# Patient Record
Sex: FEMALE | Race: WHITE | Employment: OTHER | URBAN - METROPOLITAN AREA
[De-identification: names, ages, dates, MRNs, and addresses within clinical notes are randomized per-mention and may not be internally consistent; named-entity substitution may affect disease eponyms.]

---

## 2017-04-05 RX ORDER — MELOXICAM 7.5 MG/1
TABLET ORAL
Qty: 30 TABLET | Refills: 0 | Status: SHIPPED | OUTPATIENT
Start: 2017-04-05 | End: 2017-05-05 | Stop reason: SDUPTHER

## 2017-09-05 ENCOUNTER — OFFICE VISIT (OUTPATIENT)
Dept: FAMILY MEDICINE CLINIC | Age: 82
End: 2017-09-05

## 2017-09-05 VITALS
HEART RATE: 94 BPM | RESPIRATION RATE: 16 BRPM | BODY MASS INDEX: 19.14 KG/M2 | OXYGEN SATURATION: 95 % | WEIGHT: 101.4 LBS | TEMPERATURE: 98 F | HEIGHT: 61 IN | SYSTOLIC BLOOD PRESSURE: 116 MMHG | DIASTOLIC BLOOD PRESSURE: 72 MMHG

## 2017-09-05 DIAGNOSIS — J06.9 VIRAL URI: ICD-10-CM

## 2017-09-05 DIAGNOSIS — Z91.09 ENVIRONMENTAL ALLERGIES: Primary | ICD-10-CM

## 2017-09-05 PROCEDURE — 99213 OFFICE O/P EST LOW 20 MIN: CPT | Performed by: NURSE PRACTITIONER

## 2017-09-05 RX ORDER — AZITHROMYCIN 250 MG/1
TABLET, FILM COATED ORAL
Qty: 1 PACKET | Refills: 0 | Status: SHIPPED | OUTPATIENT
Start: 2017-09-05 | End: 2017-09-15

## 2017-09-05 RX ORDER — MONTELUKAST SODIUM 10 MG/1
10 TABLET ORAL DAILY
Qty: 30 TABLET | Refills: 3 | Status: SHIPPED | OUTPATIENT
Start: 2017-09-05 | End: 2017-11-13 | Stop reason: ALTCHOICE

## 2017-09-05 ASSESSMENT — ENCOUNTER SYMPTOMS
SHORTNESS OF BREATH: 1
COUGH: 1
RHINORRHEA: 1

## 2017-11-13 ENCOUNTER — OFFICE VISIT (OUTPATIENT)
Dept: FAMILY MEDICINE CLINIC | Age: 82
End: 2017-11-13

## 2017-11-13 VITALS
WEIGHT: 100 LBS | DIASTOLIC BLOOD PRESSURE: 74 MMHG | SYSTOLIC BLOOD PRESSURE: 126 MMHG | RESPIRATION RATE: 14 BRPM | BODY MASS INDEX: 19.63 KG/M2 | HEIGHT: 60 IN | HEART RATE: 82 BPM

## 2017-11-13 DIAGNOSIS — M25.511 ACUTE PAIN OF RIGHT SHOULDER: ICD-10-CM

## 2017-11-13 DIAGNOSIS — M19.019 SHOULDER ARTHRITIS: ICD-10-CM

## 2017-11-13 DIAGNOSIS — S20.212D CONTUSION OF RIB ON LEFT SIDE, SUBSEQUENT ENCOUNTER: ICD-10-CM

## 2017-11-13 DIAGNOSIS — R03.0 ELEVATED BP WITHOUT DIAGNOSIS OF HYPERTENSION: Primary | ICD-10-CM

## 2017-11-13 PROCEDURE — 99213 OFFICE O/P EST LOW 20 MIN: CPT | Performed by: FAMILY MEDICINE

## 2017-11-13 ASSESSMENT — PATIENT HEALTH QUESTIONNAIRE - PHQ9
SUM OF ALL RESPONSES TO PHQ9 QUESTIONS 1 & 2: 0
1. LITTLE INTEREST OR PLEASURE IN DOING THINGS: 0
SUM OF ALL RESPONSES TO PHQ QUESTIONS 1-9: 0
2. FEELING DOWN, DEPRESSED OR HOPELESS: 0

## 2017-11-13 NOTE — ADDENDUM NOTE
Encounter addended by: Ash Ugalde MA on: 11/13/2017 12:20 PM<BR>    Actions taken: Letter status changed

## 2017-11-13 NOTE — PROGRESS NOTES
Subjective:      Patient ID: Partha Trujillo is a 80 y.o. female. HPI  Went up 3-4 steps up front steps and fell back down steps - reached for rail, missed it and lost balance - no dizzyness  No dizzyness -missed step - hit back of head but no serious injury - hurts to raise arm - able to do it - pain in deltoid area/ upper humerus  Left rib pain - still painful  W/ movement  Happened 5 days ago  Chief Complaint   Patient presents with    Follow-Up from [address] [place] [address], ELEVATED BP BACK AND RIGHT SHOULDER ARE STILL VERY SORE, NO FRACTURE FOUND. BP Readings from Last 3 Encounters:   11/13/17 126/74   11/08/17 (!) 187/97   09/05/17 116/72     Pulse Readings from Last 3 Encounters:   11/13/17 82   11/08/17 99   09/05/17 94     Wt Readings from Last 3 Encounters:   11/13/17 100 lb (45.4 kg)   11/08/17 103 lb 9.9 oz (47 kg)   09/05/17 101 lb 6.4 oz (46 kg)   no sx of high bp - high in ER -  High tolerance for pain ? - still pretty uncomfortable  bp can go up if mad  Taking kid size tylenol only  - seems to help some    Review of Systems    Objective:   Physical Exam   Constitutional: She is oriented to person, place, and time. She appears well-developed and well-nourished. No distress. HENT:   Head: Normocephalic and atraumatic. Mouth/Throat: Oropharynx is clear and moist.   Scalp nontender w/o trauma   Eyes: Conjunctivae are normal. No scleral icterus. Cardiovascular: Normal rate, regular rhythm and normal heart sounds. No murmur heard. Pulmonary/Chest: Effort normal and breath sounds normal. No respiratory distress. She has no wheezes. She has no rales. She exhibits tenderness (tender left lateral low rib cage to spine on left side). Abdominal: Soft. Bowel sounds are normal. She exhibits no distension. There is no tenderness. Musculoskeletal: She exhibits no edema.    Right shoulder good rom - some ttp to flex shoulder  Neg impingement   Neurological: She is alert and oriented to

## 2018-02-05 ENCOUNTER — OFFICE VISIT (OUTPATIENT)
Dept: FAMILY MEDICINE CLINIC | Age: 83
End: 2018-02-05

## 2018-02-05 VITALS
BODY MASS INDEX: 19.83 KG/M2 | DIASTOLIC BLOOD PRESSURE: 82 MMHG | WEIGHT: 101 LBS | SYSTOLIC BLOOD PRESSURE: 158 MMHG | HEIGHT: 60 IN | HEART RATE: 94 BPM

## 2018-02-05 DIAGNOSIS — R42 ACUTE ONSET OF SEVERE VERTIGO: ICD-10-CM

## 2018-02-05 DIAGNOSIS — M19.90 ARTHRITIS: ICD-10-CM

## 2018-02-05 DIAGNOSIS — R03.0 ELEVATED BP WITHOUT DIAGNOSIS OF HYPERTENSION: Primary | ICD-10-CM

## 2018-02-05 PROCEDURE — 99214 OFFICE O/P EST MOD 30 MIN: CPT | Performed by: FAMILY MEDICINE

## 2018-02-05 RX ORDER — MECLIZINE HCL 12.5 MG/1
12.5 TABLET ORAL
Qty: 30 TABLET | Refills: 0 | Status: SHIPPED | OUTPATIENT
Start: 2018-02-05 | End: 2022-05-12 | Stop reason: SDUPTHER

## 2018-02-05 RX ORDER — MELOXICAM 7.5 MG/1
TABLET ORAL
Qty: 90 TABLET | Refills: 1 | Status: SHIPPED | OUTPATIENT
Start: 2018-02-05 | End: 2019-05-09

## 2018-02-05 NOTE — LETTER
[address] [place] [id number]  Phone: [phone]  Fax: [phone] German Sherman MD        February 5, 2018     Patient: Neo Carlton   YOB: 1931   Date of Visit: 2/5/2018       To Whom it May Concern:    Melinda Durham was seen in my clinic on 2/5/2018. She should be excused from work from 2/4/2018-2/11/2018. If you have any questions or concerns, please don't hesitate to call.     Sincerely,         Raman Arevalo MD

## 2018-02-09 ENCOUNTER — OFFICE VISIT (OUTPATIENT)
Dept: FAMILY MEDICINE CLINIC | Age: 83
End: 2018-02-09

## 2018-02-09 VITALS
WEIGHT: 101 LBS | BODY MASS INDEX: 19.83 KG/M2 | RESPIRATION RATE: 16 BRPM | SYSTOLIC BLOOD PRESSURE: 148 MMHG | DIASTOLIC BLOOD PRESSURE: 84 MMHG | HEART RATE: 90 BPM | OXYGEN SATURATION: 96 % | HEIGHT: 60 IN

## 2018-02-09 DIAGNOSIS — I49.9 IRREGULAR HEART RATE: ICD-10-CM

## 2018-02-09 DIAGNOSIS — R03.0 ELEVATED BP WITHOUT DIAGNOSIS OF HYPERTENSION: Primary | ICD-10-CM

## 2018-02-09 DIAGNOSIS — I35.0 MODERATE AORTIC STENOSIS: ICD-10-CM

## 2018-02-09 DIAGNOSIS — I50.32 CHRONIC CONGESTIVE HEART FAILURE WITH LEFT VENTRICULAR DIASTOLIC DYSFUNCTION (HCC): ICD-10-CM

## 2018-02-09 PROCEDURE — 99213 OFFICE O/P EST LOW 20 MIN: CPT | Performed by: FAMILY MEDICINE

## 2018-02-09 PROCEDURE — 93000 ELECTROCARDIOGRAM COMPLETE: CPT | Performed by: FAMILY MEDICINE

## 2018-02-09 RX ORDER — METOPROLOL SUCCINATE 25 MG/1
12.5-25 TABLET, EXTENDED RELEASE ORAL DAILY
Qty: 30 TABLET | Refills: 2 | Status: SHIPPED | OUTPATIENT
Start: 2018-02-09 | End: 2019-03-22 | Stop reason: ALTCHOICE

## 2018-02-09 NOTE — PROGRESS NOTES
Subjective:      Patient ID: Lynda Salazar is a 80 y.o. female. HPI  Chief Complaint   Patient presents with    Hypertension     LOW PULSE     BP Readings from Last 3 Encounters:   02/09/18 (!) 156/84   02/05/18 (!) 158/82   11/13/17 126/74     Pulse Readings from Last 3 Encounters:   02/09/18 90   02/05/18 94   11/13/17 82     Wt Readings from Last 3 Encounters:   02/09/18 101 lb (45.8 kg)   02/05/18 101 lb (45.8 kg)   11/13/17 100 lb (45.4 kg)     Feeling more tired  Hr 74 earlier  Got up this am to clean bathroom  bp had been ok on check 1-2 days ago but high this am - 158/110  Still slight swimmy in brain but not near as bad  Pulse 88 at home other day. Taking meclizine regularly - cut back to one yesterday - took mobic yesterday -helps oa pain in hands  Taking naps - ? More tired  No cp/palp. Review of Systems    Objective:   Physical Exam   Constitutional: She is oriented to person, place, and time. She appears well-developed and well-nourished. No distress. HENT:   Head: Normocephalic and atraumatic. Mouth/Throat: Oropharynx is clear and moist.   Eyes: Conjunctivae are normal. No scleral icterus. Cardiovascular: Normal rate, regular rhythm and normal heart sounds. No murmur heard. Pulmonary/Chest: Effort normal and breath sounds normal. No respiratory distress. She has no wheezes. She has no rales. Abdominal: Soft. Bowel sounds are normal. She exhibits no distension. There is no tenderness. Musculoskeletal: She exhibits no edema. Neurological: She is alert and oriented to person, place, and time. Skin: Skin is warm and dry. Psychiatric: She has a normal mood and affect. Assessment:      1. Elevated BP without diagnosis of hypertension     2. Irregular heart rate  EKG 12 Lead   3. Moderate aortic stenosis     4.  Chronic congestive heart failure with left ventricular diastolic dysfunction (HCC)             Plan:      Monitor bp - precautions d/ w pt including low salt/ caffeine moderation/ diet/ exercise w/ low fat / increased fruit / veggies d/w pt - consider low dose beta blocker for   Echo reviewed  ekg w/o concerning results  dizzyness improved -meclizine prn  mobic prn w/ food  toprol xl 25 -1/2 to 1 po daily w/ monitoring.

## 2018-05-07 ENCOUNTER — TELEPHONE (OUTPATIENT)
Dept: FAMILY MEDICINE CLINIC | Age: 83
End: 2018-05-07

## 2018-05-07 ENCOUNTER — OFFICE VISIT (OUTPATIENT)
Dept: FAMILY MEDICINE CLINIC | Age: 83
End: 2018-05-07

## 2018-05-07 VITALS
DIASTOLIC BLOOD PRESSURE: 82 MMHG | SYSTOLIC BLOOD PRESSURE: 150 MMHG | HEIGHT: 60 IN | OXYGEN SATURATION: 98 % | HEART RATE: 117 BPM

## 2018-05-07 DIAGNOSIS — I49.9 IRREGULAR HEART BEAT: Primary | ICD-10-CM

## 2018-05-07 DIAGNOSIS — R01.1 HEART MURMUR: ICD-10-CM

## 2018-05-07 DIAGNOSIS — I49.8 SINUS ARRHYTHMIA: ICD-10-CM

## 2018-05-07 PROCEDURE — 93000 ELECTROCARDIOGRAM COMPLETE: CPT | Performed by: FAMILY MEDICINE

## 2018-05-07 PROCEDURE — 99213 OFFICE O/P EST LOW 20 MIN: CPT | Performed by: FAMILY MEDICINE

## 2018-12-12 RX ORDER — MELOXICAM 7.5 MG/1
TABLET ORAL
Qty: 30 TABLET | Refills: 3 | Status: SHIPPED | OUTPATIENT
Start: 2018-12-12 | End: 2019-03-22 | Stop reason: ALTCHOICE

## 2019-03-22 ENCOUNTER — TELEPHONE (OUTPATIENT)
Dept: FAMILY MEDICINE CLINIC | Age: 84
End: 2019-03-22

## 2019-03-22 ENCOUNTER — HOSPITAL ENCOUNTER (OUTPATIENT)
Dept: GENERAL RADIOLOGY | Age: 84
Discharge: HOME | End: 2019-03-22
Payer: MEDICARE

## 2019-03-22 ENCOUNTER — HOSPITAL ENCOUNTER (OUTPATIENT)
Dept: NON INVASIVE DIAGNOSTICS | Age: 84
Discharge: HOME | End: 2019-03-22
Payer: MEDICARE

## 2019-03-22 ENCOUNTER — OFFICE VISIT (OUTPATIENT)
Dept: FAMILY MEDICINE CLINIC | Age: 84
End: 2019-03-22
Payer: MEDICARE

## 2019-03-22 VITALS
DIASTOLIC BLOOD PRESSURE: 74 MMHG | HEART RATE: 90 BPM | RESPIRATION RATE: 20 BRPM | BODY MASS INDEX: 19.73 KG/M2 | SYSTOLIC BLOOD PRESSURE: 130 MMHG | HEIGHT: 60 IN | TEMPERATURE: 97.9 F | OXYGEN SATURATION: 96 %

## 2019-03-22 DIAGNOSIS — I35.0 SEVERE AORTIC STENOSIS: Primary | ICD-10-CM

## 2019-03-22 DIAGNOSIS — R06.02 SOB (SHORTNESS OF BREATH): ICD-10-CM

## 2019-03-22 DIAGNOSIS — J06.9 VIRAL URI: ICD-10-CM

## 2019-03-22 DIAGNOSIS — R06.02 SOB (SHORTNESS OF BREATH): Primary | ICD-10-CM

## 2019-03-22 DIAGNOSIS — I35.0 AORTIC VALVE STENOSIS, ETIOLOGY OF CARDIAC VALVE DISEASE UNSPECIFIED: ICD-10-CM

## 2019-03-22 LAB
LV EF: 63 %
LVEF MODALITY: NORMAL

## 2019-03-22 PROCEDURE — 99213 OFFICE O/P EST LOW 20 MIN: CPT | Performed by: FAMILY MEDICINE

## 2019-03-22 PROCEDURE — 93306 TTE W/DOPPLER COMPLETE: CPT

## 2019-03-22 PROCEDURE — 71046 X-RAY EXAM CHEST 2 VIEWS: CPT

## 2019-03-22 RX ORDER — DOXYCYCLINE HYCLATE 100 MG
100 TABLET ORAL 2 TIMES DAILY
Qty: 14 TABLET | Refills: 0 | Status: SHIPPED | OUTPATIENT
Start: 2019-03-22 | End: 2019-03-29

## 2019-03-22 ASSESSMENT — PATIENT HEALTH QUESTIONNAIRE - PHQ9
SUM OF ALL RESPONSES TO PHQ9 QUESTIONS 1 & 2: 0
2. FEELING DOWN, DEPRESSED OR HOPELESS: 0
SUM OF ALL RESPONSES TO PHQ QUESTIONS 1-9: 0
1. LITTLE INTEREST OR PLEASURE IN DOING THINGS: 0
SUM OF ALL RESPONSES TO PHQ QUESTIONS 1-9: 0

## 2019-03-26 ENCOUNTER — OFFICE VISIT (OUTPATIENT)
Dept: CARDIOLOGY CLINIC | Age: 84
End: 2019-03-26
Payer: MEDICARE

## 2019-03-26 VITALS
HEIGHT: 60 IN | SYSTOLIC BLOOD PRESSURE: 160 MMHG | WEIGHT: 97 LBS | HEART RATE: 94 BPM | BODY MASS INDEX: 19.04 KG/M2 | DIASTOLIC BLOOD PRESSURE: 80 MMHG | OXYGEN SATURATION: 95 %

## 2019-03-26 DIAGNOSIS — I10 ESSENTIAL HYPERTENSION: ICD-10-CM

## 2019-03-26 DIAGNOSIS — I65.21 CAROTID STENOSIS, ASYMPTOMATIC, RIGHT: ICD-10-CM

## 2019-03-26 DIAGNOSIS — R06.09 DOE (DYSPNEA ON EXERTION): ICD-10-CM

## 2019-03-26 DIAGNOSIS — E78.2 MIXED HYPERLIPIDEMIA: ICD-10-CM

## 2019-03-26 DIAGNOSIS — I35.0 SEVERE AORTIC STENOSIS: Primary | ICD-10-CM

## 2019-03-26 PROCEDURE — 99205 OFFICE O/P NEW HI 60 MIN: CPT | Performed by: INTERNAL MEDICINE

## 2019-03-28 DIAGNOSIS — I35.0 SEVERE AORTIC STENOSIS: ICD-10-CM

## 2019-03-28 DIAGNOSIS — R06.09 DOE (DYSPNEA ON EXERTION): ICD-10-CM

## 2019-03-28 DIAGNOSIS — E78.2 MIXED HYPERLIPIDEMIA: ICD-10-CM

## 2019-03-28 DIAGNOSIS — I65.21 CAROTID STENOSIS, ASYMPTOMATIC, RIGHT: ICD-10-CM

## 2019-03-28 LAB
A/G RATIO: 1.5 (ref 1.1–2.2)
ALBUMIN SERPL-MCNC: 4 G/DL (ref 3.4–5)
ALP BLD-CCNC: 56 U/L (ref 40–129)
ALT SERPL-CCNC: <5 U/L (ref 10–40)
ANION GAP SERPL CALCULATED.3IONS-SCNC: 9 MMOL/L (ref 3–16)
AST SERPL-CCNC: 11 U/L (ref 15–37)
BILIRUB SERPL-MCNC: 0.3 MG/DL (ref 0–1)
BUN BLDV-MCNC: 21 MG/DL (ref 7–20)
CALCIUM SERPL-MCNC: 9.8 MG/DL (ref 8.3–10.6)
CHLORIDE BLD-SCNC: 105 MMOL/L (ref 99–110)
CHOLESTEROL, TOTAL: 197 MG/DL (ref 0–199)
CO2: 28 MMOL/L (ref 21–32)
CREAT SERPL-MCNC: 0.8 MG/DL (ref 0.6–1.2)
GFR AFRICAN AMERICAN: >60
GFR NON-AFRICAN AMERICAN: >60
GLOBULIN: 2.7 G/DL
GLUCOSE BLD-MCNC: 85 MG/DL (ref 70–99)
HCT VFR BLD CALC: 44 % (ref 36–48)
HDLC SERPL-MCNC: 59 MG/DL (ref 40–60)
HEMOGLOBIN: 14.5 G/DL (ref 12–16)
LDL CHOLESTEROL CALCULATED: 120 MG/DL
MCH RBC QN AUTO: 28.1 PG (ref 26–34)
MCHC RBC AUTO-ENTMCNC: 33.1 G/DL (ref 31–36)
MCV RBC AUTO: 84.9 FL (ref 80–100)
PDW BLD-RTO: 14.9 % (ref 12.4–15.4)
PLATELET # BLD: 220 K/UL (ref 135–450)
PMV BLD AUTO: 9 FL (ref 5–10.5)
POTASSIUM SERPL-SCNC: 4.7 MMOL/L (ref 3.5–5.1)
RBC # BLD: 5.18 M/UL (ref 4–5.2)
SODIUM BLD-SCNC: 142 MMOL/L (ref 136–145)
TOTAL PROTEIN: 6.7 G/DL (ref 6.4–8.2)
TRIGL SERPL-MCNC: 90 MG/DL (ref 0–150)
TSH REFLEX: 1.94 UIU/ML (ref 0.27–4.2)
VLDLC SERPL CALC-MCNC: 18 MG/DL
WBC # BLD: 5 K/UL (ref 4–11)

## 2019-04-03 PROBLEM — I10 ESSENTIAL HYPERTENSION: Status: ACTIVE | Noted: 2019-04-03

## 2019-04-03 PROBLEM — I65.21 CAROTID STENOSIS, ASYMPTOMATIC, RIGHT: Status: ACTIVE | Noted: 2019-04-03

## 2019-04-03 NOTE — PROGRESS NOTES
[address]       H+P // CONSULT // OUTPATIENT VISIT // Hunter Lemus     Referring Doctor Gumaro Driver MD   Encounter Type New     CHIEF COMPLAINT     VisitType [x] Acute [] Chronic     Sxs [] None [] CP [x] SOB [] Dizzy [] Palps [] Fatigue     Problems AS, HTN, Carotid Stenosis      HISTORY OF PRESENT ILLNESS     New referral from primary cardiologist for severe AS. Recent echo with EF 65% and AV MG 51mmHg consistent with severe AS. Reports increasing fatigue and sob with exertion. Works at [employer] and feels not able to keep up. Symptom Y N Frequency Duration Severity Modify Assoc Sx Other   CP [] [x]         SOB [x] [] occasion mins mild +stress -rest     Dizzy [] [x]         Syncope [] [x]         Palpitations [] [x]           COMPLIANCE     Category Meds Diet Salt Exercise Tobacco Alcohol Drugs   Compliant [x] [x] [x] [x] [x] [x] [x]   [x]Counseling given on all above above categories    HISTORY/ALLERGIES/ROS     MedHx:  has a past medical history of Aortic stenosis and Arthritis. SurgHx:  has no past surgical history on file. SocHx:  reports that she has never smoked. She has never used smokeless tobacco. She reports that she does not drink alcohol or use drugs. FamHx: family history includes Asthma in her daughter; Cancer in her brother; Diabetes in her mother; Heart Disease in her father and mother. Allergies: Patient has no known allergies.    ROS:  [x]Full ROS obtained and negative except as mentioned in HPI     OP CV MEDICATIONS     None    PHYSICAL EXAM     Vitals:    04/04/19 0812   BP: 138/84   Pulse:       Gen Alert, coop, no distress Heart  RRR, 4/6   Head NC, AT, no abnorm Abd  Soft, NT, +BS, no mass, no OM   Eyes PER, conj/corn clear Ext  Ext nl, AT, no C/C/E   Nose Nares nl, no drain, NT Pulse 2+ and symmetric   Throat Lips, mucosa, tongue nl Skin Col/text/turg nl, no vis rash/les   Neck S/S, TM, NT, no bruit/JVD Psych Nl mood and affect   Lung CTA-B, unlabored, no DTP Lymph   No cervical or axillary LA   Ch wall NT, no deform Neuro  Nl gross M/S exam     ASSESSMENT AND PLAN     ~AS   Date EF Detail   Sx   SOB, fatigue   Hx   No intervention   NYHA   []I         [x]II           []III          []IV   TTE 11/16  3/19 60%  65% Mod AS MG 28  Severe AS MG 51   EKG   Sinus arrhythmia   STS   8.2   Plan   TAVR workup - LHC, CTA, CTSX2   ~HTN  Today BP [x] Controlled [] Borderline [] Uncontrolled   Counseling [x] Diet/Salt [x] Exercise [x] Weight    Plan Continue current medications at doses detailed above  ~Carotid Stenosis   Date Results   CUS  ALEXIS 28-16%, LICA <75%   Intervention  Asymptomatic: Angio 60%, CT 80%  Symptomatic: Angio 50%, CT 70%   Plan  Observation   ~Followup  Interval: After testing  Tests/Labs: LHC, CTA, CTSX2    Scribe Attestation  IChristine, am scribing for and in the presence of Seven Sandy MD.   Signed, Christine Larson 04/03/19 2:47 PM   Provider Elvi Valenzuela is working as a scribe for and in the presence of me (Seven Sandy MD). Working as a scribe, Christine Larson may have prepopulated components of this note with my historical  intellectual property under my direct supervision. Any additions to this intellectual property were performed in my presence and at my direction.   Furthermore, the content and accuracy of this note have been reviewed by me Seven Sandy MD).  4/4/2019 8:00 AM

## 2019-04-04 ENCOUNTER — OFFICE VISIT (OUTPATIENT)
Dept: CARDIOLOGY CLINIC | Age: 84
End: 2019-04-04
Payer: MEDICARE

## 2019-04-04 VITALS
HEART RATE: 100 BPM | BODY MASS INDEX: 19.82 KG/M2 | HEIGHT: 59 IN | SYSTOLIC BLOOD PRESSURE: 138 MMHG | WEIGHT: 98.3 LBS | DIASTOLIC BLOOD PRESSURE: 84 MMHG

## 2019-04-04 DIAGNOSIS — I10 ESSENTIAL HYPERTENSION: ICD-10-CM

## 2019-04-04 DIAGNOSIS — I35.0 NONRHEUMATIC AORTIC VALVE STENOSIS: Primary | ICD-10-CM

## 2019-04-04 DIAGNOSIS — I65.21 CAROTID STENOSIS, ASYMPTOMATIC, RIGHT: ICD-10-CM

## 2019-04-04 PROCEDURE — 99214 OFFICE O/P EST MOD 30 MIN: CPT | Performed by: INTERNAL MEDICINE

## 2019-04-04 NOTE — LETTER
[address] [address] [place] [address]  Phone: [phone]  Fax: [phone]    Lynnette Barrow MD        April 4, 2019     Alek Uriostegui, [address] [address]    Patient: Aleena Roach  MR Number: [id number]  YOB: 1931  Date of Visit: 4/4/2019    Dear Dr. Alek Uriostegui:      Familia Villalobos       H+P // CONSULT // OUTPATIENT VISIT // Asif Diaz     Referring Doctor Alek Uriostegui MD   Encounter Type New     CHIEF COMPLAINT     VisitType [x] Acute [] Chronic     Sxs [] None [] CP [x] SOB [] Dizzy [] Palps [] Fatigue     Problems AS, HTN, Carotid Stenosis      HISTORY OF PRESENT ILLNESS     New referral from primary cardiologist for severe AS. Recent echo with EF 65% and AV MG 51mmHg consistent with severe AS. Reports increasing fatigue and sob with exertion. Works at [employer] and feels not able to keep up. Symptom Y N Frequency Duration Severity Modify Assoc Sx Other   CP [] [x]         SOB [x] [] occasion mins mild +stress -rest     Dizzy [] [x]         Syncope [] [x]         Palpitations [] [x]           COMPLIANCE     Category Meds Diet Salt Exercise Tobacco Alcohol Drugs   Compliant [x] [x] [x] [x] [x] [x] [x]   [x]Counseling given on all above above categories    HISTORY/ALLERGIES/ROS     MedHx:  has a past medical history of Aortic stenosis and Arthritis. SurgHx:  has no past surgical history on file. SocHx:  reports that she has never smoked. She has never used smokeless tobacco. She reports that she does not drink alcohol or use drugs. FamHx: family history includes Asthma in her daughter; Cancer in her brother; Diabetes in her mother; Heart Disease in her father and mother. Allergies: Patient has no known allergies.    ROS:  [x]Full ROS obtained and negative except as mentioned in HPI     OP CV MEDICATIONS     None    PHYSICAL EXAM     Vitals:    04/04/19 0812   BP: 138/84   Pulse: Gen Alert, coop, no distress Heart  RRR, 4/6   Head NC, AT, no abnorm Abd  Soft, NT, +BS, no mass, no OM   Eyes PER, conj/corn clear Ext  Ext nl, AT, no C/C/E   Nose Nares nl, no drain, NT Pulse 2+ and symmetric   Throat Lips, mucosa, tongue nl Skin Col/text/turg nl, no vis rash/les   Neck S/S, TM, NT, no bruit/JVD Psych Nl mood and affect   Lung CTA-B, unlabored, no DTP Lymph   No cervical or axillary LA   Ch wall NT, no deform Neuro  Nl gross M/S exam     ASSESSMENT AND PLAN     ~AS   Date EF Detail   Sx   SOB, fatigue   Hx   No intervention   NYHA   []I         [x]II           []III          []IV   TTE 11/16  3/19 60%  65% Mod AS MG 28  Severe AS MG 51   EKG   Sinus arrhythmia   STS   8.2   Plan   TAVR workup - LHC, CTA, CTSX2   ~HTN  Today BP [x] Controlled [] Borderline [] Uncontrolled   Counseling [x] Diet/Salt [x] Exercise [x] Weight    Plan Continue current medications at doses detailed above  ~Carotid Stenosis   Date Results   CUS  ALEXIS 40-79%, LICA <14%   Intervention  Asymptomatic: Angio 60%, CT 80%  Symptomatic: Angio 50%, CT 70%   Plan  Observation   ~Followup  Interval: After testing  Tests/Labs: LHC, CTA, CTSX2    Scribe Attestation  Noel HERNANDEZ, am scribing for and in the presence of Gerald Lozano MD.   SignedNoel 04/03/19 2:47 PM   Provider Patrick Ricci is working as a scribe for and in the presence of me (Gerald Lozano MD). Working as a scribe, Noel Anderson may have prepopulated components of this note with my historical  intellectual property under my direct supervision. Any additions to this intellectual property were performed in my presence and at my direction. Furthermore, the content and accuracy of this note have been reviewed by me Gerald Lozano MD).  4/4/2019 8:00 AM      If you have questions, please do not hesitate to call me. I look forward to following [place] along with you.     Sincerely,        Stoney Salter MD

## 2019-04-08 ENCOUNTER — TELEPHONE (OUTPATIENT)
Dept: CARDIOLOGY CLINIC | Age: 84
End: 2019-04-08

## 2019-04-08 DIAGNOSIS — I35.0 SEVERE AORTIC STENOSIS: Primary | ICD-10-CM

## 2019-04-08 DIAGNOSIS — I35.0 SEVERE AORTIC STENOSIS: ICD-10-CM

## 2019-04-08 LAB
ANION GAP SERPL CALCULATED.3IONS-SCNC: 11 MMOL/L (ref 3–16)
BUN BLDV-MCNC: 14 MG/DL (ref 7–20)
CALCIUM SERPL-MCNC: 9.3 MG/DL (ref 8.3–10.6)
CHLORIDE BLD-SCNC: 102 MMOL/L (ref 99–110)
CO2: 27 MMOL/L (ref 21–32)
CREAT SERPL-MCNC: 0.8 MG/DL (ref 0.6–1.2)
GFR AFRICAN AMERICAN: >60
GFR NON-AFRICAN AMERICAN: >60
GLUCOSE BLD-MCNC: 94 MG/DL (ref 70–99)
HCT VFR BLD CALC: 42.6 % (ref 36–48)
HEMOGLOBIN: 14 G/DL (ref 12–16)
INR BLD: 0.84 (ref 0.86–1.14)
MCH RBC QN AUTO: 27.8 PG (ref 26–34)
MCHC RBC AUTO-ENTMCNC: 32.8 G/DL (ref 31–36)
MCV RBC AUTO: 84.7 FL (ref 80–100)
PDW BLD-RTO: 15.1 % (ref 12.4–15.4)
PLATELET # BLD: 219 K/UL (ref 135–450)
PMV BLD AUTO: 9.6 FL (ref 5–10.5)
POTASSIUM SERPL-SCNC: 4.4 MMOL/L (ref 3.5–5.1)
PROTHROMBIN TIME: 9.6 SEC (ref 9.8–13)
RBC # BLD: 5.03 M/UL (ref 4–5.2)
SODIUM BLD-SCNC: 140 MMOL/L (ref 136–145)
WBC # BLD: 4.8 K/UL (ref 4–11)

## 2019-04-08 NOTE — TELEPHONE ENCOUNTER
Scheduled left heart cath 4/17/19 at 9 arrive at 0730. Published on [employer] and e-mail to [place]. The morning of your procedure you will park in the hospital parking lot and report directly to the cath lab to check in.     Pre-Procedure Instructions   1. You will need to fast for at least 8 hours prior to procedure. No caffeine the morning of.   2. All other medications can be taken in the morning with sips of water. 3. You will need to take 325 mg aspirin the morning of. If you are currently taking 81 mg please take 4 tablets that morning. 4. Do not use any lotions, creams or perfume the morning of procedure. 5. Pre-procedure lab work will need to be completed 5-7 days prior to procedure. 6. Please have a responsible adult to drive you home after procedure. Depending on procedure you may require an overnight stay. 7. Cath lab will provide you with all post procedure instructions. If you have any questions regarding the procedure itself or medications, please call [phone] and ask to speak with a nurse.

## 2019-04-08 NOTE — TELEPHONE ENCOUNTER
Called [facility] spoke with granddaughter CTA Friday 4/19/19 @ 11:30 patient arrival test @ 12:00 pm in [place].     NPO 4 hrs prior/bring med list

## 2019-04-16 NOTE — PRE-PROCEDURE INSTRUCTIONS
Called patient about procedure in cath lab, instructed to arrive at  0730 for procedure at 0900. Patient should be NPO after midnight, but can take morning medication with sips of water. Instructed to have a responsible adult be with patient to take them home and stay with them afterwards. Patient asked to bring current list of medications. All questions and concerns addressed.

## 2019-04-17 ENCOUNTER — HOSPITAL ENCOUNTER (OUTPATIENT)
Dept: CARDIAC CATH/INVASIVE PROCEDURES | Age: 84
Setting detail: OUTPATIENT SURGERY
Discharge: HOME | End: 2019-04-17
Payer: MEDICARE

## 2019-04-17 VITALS — BODY MASS INDEX: 19.78 KG/M2 | WEIGHT: 98.11 LBS | HEIGHT: 59 IN

## 2019-04-17 LAB
EKG ATRIAL RATE: 78 BPM
EKG DIAGNOSIS: NORMAL
EKG P AXIS: -15 DEGREES
EKG P-R INTERVAL: 152 MS
EKG Q-T INTERVAL: 386 MS
EKG QRS DURATION: 78 MS
EKG QTC CALCULATION (BAZETT): 440 MS
EKG R AXIS: 38 DEGREES
EKG T AXIS: 77 DEGREES
EKG VENTRICULAR RATE: 78 BPM
POC ACT LR: 263 SEC

## 2019-04-17 PROCEDURE — 93571 IV DOP VEL&/PRESS C FLO 1ST: CPT

## 2019-04-17 PROCEDURE — 6360000002 HC RX W HCPCS

## 2019-04-17 PROCEDURE — 93454 CORONARY ARTERY ANGIO S&I: CPT | Performed by: INTERNAL MEDICINE

## 2019-04-17 PROCEDURE — C1887 CATHETER, GUIDING: HCPCS

## 2019-04-17 PROCEDURE — 6360000004 HC RX CONTRAST MEDICATION: Performed by: INTERNAL MEDICINE

## 2019-04-17 PROCEDURE — 2720000010 HC SURG SUPPLY STERILE

## 2019-04-17 PROCEDURE — 93454 CORONARY ARTERY ANGIO S&I: CPT

## 2019-04-17 PROCEDURE — 99152 MOD SED SAME PHYS/QHP 5/>YRS: CPT

## 2019-04-17 PROCEDURE — 2709999900 HC NON-CHARGEABLE SUPPLY

## 2019-04-17 PROCEDURE — 99153 MOD SED SAME PHYS/QHP EA: CPT

## 2019-04-17 PROCEDURE — 2580000003 HC RX 258

## 2019-04-17 PROCEDURE — 99152 MOD SED SAME PHYS/QHP 5/>YRS: CPT | Performed by: INTERNAL MEDICINE

## 2019-04-17 PROCEDURE — 2500000003 HC RX 250 WO HCPCS

## 2019-04-17 PROCEDURE — 93571 IV DOP VEL&/PRESS C FLO 1ST: CPT | Performed by: INTERNAL MEDICINE

## 2019-04-17 PROCEDURE — 93005 ELECTROCARDIOGRAM TRACING: CPT | Performed by: INTERNAL MEDICINE

## 2019-04-17 PROCEDURE — C1894 INTRO/SHEATH, NON-LASER: HCPCS

## 2019-04-17 PROCEDURE — 85347 COAGULATION TIME ACTIVATED: CPT

## 2019-04-17 PROCEDURE — C1769 GUIDE WIRE: HCPCS

## 2019-04-17 RX ORDER — SODIUM CHLORIDE 9 MG/ML
INJECTION, SOLUTION INTRAVENOUS CONTINUOUS
Status: DISCONTINUED | OUTPATIENT
Start: 2019-04-17 | End: 2019-04-18 | Stop reason: HOSPADM

## 2019-04-17 RX ORDER — SODIUM CHLORIDE 0.9 % (FLUSH) 0.9 %
10 SYRINGE (ML) INJECTION EVERY 12 HOURS SCHEDULED
Status: DISCONTINUED | OUTPATIENT
Start: 2019-04-17 | End: 2019-04-17 | Stop reason: ALTCHOICE

## 2019-04-17 RX ORDER — SODIUM CHLORIDE 0.9 % (FLUSH) 0.9 %
10 SYRINGE (ML) INJECTION PRN
Status: DISCONTINUED | OUTPATIENT
Start: 2019-04-17 | End: 2019-04-18 | Stop reason: HOSPADM

## 2019-04-17 RX ORDER — ACETAMINOPHEN 325 MG/1
650 TABLET ORAL EVERY 4 HOURS PRN
Status: DISCONTINUED | OUTPATIENT
Start: 2019-04-17 | End: 2019-04-18 | Stop reason: HOSPADM

## 2019-04-17 RX ORDER — SODIUM CHLORIDE 0.9 % (FLUSH) 0.9 %
10 SYRINGE (ML) INJECTION PRN
Status: DISCONTINUED | OUTPATIENT
Start: 2019-04-17 | End: 2019-04-17 | Stop reason: ALTCHOICE

## 2019-04-17 RX ORDER — SODIUM CHLORIDE 0.9 % (FLUSH) 0.9 %
10 SYRINGE (ML) INJECTION EVERY 12 HOURS SCHEDULED
Status: DISCONTINUED | OUTPATIENT
Start: 2019-04-17 | End: 2019-04-18 | Stop reason: HOSPADM

## 2019-04-17 RX ORDER — ASPIRIN 325 MG
325 TABLET ORAL ONCE
Status: DISCONTINUED | OUTPATIENT
Start: 2019-04-17 | End: 2019-04-17 | Stop reason: ALTCHOICE

## 2019-04-17 RX ORDER — ONDANSETRON 2 MG/ML
4 INJECTION INTRAMUSCULAR; INTRAVENOUS EVERY 6 HOURS PRN
Status: DISCONTINUED | OUTPATIENT
Start: 2019-04-17 | End: 2019-04-18 | Stop reason: HOSPADM

## 2019-04-17 RX ADMIN — IOPAMIDOL 90 ML: 755 INJECTION, SOLUTION INTRAVENOUS at 09:47

## 2019-04-17 NOTE — PRE SEDATION
Brief Pre-Op Note/Sedation Assessment      Jordon Rojo  4/14/1931  Room/bed info not found  [id number]  8:58 AM    Planned Procedure: Cardiac Catheterization Procedure    Post Procedure Plan: Return to same level of care    Consent: I have discussed with the patient and/or the patient representative the indication, alternatives, and the possible risks and/or complications of the planned procedure and the anesthesia methods. The patient and/or patient representative appear to understand and agree to proceed. Chief Complaint: Dyspnea on Exertion      Indications for Cath Procedure:  Valvular Disease - Aortic Stenosis; Stenosis Severity: Severe  Anginal Classification within 2 weeks:  CCS III - Symptoms with everyday living activities, i.e., moderate limitation  NYHA Heart Failure Class within 2 weeks: No symptoms    Anti- Anginal Meds within 2 weeks:   No: Contraindicated  none    Stress or Imaging Studies Performed:  None    Vital Signs:  Ht 4' 11\" (1.499 m)   Wt 98 lb 1.7 oz (44.5 kg)   LMP  (Exact Date)   BMI 19.81 kg/m²     Allergies:  No Known Allergies    Past Medical History:  Past Medical History:   Diagnosis Date    Aortic stenosis     Arthritis          Surgical History:  History reviewed. No pertinent surgical history.       Medications:  Current Outpatient Medications   Medication Sig Dispense Refill    aspirin 81 MG tablet Take 81 mg by mouth daily      meloxicam (MOBIC) 7.5 MG tablet TAKE ONE TABLET BY MOUTH ONCE DAILY 90 tablet 1     Current Facility-Administered Medications   Medication Dose Route Frequency Provider Last Rate Last Dose    0.9 % sodium chloride infusion   Intravenous Continuous Olivia Fagan MD        sodium chloride flush 0.9 % injection 10 mL  10 mL Intravenous 2 times per day Olivia Fagan MD        sodium chloride flush 0.9 % injection 10 mL  10 mL Intravenous PRN Olivia Fagan MD        aspirin tablet 325 mg  325 mg Oral Once Olivia Fagan MD

## 2019-04-17 NOTE — H&P
[address]  Cardiology Consult    Wally Carr  4/14/1931 April 17, 2019      CC: Shortness of breath       Subjective:     History of Present Illness:    Wally Carr is a 80 y.o. patient with a PMH significant for aortic valve stenosis     She is here for [facility] to define coronary anatomy     Has progressive dyspnea     Progressive dyspnea mild chest discomfort    Quality. Progressive dyspnea for several weeks  Severity. Moderately severe  Present with exertion relieved by rest      Past Medical History:   has a past medical history of Aortic stenosis and Arthritis. Surgical History:   has no past surgical history on file. Social History:   reports that she has never smoked. She has never used smokeless tobacco. She reports that she does not drink alcohol or use drugs. Family History:  family history includes Asthma in her daughter; Cancer in her brother; Diabetes in her mother; Heart Disease in her father and mother. Home Medications:  Were reviewed and are listed in nursing record and/or below  Prior to Admission medications    Medication Sig Start Date End Date Taking? Authorizing Provider   aspirin 81 MG tablet Take 81 mg by mouth daily   Yes Historical Provider, MD   meloxicam (MOBIC) 7.5 MG tablet TAKE ONE TABLET BY MOUTH ONCE DAILY 2/5/18  Yes Kinsey Cat MD        CURRENT Medications:    0.9 % sodium chloride infusion Continuous   sodium chloride flush 0.9 % injection 10 mL 2 times per day   sodium chloride flush 0.9 % injection 10 mL PRN   aspirin tablet 325 mg Once       Allergies:  Patient has no known allergies. Review of Systems: SEE HPI   · Constitutional: no unanticipated weight loss. There's been no change in energy level, sleep pattern, or activity level. No fevers, chills. · Eyes: No visual changes or diplopia. No scleral icterus. · ENT: No Headaches, hearing loss or vertigo. No mouth sores or sore throat.   · Cardiovascular: No Chest pain, tightness or discomfort.  No Shortness of breath. No Dyspnea on exertion, Orthopnea, Paroxysmal nocturnal dyspnea or breathlessness at rest.   No Palpitations.  No Syncope ('blackouts', 'faints', 'collapse') or dizziness. · Respiratory: No cough or wheezing, no sputum production. No hematemesis. · Gastrointestinal: No abdominal pain, appetite loss, blood in stools. No change in bowel or bladder habits. · Genitourinary: No dysuria, trouble voiding, or hematuria. · Musculoskeletal:  No gait disturbance, no joint complaints. · Integumentary: No rash or pruritis. · Neurological: No headache, diplopia, change in muscle strength, numbness or tingling. · Psychiatric: No anxiety or depression. · Endocrine: No temperature intolerance. No excessive thirst, fluid intake, or urination. No tremor. · Hematologic/Lymphatic: No abnormal bruising or bleeding, blood clots or swollen lymph nodes. · Allergic/Immunologic: No nasal congestion or hives. Objective:     PHYSICAL EXAM:      There were no vitals filed for this visit. Weight: 98 lb 1.7 oz (44.5 kg)       General Appearance:  Alert, cooperative, no distress, appears stated age. Head:  Normocephalic, without obvious abnormality, atraumatic. Eyes:  Pupils equal and round. No scleral icterus. Mouth: Moist mucosa, no pharyngeal erythema. Nose: Nares normal. No drainage or sinus tenderness. Neck: Supple, symmetrical, trachea midline. No adenopathy. No tenderness/mass/nodules. No carotid bruit or elevated JVD. Lungs:   Respiratory Effort: Normal   Auscultation: Clear to auscultation bilaterally, respirations unlabored. No wheeze, rales   Chest Wall:  No tenderness or deformity. Cardiovascular:    Pulses  Palpation: normal   Ascultation: Regular rate, S1/ S2 normal. No murmur, rub, or gallop. 2+ radial and pedal pulses, symmetric  Carotid  Femoral   Abdomen and Gastrointestinal:   Soft, non-tender, bowel sounds active.   Liver and Spleen  Masses Musculoskeletal: No muscle wasting  Back  Gait   Extremities: Extremities normal, atraumatic. No cyanosis or edema. No cyanosis clubbing       Skin: Inspection and palpation performed, no rashes or lesions. Pysch: Normal mood and affect. Alert and oriented to time place person   Neurologic: Normal gross motor and sensory exam.       Labs     All labs have been reviewed    Lab Results   Component Value Date    WBC 4.8 04/08/2019    RBC 5.03 04/08/2019    HGB 14.0 04/08/2019    HCT 42.6 04/08/2019    MCV 84.7 04/08/2019    RDW 15.1 04/08/2019     04/08/2019     Lab Results   Component Value Date     04/08/2019    K 4.4 04/08/2019     04/08/2019    CO2 27 04/08/2019    BUN 14 04/08/2019    CREATININE 0.8 04/08/2019    GFRAA >60 04/08/2019    AGRATIO 1.5 03/28/2019    LABGLOM >60 04/08/2019    GLUCOSE 94 04/08/2019    PROT 6.7 03/28/2019    CALCIUM 9.3 04/08/2019    BILITOT 0.3 03/28/2019    ALKPHOS 56 03/28/2019    AST 11 03/28/2019    ALT <5 03/28/2019     No results found for: PTINR  No results found for: LABA1C  No results found for: CKTOTAL, CKMB, CKMBINDEX, TROPONINI    Cardiac, Vascular and Imaging Data all Personally Reviewed in Detail by Myself        Echocardiogram: Left ventricular cavity size is normal. There is mild concentric left   ventricular hypertrophy. Overall left ventricular systolic function appears   normal with an ejection fraction of 60-65%. No regional wall motion   abnormalities are noted. Normal diastolic function.   The aortic valve is thickened/calcified. Severe aortic stenosis with a peak   velocity of 4.4m/s and a mean pressure gradient of 51mmHg.   Mild tricuspid regurgitation.   Estimated pulmonary artery systolic pressure is at 31 mmHg assuming a right   atrial pressure of 3 mmHg.   Compare to prior echo on (11/17/16).        Assessment and Plan     -Severe Aortic valve stenosis   [facility] today to define coronary anatomy     Hx oF CAD per CTA    Carotid stenosis

## 2019-04-17 NOTE — PROCEDURES
[address] [facility]                            CARDIAC CATHETERIZATION    PATIENT NAME: Bhargavi Calloway                   :        1931  MED REC NO:   [id number]                          ROOM:  ACCOUNT NO:   [de-identified]                           ADMIT DATE: 2019  PROVIDER:     Amina Adams MD    DATE OF PROCEDURE:  2019    PROCEDURE PERFORMED:  1. Left heart catheterization. 2.  Fractional flow reserve of left anterior descending artery. INDICATION FOR PROCEDURE:  Severe aortic valve stenosis. Informed consent obtained. PROCEDURE IN DETAIL:  The patient was brought to the cardiac cath  laboratory. The right groin was prepped and draped in sterile fashion. We gave lidocaine. We accessed the right common femoral artery using micropuncture access  needle and inserted a 4 x 5 slender sheath. Dilators and wires were  removed. Sheath was flushed. JL4 diagnostic catheter was advanced and  used to select and engage the left main coronary artery ostium. We  performed angiography of the left system. JL was removed. Evangelina Fling was  advanced and used to select and engage the right coronary artery ostium. We performed angiography of the right system. Evangelina Fling was removed. XB3.5  guide catheter was advanced over the J-wire, this was a 5-Panamanian. It  was used to select and engage the left main coronary artery ostium. We  performed angiography. We advanced a Runthrough coronary guidewire in  the LAD. We performed fractional flow reserve using ACIST system. The  fractional flow reserve of proximal left anterior descending artery was  0.84. After that, catheters, wires, sheaths were removed. Manual pressure was  applied to achieve hemostasis. No complications. Estimated blood loss  less than 20 mL. ANGIOGRAPHY FINDINGS:  1. Left main is normal with CLARISSA 3 flow. No stenosis.   2.  Left anterior descending artery in the proximal portion has 60%  stenosis. Fractional flow reserve is 0.84. Patent diagonal branches. 3.  Left circumflex artery is patent without significant stenosis. CLARISSA  3 flow. 4.  Right coronary artery is coming from right coronary cusp. It is  right dominant. Proximal portion has 50% stenosis. Patent posterior  descending artery. SUMMARY:  1. Left anterior descending artery proximal portion 60% stenosis. The  fractional flow reserve was 0.84.  2.  Right coronary artery 50%. RECOMMENDATION:  1. Continue postop post cath care. 2.  Site care. 3.  Risk factor modification. 4.  Continue TAVR workup. 5.  Follow up with Dr. Nia Escobedo and Dr. Nikki Stark.         Corie Banegas MD    D: 04/17/2019 9:37:17       T: 04/17/2019 12:19:08     [id number]  Job#: [id number]     Doc#: [id number]    CC:

## 2019-04-19 ENCOUNTER — HOSPITAL ENCOUNTER (OUTPATIENT)
Dept: CT IMAGING | Age: 84
Discharge: HOME | End: 2019-04-19
Payer: MEDICARE

## 2019-04-19 DIAGNOSIS — I35.0 NONRHEUMATIC AORTIC VALVE STENOSIS: ICD-10-CM

## 2019-04-19 DIAGNOSIS — I65.21 CAROTID STENOSIS, ASYMPTOMATIC, RIGHT: ICD-10-CM

## 2019-04-19 PROCEDURE — 70498 CT ANGIOGRAPHY NECK: CPT

## 2019-04-19 PROCEDURE — 71275 CT ANGIOGRAPHY CHEST: CPT

## 2019-04-19 PROCEDURE — 6360000004 HC RX CONTRAST MEDICATION: Performed by: INTERNAL MEDICINE

## 2019-04-19 PROCEDURE — 74174 CTA ABD&PLVS W/CONTRAST: CPT

## 2019-04-19 RX ADMIN — IOPAMIDOL 150 ML: 755 INJECTION, SOLUTION INTRAVENOUS at 12:06

## 2019-04-23 NOTE — TELEPHONE ENCOUNTER
LAST APPT 3/22/19 WITH DR Antonio BLUNT  NO FOLLOW UP SCHEDULED    Sodium 140  136 - 145 mmol/L Final 04/08/2019 11:17 AM [facility]   Potassium 4.4  3.5 - 5.1 mmol/L Final 04/08/2019 11:17 AM [facility]   Chloride 102  99 - 110 mmol/L Final 04/08/2019 11:17 AM [facility]   CO2 27  21 - 32 mmol/L Final 04/08/2019 11:17 AM [facility]   Anion Gap 11  3 - 16 Final 04/08/2019 11:17 AM [facility]   Glucose 94  70 - 99 mg/dL Final 04/08/2019 11:17 AM [facility]   BUN 14  7 - 20 mg/dL Final 04/08/2019 11:17 AM [facility]   CREATININE 0.8  0.6 - 1.2 mg/dL Final 04/08/2019 11:17 AM [facility]   GFR Non-African American >60  >60 Final 04/08/2019 11:17 AM [facility]   >60 mL/min/1.73m2 EGFR, calc. for ages 25 and older using the   MDRD formula (not corrected for weight), is valid for stable   renal function. GFR  >60  >60 Final 04/08/2019 11:17 AM [facility]   Chronic Kidney Disease: less than 60 ml/min/1.73 sq. m.         Kidney Failure: less than 15 ml/min/1.73 sq.m. Results valid for patients 18 years and older.     Calcium 9.3  8.3 - 10.6 mg/dL Final 04/08/2019 11:17 AM [facility]   Testing Performed By

## 2019-04-24 ENCOUNTER — OFFICE VISIT (OUTPATIENT)
Dept: CARDIOTHORACIC SURGERY | Age: 84
End: 2019-04-24
Payer: MEDICARE

## 2019-04-24 VITALS
OXYGEN SATURATION: 98 % | BODY MASS INDEX: 19.76 KG/M2 | SYSTOLIC BLOOD PRESSURE: 166 MMHG | DIASTOLIC BLOOD PRESSURE: 80 MMHG | HEART RATE: 98 BPM | TEMPERATURE: 97.4 F | HEIGHT: 59 IN | WEIGHT: 98 LBS

## 2019-04-24 DIAGNOSIS — I35.0 NONRHEUMATIC AORTIC VALVE STENOSIS: Primary | ICD-10-CM

## 2019-04-24 PROCEDURE — 99205 OFFICE O/P NEW HI 60 MIN: CPT | Performed by: THORACIC SURGERY (CARDIOTHORACIC VASCULAR SURGERY)

## 2019-04-24 RX ORDER — MELOXICAM 7.5 MG/1
TABLET ORAL
Qty: 30 TABLET | Refills: 3 | Status: ON HOLD | OUTPATIENT
Start: 2019-04-24 | End: 2019-05-22 | Stop reason: HOSPADM

## 2019-04-24 ASSESSMENT — ENCOUNTER SYMPTOMS
BACK PAIN: 0
ANAL BLEEDING: 0
NAUSEA: 0
SORE THROAT: 0
SINUS PAIN: 0
COUGH: 0
EYE REDNESS: 0
CHEST TIGHTNESS: 0
EYE PAIN: 0
SHORTNESS OF BREATH: 1
ABDOMINAL PAIN: 0

## 2019-04-24 NOTE — PROGRESS NOTES
Subjective:      Patient ID: Fer Lovett is a 80 y.o. female. Chief Complaint   Patient presents with   Sofie Mcclure Patient     Mrs. Enrico Baker is being seen today for her 1st TAVR consult. HPI   Ms. Enrico Baker is an 80year old woman with symptomatic severe aortic stenosis who presents for evaluation for TAVR. She has been having symptoms of shortness of breath with exertion and subtle difficulties with memory which is what prompted her to see her physician. Reports she has really only noticed the symptoms in the last 4 weeks or so. She had a known history of aortic stenosis, she saw her family physician Dr. Augustina Uribe who repeated and echo which demonstrated severe aortic stenosis with preserved LV function. She denies any other symptoms including chest pain, syncope, lower extremity edema. She is active, she works at [employer]. .    Her medical history is significant for hypertension, arthritis, carotid artery disease, and aortic stenosis. She has subsequently had a left heart cath which demonstrated 60 % LAD stenosis with negative FFR and a 50% stenosis in the right. There was no intervention performed. Review of Systems   Constitutional: Positive for activity change, fatigue and unexpected weight change (Weight loss). HENT: Positive for dental problem (Upper and lower dentures) and hearing loss (+Right ear- Hearing aid). Negative for sinus pain and sore throat. Eyes: Negative for pain, redness and visual disturbance. Wears reading glasses   Respiratory: Positive for shortness of breath. Negative for cough and chest tightness. Cardiovascular: Negative for chest pain, palpitations and leg swelling. Gastrointestinal: Negative for abdominal pain, anal bleeding and nausea. Endocrine: Negative. Genitourinary: Negative for difficulty urinating, dysuria, frequency and hematuria. Musculoskeletal: Positive for arthralgias (Fingers). Negative for back pain and neck pain.    Skin: Negative for pallor, rash and wound. Allergic/Immunologic: Positive for environmental allergies. Negative for food allergies. Neurological: Positive for dizziness and light-headedness. Negative for seizures and syncope. Hematological: Does not bruise/bleed easily. Psychiatric/Behavioral: Positive for confusion. Past Medical History:   Diagnosis Date    Aortic stenosis     Arthritis      Past Surgical History:   Procedure Laterality Date    CARDIAC CATHETERIZATION  04/17/2018    HYSTERECTOMY, TOTAL ABDOMINAL      VARICOSE VEIN SURGERY Bilateral 1980's     No Known Allergies  Current Outpatient Medications   Medication Sig Dispense Refill    meloxicam (MOBIC) 7.5 MG tablet TAKE ONE TABLET BY MOUTH ONCE DAILY 90 tablet 1     No current facility-administered medications for this visit. Social History     Socioeconomic History    Marital status:       Spouse name: Not on file    Number of children: Not on file    Years of education: Not on file    Highest education level: Not on file   Occupational History    Not on file   Social Needs    Financial resource strain: Not on file    Food insecurity:     Worry: Not on file     Inability: Not on file    Transportation needs:     Medical: Not on file     Non-medical: Not on file   Tobacco Use    Smoking status: Never Smoker    Smokeless tobacco: Never Used   Substance and Sexual Activity    Alcohol use: No    Drug use: No    Sexual activity: Not on file   Lifestyle    Physical activity:     Days per week: Not on file     Minutes per session: Not on file    Stress: Not on file   Relationships    Social connections:     Talks on phone: Not on file     Gets together: Not on file     Attends Quaker service: Not on file     Active member of club or organization: Not on file     Attends meetings of clubs or organizations: Not on file     Relationship status: Not on file    Intimate partner violence:     Fear of current or ex partner: Not on file     Emotionally abused: Not on file     Physically abused: Not on file     Forced sexual activity: Not on file   Other Topics Concern    Not on file   Social History Narrative    Not on file     Family History   Problem Relation Age of Onset    Diabetes Mother     Heart Disease Mother     Heart Disease Father     Cancer Brother     Asthma Daughter    Thiago Benton Sister         Breast, Bone    Heart Disease Sister         Valve     Heart Disease Nephew         Valve    Heart Disease Brother        Objective:   Physical Exam  Vitals:    04/24/19 0919 04/24/19 0926   BP: (!) 164/74 (!) 166/80   Site: Left Upper Arm Right Upper Arm   Position: Sitting Sitting   Pulse: 98    Temp: 97.4 °F (36.3 °C)    TempSrc: Oral    SpO2: 98%    Weight: 98 lb (44.5 kg)    Height: 4' 11\" (1.499 m)        General : elderly woman, appears well  HEENT: bilateral carotid bruit-right louder than left, no JVD, no stridor, uses hearing aides  CV: normal rate, regular rhythm, systolic murmur  Resp: normal effort, clear, good air movement bilaterally  Abd: soft, NT, ND, no bruit  Ext: no edema, warm, normal radial and pedal pulses  Neuro: oriented, no focal deficits, normal strength  Assessment:       Ms. Geovanni Ovalles is an 80year old woman with symptomatic severe aortic stenosis who presents for evaluation for TAVR. STS risk calculator for isolated AVR estimates her risk as follows:    Risk of Mortality: 4.844%  Morbidity or Mortality: 14.000%   Renal Failure: 1.313%   Permanent Stroke: 3.273%   Prolonged Ventilation: 8.316%   DSW Infection: 0.039%   Reoperation: 4.457%   Short Length of Stay: 25.205%   Long Length of Stay: 5.605%    She has minimal comorbid conditions but her age and frailty make her a good candidate for TAVR. Plan:      Proceed with TAVR work up. Agree she is an appropriate candidate for CABG. LHC and CTAs complete. She will still need to see Dr. Judith Roberson for second surgical opinion.     Amy Domingo, MD    I saw and evaluated the patient. I agree with the findings/plan of care in the fellow's note.       Debby Potter MD  4/24/2019  1:31 PM

## 2019-04-29 ENCOUNTER — TELEPHONE (OUTPATIENT)
Dept: CARDIOLOGY CLINIC | Age: 84
End: 2019-04-29

## 2019-04-29 NOTE — TELEPHONE ENCOUNTER
Spoke to pts Christelle brower, to let her know plan for upcoming TAVR (pending last CVTS opinion). Scheduled on 5/16/19 at 12:45 to see Dr. Talita Goddard with PAT to follow. Planning for TAVR on 5/21/19. Verbalized understanding.  Brandi ESPITIA

## 2019-05-09 ENCOUNTER — OFFICE VISIT (OUTPATIENT)
Dept: CARDIOTHORACIC SURGERY | Age: 84
End: 2019-05-09
Payer: MEDICARE

## 2019-05-09 VITALS
HEART RATE: 74 BPM | HEIGHT: 59 IN | SYSTOLIC BLOOD PRESSURE: 140 MMHG | TEMPERATURE: 97.3 F | OXYGEN SATURATION: 94 % | BODY MASS INDEX: 19.96 KG/M2 | DIASTOLIC BLOOD PRESSURE: 80 MMHG | WEIGHT: 99 LBS

## 2019-05-09 DIAGNOSIS — I35.0 NONRHEUMATIC AORTIC VALVE STENOSIS: Primary | ICD-10-CM

## 2019-05-09 PROCEDURE — 99212 OFFICE O/P EST SF 10 MIN: CPT | Performed by: THORACIC SURGERY (CARDIOTHORACIC VASCULAR SURGERY)

## 2019-05-09 SDOH — HEALTH STABILITY: MENTAL HEALTH: HOW OFTEN DO YOU HAVE A DRINK CONTAINING ALCOHOL?: NEVER

## 2019-05-09 NOTE — PROGRESS NOTES
Subjective:      Patient ID: Alvin Hurtado is a 80 y.o. female. Chief Complaint   Patient presents with   Dania Flores Patient     Mrs. Jia Wei is being seen today for her 1st TAVR consult. HPI   Ms. Jia Wei is an 80year old woman with symptomatic severe aortic stenosis who presents for evaluation for TAVR. She has been having symptoms of shortness of breath with exertion and subtle difficulties with memory which is what prompted her to see her physician. Reports she has really only noticed the symptoms in the last 4 weeks or so. She had a known history of aortic stenosis, she saw her family physician Dr. Tia Bee who repeated and echo which demonstrated severe aortic stenosis with preserved LV function. She denies any other symptoms including chest pain, syncope, lower extremity edema. She is active, she works at [employer]. .    Her medical history is significant for hypertension, arthritis, carotid artery disease, and aortic stenosis. She has subsequently had a left heart cath which demonstrated 60 % LAD stenosis with negative FFR and a 50% stenosis in the right. There was no intervention performed. Review of Systems   Constitutional: Positive for activity change, fatigue and unexpected weight change (Weight loss). HENT: Positive for dental problem (Upper and lower dentures) and hearing loss (+Right ear- Hearing aid). Negative for sinus pain and sore throat. Eyes: Negative for pain, redness and visual disturbance. Wears reading glasses   Respiratory: Positive for shortness of breath. Negative for cough and chest tightness. Cardiovascular: Negative for chest pain, palpitations and leg swelling. Gastrointestinal: Negative for abdominal pain, anal bleeding and nausea. Endocrine: Negative. Genitourinary: Negative for difficulty urinating, dysuria, frequency and hematuria. Musculoskeletal: Positive for arthralgias (Fingers). Negative for back pain and neck pain.    Skin: Negative for pallor, rash and wound. Allergic/Immunologic: Positive for environmental allergies. Negative for food allergies. Neurological: Positive for dizziness and light-headedness. Negative for seizures and syncope. Hematological: Does not bruise/bleed easily. Psychiatric/Behavioral: Positive for confusion. Past Medical History:   Diagnosis Date    Aortic stenosis     Arthritis      Past Surgical History:   Procedure Laterality Date    CARDIAC CATHETERIZATION  04/17/2018    HYSTERECTOMY, TOTAL ABDOMINAL      VARICOSE VEIN SURGERY Bilateral 1980's     No Known Allergies  Current Outpatient Medications   Medication Sig Dispense Refill    meloxicam (MOBIC) 7.5 MG tablet TAKE ONE TABLET BY MOUTH ONCE DAILY 90 tablet 1     No current facility-administered medications for this visit. Social History     Socioeconomic History    Marital status:       Spouse name: Not on file    Number of children: Not on file    Years of education: Not on file    Highest education level: Not on file   Occupational History    Not on file   Social Needs    Financial resource strain: Not on file    Food insecurity:     Worry: Not on file     Inability: Not on file    Transportation needs:     Medical: Not on file     Non-medical: Not on file   Tobacco Use    Smoking status: Never Smoker    Smokeless tobacco: Never Used   Substance and Sexual Activity    Alcohol use: No    Drug use: No    Sexual activity: Not on file   Lifestyle    Physical activity:     Days per week: Not on file     Minutes per session: Not on file    Stress: Not on file   Relationships    Social connections:     Talks on phone: Not on file     Gets together: Not on file     Attends Mormonism service: Not on file     Active member of club or organization: Not on file     Attends meetings of clubs or organizations: Not on file     Relationship status: Not on file    Intimate partner violence:     Fear of current or ex partner: MD    I saw and evaluated the patient. I agree with the findings/plan of care in the fellow's note. Martin Nur MD  4/24/2019  1:31 PM           Agree with above and with Davion Bishop's progress note from today. No c/o. Very pleasant lady. CTAB RRR with 3/6 harsh systolic murmur. Severe symptomatic AS with moderate risk for SAVR. Best option TAVR. Proceed with work-up.     Electronically signed by Sarah Waters MD on 5/9/2019 at 10:34 AM

## 2019-05-09 NOTE — PROGRESS NOTES
Has had no need for MD's for last 80+ years  Works full time for [employer] a week off, when coming back to work did not have her normal energy, felt she was getting confused. Was feeling SOB, cold and very tired, left work and went to [facility]. Dr. 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 very tired, SOB with walking and talking  Eyes:  No glaucoma, cataracts. Wears reading glasses  ENMT:  No nosebleeds, deviated septum. [place], now cannot hear our of L ear, has full dentures  Cardiac:  SOB with waking and talking, fatigue  Vascular:  No claudication, no varicosities. GI:  No PUD,  Occasional heartburn. :  No kidney stones,   Musculoskeletal:  + arthritis in hands  Respiratory:  + SOB,no  Emphysema, no  asthma. Integumentary:  No dermatitis, no  itching,  No rash. Neurological:  No stroke, TIAs,no  seizures. Psychiatric:  No depression, no anxiety. Endocrine: No diabetes,no  thyroid issues. Hematologic:  No bleeding, + easy bruising. Immunologic:  No known cancer,  No steroid therapies.

## 2019-05-13 ENCOUNTER — OFFICE VISIT (OUTPATIENT)
Dept: FAMILY MEDICINE CLINIC | Age: 84
End: 2019-05-13
Payer: MEDICARE

## 2019-05-13 VITALS
SYSTOLIC BLOOD PRESSURE: 104 MMHG | WEIGHT: 99.4 LBS | DIASTOLIC BLOOD PRESSURE: 76 MMHG | BODY MASS INDEX: 20.04 KG/M2 | HEART RATE: 92 BPM | TEMPERATURE: 97.6 F | HEIGHT: 59 IN | OXYGEN SATURATION: 98 %

## 2019-05-13 DIAGNOSIS — J45.41 MODERATE PERSISTENT ASTHMATIC BRONCHITIS WITH ACUTE EXACERBATION: Primary | ICD-10-CM

## 2019-05-13 PROCEDURE — 94640 AIRWAY INHALATION TREATMENT: CPT | Performed by: NURSE PRACTITIONER

## 2019-05-13 PROCEDURE — 99214 OFFICE O/P EST MOD 30 MIN: CPT | Performed by: NURSE PRACTITIONER

## 2019-05-13 RX ORDER — ALBUTEROL SULFATE 2.5 MG/3ML
2.5 SOLUTION RESPIRATORY (INHALATION) ONCE
Status: COMPLETED | OUTPATIENT
Start: 2019-05-13 | End: 2019-05-13

## 2019-05-13 RX ORDER — AZITHROMYCIN 250 MG/1
TABLET, FILM COATED ORAL
Qty: 6 TABLET | Refills: 0 | Status: ON HOLD | OUTPATIENT
Start: 2019-05-13 | End: 2019-05-22 | Stop reason: HOSPADM

## 2019-05-13 RX ORDER — ALBUTEROL SULFATE 90 UG/1
2 AEROSOL, METERED RESPIRATORY (INHALATION) EVERY 4 HOURS PRN
Qty: 1 INHALER | Refills: 0 | Status: SHIPPED
Start: 2019-05-13 | End: 2019-07-16

## 2019-05-13 RX ADMIN — ALBUTEROL SULFATE 2.5 MG: 2.5 SOLUTION RESPIRATORY (INHALATION) at 10:19

## 2019-05-13 NOTE — LETTER
[address] [facility] [address]  Phone: [phone]  Fax: [phone] Chiki Sherman MD      May 13, 2019     Patient: Ramon Cordova   YOB: 1931   Date of Visit: 5/13/2019       To Whom It May Concern: It is my medical opinion that Vinod Uriostegui is scheduled for TAVR  surgery on 5/21/19 at [facility]. A return to work date will be determined one week after surgery. If you have any questions or concerns, please don't hesitate to call.     Sincerely,        MD PING Levine/magen

## 2019-05-13 NOTE — PROGRESS NOTES
Administrations This Visit     albuterol (PROVENTIL) nebulizer solution 2.5 mg     Admin Date  05/13/2019  10:19 Action  Given Dose  2.5 mg Route  Nebulization Site   Administered By  Rambo Diaz MA    Ordering Provider:  CAROLYN Christensen CNP    NDC:  3460-4695-61    Lot#:  [id number]    :  [id number] [facility].     Patient Supplied?:  No    Comments:  SITE: NEBULIZERNDC# 2441-1545-71SLV# [id number] DATE: 11/2020VERIFIED BY: SPRING

## 2019-05-13 NOTE — PROGRESS NOTES
Lexie Toy  80 y.o. female    4/14/1931      CC: Cough     Chief Complaint   Patient presents with    Cough     PT CO SLIGHT COUGH AND CHEST CONGESTION SINCE THURS/FRI LAST WEEK. BELIEVES IT IS ALLERGIES. RUNNY NOSE, SPITTING UP PHLEM, PND. NO SORE THROAT. HPI     Started Thursday after sitting outside when grass was being cut. Has history of sinuses all year long but has bad allergies this time of the year. Started with sinus drainage and then chest congestion and then cannot sleep   Then her heart started flipping and blamed it on her anxiety. Said the stress does it. Drainage is clear and goes down to the chest and then coughs it up and feels a little better. Is a little wheezy. Has shortness of breath anyway due to her heart. Worse with the allergy flare. O2 at home 90 yesterday and then up this am.    HR up some to 115. O2 was 97-98 this am.    Raspy cough. No fever. Laid in bed Saturday. Has preop later this week. Will be having aortic valve replacement. No Known Allergies    Physical  Examination    Physical Exam   Constitutional: She is oriented to person, place, and time. She appears well-developed and well-nourished. She is cooperative. No distress. HENT:   Head: Normocephalic. Right Ear: External ear and ear canal normal. No drainage, swelling or tenderness. Tympanic membrane is not injected, not erythematous and not bulging. No middle ear effusion. Left Ear: External ear and ear canal normal. No drainage, swelling or tenderness. Tympanic membrane is not injected, not erythematous and not bulging. No middle ear effusion. Nose: Mucosal edema present. No rhinorrhea, sinus tenderness or nasal deformity. No epistaxis. Right sinus exhibits no maxillary sinus tenderness and no frontal sinus tenderness. Left sinus exhibits no maxillary sinus tenderness and no frontal sinus tenderness. Mouth/Throat: Uvula is midline.  Mucous membranes are not pale, not dry and not cyanotic. No oral lesions. Normal dentition. Posterior oropharyngeal erythema present. No oropharyngeal exudate or posterior oropharyngeal edema. Dull TMs bilaterally. Eyes: Pupils are equal, round, and reactive to light. Right eye exhibits no discharge and no exudate. Left eye exhibits no discharge and no exudate. Right conjunctiva is not injected. Right conjunctiva has no hemorrhage. Left conjunctiva is not injected. Left conjunctiva has no hemorrhage. Cardiovascular: Normal rate, regular rhythm, S1 normal and S2 normal. Exam reveals no gallop and no friction rub. Murmur heard. Pulmonary/Chest: Effort normal. No respiratory distress. She has no decreased breath sounds. She has no wheezes. She has no rhonchi. She has rales (bases bilaterally with rhonchi and almost wheeze noted. ). Abdominal: Normal appearance. There is no rigidity. Lymphadenopathy:        Head (right side): No submental, no submandibular, no tonsillar, no preauricular, no posterior auricular and no occipital adenopathy present. Head (left side): No submental, no submandibular, no tonsillar, no preauricular, no posterior auricular and no occipital adenopathy present. She has no cervical adenopathy. Neurological: She is alert and oriented to person, place, and time. She has normal strength. No sensory deficit. Coordination normal.   Skin: Skin is warm and dry. No rash noted. She is not diaphoretic. No erythema. Nursing note and vitals reviewed. Vitals:    05/13/19 0901   BP: 104/76   Site: Left Upper Arm   Position: Sitting   Cuff Size: Medium Adult   Pulse: 92   Temp: 97.6 °F (36.4 °C)   TempSrc: Oral   SpO2: 98%   Weight: 99 lb 6.4 oz (45.1 kg)   Height: 4' 11\" (1.499 m)     Body mass index is 20.08 kg/m².      Wt Readings from Last 3 Encounters:   05/13/19 99 lb 6.4 oz (45.1 kg)   05/09/19 99 lb (44.9 kg)   04/24/19 98 lb (44.5 kg)     BP Readings from Last 3 Encounters:   05/13/19 104/76   05/09/19 (!) 140/80   04/24/19 (!) 166/80        No results found for this visit on 05/13/19. Assessment     Diagnosis Orders   1. Moderate persistent asthmatic bronchitis with acute exacerbation  azithromycin (ZITHROMAX) 250 MG tablet    albuterol sulfate HFA (VENTOLIN HFA) 108 (90 Base) MCG/ACT inhaler         Plan    Albuterol nebs. Rales and rhonchi  In bases have completely cleared with nebs. Breathing easier and less short of breath. Albuterol MDI or home nebs. zpak  Hold on steroids due to upcoming aortic valve surgery. Return if symptoms worsen or fail to improve. There are no Patient Instructions on file for this visit.

## 2019-05-15 NOTE — PROGRESS NOTES
[address]     H+P // CONSULT // OUTPATIENT VISIT // [place]     Referring Doctor Roosevelt Mera MD   Encounter Type Followup     CHIEF COMPLAINT     Visit Type Chronic   Symptoms None   Problems AS, HTN, Carotid stenosis      HISTORY OF PRESENT ILLNESS      GEN - Doing well. SOB continues and worsening. Using inhaler.  AS - worsening sob. Plan for TAVR next week.  HTN - Ambulatory BP readings in good range   CAROTID - Results below noted. No TIA or CVA sx.     MED - Compliant with CV meds listed below without notable side effects     COMPLIANCE     Category Meds Diet Salt Exercise Tobacco Alcohol Drugs   Compliant [x] [x] [x] [x] [x] [x] [x]   [x]Counseling given on all above above categories    HISTORY/ALLERGIES/ROS     MedHx:  has a past medical history of Aortic stenosis and Arthritis. SurgHx:  has a past surgical history that includes Varicose vein surgery (Bilateral, 1980's); Cardiac catheterization (04/17/2018); and Hysterectomy, total abdominal.   SocHx:  reports that she has never smoked. She has never used smokeless tobacco. She reports that she does not drink alcohol or use drugs. FamHx: family history includes Asthma in her daughter; Cancer in her brother and sister; Diabetes in her mother; Heart Disease in her brother, father, mother, nephew, and sister. Allergies: Patient has no known allergies.    ROS:  [x]Full ROS obtained and negative except as mentioned in HPI     OP CV MEDICATIONS     None    PHYSICAL EXAM     Vitals:    05/16/19 1223   BP: 130/80   Pulse: 96      Gen Alert, coop, no distress Heart  Rrr, 4/6   Head NC, AT, no abnorm Abd  Soft, NT, +BS, no mass, no OM   Eyes PER, conj/corn clear Ext  Ext nl, AT, no C/C/E   Nose Nares nl, no drain, NT Pulse 2+ and symmetric   Throat Lips, mucosa, tongue nl Skin Col/text/turg nl, no vis rash/les   Neck S/S, TM, NT, no bruit/JVD Psych Nl mood and affect   Lung CTA-B, unlabored, no DTP Lymph   No cervical or axillary LA   Ch wall NT, no deform Neuro  Nl gross M/S exam     ASSESSMENT AND PLAN     ~AS   Date EF Detail   Sx   SOB, fatigue   Hx   No intervention   NYHA   []I         [x]II           []III          []IV   TTE 11/16  3/19 60%  65% Mod AS MG 28  Severe AS MG 51   EKG   Sinus arrhythmia   STS   8.2   Plan   TAVR next week - R 23mm ES3   ~HTN  Today BP Controlled   Counseling Counseled on diet/salt, exercise and ideal body weight   Plan Continue current meds at doses above   ~Carotid Stenosis   Date Results   CUS  ALEXIS 55-09%, LICA <63%   Intervention  Asymptomatic: Angio 60%, CT 80%  Symptomatic: Angio 50%, CT 70%   Plan  Observation   ~Followup  Interval: After TAVR  Tests/Labs: PAT

## 2019-05-16 ENCOUNTER — OFFICE VISIT (OUTPATIENT)
Dept: CARDIOLOGY CLINIC | Age: 84
End: 2019-05-16
Payer: MEDICARE

## 2019-05-16 ENCOUNTER — HOSPITAL ENCOUNTER (OUTPATIENT)
Dept: PREADMISSION TESTING | Age: 84
Discharge: HOME | End: 2019-05-20
Payer: MEDICARE

## 2019-05-16 ENCOUNTER — HOSPITAL ENCOUNTER (OUTPATIENT)
Dept: GENERAL RADIOLOGY | Age: 84
Discharge: HOME | End: 2019-05-16
Payer: MEDICARE

## 2019-05-16 VITALS
DIASTOLIC BLOOD PRESSURE: 80 MMHG | HEIGHT: 59 IN | SYSTOLIC BLOOD PRESSURE: 130 MMHG | BODY MASS INDEX: 19.56 KG/M2 | WEIGHT: 97 LBS | HEART RATE: 96 BPM

## 2019-05-16 VITALS
BODY MASS INDEX: 19.56 KG/M2 | TEMPERATURE: 97.2 F | OXYGEN SATURATION: 95 % | RESPIRATION RATE: 16 BRPM | WEIGHT: 97 LBS | DIASTOLIC BLOOD PRESSURE: 90 MMHG | HEART RATE: 75 BPM | SYSTOLIC BLOOD PRESSURE: 174 MMHG | HEIGHT: 59 IN

## 2019-05-16 DIAGNOSIS — I65.23 BILATERAL CAROTID ARTERY STENOSIS: ICD-10-CM

## 2019-05-16 DIAGNOSIS — I10 ESSENTIAL HYPERTENSION: ICD-10-CM

## 2019-05-16 DIAGNOSIS — Z01.811 PRE-OP CHEST EXAM: ICD-10-CM

## 2019-05-16 DIAGNOSIS — I35.0 NONRHEUMATIC AORTIC VALVE STENOSIS: Primary | ICD-10-CM

## 2019-05-16 LAB
ABO/RH: NORMAL
ALBUMIN SERPL-MCNC: 4.4 G/DL (ref 3.4–5)
ALP BLD-CCNC: 52 U/L (ref 40–129)
ALT SERPL-CCNC: 6 U/L (ref 10–40)
ANION GAP SERPL CALCULATED.3IONS-SCNC: 10 MMOL/L (ref 3–16)
ANTIBODY SCREEN: NORMAL
AST SERPL-CCNC: 13 U/L (ref 15–37)
BASOPHILS ABSOLUTE: 0.1 K/UL (ref 0–0.2)
BASOPHILS RELATIVE PERCENT: 1.3 %
BILIRUB SERPL-MCNC: 0.3 MG/DL (ref 0–1)
BILIRUBIN DIRECT: <0.2 MG/DL (ref 0–0.3)
BILIRUBIN URINE: NEGATIVE
BILIRUBIN, INDIRECT: ABNORMAL MG/DL (ref 0–1)
BLOOD, URINE: NEGATIVE
BUN BLDV-MCNC: 11 MG/DL (ref 7–20)
CALCIUM SERPL-MCNC: 9.8 MG/DL (ref 8.3–10.6)
CHLORIDE BLD-SCNC: 103 MMOL/L (ref 99–110)
CLARITY: CLEAR
CO2: 27 MMOL/L (ref 21–32)
COLOR: YELLOW
CREAT SERPL-MCNC: 0.8 MG/DL (ref 0.6–1.2)
EOSINOPHILS ABSOLUTE: 0.2 K/UL (ref 0–0.6)
EOSINOPHILS RELATIVE PERCENT: 5.3 %
EPITHELIAL CELLS, UA: 1 /HPF (ref 0–5)
GFR AFRICAN AMERICAN: >60
GFR NON-AFRICAN AMERICAN: >60
GLUCOSE BLD-MCNC: 78 MG/DL (ref 70–99)
GLUCOSE URINE: NEGATIVE MG/DL
HCT VFR BLD CALC: 44.6 % (ref 36–48)
HEMOGLOBIN: 14.5 G/DL (ref 12–16)
HYALINE CASTS: 0 /LPF (ref 0–8)
INR BLD: 0.93 (ref 0.86–1.14)
KETONES, URINE: NEGATIVE MG/DL
LEUKOCYTE ESTERASE, URINE: ABNORMAL
LYMPHOCYTES ABSOLUTE: 1.1 K/UL (ref 1–5.1)
LYMPHOCYTES RELATIVE PERCENT: 27.1 %
MAGNESIUM: 2.5 MG/DL (ref 1.8–2.4)
MCH RBC QN AUTO: 27.7 PG (ref 26–34)
MCHC RBC AUTO-ENTMCNC: 32.6 G/DL (ref 31–36)
MCV RBC AUTO: 84.9 FL (ref 80–100)
MICROSCOPIC EXAMINATION: YES
MONOCYTES ABSOLUTE: 0.4 K/UL (ref 0–1.3)
MONOCYTES RELATIVE PERCENT: 9.1 %
NEUTROPHILS ABSOLUTE: 2.4 K/UL (ref 1.7–7.7)
NEUTROPHILS RELATIVE PERCENT: 57.2 %
NITRITE, URINE: NEGATIVE
PDW BLD-RTO: 14.7 % (ref 12.4–15.4)
PH UA: 7 (ref 5–8)
PLATELET # BLD: 177 K/UL (ref 135–450)
PMV BLD AUTO: 9 FL (ref 5–10.5)
POTASSIUM SERPL-SCNC: 4.3 MMOL/L (ref 3.5–5.1)
PROTEIN UA: NEGATIVE MG/DL
PROTHROMBIN TIME: 10.6 SEC (ref 9.8–13)
RBC # BLD: 5.25 M/UL (ref 4–5.2)
RBC UA: NORMAL /HPF (ref 0–2)
SODIUM BLD-SCNC: 140 MMOL/L (ref 136–145)
SPECIFIC GRAVITY UA: 1.02 (ref 1–1.03)
TOTAL PROTEIN: 7.4 G/DL (ref 6.4–8.2)
URINE REFLEX TO CULTURE: YES
URINE TYPE: ABNORMAL
UROBILINOGEN, URINE: 0.2 E.U./DL
WBC # BLD: 4.2 K/UL (ref 4–11)
WBC UA: 2 /HPF (ref 0–5)

## 2019-05-16 PROCEDURE — 80048 BASIC METABOLIC PNL TOTAL CA: CPT

## 2019-05-16 PROCEDURE — 99214 OFFICE O/P EST MOD 30 MIN: CPT | Performed by: INTERNAL MEDICINE

## 2019-05-16 PROCEDURE — 86850 RBC ANTIBODY SCREEN: CPT

## 2019-05-16 PROCEDURE — 80076 HEPATIC FUNCTION PANEL: CPT

## 2019-05-16 PROCEDURE — 83735 ASSAY OF MAGNESIUM: CPT

## 2019-05-16 PROCEDURE — 81001 URINALYSIS AUTO W/SCOPE: CPT

## 2019-05-16 PROCEDURE — 86901 BLOOD TYPING SEROLOGIC RH(D): CPT

## 2019-05-16 PROCEDURE — 87086 URINE CULTURE/COLONY COUNT: CPT

## 2019-05-16 PROCEDURE — 71046 X-RAY EXAM CHEST 2 VIEWS: CPT

## 2019-05-16 PROCEDURE — 86900 BLOOD TYPING SEROLOGIC ABO: CPT

## 2019-05-16 PROCEDURE — 85610 PROTHROMBIN TIME: CPT

## 2019-05-16 PROCEDURE — 85025 COMPLETE CBC W/AUTO DIFF WBC: CPT

## 2019-05-16 PROCEDURE — 36415 COLL VENOUS BLD VENIPUNCTURE: CPT

## 2019-05-16 NOTE — PROGRESS NOTES
Name: Shanna Gilbert and time of surgery:_5/21/19 0700__ Arrival Time:_0500_____________     1. Do not eat or drink anything after 12 midnight prior to surgery. This includes no water, chewing gum or mints. 2. Take the following pills with a small sip of water on the morning of surgery:__Albuterol__________     3. Please stop taking Ibuprofen, Advil, Naproxen, Vitamin E and other Anti-inflammatory products should be stopped for 5 days before surgery or as directed by your physician. 4. Stopping Plavix, Coumadin,Eliquis, Lovenox,Effient,Pradaxa,Xarelto, Fragmin or other blood thinners on _not taking________________. 5. Shower the night before with Hibiclens. 6.  If you take a long acting insulin in the evening only  take half of your usual  dose the night  before your procedure     7. Do not smoke and do not drink alcoholic beverages 24 hours prior to surgery, including NA beer. 8.  You may brush your teeth and gargle the morning of surgery. DO NOT SWALLOW WATER     9. If you have dentures, they will be removed before going to the OR; we will provide you a container. If you wear contact lenses please do not wear them the day of surgery. If you wear glasses, they will be removed; please bring a case for them. 10. Please wear simple, loose fitting clothing to the hospital.  Mindi Scales not bring valuables (money, credit cards, checkbooks, etc.) Do not wear any makeup (including no eye makeup) or nail polish on your fingers or toes. 11. DO NOT wear any jewelry or piercings on day of surgery. All body piercing jewelry must be removed.                15. Notify your Surgeon if you develop any illness between now and surgery  time, cough, cold, fever, sore throat, nausea, vomiting, etc.  Please notify your surgeon if you experience dizziness, shortness of breath or blurred vision between now & the time of your surgery               13. During flu season no children under the age of 15 are permitted in the hospital for the safety of all patients. 15. For your convenience [facility] has a pharmacy on site to fill your prescriptions. If you have any questions between now and surgery, please call [phone]. All above information reviewed with patient in person or by phone. Patient verbalizes understanding. All questions and concerns addressed.                                                                                                                                                                                                                             CVPRE OP INSTRUCTIONS

## 2019-05-16 NOTE — PRE-PROCEDURE INSTRUCTIONS
1.  EAA Introduced self to pt and family. 2.  EAA Informed about what to expect the day of surgery. A)  Preop nurse will bring them to pre-op holdingEAA. B)  PCA will bathe & shave them. EAA       C)  Anesthesiologist will put IV lines in while in preop. sedation for comfort,             during lining, doze off. EAA       D)  Nursing staff will be in to visit. EAA       E)  Operating room will be very cold. Warming blanket under and on themEAA        F)   In the operating room there will be several people connecting them to               monitors. EAA        3. EAA Will be taken to the CVU for recovery. 4.   EAA Invite them to ask questions.

## 2019-05-16 NOTE — PROGRESS NOTES
Patient here for PAT. Labs sent as ordered. EKG and chest xray on chart. PFT's on chart. Pre op instructions given with full understanding voiced. All questions answered. Released to home.

## 2019-05-18 LAB
ORGANISM: ABNORMAL
URINE CULTURE, ROUTINE: ABNORMAL
URINE CULTURE, ROUTINE: ABNORMAL

## 2019-05-21 ENCOUNTER — HOSPITAL ENCOUNTER (INPATIENT)
Age: 84
LOS: 1 days | Discharge: HOME | DRG: 267 | End: 2019-05-22
Attending: INTERNAL MEDICINE | Admitting: INTERNAL MEDICINE
Payer: MEDICARE

## 2019-05-21 ENCOUNTER — HOSPITAL ENCOUNTER (OUTPATIENT)
Dept: CARDIAC CATH/INVASIVE PROCEDURES | Age: 84
Discharge: HOME | DRG: 267 | End: 2019-05-21
Payer: MEDICARE

## 2019-05-21 ENCOUNTER — ANESTHESIA EVENT (OUTPATIENT)
Dept: OPERATING ROOM | Age: 84
DRG: 267 | End: 2019-05-21
Payer: MEDICARE

## 2019-05-21 ENCOUNTER — ANESTHESIA (OUTPATIENT)
Dept: OPERATING ROOM | Age: 84
DRG: 267 | End: 2019-05-21
Payer: MEDICARE

## 2019-05-21 VITALS — OXYGEN SATURATION: 100 %

## 2019-05-21 LAB
ABO/RH: NORMAL
ACTIVATED CLOTTING TIME: 123 SEC (ref 99–130)
ACTIVATED CLOTTING TIME: 171 SEC (ref 99–130)
ACTIVATED CLOTTING TIME: 97 SEC (ref 99–130)
ANTIBODY SCREEN: NORMAL
BASE EXCESS ARTERIAL: -2 (ref -3–3)
BASE EXCESS ARTERIAL: 0 (ref -3–3)
BASE EXCESS ARTERIAL: 1 (ref -3–3)
BLOOD BANK DISPENSE STATUS: NORMAL
BLOOD BANK PRODUCT CODE: NORMAL
BPU ID: NORMAL
CALCIUM IONIZED: 1.26 MMOL/L (ref 1.12–1.32)
CALCIUM IONIZED: 1.28 MMOL/L (ref 1.12–1.32)
CALCIUM IONIZED: 1.29 MMOL/L (ref 1.12–1.32)
DESCRIPTION BLOOD BANK: NORMAL
EKG ATRIAL RATE: 63 BPM
EKG DIAGNOSIS: NORMAL
EKG P AXIS: 58 DEGREES
EKG P-R INTERVAL: 168 MS
EKG Q-T INTERVAL: 436 MS
EKG QRS DURATION: 88 MS
EKG QTC CALCULATION (BAZETT): 446 MS
EKG R AXIS: -14 DEGREES
EKG T AXIS: 101 DEGREES
EKG VENTRICULAR RATE: 63 BPM
GLUCOSE BLD-MCNC: 135 MG/DL (ref 70–99)
GLUCOSE BLD-MCNC: 153 MG/DL (ref 70–99)
GLUCOSE BLD-MCNC: 162 MG/DL (ref 70–99)
HCO3 ARTERIAL: 24.3 MMOL/L (ref 21–29)
HCO3 ARTERIAL: 26 MMOL/L (ref 21–29)
HCO3 ARTERIAL: 27.9 MMOL/L (ref 21–29)
HEMOGLOBIN: 10.6 GM/DL (ref 12–16)
HEMOGLOBIN: 11.8 GM/DL (ref 12–16)
HEMOGLOBIN: 12.6 GM/DL (ref 12–16)
LACTATE: 0.41 MMOL/L (ref 0.4–2)
LACTATE: 1.28 MMOL/L (ref 0.4–2)
LACTATE: 1.38 MMOL/L (ref 0.4–2)
O2 SAT, ARTERIAL: 100 % (ref 93–100)
O2 SAT, ARTERIAL: 100 % (ref 93–100)
O2 SAT, ARTERIAL: 99 % (ref 93–100)
PCO2 ARTERIAL: 45.7 MM HG (ref 35–45)
PCO2 ARTERIAL: 47.8 MM HG (ref 35–45)
PCO2 ARTERIAL: 56.9 MM HG (ref 35–45)
PERFORMED ON: ABNORMAL
PH ARTERIAL: 7.3 (ref 7.35–7.45)
PH ARTERIAL: 7.33 (ref 7.35–7.45)
PH ARTERIAL: 7.34 (ref 7.35–7.45)
PO2 ARTERIAL: 140.7 MM HG (ref 75–108)
PO2 ARTERIAL: 351.4 MM HG (ref 75–108)
PO2 ARTERIAL: 412.6 MM HG (ref 75–108)
POC HEMATOCRIT: 31 % (ref 36–48)
POC HEMATOCRIT: 35 % (ref 36–48)
POC HEMATOCRIT: 37 % (ref 36–48)
POC POTASSIUM: 3.7 MMOL/L (ref 3.5–5.1)
POC SAMPLE TYPE: ABNORMAL
POC SODIUM: 141 MMOL/L (ref 136–145)
POC SODIUM: 141 MMOL/L (ref 136–145)
POC SODIUM: 142 MMOL/L (ref 136–145)
PRO-BNP: 348 PG/ML (ref 0–449)
TCO2 ARTERIAL: 26 MMOL/L
TCO2 ARTERIAL: 27 MMOL/L
TCO2 ARTERIAL: 30 MMOL/L
TROPONIN: <0.01 NG/ML

## 2019-05-21 PROCEDURE — 36415 COLL VENOUS BLD VENIPUNCTURE: CPT

## 2019-05-21 PROCEDURE — 6370000000 HC RX 637 (ALT 250 FOR IP): Performed by: INTERNAL MEDICINE

## 2019-05-21 PROCEDURE — 2709999900 HC NON-CHARGEABLE SUPPLY

## 2019-05-21 PROCEDURE — 2780000006 HC MISC HEART VALVE

## 2019-05-21 PROCEDURE — 82330 ASSAY OF CALCIUM: CPT

## 2019-05-21 PROCEDURE — 33361 REPLACE AORTIC VALVE PERQ: CPT | Performed by: INTERNAL MEDICINE

## 2019-05-21 PROCEDURE — 2580000003 HC RX 258: Performed by: INTERNAL MEDICINE

## 2019-05-21 PROCEDURE — 2580000003 HC RX 258

## 2019-05-21 PROCEDURE — 6360000002 HC RX W HCPCS

## 2019-05-21 PROCEDURE — 2709999900 HC NON-CHARGEABLE SUPPLY: Performed by: THORACIC SURGERY (CARDIOTHORACIC VASCULAR SURGERY)

## 2019-05-21 PROCEDURE — 6360000004 HC RX CONTRAST MEDICATION

## 2019-05-21 PROCEDURE — 6370000000 HC RX 637 (ALT 250 FOR IP): Performed by: THORACIC SURGERY (CARDIOTHORACIC VASCULAR SURGERY)

## 2019-05-21 PROCEDURE — 6360000002 HC RX W HCPCS: Performed by: INTERNAL MEDICINE

## 2019-05-21 PROCEDURE — 02RF38Z REPLACEMENT OF AORTIC VALVE WITH ZOOPLASTIC TISSUE, PERCUTANEOUS APPROACH: ICD-10-PCS | Performed by: THORACIC SURGERY (CARDIOTHORACIC VASCULAR SURGERY)

## 2019-05-21 PROCEDURE — 2500000003 HC RX 250 WO HCPCS: Performed by: THORACIC SURGERY (CARDIOTHORACIC VASCULAR SURGERY)

## 2019-05-21 PROCEDURE — 83880 ASSAY OF NATRIURETIC PEPTIDE: CPT

## 2019-05-21 PROCEDURE — 86901 BLOOD TYPING SEROLOGIC RH(D): CPT

## 2019-05-21 PROCEDURE — 2500000003 HC RX 250 WO HCPCS

## 2019-05-21 PROCEDURE — 83605 ASSAY OF LACTIC ACID: CPT

## 2019-05-21 PROCEDURE — 86900 BLOOD TYPING SEROLOGIC ABO: CPT

## 2019-05-21 PROCEDURE — 82947 ASSAY GLUCOSE BLOOD QUANT: CPT

## 2019-05-21 PROCEDURE — 93010 ELECTROCARDIOGRAM REPORT: CPT | Performed by: INTERNAL MEDICINE

## 2019-05-21 PROCEDURE — 6360000002 HC RX W HCPCS: Performed by: THORACIC SURGERY (CARDIOTHORACIC VASCULAR SURGERY)

## 2019-05-21 PROCEDURE — 84484 ASSAY OF TROPONIN QUANT: CPT

## 2019-05-21 PROCEDURE — C1894 INTRO/SHEATH, NON-LASER: HCPCS

## 2019-05-21 PROCEDURE — 3700000000 HC ANESTHESIA ATTENDED CARE: Performed by: THORACIC SURGERY (CARDIOTHORACIC VASCULAR SURGERY)

## 2019-05-21 PROCEDURE — 84132 ASSAY OF SERUM POTASSIUM: CPT

## 2019-05-21 PROCEDURE — 6360000004 HC RX CONTRAST MEDICATION: Performed by: INTERNAL MEDICINE

## 2019-05-21 PROCEDURE — 85347 COAGULATION TIME ACTIVATED: CPT

## 2019-05-21 PROCEDURE — 82803 BLOOD GASES ANY COMBINATION: CPT

## 2019-05-21 PROCEDURE — 94150 VITAL CAPACITY TEST: CPT

## 2019-05-21 PROCEDURE — 86923 COMPATIBILITY TEST ELECTRIC: CPT

## 2019-05-21 PROCEDURE — C1725 CATH, TRANSLUMIN NON-LASER: HCPCS

## 2019-05-21 PROCEDURE — 3700000001 HC ADD 15 MINUTES (ANESTHESIA): Performed by: THORACIC SURGERY (CARDIOTHORACIC VASCULAR SURGERY)

## 2019-05-21 PROCEDURE — 84295 ASSAY OF SERUM SODIUM: CPT

## 2019-05-21 PROCEDURE — 86850 RBC ANTIBODY SCREEN: CPT

## 2019-05-21 PROCEDURE — 2720000010 HC SURG SUPPLY STERILE

## 2019-05-21 PROCEDURE — C1769 GUIDE WIRE: HCPCS

## 2019-05-21 PROCEDURE — 33361 REPLACE AORTIC VALVE PERQ: CPT | Performed by: THORACIC SURGERY (CARDIOTHORACIC VASCULAR SURGERY)

## 2019-05-21 PROCEDURE — 85014 HEMATOCRIT: CPT

## 2019-05-21 PROCEDURE — 6360000002 HC RX W HCPCS: Performed by: ANESTHESIOLOGY

## 2019-05-21 PROCEDURE — 93005 ELECTROCARDIOGRAM TRACING: CPT | Performed by: INTERNAL MEDICINE

## 2019-05-21 PROCEDURE — 94760 N-INVAS EAR/PLS OXIMETRY 1: CPT

## 2019-05-21 PROCEDURE — 2580000003 HC RX 258: Performed by: THORACIC SURGERY (CARDIOTHORACIC VASCULAR SURGERY)

## 2019-05-21 PROCEDURE — 2140000000 HC CCU INTERMEDIATE R&B

## 2019-05-21 PROCEDURE — 6370000000 HC RX 637 (ALT 250 FOR IP)

## 2019-05-21 RX ORDER — NITROGLYCERIN 20 MG/100ML
5 INJECTION INTRAVENOUS CONTINUOUS
Status: DISCONTINUED | OUTPATIENT
Start: 2019-05-21 | End: 2019-05-22 | Stop reason: HOSPADM

## 2019-05-21 RX ORDER — AMIODARONE HYDROCHLORIDE 200 MG/1
400 TABLET ORAL DAILY
Status: DISCONTINUED | OUTPATIENT
Start: 2019-05-21 | End: 2019-05-22 | Stop reason: HOSPADM

## 2019-05-21 RX ORDER — SODIUM CHLORIDE 0.9 % (FLUSH) 0.9 %
10 SYRINGE (ML) INJECTION EVERY 12 HOURS SCHEDULED
Status: DISCONTINUED | OUTPATIENT
Start: 2019-05-21 | End: 2019-05-22 | Stop reason: HOSPADM

## 2019-05-21 RX ORDER — SODIUM CHLORIDE, SODIUM LACTATE, POTASSIUM CHLORIDE, CALCIUM CHLORIDE 600; 310; 30; 20 MG/100ML; MG/100ML; MG/100ML; MG/100ML
INJECTION, SOLUTION INTRAVENOUS ONCE
Status: DISCONTINUED | OUTPATIENT
Start: 2019-05-21 | End: 2019-05-21

## 2019-05-21 RX ORDER — CEFAZOLIN SODIUM 2 G/100ML
2 INJECTION, SOLUTION INTRAVENOUS ONCE
Status: COMPLETED | OUTPATIENT
Start: 2019-05-21 | End: 2019-05-21

## 2019-05-21 RX ORDER — MIDAZOLAM HYDROCHLORIDE 1 MG/ML
INJECTION INTRAMUSCULAR; INTRAVENOUS PRN
Status: DISCONTINUED | OUTPATIENT
Start: 2019-05-21 | End: 2019-05-21 | Stop reason: SDUPTHER

## 2019-05-21 RX ORDER — SODIUM CHLORIDE 0.9 % (FLUSH) 0.9 %
10 SYRINGE (ML) INJECTION PRN
Status: DISCONTINUED | OUTPATIENT
Start: 2019-05-21 | End: 2019-05-22 | Stop reason: HOSPADM

## 2019-05-21 RX ORDER — ONDANSETRON 2 MG/ML
4 INJECTION INTRAMUSCULAR; INTRAVENOUS EVERY 6 HOURS PRN
Status: DISCONTINUED | OUTPATIENT
Start: 2019-05-21 | End: 2019-05-22 | Stop reason: HOSPADM

## 2019-05-21 RX ORDER — HEPARIN SODIUM 1000 [USP'U]/ML
INJECTION, SOLUTION INTRAVENOUS; SUBCUTANEOUS PRN
Status: DISCONTINUED | OUTPATIENT
Start: 2019-05-21 | End: 2019-05-21 | Stop reason: SDUPTHER

## 2019-05-21 RX ORDER — 0.9 % SODIUM CHLORIDE 0.9 %
500 INTRAVENOUS SOLUTION INTRAVENOUS PRN
Status: DISCONTINUED | OUTPATIENT
Start: 2019-05-21 | End: 2019-05-22 | Stop reason: HOSPADM

## 2019-05-21 RX ORDER — HYDRALAZINE HYDROCHLORIDE 20 MG/ML
10 INJECTION INTRAMUSCULAR; INTRAVENOUS EVERY 6 HOURS PRN
Status: DISCONTINUED | OUTPATIENT
Start: 2019-05-21 | End: 2019-05-22 | Stop reason: HOSPADM

## 2019-05-21 RX ORDER — FENTANYL CITRATE 50 UG/ML
INJECTION, SOLUTION INTRAMUSCULAR; INTRAVENOUS PRN
Status: DISCONTINUED | OUTPATIENT
Start: 2019-05-21 | End: 2019-05-21 | Stop reason: SDUPTHER

## 2019-05-21 RX ORDER — PROTAMINE SULFATE 10 MG/ML
INJECTION, SOLUTION INTRAVENOUS PRN
Status: DISCONTINUED | OUTPATIENT
Start: 2019-05-21 | End: 2019-05-21 | Stop reason: SDUPTHER

## 2019-05-21 RX ORDER — AMIODARONE HYDROCHLORIDE 50 MG/ML
INJECTION, SOLUTION INTRAVENOUS PRN
Status: DISCONTINUED | OUTPATIENT
Start: 2019-05-21 | End: 2019-05-21 | Stop reason: SDUPTHER

## 2019-05-21 RX ORDER — ACETAMINOPHEN 325 MG/1
650 TABLET ORAL EVERY 4 HOURS PRN
Status: DISCONTINUED | OUTPATIENT
Start: 2019-05-21 | End: 2019-05-22 | Stop reason: HOSPADM

## 2019-05-21 RX ORDER — ATROPINE SULFATE 0.4 MG/ML
0.5 AMPUL (ML) INJECTION
Status: DISPENSED | OUTPATIENT
Start: 2019-05-21 | End: 2019-05-21

## 2019-05-21 RX ORDER — PROPOFOL 10 MG/ML
INJECTION, EMULSION INTRAVENOUS CONTINUOUS PRN
Status: DISCONTINUED | OUTPATIENT
Start: 2019-05-21 | End: 2019-05-21 | Stop reason: SDUPTHER

## 2019-05-21 RX ORDER — CEFAZOLIN SODIUM 2 G/100ML
2 INJECTION, SOLUTION INTRAVENOUS ONCE
Status: DISCONTINUED | OUTPATIENT
Start: 2019-05-21 | End: 2019-05-21

## 2019-05-21 RX ORDER — CLOPIDOGREL BISULFATE 75 MG/1
75 TABLET ORAL DAILY
Status: DISCONTINUED | OUTPATIENT
Start: 2019-05-21 | End: 2019-05-22 | Stop reason: HOSPADM

## 2019-05-21 RX ORDER — ASPIRIN 81 MG/1
81 TABLET ORAL DAILY
Status: DISCONTINUED | OUTPATIENT
Start: 2019-05-21 | End: 2019-05-22 | Stop reason: HOSPADM

## 2019-05-21 RX ORDER — ALBUTEROL SULFATE 90 UG/1
2 AEROSOL, METERED RESPIRATORY (INHALATION) EVERY 4 HOURS PRN
Status: DISCONTINUED | OUTPATIENT
Start: 2019-05-21 | End: 2019-05-22 | Stop reason: HOSPADM

## 2019-05-21 RX ORDER — SODIUM CHLORIDE, SODIUM LACTATE, POTASSIUM CHLORIDE, CALCIUM CHLORIDE 600; 310; 30; 20 MG/100ML; MG/100ML; MG/100ML; MG/100ML
INJECTION, SOLUTION INTRAVENOUS ONCE
Status: COMPLETED | OUTPATIENT
Start: 2019-05-21 | End: 2019-05-21

## 2019-05-21 RX ADMIN — CEFAZOLIN SODIUM 2 G: 2 INJECTION, SOLUTION INTRAVENOUS at 07:14

## 2019-05-21 RX ADMIN — Medication 10 ML: at 20:47

## 2019-05-21 RX ADMIN — PROPOFOL 5 MCG/KG/MIN: 10 INJECTION, EMULSION INTRAVENOUS at 07:16

## 2019-05-21 RX ADMIN — PROTAMINE SULFATE 50 MG: 10 INJECTION, SOLUTION INTRAVENOUS at 08:21

## 2019-05-21 RX ADMIN — MIDAZOLAM HYDROCHLORIDE 0.5 MG: 1 INJECTION, SOLUTION INTRAMUSCULAR; INTRAVENOUS at 07:16

## 2019-05-21 RX ADMIN — AMIODARONE HYDROCHLORIDE 400 MG: 200 TABLET ORAL at 14:16

## 2019-05-21 RX ADMIN — FENTANYL CITRATE 25 MCG: 50 INJECTION, SOLUTION INTRAMUSCULAR; INTRAVENOUS at 07:39

## 2019-05-21 RX ADMIN — FENTANYL CITRATE 25 MCG: 50 INJECTION, SOLUTION INTRAMUSCULAR; INTRAVENOUS at 07:16

## 2019-05-21 RX ADMIN — Medication 10 ML: at 14:17

## 2019-05-21 RX ADMIN — AMIODARONE HYDROCHLORIDE 150 MG: 50 INJECTION, SOLUTION INTRAVENOUS at 08:13

## 2019-05-21 RX ADMIN — MIDAZOLAM HYDROCHLORIDE 0.5 MG: 1 INJECTION, SOLUTION INTRAMUSCULAR; INTRAVENOUS at 07:39

## 2019-05-21 RX ADMIN — FENTANYL CITRATE 25 MCG: 50 INJECTION, SOLUTION INTRAMUSCULAR; INTRAVENOUS at 08:09

## 2019-05-21 RX ADMIN — PHENYLEPHRINE HYDROCHLORIDE 100 MCG/MIN: 10 INJECTION INTRAVENOUS at 07:14

## 2019-05-21 RX ADMIN — ONDANSETRON 4 MG: 2 INJECTION INTRAMUSCULAR; INTRAVENOUS at 17:25

## 2019-05-21 RX ADMIN — MIDAZOLAM HYDROCHLORIDE 0.5 MG: 1 INJECTION, SOLUTION INTRAMUSCULAR; INTRAVENOUS at 08:09

## 2019-05-21 RX ADMIN — CLOPIDOGREL 75 MG: 75 TABLET, FILM COATED ORAL at 14:16

## 2019-05-21 RX ADMIN — ASPIRIN 81 MG: 81 TABLET, COATED ORAL at 14:16

## 2019-05-21 RX ADMIN — SODIUM CHLORIDE, POTASSIUM CHLORIDE, SODIUM LACTATE AND CALCIUM CHLORIDE: 600; 310; 30; 20 INJECTION, SOLUTION INTRAVENOUS at 07:14

## 2019-05-21 RX ADMIN — IOPAMIDOL 71 ML: 755 INJECTION, SOLUTION INTRAVENOUS at 08:40

## 2019-05-21 RX ADMIN — HEPARIN SODIUM 4000 UNITS: 1000 INJECTION INTRAVENOUS; SUBCUTANEOUS at 07:57

## 2019-05-21 ASSESSMENT — PULMONARY FUNCTION TESTS
PIF_VALUE: 1

## 2019-05-21 ASSESSMENT — PAIN SCALES - GENERAL
PAINLEVEL_OUTOF10: 0

## 2019-05-21 NOTE — PROGRESS NOTES
Order received for arterial line removal.  Right arterial line discontinued. Manual pressure held for 10 minutes and tegaderm on site. No hematoma, no oozing noted. Patient tolerated well.

## 2019-05-21 NOTE — PROGRESS NOTES
Patient admitted to CVU from hybrid cath lab post acute recovery phase and attached to monitors. Report received from cath lab staff and groin sites assessed by both staff at bedside. Right and left groin sites venous sheaths dry and intact. amiodarone infusing via Left groin sheath. Site soft to touch, no hematoma or oozing noted and bilateral pulses per doppler. Right Sheaths removed in cath lab and perclosed. Left arterial sheath removed in cath lab at 0900. Bilateral groins sites soft to touch, no oozing and hematoma noted. (See cath lab \"Zims\" documentation in chart.) Hemodymanics stable and will continue to monitor per MD order. Assessment completed as charted. Family let back to see patient. Visiting hours reviewed and all questions answered. Tele , DAVID.

## 2019-05-21 NOTE — ANESTHESIA PRE PROCEDURE
PROT 7.4 05/16/2019    CALCIUM 9.8 05/16/2019    BILITOT 0.3 05/16/2019    ALKPHOS 52 05/16/2019    AST 13 05/16/2019    ALT 6 05/16/2019       POC Tests: No results for input(s): POCGLU, POCNA, POCK, POCCL, POCBUN, POCHEMO, POCHCT in the last 72 hours. Coags:   Lab Results   Component Value Date    PROTIME 10.6 05/16/2019    INR 0.93 05/16/2019       HCG (If Applicable): No results found for: PREGTESTUR, PREGSERUM, HCG, HCGQUANT     ABGs: No results found for: PHART, PO2ART, YYR1PEQ, FTC2GEZ, BEART, V4ZEZGAV     Type & Screen (If Applicable):  No results found for: LABABO, [address]    Anesthesia Evaluation  Patient summary reviewed and Nursing notes reviewed no history of anesthetic complications:   Airway: Mallampati: II  TM distance: >3 FB   Neck ROM: full  Mouth opening: > = 3 FB Dental:          Pulmonary:normal exam    (+) asthma:                            Cardiovascular:  Exercise tolerance: poor (<4 METS),   (+) hypertension:, valvular problems/murmurs: AS, CAD:,       ECG reviewed      Echocardiogram reviewed  Stress test reviewed  Cleared by cardiology              Neuro/Psych:               GI/Hepatic/Renal:             Endo/Other:                     Abdominal:           Vascular:                                        Anesthesia Plan      MAC     ASA 4       Induction: intravenous. arterial line  MIPS: Postoperative opioids intended and Prophylactic antiemetics administered. Anesthetic plan and risks discussed with patient. Use of blood products discussed with patient whom consented to blood products.                    Hayley Weathers MD   5/21/2019

## 2019-05-21 NOTE — OP NOTE
performed    PostProcedure Wires removed and delivery catheter withdrawn. Sheaths pulled in OR unless needed for fluid maintenance, hemodynamic monitoring or pacing. If preclosed, femoral artery sutures tied and manual pressure applied until hemostasis. Transitioned to CVU without ventilator support and hemodynamically stable.        Complications   None     Blood Loss   <100cc     Facility Inpatient Coding      ü ICD-10 Diagnosis Code Code   X Nonrheumatic aortic valve stenosis I35.0     ü ICD-10 Diagnosis Code Code   X Replacement of aortic valve with zooplastic tissue, perc [id number]    Replacement of aortic valve with zooplastic tissue, transapical [id number]     Major Comorbidities/Complications   ü [facility] Code Evidence    Severe protein malnutrition E43     Encephalopathy, unspecified G92.28     Metabolic encephalopathy U88.07     Other encephalopathy G93.49     Posterior reversible encephalopathy syndrome I67.83     Toxic encephalopathy G92     Ventricular fibrillation E65.95     Acute systolic CHF A71.17     Acute on chronic systolic CHF E21.84     Acue diastolic CHF O15.95     Acute on chronic diastolic CHF K60.29     Acute combined systolic and diastolic CHF V71.98     Acute on chronic combined systolic and diastolic CHF I60.63     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, 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     CVA due to unsp occl or sten of carotid arteries, unsp I63.239     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

## 2019-05-21 NOTE — PROGRESS NOTES
Patient instructed on removal procedure. Venous  sheath site without hematoma or oozing. Venous sheath removed per policy without difficulty. Integrity of sheath inspected upon removal and no abnormalities noted. Manual pressure held X 10 minutes. Dry, sterile tegaderm applied. Patient tolerated well. Vital signs, groin checks, and pedal pulses will be completed per protocol (every 15 minutes X 4, every 30 minutes X 2, and every hour X 2 per protocol). Sheath removed by  June Ernst.

## 2019-05-21 NOTE — OP NOTE
artery. This was  secured with the skin with a 2-0 silk suture. The valve device was then  placed through the sheath and the valve was positioned across the aortic  wall. Once all present were in agreement, the valve was appropriately  located. The valve was deployed. It seated perfectly. The patient go  into atrial fibrillation after deployment of the valve. She was  cardioverted once and returned to sinus rhythm for a minute or 2 and  then went back in the atrial fibrillation with a rate of around 100 to  110 beats per minute. Amiodarone was given, and as of the end of the  procedure, she was still in atrial fibrillation with a controlled rate. With the valve being deployed, an echocardiogram was utilized to assess  its function. There was zero paravalvular leakage. The valve leads  function normally. The mean gradient across the valve was somewhere  between 2 to 3 mmHg. The guidewire of the ventricle was captured and removed. The introducer  sheath was removed under angiographic guidance. The Perclose devices in  the right common femoral artery then snug down. A completion angiogram  was performed and there was a small leak at the perforation site with  holding pressure and administration of protamine to reverse the  previously given Heparin, good hemostasis was able to be achieved. The  patient was then prepared for transverse to the ICU for ongoing care.         Blake Lackey MD    D: 05/21/2019 9:23:25       T: 05/21/2019 9:48:49     [id number]  Job#: [id number]     Doc#: [id number]    CC:  Rohit Crowley MD

## 2019-05-21 NOTE — PROGRESS NOTES
05/21/19 1205   Incentive Spirometry Tx   Treatment Tolerance Well   Incentive Spirometry Goal (mL) 420 mL   Incentive Spirometry Achieved (mL) 450 mL

## 2019-05-21 NOTE — PROGRESS NOTES
Patient instructed on removal procedure. Venous  sheath site without hematoma or oozing. Venous sheath removed per policy without difficulty. Integrity of sheath inspected upon removal and no abnormalities noted. Manual pressure held X 10 minutes. Dry, sterile tegaderm applied Patient tolerated well. Vital signs, groin checks, and pedal pulses will be completed per protocol (every 15 minutes X 4, every 30 minutes X 2, and every hour X 2 per protocol).    Sheath removed by  Lora Banuelos.

## 2019-05-21 NOTE — PROGRESS NOTES
Pt vomited small after coughing per family. zofran given up to chair, right groin with small amount of blood noted, site soft to touch, no hematoma or bruising noted. Left groin unremarkable. Will monitor.

## 2019-05-21 NOTE — H&P
[address]  Inpatient Consultation / H+P      REASON FOR CONSULT/CHIEF COMPLAINT/HPI     TYPE Interventional Cardiology / Structural Heart Disease   RFC/CC AS   HPI Miguel Mckeon is a 80 y. o.here for TAVR for severe AS. HISTORY/ALLERGIES/ROS       MedHx:  has a past medical history of Aortic stenosis and Arthritis. SurgHx:  has a past surgical history that includes Varicose vein surgery (Bilateral, 1980's); Cardiac catheterization (04/17/2018); and Hysterectomy, total abdominal.   SocHx:  reports that she has never smoked. She has never used smokeless tobacco. She reports that she does not drink alcohol or use drugs. FamHx: No evidence for sudden cardiac death or premature CAD. Allergies: Patient has no known allergies. ROS:   [x]Full ROS obtained and negative except as mentioned in HPI    MEDICATIONS      Prior to Admission medications    Medication Sig Start Date End Date Taking?  Authorizing Provider   albuterol sulfate HFA (VENTOLIN HFA) 108 (90 Base) MCG/ACT inhaler Inhale 2 puffs into the lungs every 4 hours as needed for Wheezing or Shortness of Breath 5/13/19  Yes Tracie , APRN - CNP   azithromycin (ZITHROMAX) 250 MG tablet 500mg on day 1 followed by 250mg on days 2 - 5 5/13/19   Tracie , APRN - CNP   meloxicam (MOBIC) 7.5 MG tablet TAKE 1 TABLET BY MOUTH ONCE DAILY 4/24/19   Fabiola Mcneill APRN - [place]       PHYSICAL EXAM    HR 90  Vitals:    05/21/19 0524   BP: (!) 161/89   Pulse:    Resp:    Temp:    SpO2:     Weight: 97 lb (44 kg)(Bed scale reading 64.2 kg - grossly off from stated weight)     Gen Alert, coop, no distress Heart  RRR, 4/6   Head NC, AT, no abnorm Abd  Soft, NT, +BS, no mass, no OM   Eyes PERRLA, conj/corn clear Ext  Ext nl, AT, no C/C/E   Nose Nares nl, no drain, NT Pulse 2+ and symmetric   Throat Lips, mucosa, tongue nl Skin Color/text/turg nl, no rash/lesions   Neck S/S, TM, NT, no bruit/JVD Psych Nl mood and affect   Lung CTA-B, unlabored, no DTP Lymph   No cervical or axillary LA   Ch wall NT, no deform Neuro  Nl gross M/S exam     LABS     CBC:   Lab Results   Component Value Date    WBC 4.2 05/16/2019    RBC 5.25 05/16/2019    HGB 10.6 05/21/2019    HCT 44.6 05/16/2019    MCV 84.9 05/16/2019    RDW 14.7 05/16/2019     05/16/2019     CMP:  Lab Results   Component Value Date     05/16/2019    K 4.3 05/16/2019     05/16/2019    CO2 27 05/16/2019    BUN 11 05/16/2019    CREATININE 0.8 05/16/2019    GFRAA >60 05/16/2019    AGRATIO 1.5 03/28/2019    LABGLOM >60 05/16/2019    GLUCOSE 78 05/16/2019    PROT 7.4 05/16/2019    CALCIUM 9.8 05/16/2019    BILITOT 0.3 05/16/2019    ALKPHOS 52 05/16/2019    AST 13 05/16/2019    ALT 6 05/16/2019     PT/INR:  No results found for: PTINR  Lab Results   Component Value Date    TROPONINI <0.01 05/21/2019     ASSESSMENT AND PLAN      ~AS    Date EF Detail   Sx     SOB, fatigue   Hx     No intervention   NYHA     []I         [x]II           []III          []IV   TTE 11/16  3/19 60%  65% Mod AS MG 28  Severe AS MG 51   EKG     Sinus arrhythmia   STS     8.2   Plan     TAVR today   ~HTN  Today BP Controlled   Counseling Counseled on diet/salt, exercise and ideal body weight   Plan Continue current meds at doses above   ~Carotid Stenosis    Date Results   CUS   ALEXIS 69-86%, LICA <25%   Intervention   Asymptomatic: Angio 60%, CT 80%  Symptomatic: Angio 50%, CT 70%   Plan   Observation

## 2019-05-21 NOTE — PROGRESS NOTES
Pt maintaining NSR. Discussed with Dr. Anam Miles. Will give Amiodarone 400mg PO now and DC Amiodarone gtt. Du Murphy, RN, aware of plan. OK to DC venous sheath once gtt off. Pt states that she is feeling well with the exception of back discomfort from laying. Right groin site clear. No complaints of groin pain. States breathing well. No chest pain.  Brandi ESPITIA

## 2019-05-21 NOTE — PROGRESS NOTES
Pt verified information regarding surgery, name, birth date, surgeon, procedure and allergies: NKDA. Patient transferred to [address] [place] [address] for surgery. Appropriate antibiotics ordered: Ancef 2 grams. Beta blocker: Pt not taking pre-op. DVT prophyaxis: SCD and ROMAN stockings in place. Lab work within normal limits, 2 units of RBC's on call to OR, vital signs stable, Mepilex sacral border applied and 2% chlorhexidine gluconate skin prep given. Patient and family educated about surgery and pain management. Arterial line placed in Left Radial by Dr. Juan Carlos Richey. To surgery per bed at 0654.

## 2019-05-22 VITALS
DIASTOLIC BLOOD PRESSURE: 90 MMHG | SYSTOLIC BLOOD PRESSURE: 145 MMHG | BODY MASS INDEX: 21.33 KG/M2 | HEART RATE: 81 BPM | WEIGHT: 105.82 LBS | RESPIRATION RATE: 18 BRPM | HEIGHT: 59 IN | TEMPERATURE: 97.2 F | OXYGEN SATURATION: 96 %

## 2019-05-22 LAB
ANION GAP SERPL CALCULATED.3IONS-SCNC: 12 MMOL/L (ref 3–16)
BUN BLDV-MCNC: 8 MG/DL (ref 7–20)
CALCIUM SERPL-MCNC: 9.6 MG/DL (ref 8.3–10.6)
CHLORIDE BLD-SCNC: 102 MMOL/L (ref 99–110)
CO2: 24 MMOL/L (ref 21–32)
CREAT SERPL-MCNC: 0.7 MG/DL (ref 0.6–1.2)
EKG ATRIAL RATE: 82 BPM
EKG DIAGNOSIS: NORMAL
EKG P AXIS: 23 DEGREES
EKG P-R INTERVAL: 164 MS
EKG Q-T INTERVAL: 364 MS
EKG QRS DURATION: 88 MS
EKG QTC CALCULATION (BAZETT): 425 MS
EKG R AXIS: 50 DEGREES
EKG T AXIS: 93 DEGREES
EKG VENTRICULAR RATE: 82 BPM
GFR AFRICAN AMERICAN: >60
GFR NON-AFRICAN AMERICAN: >60
GLUCOSE BLD-MCNC: 107 MG/DL (ref 70–99)
HCT VFR BLD CALC: 39.3 % (ref 36–48)
HEMOGLOBIN: 13.1 G/DL (ref 12–16)
LEFT VENTRICULAR EJECTION FRACTION MODE: NORMAL
LV EF: 65 %
LV EF: 65 %
LVEF MODALITY: NORMAL
MCH RBC QN AUTO: 27.8 PG (ref 26–34)
MCHC RBC AUTO-ENTMCNC: 33.4 G/DL (ref 31–36)
MCV RBC AUTO: 83.3 FL (ref 80–100)
PDW BLD-RTO: 14.4 % (ref 12.4–15.4)
PLATELET # BLD: 141 K/UL (ref 135–450)
PMV BLD AUTO: 8.5 FL (ref 5–10.5)
POTASSIUM REFLEX MAGNESIUM: 3.9 MMOL/L (ref 3.5–5.1)
RBC # BLD: 4.72 M/UL (ref 4–5.2)
SODIUM BLD-SCNC: 138 MMOL/L (ref 136–145)
WBC # BLD: 6.8 K/UL (ref 4–11)

## 2019-05-22 PROCEDURE — 85027 COMPLETE CBC AUTOMATED: CPT

## 2019-05-22 PROCEDURE — 3600000017 HC SURGERY HYBRID ADDL 15MIN

## 2019-05-22 PROCEDURE — 99024 POSTOP FOLLOW-UP VISIT: CPT | Performed by: INTERNAL MEDICINE

## 2019-05-22 PROCEDURE — 93005 ELECTROCARDIOGRAM TRACING: CPT | Performed by: INTERNAL MEDICINE

## 2019-05-22 PROCEDURE — 80048 BASIC METABOLIC PNL TOTAL CA: CPT

## 2019-05-22 PROCEDURE — 93306 TTE W/DOPPLER COMPLETE: CPT

## 2019-05-22 PROCEDURE — 94761 N-INVAS EAR/PLS OXIMETRY MLT: CPT

## 2019-05-22 PROCEDURE — 6370000000 HC RX 637 (ALT 250 FOR IP): Performed by: INTERNAL MEDICINE

## 2019-05-22 PROCEDURE — 3600000007 HC SURGERY HYBRID BASE

## 2019-05-22 PROCEDURE — 93010 ELECTROCARDIOGRAM REPORT: CPT | Performed by: INTERNAL MEDICINE

## 2019-05-22 PROCEDURE — 2580000003 HC RX 258: Performed by: INTERNAL MEDICINE

## 2019-05-22 RX ORDER — CLOPIDOGREL BISULFATE 75 MG/1
75 TABLET ORAL DAILY
Qty: 30 TABLET | Refills: 5 | Status: SHIPPED | OUTPATIENT
Start: 2019-05-22 | End: 2019-10-10 | Stop reason: ALTCHOICE

## 2019-05-22 RX ORDER — ASPIRIN 81 MG/1
81 TABLET ORAL DAILY
Qty: 30 TABLET | Refills: 3 | Status: SHIPPED | OUTPATIENT
Start: 2019-05-22 | End: 2019-09-21 | Stop reason: SDUPTHER

## 2019-05-22 RX ORDER — AMIODARONE HYDROCHLORIDE 400 MG/1
400 TABLET ORAL DAILY
Qty: 30 TABLET | Refills: 1 | Status: SHIPPED | OUTPATIENT
Start: 2019-05-22 | End: 2019-05-22

## 2019-05-22 RX ORDER — AMIODARONE HYDROCHLORIDE 200 MG/1
200 TABLET ORAL DAILY
Qty: 30 TABLET | Refills: 0 | Status: SHIPPED | OUTPATIENT
Start: 2019-05-22 | End: 2019-05-30 | Stop reason: DRUGHIGH

## 2019-05-22 RX ADMIN — AMIODARONE HYDROCHLORIDE 400 MG: 200 TABLET ORAL at 08:52

## 2019-05-22 RX ADMIN — ASPIRIN 81 MG: 81 TABLET, COATED ORAL at 08:52

## 2019-05-22 RX ADMIN — Medication 10 ML: at 08:53

## 2019-05-22 RX ADMIN — CLOPIDOGREL 75 MG: 75 TABLET, FILM COATED ORAL at 08:52

## 2019-05-22 ASSESSMENT — PAIN SCALES - GENERAL
PAINLEVEL_OUTOF10: 0
PAINLEVEL_OUTOF10: 0

## 2019-05-22 NOTE — PROGRESS NOTES
CLINICAL PHARMACY NOTE: MEDS TO [address] Select Patient?: No  Total # of Prescriptions Filled: 3   The following medications were delivered to the patient:  · Clopidogrel  · Aspirin low  · Amiodarone  Total # of Interventions Completed: 0  Time Spent (min): 60    Additional Documentation:  Meds delivered to patient bedside.  Signed by patient granddaughter

## 2019-05-22 NOTE — DISCHARGE SUMMARY
[address]   DISCHARGE SUMMARY    Patient ID:  Ramon Cordova [id number]  7 y.o.  4/14/1931    Admit date:   5/21/2019  Discharge Date:  5/22/19  Admit MD:   Sissy Jimenes MD   Admit Dx:   Nonrheumatic aortic valve stenosis [I35.0]  D/C Dx:     Patient Active Problem List   Diagnosis    Aortic calcification ([address]    Hypercholesteremia    Nonrheumatic aortic valve stenosis    Right carotid artery occlusion    Arthritis    Essential hypertension    Carotid stenosis, asymptomatic, right      D/C Condition:  good  Hospital Course:  Admitted for TAVR for severe AS. No apparent complications. Access site(s) stable. Patient denies cp, sob. Consults:   IP CONSULT TO CARDIAC REHAB  Subjective:  Patient was seen and examined. Notes, labs, and recent testing reviewed. No acute issues overnight and patient without concern prior to discharge.    Exam:   Gen Alert, coop, no distress Heart  RRR, no MRG, nl apical impulse   Head NC, AT, no abnorm Abd  Soft, NT, +BS, no mass, no OM   Eyes PERRLA, conj/corn clear Ext  Ext nl, AT, no C/C/E   Nose Nares nl, no drain, NT Pulse 2+ and symmetric   Throat Lips, mucosa, tongue nl Skin Color/text/turg nl, no rash/lesions   Neck S/S, TM, NT, no bruit/JVD Psych Nl mood and affect   Lung CTA-B, unlabored, no DTP Lymph   No cervical or axillary LA   Ch wall NT, no deform Neuro  Nl gross M/S exam     Diagnostic Studies and Labs:  Reviewed, please see Epic for specific details  Disposition:     home  Discharge Medications:    Barb Prasad   Home Medication Instructions [facility]:[id number]    Printed on:05/22/19 0653   Medication Information                      albuterol sulfate HFA (VENTOLIN HFA) 108 (90 Base) MCG/ACT inhaler  Inhale 2 puffs into the lungs every 4 hours as needed for Wheezing or Shortness of Breath             amiodarone (PACERONE) 400 MG tablet  Take 1 tablet by mouth daily             aspirin 81 MG EC tablet  Take 1 tablet by mouth daily             clopidogrel (PLAVIX) 75 MG tablet  Take 1 tablet by mouth daily                 Valve AC/AP Regimen:  Antiplatelet  [x]ASA 90QK qd  [x]Plavix 75mg qd  []Effient 10mg  qd  []Brilinta 90bid   Anticoagulant  []Coumadin  []Xarelto  []Eliquis    Core Measure Medications  []BB    []ACEI/ARB   []Statin     Summary:  ~Patient is stable from CV standpoint  ~Tobacco, diet, salt, activity restrictions discussed in detail  [facility] not indicated for AS or for TAVR procedure. Resume only with other indications.      Followup:  ~[facility] TAVR Clinic:  1 weeks    Signed:  Gretchen Abreu MD, 5/22/2019, 6:53 AM  Time spent on discharge of patient: >31 minutes

## 2019-05-22 NOTE — CONSULTS
RN discussed CR flyer with pt and pt's family member; CR flyer provided as well as contact information for [facility] CR as it is in closer proximity to pt's home; all questions answered at this time

## 2019-05-22 NOTE — PROGRESS NOTES
Resting in bed, ambulated to chair with SBA. Assessment completed. Right groin site soft to touch, no hematoma with small amount of old drainage. Left groin site soft to touch with small bruise   , no hematoma or oozing noted.  Denies pain will monitor

## 2019-05-22 NOTE — PROGRESS NOTES
Transcatheter Aortic Valve Replacement (TAVR)  Procedure and Hospitalization Summary      Dear Odelia Schwab, MD, Argentina Romero MD and Giuliana Herrera MD,  Thank you for referring your patient to [facility] for treatment of severe aortic stenosis. A Transcatheter Aortic Valve Replacement (TAVR) was performed on your patient. For your reference, I have included a summary of the procedure and hospitalization.     Procedural Details  Date Make Model Size Access PreTAVR MG PostTAVR [facility]   5/21/19 [place] S3 23mm Transfemoral 51mmHg 8mmHg     Hospitalization Details  Admit Date Discharge Date Complications   7/86/85 4/17/69 None     Typical Followup  Date Followup Studies/Labs   1 day Valve Coordinator phone call None   1 week Office visit with TAVR Team PRN   4 weeks Office visit with TAVR Team Echocardiogram   3 months Office visit with primary cardiologist None   Yearly Office visit with primary cardiologist Echocardiogram     Questions/Concerns  Name Title Phone   [address] Nurse Coordinator [phone]   Carmela Tate MD Interventional Cardiology - TAVR [phone]   Nishant Orozco MD Interventional Cardiology - TAVR [phone]     Transcatheter AVR Versus Surgical AVR

## 2019-05-22 NOTE — PROGRESS NOTES
Discharge instructions and post trav instructions reviewed with patient and family member. Patient and family verbalized understanding. All home medications have been reviewed, questions answered and patient voiced understanding. All medication side effects reviewed and patient and family verbalized understanding. Patient given prescriptions, discharge instructions, and appointment times. Patient discharged to home with family via private car. Taken to lobby via wheelchair. Follow up appointment(s) reviewed with patient and all attempts made to schedule within 7 days of discharge. [facility]  Depression Screen    1. During the past month, have you often been bothered by feeling down, depressed, or hopeless?   no    2. During the past month, have you often been bothered by little interest or pleasure in       doing things?    no

## 2019-05-28 NOTE — PROGRESS NOTES
[address]     H+P // CONSULT // OUTPATIENT VISIT // Daljit Faust     Referring Doctor Romain Roberts MD   Encounter Type Followup     CHIEF COMPLAINT     Visit Type Chronic   Symptoms None   Problems AS, HTN, Carotid stenosis, AFIB      HISTORY OF PRESENT ILLNESS      GEN - Doing great since TAVR. No new concerns. Groins intact without symptoms.  AS - s/p TAVR. No concerns. EKG with NSR, nl QRS.  HTN - Ambulatory BP readings in good range. No HA or dizziness.  CAROTID - Results below noted. No TIA or CVA sx.  AFIB - brief post TAVR. Resolved and on amio.  MED - Compliant with CV meds listed below without notable side effects     COMPLIANCE     Category Meds Diet Salt Exercise Tobacco Alcohol Drugs   Compliant [x] [x] [x] [x] [x] [x] [x]   [x]Counseling given on all above above categories  HISTORY/ALLERGIES/ROS     MedHx:  has a past medical history of Aortic stenosis and Arthritis. SurgHx:  has a past surgical history that includes Varicose vein surgery (Bilateral, 1980's); Cardiac catheterization (04/17/2018); Hysterectomy, total abdominal; and Aortic valve replacement (N/A, 5/21/2019). SocHx:  reports that she has never smoked. She has never used smokeless tobacco. She reports that she does not drink alcohol or use drugs. FamHx: family history includes Asthma in her daughter; Cancer in her brother and sister; Diabetes in her mother; Heart Disease in her brother, father, mother, nephew, and sister. Allergies: Patient has no known allergies.    ROS:  [x]Full ROS obtained and negative except as mentioned in HPI     OP CV MEDICATIONS     Current Outpatient Medications   Medication Sig Dispense Refill    aspirin 81 MG EC tablet Take 1 tablet by mouth daily 30 tablet 3    clopidogrel (PLAVIX) 75 MG tablet Take 1 tablet by mouth daily 30 tablet 5    amiodarone (CORDARONE) 200 MG tablet Take 1 tablet by mouth daily 30 tablet 0    albuterol sulfate HFA (VENTOLIN HFA) 108 (90 Base) MCG/ACT inhaler Inhale 2 puffs into the lungs every 4 hours as needed for Wheezing or Shortness of Breath 1 Inhaler 0     No current facility-administered medications for this visit.       PHYSICAL EXAM     Vitals:    05/30/19 1155   BP: 130/80   Pulse: 78      Gen Alert, coop, no distress Heart  Rrr, no mrg   Head NC, AT, no abnorm Abd  Soft, NT, +BS, no mass, no OM   Eyes PER, conj/corn clear Ext  Ext nl, AT, no C/C/E   Nose Nares nl, no drain, NT Pulse 2+ and symmetric   Throat Lips, mucosa, tongue nl Skin Col/text/turg nl, no vis rash/les   Neck S/S, TM, NT, no bruit/JVD Psych Nl mood and affect   Lung CTA-B, unlabored, no DTP Lymph   No cervical or axillary LA   Ch wall NT, no deform Neuro  Nl gross M/S exam     ASSESSMENT AND PLAN     ~AS   Date EF Detail   Sx   SOB, fatigue   Hx 5/19  TAVR with ES3 23mm   NYHA   [x]I         []II           []III          []IV   TTE 11/16  3/19  5/19 60%  65%  65% Mod AS MG 28  Severe AS MG 51  TAVR MG 8, no AI   EKG   Sinus arrhythmia   Plan   Continued observation  Echo at 1 month and yearly thereafter   ~HTN  Today BP Controlled   Counseling Counseled on diet/salt, exercise and ideal body weight   Plan Continue current meds at doses above   ~Carotid Stenosis   Date Results   CUS  ALEXIS 79-22%, LICA <94%   Intervention  Asymptomatic: Angio 60%, CT 80%  Symptomatic: Angio 50%, CT 70%   Plan  Observation   ~AFIB  Brief post TAVR, resolved with amio  Plan Stop amio at 1 month, reduce to 100mg daily today   No known recurrence, EKG today NSR.  ~Followup  Interval: 1 month  Tests/Labs: Echo at 1 month

## 2019-05-30 ENCOUNTER — OFFICE VISIT (OUTPATIENT)
Dept: CARDIOLOGY CLINIC | Age: 84
End: 2019-05-30
Payer: MEDICARE

## 2019-05-30 VITALS
HEIGHT: 59 IN | DIASTOLIC BLOOD PRESSURE: 80 MMHG | WEIGHT: 98.1 LBS | BODY MASS INDEX: 19.78 KG/M2 | SYSTOLIC BLOOD PRESSURE: 130 MMHG | HEART RATE: 78 BPM

## 2019-05-30 DIAGNOSIS — I65.21 CAROTID STENOSIS, ASYMPTOMATIC, RIGHT: ICD-10-CM

## 2019-05-30 DIAGNOSIS — I48.0 PAF (PAROXYSMAL ATRIAL FIBRILLATION) (HCC): Primary | ICD-10-CM

## 2019-05-30 DIAGNOSIS — I35.0 NONRHEUMATIC AORTIC VALVE STENOSIS: ICD-10-CM

## 2019-05-30 DIAGNOSIS — I10 ESSENTIAL HYPERTENSION: ICD-10-CM

## 2019-05-30 PROCEDURE — 99214 OFFICE O/P EST MOD 30 MIN: CPT | Performed by: INTERNAL MEDICINE

## 2019-05-30 PROCEDURE — 93000 ELECTROCARDIOGRAM COMPLETE: CPT | Performed by: INTERNAL MEDICINE

## 2019-05-30 RX ORDER — AMIODARONE HYDROCHLORIDE 200 MG/1
100 TABLET ORAL DAILY
Qty: 30 TABLET | Refills: 0 | Status: SHIPPED
Start: 2019-05-30 | End: 2019-07-16

## 2019-05-30 NOTE — LETTER
[address] [address] [address] [address]  Phone: [phone]  Fax: [phone]    Lisa Hein MD        May 31, 2019     Mikal Hidalgo, [address] [place] [id number]    Patient: Dori Garibay  MR Number: [id number]  YOB: 1931  Date of Visit: 5/30/2019    Dear Dr. Mikal Hidalgo:      [address]     H+P // CONSULT // OUTPATIENT VISIT // Madi Lawson     Referring Doctor Mikal Hidalgo MD   Encounter Type Followup     CHIEF COMPLAINT     Visit Type Chronic   Symptoms None   Problems AS, HTN, Carotid stenosis, AFIB      HISTORY OF PRESENT ILLNESS     ? GEN - Doing great since TAVR. No new concerns. Groins intact without symptoms. ? AS - s/p TAVR. No concerns. EKG with NSR, nl QRS. ? HTN - Ambulatory BP readings in good range. No HA or dizziness. ? CAROTID - Results below noted. No TIA or CVA sx.    ? AFIB - brief post TAVR. Resolved and on amio. ? MED - Compliant with CV meds listed below without notable side effects     COMPLIANCE     Category Meds Diet Salt Exercise Tobacco Alcohol Drugs   Compliant [x] [x] [x] [x] [x] [x] [x]   [x]Counseling given on all above above categories  HISTORY/ALLERGIES/ROS     MedHx:  has a past medical history of Aortic stenosis and Arthritis. SurgHx:  has a past surgical history that includes Varicose vein surgery (Bilateral, 1980's); Cardiac catheterization (04/17/2018); Hysterectomy, total abdominal; and Aortic valve replacement (N/A, 5/21/2019). SocHx:  reports that she has never smoked. She has never used smokeless tobacco. She reports that she does not drink alcohol or use drugs. FamHx: family history includes Asthma in her daughter; Cancer in her brother and sister; Diabetes in her mother; Heart Disease in her brother, father, mother, nephew, and sister. Allergies: Patient has no known allergies. ROS:  [x]Full ROS obtained and negative except as mentioned in HPI     OP CV MEDICATIONS     Current Outpatient Medications   Medication Sig Dispense Refill    aspirin 81 MG EC tablet Take 1 tablet by mouth daily 30 tablet 3    clopidogrel (PLAVIX) 75 MG tablet Take 1 tablet by mouth daily 30 tablet 5    amiodarone (CORDARONE) 200 MG tablet Take 1 tablet by mouth daily 30 tablet 0    albuterol sulfate HFA (VENTOLIN HFA) 108 (90 Base) MCG/ACT inhaler Inhale 2 puffs into the lungs every 4 hours as needed for Wheezing or Shortness of Breath 1 Inhaler 0     No current facility-administered medications for this visit. PHYSICAL EXAM     Vitals:    05/30/19 1155   BP: 130/80   Pulse: 78      Gen Alert, coop, no distress Heart  Rrr, no mrg   Head NC, AT, no abnorm Abd  Soft, NT, +BS, no mass, no OM   Eyes PER, conj/corn clear Ext  Ext nl, AT, no C/C/E   Nose Nares nl, no drain, NT Pulse 2+ and symmetric   Throat Lips, mucosa, tongue nl Skin Col/text/turg nl, no vis rash/les   Neck S/S, TM, NT, no bruit/JVD Psych Nl mood and affect   Lung CTA-B, unlabored, no DTP Lymph   No cervical or axillary LA   Ch wall NT, no deform Neuro  Nl gross M/S exam     ASSESSMENT AND PLAN     ~AS   Date EF Detail   Sx   SOB, fatigue   Hx 5/19  TAVR with ES3 23mm   NYHA   [x]I         []II           []III          []IV   TTE 11/16  3/19  5/19 60%  65%  65% Mod AS MG 28  Severe AS MG 51  TAVR MG 8, no AI   EKG   Sinus arrhythmia   Plan   Continued observation  Echo at 1 month and yearly thereafter   ~HTN  Today BP Controlled   Counseling Counseled on diet/salt, exercise and ideal body weight   Plan Continue current meds at doses above   ~Carotid Stenosis   Date Results   CUS  ALEXIS 89-69%, LICA <83%   Intervention  Asymptomatic: Angio 60%, CT 80%  Symptomatic: Angio 50%, CT 70%   Plan  Observation   ~AFIB  Brief post TAVR, resolved with amio  Plan Stop amio at 1 month, reduce to 100mg daily today   No known recurrence, EKG today NSR.

## 2019-05-30 NOTE — PATIENT INSTRUCTIONS
Decrease your Amiodarone to 100mg a day. You can cut the current 200mg tablets in half. Stop the medicine in 2 weeks.

## 2019-06-06 ENCOUNTER — TELEPHONE (OUTPATIENT)
Dept: CARDIOLOGY CLINIC | Age: 84
End: 2019-06-06

## 2019-06-06 NOTE — TELEPHONE ENCOUNTER
Irma Oakley is requesting Clarification on current restrictions.  Please fax to [phone] or call [phone]

## 2019-07-16 ENCOUNTER — OFFICE VISIT (OUTPATIENT)
Dept: FAMILY MEDICINE CLINIC | Age: 84
End: 2019-07-16
Payer: MEDICARE

## 2019-07-16 VITALS
WEIGHT: 99 LBS | BODY MASS INDEX: 19.96 KG/M2 | OXYGEN SATURATION: 97 % | HEART RATE: 88 BPM | SYSTOLIC BLOOD PRESSURE: 122 MMHG | HEIGHT: 59 IN | DIASTOLIC BLOOD PRESSURE: 62 MMHG

## 2019-07-16 DIAGNOSIS — M79.89 LEG SWELLING: ICD-10-CM

## 2019-07-16 DIAGNOSIS — L03.115 CELLULITIS OF RIGHT LEG: Primary | ICD-10-CM

## 2019-07-16 PROCEDURE — 99213 OFFICE O/P EST LOW 20 MIN: CPT | Performed by: FAMILY MEDICINE

## 2019-07-16 RX ORDER — DOXYCYCLINE HYCLATE 100 MG
100 TABLET ORAL 2 TIMES DAILY
Qty: 20 TABLET | Refills: 0 | Status: SHIPPED | OUTPATIENT
Start: 2019-07-16 | End: 2019-07-26

## 2019-07-16 NOTE — PROGRESS NOTES
Subjective:      Patient ID: Criss Kwong is a 80 y.o. female. HPI  Some sob but a lot more swelling marlen on right leg - bruising a lot  Cut shin on car door 1 week ago - now red/ tender -not healing   Swelling better now  On triple antibiotic oint  Chief Complaint   Patient presents with    Edema     RIGHT LEG SWELLING X 3 DAYS WITH REDNESS. HAS BEEN BACK TO WORK X 3-4 WKS. Using alcohol / peroxide for shin  On plavix/ asa - stopped amiodarone  Past Surgical History:   Procedure Laterality Date    AORTIC VALVE REPLACEMENT N/A 5/21/2019    TRANSCATHETER AORTIC VALVE REPLACEMENT FEMORAL APPROACH performed by Julian Jacobs MD at [address]  04/17/2018    HYSTERECTOMY, TOTAL ABDOMINAL      VARICOSE VEIN SURGERY Bilateral 1980's   elevation helped swelling overnight  Active at work/ home -not sitting around  Echo was normal 65% ef in may - getting another echo soon  BP Readings from Last 3 Encounters:   07/16/19 122/62   05/30/19 130/80   05/22/19 (!) 145/90     Pulse Readings from Last 3 Encounters:   07/16/19 88   05/30/19 78   05/22/19 81     Wt Readings from Last 3 Encounters:   07/16/19 99 lb (44.9 kg)   05/30/19 98 lb 1.6 oz (44.5 kg)   05/22/19 105 lb 13.1 oz (48 kg)   more off balance lately  Some sob at times  Hearing seems worse    Review of Systems    Objective:   Physical Exam   Constitutional: She is oriented to person, place, and time. She appears well-developed and well-nourished. No distress. Eyes: No scleral icterus. Cardiovascular: Normal rate and regular rhythm. Murmur heard. Pulmonary/Chest: Effort normal.   Musculoskeletal: She exhibits edema (swelling - trace -right > left). Neurological: She is alert and oriented to person, place, and time. Skin: Skin is warm and dry. Small oval abrasion right shin -superficial -   Psychiatric: She has a normal mood and affect. Assessment:       Diagnosis Orders   1. Cellulitis of right leg     2.  Leg swelling             Plan:      Td or Tdap if covered  Elevation/ compression/ low salt  F/u cardiology soon - echo pending  Doxy 100 bid for 10 days w/ probiotic/ yogurt  - se including sun sensitivity d/ wpt  Wound care d/w pt  F/u cardiology        Nehemias Meza MD

## 2019-07-18 ENCOUNTER — OFFICE VISIT (OUTPATIENT)
Dept: CARDIOLOGY CLINIC | Age: 84
End: 2019-07-18
Payer: MEDICARE

## 2019-07-18 ENCOUNTER — HOSPITAL ENCOUNTER (OUTPATIENT)
Dept: NON INVASIVE DIAGNOSTICS | Age: 84
Discharge: HOME | End: 2019-07-18
Payer: MEDICARE

## 2019-07-18 VITALS
OXYGEN SATURATION: 95 % | DIASTOLIC BLOOD PRESSURE: 92 MMHG | HEART RATE: 81 BPM | HEIGHT: 60 IN | BODY MASS INDEX: 19.04 KG/M2 | SYSTOLIC BLOOD PRESSURE: 176 MMHG | WEIGHT: 97 LBS

## 2019-07-18 DIAGNOSIS — I35.0 NONRHEUMATIC AORTIC VALVE STENOSIS: Primary | ICD-10-CM

## 2019-07-18 DIAGNOSIS — I48.91 ATRIAL FIBRILLATION, UNSPECIFIED TYPE (HCC): ICD-10-CM

## 2019-07-18 DIAGNOSIS — I10 ESSENTIAL HYPERTENSION: ICD-10-CM

## 2019-07-18 DIAGNOSIS — I35.0 NONRHEUMATIC AORTIC VALVE STENOSIS: ICD-10-CM

## 2019-07-18 LAB
LV EF: 65 %
LVEF MODALITY: NORMAL

## 2019-07-18 PROCEDURE — 93306 TTE W/DOPPLER COMPLETE: CPT

## 2019-07-18 PROCEDURE — 99214 OFFICE O/P EST MOD 30 MIN: CPT | Performed by: INTERNAL MEDICINE

## 2019-09-23 RX ORDER — HYDROGEN PEROXIDE 2.65 ML/100ML
LIQUID ORAL; TOPICAL
Qty: 90 TABLET | Refills: 2 | Status: ON HOLD | OUTPATIENT
Start: 2019-09-23 | End: 2021-10-02 | Stop reason: HOSPADM

## 2019-10-02 PROBLEM — I48.0 PAF (PAROXYSMAL ATRIAL FIBRILLATION) (HCC): Status: ACTIVE | Noted: 2019-10-02

## 2019-10-02 PROBLEM — Z95.2 S/P TAVR (TRANSCATHETER AORTIC VALVE REPLACEMENT): Status: ACTIVE | Noted: 2019-10-02

## 2019-10-10 ENCOUNTER — OFFICE VISIT (OUTPATIENT)
Dept: CARDIOLOGY CLINIC | Age: 84
End: 2019-10-10
Payer: MEDICARE

## 2019-10-10 VITALS
HEIGHT: 60 IN | HEART RATE: 78 BPM | DIASTOLIC BLOOD PRESSURE: 80 MMHG | OXYGEN SATURATION: 96 % | SYSTOLIC BLOOD PRESSURE: 160 MMHG | WEIGHT: 94 LBS | BODY MASS INDEX: 18.46 KG/M2

## 2019-10-10 DIAGNOSIS — I48.0 PAF (PAROXYSMAL ATRIAL FIBRILLATION) (HCC): ICD-10-CM

## 2019-10-10 DIAGNOSIS — Z95.2 S/P TAVR (TRANSCATHETER AORTIC VALVE REPLACEMENT): ICD-10-CM

## 2019-10-10 DIAGNOSIS — I35.0 NONRHEUMATIC AORTIC VALVE STENOSIS: Primary | ICD-10-CM

## 2019-10-10 DIAGNOSIS — I10 ESSENTIAL HYPERTENSION: ICD-10-CM

## 2019-10-10 PROCEDURE — 99214 OFFICE O/P EST MOD 30 MIN: CPT | Performed by: INTERNAL MEDICINE

## 2019-10-10 RX ORDER — LOSARTAN POTASSIUM 25 MG/1
25 TABLET ORAL DAILY
Qty: 30 TABLET | Refills: 11 | Status: SHIPPED | OUTPATIENT
Start: 2019-10-10 | End: 2020-06-11

## 2019-11-25 ENCOUNTER — TELEPHONE (OUTPATIENT)
Dept: FAMILY MEDICINE CLINIC | Age: 84
End: 2019-11-25

## 2019-11-25 ENCOUNTER — HOSPITAL ENCOUNTER (OUTPATIENT)
Age: 84
Discharge: HOME | End: 2019-11-25
Payer: MEDICARE

## 2019-11-25 ENCOUNTER — OFFICE VISIT (OUTPATIENT)
Dept: FAMILY MEDICINE CLINIC | Age: 84
End: 2019-11-25
Payer: MEDICARE

## 2019-11-25 ENCOUNTER — HOSPITAL ENCOUNTER (OUTPATIENT)
Dept: GENERAL RADIOLOGY | Age: 84
Discharge: HOME | End: 2019-11-25
Payer: MEDICARE

## 2019-11-25 VITALS
SYSTOLIC BLOOD PRESSURE: 118 MMHG | DIASTOLIC BLOOD PRESSURE: 68 MMHG | TEMPERATURE: 98 F | HEART RATE: 88 BPM | WEIGHT: 89.2 LBS | HEIGHT: 60 IN | BODY MASS INDEX: 17.51 KG/M2 | OXYGEN SATURATION: 98 %

## 2019-11-25 DIAGNOSIS — F32.1 CURRENT MODERATE EPISODE OF MAJOR DEPRESSIVE DISORDER WITHOUT PRIOR EPISODE (HCC): ICD-10-CM

## 2019-11-25 DIAGNOSIS — R05.3 PERSISTENT COUGH: Primary | ICD-10-CM

## 2019-11-25 DIAGNOSIS — J31.0 CHRONIC RHINITIS: ICD-10-CM

## 2019-11-25 DIAGNOSIS — J06.9 VIRAL URI: Primary | ICD-10-CM

## 2019-11-25 DIAGNOSIS — R05.3 PERSISTENT COUGH: ICD-10-CM

## 2019-11-25 PROCEDURE — 99213 OFFICE O/P EST LOW 20 MIN: CPT | Performed by: FAMILY MEDICINE

## 2019-11-25 PROCEDURE — 71046 X-RAY EXAM CHEST 2 VIEWS: CPT

## 2019-11-25 RX ORDER — ESCITALOPRAM OXALATE 10 MG/1
10 TABLET ORAL DAILY
Qty: 30 TABLET | Refills: 3 | Status: SHIPPED | OUTPATIENT
Start: 2019-11-25 | End: 2020-12-23

## 2019-12-27 ENCOUNTER — TELEPHONE (OUTPATIENT)
Dept: CARDIOLOGY CLINIC | Age: 84
End: 2019-12-27

## 2019-12-27 DIAGNOSIS — Z95.2 S/P TAVR (TRANSCATHETER AORTIC VALVE REPLACEMENT): Primary | ICD-10-CM

## 2020-01-10 RX ORDER — MELOXICAM 7.5 MG/1
TABLET ORAL
Qty: 30 TABLET | Refills: 2 | Status: SHIPPED | OUTPATIENT
Start: 2020-01-10 | End: 2020-07-08

## 2020-01-22 ENCOUNTER — TELEPHONE (OUTPATIENT)
Dept: FAMILY MEDICINE CLINIC | Age: 85
End: 2020-01-22

## 2020-01-22 RX ORDER — PREDNISONE 10 MG/1
10 TABLET ORAL SEE ADMIN INSTRUCTIONS
Qty: 16 TABLET | Refills: 0 | Status: SHIPPED | OUTPATIENT
Start: 2020-01-22 | End: 2020-06-11 | Stop reason: ALTCHOICE

## 2020-03-18 ENCOUNTER — OFFICE VISIT (OUTPATIENT)
Dept: FAMILY MEDICINE CLINIC | Age: 85
End: 2020-03-18
Payer: MEDICARE

## 2020-03-18 VITALS
HEIGHT: 60 IN | HEART RATE: 86 BPM | OXYGEN SATURATION: 99 % | WEIGHT: 99 LBS | SYSTOLIC BLOOD PRESSURE: 120 MMHG | BODY MASS INDEX: 19.44 KG/M2 | RESPIRATION RATE: 18 BRPM | DIASTOLIC BLOOD PRESSURE: 64 MMHG

## 2020-03-18 PROCEDURE — 99212 OFFICE O/P EST SF 10 MIN: CPT | Performed by: NURSE PRACTITIONER

## 2020-03-18 ASSESSMENT — ENCOUNTER SYMPTOMS: COUGH: 1

## 2020-03-18 ASSESSMENT — PATIENT HEALTH QUESTIONNAIRE - PHQ9
1. LITTLE INTEREST OR PLEASURE IN DOING THINGS: 1
SUM OF ALL RESPONSES TO PHQ QUESTIONS 1-9: 2
SUM OF ALL RESPONSES TO PHQ9 QUESTIONS 1 & 2: 2
2. FEELING DOWN, DEPRESSED OR HOPELESS: 1
SUM OF ALL RESPONSES TO PHQ QUESTIONS 1-9: 2

## 2020-03-18 NOTE — PATIENT INSTRUCTIONS
Patient Education        Allergies: Care Instructions  Your Care Instructions    Allergies occur when your body's defense system (immune system) overreacts to certain substances. The immune system treats a harmless substance as if it were a harmful germ or virus. Many things can cause this overreaction, including pollens, medicine, food, dust, animal dander, and mold. Allergies can be mild or severe. Mild allergies can be managed with home treatment. But medicine may be needed to prevent problems. Managing your allergies is an important part of staying healthy. Your doctor may suggest that you have allergy testing to help find out what is causing your allergies. When you know what things trigger your symptoms, you can avoid them. This can prevent allergy symptoms and other health problems. For severe allergies that cause reactions that affect your whole body (anaphylactic reactions), your doctor may prescribe a shot of epinephrine to carry with you in case you have a severe reaction. Learn how to give yourself the shot and keep it with you at all times. Make sure it is not . Follow-up care is a key part of your treatment and safety. Be sure to make and go to all appointments, and call your doctor if you are having problems. It's also a good idea to know your test results and keep a list of the medicines you take. How can you care for yourself at home? · If you have been told by your doctor that dust or dust mites are causing your allergy, decrease the dust around your bed:  ? Wash sheets, pillowcases, and other bedding in hot water every week. ? Use dust-proof covers for pillows, duvets, and mattresses. Avoid plastic covers because they tear easily and do not \"breathe. \" Wash as instructed on the label. ? Do not use any blankets and pillows that you do not need. ? Use blankets that you can wash in your washing machine. ?  Consider removing drapes and carpets, which attract and hold dust, from your bedroom. · If you are allergic to house dust and mites, do not use home humidifiers. Your doctor can suggest ways you can control dust and mites. · Look for signs of cockroaches. Cockroaches cause allergic reactions. Use cockroach baits to get rid of them. Then, clean your home well. Cockroaches like areas where grocery bags, newspapers, empty bottles, or cardboard boxes are stored. Do not keep these inside your home, and keep trash and food containers sealed. Seal off any spots where cockroaches might enter your home. · If you are allergic to mold, get rid of furniture, rugs, and drapes that smell musty. Check for mold in the bathroom. · If you are allergic to outdoor pollen or mold spores, use air-conditioning. Change or clean all filters every month. Keep windows closed. · If you are allergic to pollen, stay inside when pollen counts are high. Use a vacuum  with a HEPA filter or a double-thickness filter at least two times each week. · Stay inside when air pollution is bad. Avoid paint fumes, perfumes, and other strong odors. · Avoid conditions that make your allergies worse. Stay away from smoke. Do not smoke or let anyone else smoke in your house. Do not use fireplaces or wood-burning stoves. · If you are allergic to your pets, change the air filter in your furnace every month. Use high-efficiency filters. · If you are allergic to pet dander, keep pets outside or out of your bedroom. Old carpet and cloth furniture can hold a lot of animal dander. You may need to replace them. When should you call for help? Give an epinephrine shot if:    · You think you are having a severe allergic reaction.     · You have symptoms in more than one body area, such as mild nausea and an itchy mouth.    After giving an epinephrine shot call  911, even if you feel better.   Call 911 if:    · You have symptoms of a severe allergic reaction.  These may include:  ? Sudden raised, red areas (hives) all over your instructions on the label. Managing your allergies is an important part of staying healthy. Your doctor may suggest that you have tests to help find the cause of your allergies. When you know what things trigger your symptoms, you can avoid them. This can prevent allergy symptoms and other health problems. In some cases, immunotherapy might help. For this treatment, you get shots or use pills that have a small amount of certain allergens in them. Your body \"gets used to\" the allergen, so you react less to it over time. This kind of treatment may help prevent or reduce some allergy symptoms. Follow-up care is a key part of your treatment and safety. Be sure to make and go to all appointments, and call your doctor if you are having problems. It's also a good idea to know your test results and keep a list of the medicines you take. How can you care for yourself at home? · Be safe with medicines. Take your medicines exactly as prescribed. Call your doctor if you think you are having a problem with your medicine. · During your allergy season, keep windows closed. If you need to use air-conditioning, change or clean all filters every month. Take a shower and change your clothes after you have been outside. · Stay inside when pollen counts are high. Vacuum once or twice a week. Use a vacuum  with a HEPA filter or a double-thickness filter. When should you call for help? Give an epinephrine shot if:    · You think you are having a severe allergic reaction.    After giving an epinephrine shot, call  911, even if you feel better.   Call 911 if:    · You have symptoms of a severe allergic reaction. These may include:  ? Sudden raised, red areas (hives) all over your body. ? Swelling of the throat, mouth, lips, or tongue. ? Trouble breathing. ? Passing out (losing consciousness).  Or you may feel very lightheaded or suddenly feel weak, confused, or restless.     · You have been given an epinephrine shot, even

## 2020-03-18 NOTE — PROGRESS NOTES
SUBJECTIVE:    Patient ID: August Rodriguez is a 80 y.o. y.o. female. HPI  Chief Complaint   Patient presents with    Forms     PT IS NEEDS STD PAPERWORK FILLED OUT    Other     PT HAS THESE SPOTS ON BOTH OF HER EARS BEEN THERE FOR MONTH     Pt in for a dry  cough for the last few days - sneezing as well when she is around - she had a valve replacement this year- works at [employer] and she is concerned about dorys the [employer] Virus    She has a sore in her right ear Dr Simon Hough looked at them awhile- she is using H202 last week and her antibacterial ointment   Tender to touch   Review of Systems   Respiratory: Positive for cough. All other systems reviewed and are negative. OBJECTIVE:    Vitals:    03/18/20 1505   BP: 120/64   Pulse: 86   Resp: 18   SpO2: 99%     Physical Exam  Constitutional:       General: She is awake. Appearance: Normal appearance. She is well-developed, well-groomed and normal weight. She is not ill-appearing, toxic-appearing or diaphoretic. Pulmonary:      Effort: Pulmonary effort is normal.      Breath sounds: Normal breath sounds and air entry. Neurological:      Mental Status: She is alert. Psychiatric:         Attention and Perception: Attention and perception normal.         Mood and Affect: Mood and affect normal.         Speech: Speech normal.         Behavior: Behavior normal. Behavior is cooperative. Thought Content: Thought content normal.         Cognition and Memory: Cognition and memory normal.         Judgment: Judgment normal.       Chandan Whalen was seen today for forms and other. Diagnoses and all orders for this visit:    Cough  Continue allergy medication  Return to work in two weeks - extend leave based on COVID      -     mupirocin (BACTROBAN) 2 % ointment; Apply topically 3 times daily.

## 2020-04-10 ENCOUNTER — TELEPHONE (OUTPATIENT)
Dept: CARDIOLOGY CLINIC | Age: 85
End: 2020-04-10

## 2020-04-14 NOTE — TELEPHONE ENCOUNTER
XIOMARA for pts granddaughter [facility] to call to [facility] DCE appt to 06/11/2020 12:45pm per [place].

## 2020-06-10 NOTE — PATIENT INSTRUCTIONS
Increase Losartan to 50mg a day. You can double up on your current 25mg tablets until you get your new prescription. Monitor your blood pressure at home and let Kelvin Bain know if you have any consistently high readings.  Kelvin Bain [phone]

## 2020-06-11 ENCOUNTER — OFFICE VISIT (OUTPATIENT)
Dept: CARDIOLOGY CLINIC | Age: 85
End: 2020-06-11
Payer: MEDICARE

## 2020-06-11 VITALS
WEIGHT: 101 LBS | HEIGHT: 61 IN | SYSTOLIC BLOOD PRESSURE: 164 MMHG | DIASTOLIC BLOOD PRESSURE: 98 MMHG | BODY MASS INDEX: 19.07 KG/M2 | RESPIRATION RATE: 20 BRPM | HEART RATE: 88 BPM

## 2020-06-11 PROCEDURE — 99214 OFFICE O/P EST MOD 30 MIN: CPT | Performed by: INTERNAL MEDICINE

## 2020-06-11 RX ORDER — LOSARTAN POTASSIUM 50 MG/1
50 TABLET ORAL DAILY
Qty: 90 TABLET | Refills: 3 | Status: SHIPPED | OUTPATIENT
Start: 2020-06-11 | End: 2021-07-10 | Stop reason: SDUPTHER

## 2020-06-11 NOTE — LETTER
AM (Patient not taking: Reported on 3/18/2020) 16 tablet 0    meloxicam (MOBIC) 7.5 MG tablet TAKE 1 TABLET BY MOUTH ONCE DAILY 30 tablet 2    Meloxicam (MOBIC PO) Take by mouth as needed      escitalopram (LEXAPRO) 10 MG tablet Take 1 tablet by mouth daily 30 tablet 3    losartan (COZAAR) 25 MG tablet Take 1 tablet by mouth daily 30 tablet 11    EQ ASPIRIN ADULT LOW DOSE 81 MG EC tablet TAKE  TABLET BY MOUTH ONCE DAILY 90 tablet 2     No current facility-administered medications for this visit.       PHYSICAL EXAM     Vitals:    06/11/20 1301   BP: (!) 164/98   Pulse:    Resp:       Gen Alert, coop, no distress Heart  Rrr, no mrg   Head NC, AT, no abnorm Abd  Soft, NT, +BS, no mass, no OM   Eyes PER, conj/corn clear Ext  Ext nl, AT, no C/C/E   Nose Nares nl, no drain, NT Pulse 2+ and symmetric   Throat Lips, mucosa, tongue nl Skin Col/text/turg nl, no vis rash/les   Neck S/S, TM, NT, no bruit/JVD Psych Nl mood and affect   Lung CTA-B, unlabored, no DTP Lymph   No cervical or axillary LA   Ch wall NT, no deform Neuro  Nl gross M/S exam     ASSESSMENT AND PLAN     *AS    Date EF Detail   Sx   SOB, fatigue   Hx 5/19  TAVR with ES3 23mm   NYHA   [x]I         []II           []III          []IV   TTE 11/16  3/19  5/19  7/19 60%  65%  65%  65% Mod AS MG 28  Severe AS MG 51  TAVR MG 8, no AI  TAVR MG 9   EKG   Sinus arrhythmia   Plan   Continued observation  Echo yearly   *HTN  Status elevated  Plan Counseled on diet/salt/exercise/weight, increase losartan to 50  *AFIB   Brief post TAVR, resolved with amio  Plan Continued observation  *COMPLIANCE  Status Compliant  Plan Discussed importance of compliance with meds/diet/salt/exercise; avoid tob/alc/drugs; patient verbalized understanding  *FOLLOWUP  6 months    [address] Nehal Bond, am scribing for and in the presence of Macario Donis MD.   Signed, Nehal Moulton 06/10/20 3:00 PM   Provider Attestation Yoli Helms is working as a scribe for and in the presence of genet Simmons MD). Working as a scribe, Yoli Helms may have prepopulated components of this note with my historical  intellectual property under my direct supervision. Any additions to this intellectual property were performed in my presence and at my direction. Furthermore, the content and accuracy of this note have been reviewed by me Maria Eugenia Simmons MD).  6/11/2020 1:12 PM      If you have questions, please do not hesitate to call me. I look forward to following Greg Bowie along with you.     Sincerely,        Barney Vasquez MD

## 2020-07-08 RX ORDER — MELOXICAM 7.5 MG/1
TABLET ORAL
Qty: 30 TABLET | Refills: 2 | Status: SHIPPED | OUTPATIENT
Start: 2020-07-08 | End: 2020-12-23

## 2020-09-02 NOTE — TELEPHONE ENCOUNTER
Called Gloria appt to 06/11/2020 12:45pm with DCE per Lily Please contact the patient to schedule an appt with Annalise.

## 2020-12-17 NOTE — PROGRESS NOTES
2 or more) AS, HTN, AFIB   Chronic illness with: Exac, progr or SA of Tx  (57435/82719 - 1 or more)    Undiagnosed new problem with: uncertain prognosis  (50391/86451 - 1 or more)    Acute illness with systemic Sx  (89268/04840 - 1 or more)    Acute, complicated injury  (80301/90455 - 1 or more)    54351 1 or more chronic illness with exacerbation, progression or SA of treatment    Time  30-39 minutes spent preparing to see patient including reviewing patient history/prior tests/prior consults, performing a medical exam, counseling and educating patient/family/caregiver, ordering medications/tests/procedures, referring and communicating with PCPs and other pertinent consultants, documenting information in the EMR, independently interpreting results and communicating to family and coordination of patient care.

## 2020-12-23 ENCOUNTER — OFFICE VISIT (OUTPATIENT)
Dept: CARDIOLOGY CLINIC | Age: 85
End: 2020-12-23
Payer: MEDICARE

## 2020-12-23 VITALS
HEIGHT: 60 IN | HEART RATE: 118 BPM | WEIGHT: 105 LBS | SYSTOLIC BLOOD PRESSURE: 138 MMHG | DIASTOLIC BLOOD PRESSURE: 86 MMHG | BODY MASS INDEX: 20.62 KG/M2 | OXYGEN SATURATION: 98 %

## 2020-12-23 PROCEDURE — 99214 OFFICE O/P EST MOD 30 MIN: CPT | Performed by: INTERNAL MEDICINE

## 2020-12-23 NOTE — LETTER
 losartan (COZAAR) 50 MG tablet Take 1 tablet by mouth daily 90 tablet 3    escitalopram (LEXAPRO) 10 MG tablet Take 1 tablet by mouth daily (Patient not taking: Reported on 6/11/2020) 30 tablet 3    EQ ASPIRIN ADULT LOW DOSE 81 MG EC tablet TAKE  TABLET BY MOUTH ONCE DAILY 90 tablet 2     No current facility-administered medications for this visit.       PHYSICAL EXAM     Vitals:    12/23/20 1026   BP: 138/86   Pulse: 90   SpO2: 98%      Gen Alert, coop, no distress Heart  Rrr, no mrg   Head NC, AT, no abnorm Abd  Soft, NT, +BS, no mass, no OM   Eyes PER, conj/corn clear Ext  Ext nl, AT, no C/C/E   Nose Nares nl, no drain, NT Pulse 2+ and symmetric   Throat Lips, mucosa, tongue nl Skin Col/text/turg nl, no vis rash/les   Neck S/S, TM, NT, no bruit/JVD Psych Nl mood and affect   Lung CTA-B, unlabored, no DTP Lymph   No cervical or axillary LA   Ch wall NT, no deform Neuro  Nl gross M/S exam     ASSESSMENT AND PLAN     *AS    Date EF Detail   Sx   SOB, fatigue   Hx 5/19  TAVR with ES3 23mm   NYHA   [x]I         []II           []III          []IV   TTE 11/16  3/19  5/19  7/19  7/20 60%  65%  65%  65%  65% Mod AS MG 28  Severe AS MG 51  TAVR MG 8, no AI  TAVR MG 9  TAVR well seated MG 9, no AI   EKG   Sinus arrhythmia   Plan   Continued observation  Echo yearly   *HTN  Status Controlled, vitals and available ambulatory monitoring logs reviewed personally  Plan Counseled on diet/salt/exercise/weight, continue meds at doses above  *AFIB   Brief post TAVR, resolved with amio  Plan Continued observation  *COMPLIANCE  Status Compliant  Plan Discussed importance of compliance with meds/diet/salt/exercise; avoid tob/alc/drugs; patient verbalized understanding  *FOLLOWUP  6 months    [address] [place], Nicole Lord, am scribing for and in the presence of Chloe Simmons MD.   SignedNicole 12/17/20 3:51 PM   Provider Attestation

## 2021-02-25 ENCOUNTER — VIRTUAL VISIT (OUTPATIENT)
Dept: FAMILY MEDICINE CLINIC | Age: 86
End: 2021-02-25
Payer: MEDICARE

## 2021-02-25 DIAGNOSIS — I10 ESSENTIAL HYPERTENSION: ICD-10-CM

## 2021-02-25 DIAGNOSIS — G47.62 NOCTURNAL LEG CRAMPS: Primary | ICD-10-CM

## 2021-02-25 LAB
A/G RATIO: 1.6 (ref 1.1–2.2)
ALBUMIN SERPL-MCNC: 4.2 G/DL (ref 3.4–5)
ALP BLD-CCNC: 64 U/L (ref 40–129)
ALT SERPL-CCNC: 6 U/L (ref 10–40)
ANION GAP SERPL CALCULATED.3IONS-SCNC: 14 MMOL/L (ref 3–16)
AST SERPL-CCNC: 14 U/L (ref 15–37)
BILIRUB SERPL-MCNC: <0.2 MG/DL (ref 0–1)
BUN BLDV-MCNC: 17 MG/DL (ref 7–20)
CALCIUM SERPL-MCNC: 10.1 MG/DL (ref 8.3–10.6)
CHLORIDE BLD-SCNC: 104 MMOL/L (ref 99–110)
CO2: 23 MMOL/L (ref 21–32)
CREAT SERPL-MCNC: 1.1 MG/DL (ref 0.6–1.2)
GFR AFRICAN AMERICAN: 56
GFR NON-AFRICAN AMERICAN: 47
GLOBULIN: 2.7 G/DL
GLUCOSE BLD-MCNC: 119 MG/DL (ref 70–99)
MAGNESIUM: 2.4 MG/DL (ref 1.8–2.4)
POTASSIUM SERPL-SCNC: 4.1 MMOL/L (ref 3.5–5.1)
SODIUM BLD-SCNC: 141 MMOL/L (ref 136–145)
TOTAL PROTEIN: 6.9 G/DL (ref 6.4–8.2)

## 2021-02-25 PROCEDURE — 99213 OFFICE O/P EST LOW 20 MIN: CPT | Performed by: FAMILY MEDICINE

## 2021-02-25 PROCEDURE — 36415 COLL VENOUS BLD VENIPUNCTURE: CPT | Performed by: FAMILY MEDICINE

## 2021-02-25 RX ORDER — MELOXICAM 7.5 MG/1
7.5 TABLET ORAL DAILY
COMMUNITY
End: 2021-04-15 | Stop reason: SDUPTHER

## 2021-02-25 SDOH — ECONOMIC STABILITY: FOOD INSECURITY: WITHIN THE PAST 12 MONTHS, YOU WORRIED THAT YOUR FOOD WOULD RUN OUT BEFORE YOU GOT MONEY TO BUY MORE.: NEVER TRUE

## 2021-02-25 SDOH — ECONOMIC STABILITY: TRANSPORTATION INSECURITY
IN THE PAST 12 MONTHS, HAS LACK OF TRANSPORTATION KEPT YOU FROM MEETINGS, WORK, OR FROM GETTING THINGS NEEDED FOR DAILY LIVING?: NO

## 2021-02-25 SDOH — ECONOMIC STABILITY: INCOME INSECURITY: HOW HARD IS IT FOR YOU TO PAY FOR THE VERY BASICS LIKE FOOD, HOUSING, MEDICAL CARE, AND HEATING?: NOT HARD AT ALL

## 2021-02-25 ASSESSMENT — PATIENT HEALTH QUESTIONNAIRE - PHQ9
SUM OF ALL RESPONSES TO PHQ QUESTIONS 1-9: 2
SUM OF ALL RESPONSES TO PHQ9 QUESTIONS 1 & 2: 2
1. LITTLE INTEREST OR PLEASURE IN DOING THINGS: 1
2. FEELING DOWN, DEPRESSED OR HOPELESS: 1

## 2021-02-25 NOTE — PROGRESS NOTES
2021    TELEHEALTH EVALUATION -- Audio/Visual (During VWKNV-05 public health emergency)    HPI:    Dilan Prince (:  1931) has requested an audio/video evaluation for the following concern(s):    Chief Complaint   Patient presents with    Leg Pain     PT C/O BILATERAL LOWER LEG PAIN FOR THE PAST 2-3 DAYS WITH SOME DISCOLORATION. THIS IS KEEPING HER AWAKE AT NIGHT. BP Readings from Last 3 Encounters:   20 138/86   20 (!) 164/98   20 120/64   getting leg cramps in calves - ankles stay tight -no swelling/ no redness  Mostly at night. Taking mvi daily - and getting bananas more which helped - hard to walk when cramps  Left worse than right. On asa - gets dark patches from that - but no other changes. Uses antibiotic oint on dark areas  Pushing oranges / vitamins - somewhat picky eater. Not drinking as much? Using otc allergy pill  On mobic 7.5mg daily  bp 144/86 this am.  Higher from stress - elevated bp  Not taking covid vaccine yet - not sure what to do  Son had bad reaction - not working right now  [employer] cardio periodically  Weight stable to up slightly - 102# this am - usually stays 100-105#. Review of Systems    Prior to Visit Medications    Medication Sig Taking?  Authorizing Provider   losartan (COZAAR) 50 MG tablet Take 1 tablet by mouth daily  Sinan Douglas MD   EQ ASPIRIN ADULT LOW DOSE 81 MG EC tablet TAKE  TABLET BY MOUTH ONCE DAILY  Sinan Douglas MD       Social History     Tobacco Use    Smoking status: Never Smoker    Smokeless tobacco: Never Used   Substance Use Topics    Alcohol use: Never     Frequency: Never    Drug use: No        PHYSICAL EXAMINATION:  [ INSTRUCTIONS:  \"[x]\" Indicates a positive item  \"[]\" Indicates a negative item  -- DELETE ALL ITEMS NOT EXAMINED]  Vital Signs: (As obtained by patient/caregiver or practitioner observation)    Blood pressure-  Heart rate-    Respiratory rate-    Temperature-  Pulse oximetry- was limited: Vitals/Constitutional/EENT/Resp/CV/GI//MS/Neuro/Skin/Heme-Lymph-Imm. Pursuant to the emergency declaration under the [address] [place] [address] and the [employer] Act, this Virtual Visit was conducted with patient's (and/or legal guardian's) consent, to reduce the patient's risk of exposure to COVID-19 and provide necessary medical care. The patient (and/or legal guardian) has also been advised to contact this office for worsening conditions or problems, and seek emergency medical treatment and/or call 911 if deemed necessary. Patient identification was verified at the start of the visit: Yes    Total time spent on this encounter: 21-30 minutes    Services were provided through a video synchronous discussion virtually to substitute for in-person clinic visit. Patient and provider were located at their individual homes. --Rupa Jones MD on 2/25/2021 at 11:18 AM    An electronic signature was used to authenticate this note.

## 2021-04-14 ENCOUNTER — PATIENT MESSAGE (OUTPATIENT)
Dept: FAMILY MEDICINE CLINIC | Age: 86
End: 2021-04-14

## 2021-04-14 DIAGNOSIS — N28.9 RENAL INSUFFICIENCY: Primary | ICD-10-CM

## 2021-04-15 RX ORDER — MELOXICAM 7.5 MG/1
7.5 TABLET ORAL DAILY
Qty: 90 TABLET | Refills: 0 | Status: SHIPPED | OUTPATIENT
Start: 2021-04-15 | End: 2021-07-10 | Stop reason: SDUPTHER

## 2021-04-15 NOTE — TELEPHONE ENCOUNTER
It may be because her kidney function was down a little on last lab test. Will refill, but patient should hydrate more with water through day and recheck bmp again in next 1-2 months prior to further refills.

## 2021-04-15 NOTE — TELEPHONE ENCOUNTER
I sent a [place] [employer] message to her/ her granddaughter advising her to stay well hydrated and recheck labs in 1-2 months

## 2021-05-05 ENCOUNTER — OFFICE VISIT (OUTPATIENT)
Dept: FAMILY MEDICINE CLINIC | Age: 86
End: 2021-05-05
Payer: MEDICARE

## 2021-05-05 VITALS
HEIGHT: 60 IN | BODY MASS INDEX: 20.42 KG/M2 | OXYGEN SATURATION: 99 % | SYSTOLIC BLOOD PRESSURE: 134 MMHG | HEART RATE: 64 BPM | WEIGHT: 104 LBS | DIASTOLIC BLOOD PRESSURE: 80 MMHG

## 2021-05-05 DIAGNOSIS — N28.9 RENAL INSUFFICIENCY: ICD-10-CM

## 2021-05-05 DIAGNOSIS — I10 ESSENTIAL HYPERTENSION: ICD-10-CM

## 2021-05-05 DIAGNOSIS — M19.90 ARTHRITIS: ICD-10-CM

## 2021-05-05 DIAGNOSIS — H91.93 BILATERAL HEARING LOSS, UNSPECIFIED HEARING LOSS TYPE: Primary | ICD-10-CM

## 2021-05-05 PROCEDURE — 36415 COLL VENOUS BLD VENIPUNCTURE: CPT | Performed by: FAMILY MEDICINE

## 2021-05-05 PROCEDURE — 99213 OFFICE O/P EST LOW 20 MIN: CPT | Performed by: FAMILY MEDICINE

## 2021-05-05 NOTE — PROGRESS NOTES
[facility]  Clinic Note    Date: 5/5/2021                                               Subjective:     Chief Complaint   Patient presents with    Otalgia     EARS FEEL CLOGGED      HPI  HEARING BAD  TURNED 80 AND RETIRED FROM WORKING. HEARING A LOT WORSE BILAT  PRESSURE IN EARS - TALKING LOUD TO COMPENSATE  SOUNDS DIFFERENT TO PATIENT. No nasal congestion - breathing good. Pt father w/ hearing loss early in life  No other concerns  bp stable on cozaar  Sees cardio q6m s/ p avr  On mobic daily - working well/ tolerated well. Mainly for pain in hands.          Patient Active Problem List    Diagnosis Date Noted    S/P TAVR (transcatheter aortic valve replacement) 10/02/2019    PAF (paroxysmal atrial fibrillation) ([address] 10/02/2019    Essential hypertension 04/03/2019    Carotid stenosis, asymptomatic, right 04/03/2019    Arthritis 02/05/2018    Hypercholesteremia 11/23/2016    Nonrheumatic aortic valve stenosis 11/23/2016    Right carotid artery occlusion 11/23/2016    Aortic calcification ([facility] 11/09/2016     Past Medical History:   Diagnosis Date    Aortic stenosis     Arthritis         Past Surgical History:   Procedure Laterality Date    AORTIC VALVE REPLACEMENT N/A 5/21/2019    TRANSCATHETER AORTIC VALVE REPLACEMENT FEMORAL APPROACH performed by Margaret Kirby MD at [address]  04/17/2018    HYSTERECTOMY, TOTAL ABDOMINAL      VARICOSE VEIN SURGERY Bilateral 1980's     Virtual Visit on 02/25/2021   Component Date Value Ref Range Status    Magnesium 02/25/2021 2.40  1.80 - 2.40 mg/dL Final    Sodium 02/25/2021 141  136 - 145 mmol/L Final    Potassium 02/25/2021 4.1  3.5 - 5.1 mmol/L Final    Chloride 02/25/2021 104  99 - 110 mmol/L Final    CO2 02/25/2021 23  21 - 32 mmol/L Final    Anion Gap 02/25/2021 14  3 - 16 Final    Glucose 02/25/2021 119* 70 - 99 mg/dL Final    BUN 02/25/2021 17  7 - 20 mg/dL Final    CREATININE 02/25/2021 1.1  0.6 - 1.2 Pulse Readings from Last 3 Encounters:   05/05/21 64   12/23/20 118   06/11/20 88       Wt Readings from Last 3 Encounters:   05/05/21 104 lb (47.2 kg)   12/23/20 105 lb (47.6 kg)   06/11/20 101 lb (45.8 kg)       Physical Exam  Constitutional:       General: She is not in acute distress. Appearance: Normal appearance. She is well-developed. HENT:      Head: Normocephalic and atraumatic. Right Ear: Tympanic membrane and ear canal normal.      Left Ear: Tympanic membrane and ear canal normal.      Mouth/Throat:      Mouth: Mucous membranes are moist.      Pharynx: Oropharynx is clear. No oropharyngeal exudate or posterior oropharyngeal erythema. Eyes:      General: No scleral icterus. Conjunctiva/sclera: Conjunctivae normal.   Neck:      Thyroid: No thyromegaly. Cardiovascular:      Rate and Rhythm: Normal rate and regular rhythm. Heart sounds: Normal heart sounds. No murmur. Pulmonary:      Effort: Pulmonary effort is normal. No respiratory distress. Breath sounds: Normal breath sounds. No wheezing or rales. Abdominal:      General: Bowel sounds are normal. There is no distension. Palpations: Abdomen is soft. Tenderness: There is no abdominal tenderness. Lymphadenopathy:      Cervical: No cervical adenopathy. Skin:     General: Skin is warm and dry. Neurological:      Mental Status: She is alert and oriented to person, place, and time. Assessment/Plan:   There are no diagnoses linked to this encounter. Diagnosis Orders   1. Bilateral hearing loss, unspecified hearing loss type     2. Essential hypertension     3. Arthritis     declines vaccines including covid  bp stable on cozaar  Cont mobic w/ gi effects - can use prn  On asa 81/d  No obvious wax impaction/ fluid/ pressure in ears  Refer to audiology    No follow-ups on file.     Fahad Rogers DO  5/5/2021  11:20 AM

## 2021-05-06 LAB
A/G RATIO: 1.8 (ref 1.1–2.2)
ALBUMIN SERPL-MCNC: 4.4 G/DL (ref 3.4–5)
ALP BLD-CCNC: 60 U/L (ref 40–129)
ALT SERPL-CCNC: 10 U/L (ref 10–40)
ANION GAP SERPL CALCULATED.3IONS-SCNC: 11 MMOL/L (ref 3–16)
AST SERPL-CCNC: 18 U/L (ref 15–37)
BILIRUB SERPL-MCNC: 0.3 MG/DL (ref 0–1)
BUN BLDV-MCNC: 20 MG/DL (ref 7–20)
CALCIUM SERPL-MCNC: 9.7 MG/DL (ref 8.3–10.6)
CHLORIDE BLD-SCNC: 104 MMOL/L (ref 99–110)
CO2: 27 MMOL/L (ref 21–32)
CREAT SERPL-MCNC: 0.9 MG/DL (ref 0.6–1.2)
GFR AFRICAN AMERICAN: >60
GFR NON-AFRICAN AMERICAN: 59
GLOBULIN: 2.4 G/DL
GLUCOSE BLD-MCNC: 82 MG/DL (ref 70–99)
POTASSIUM SERPL-SCNC: 4.6 MMOL/L (ref 3.5–5.1)
SODIUM BLD-SCNC: 142 MMOL/L (ref 136–145)
TOTAL PROTEIN: 6.8 G/DL (ref 6.4–8.2)

## 2021-05-24 NOTE — PROGRESS NOTES
Aneudy Brown   4/14/1931, 80 y.o. female   [id number]       Referring Provider: Justin Zimmer MD  Referral Type: In an order in [address] [place] [address]    Reason for Visit: Evaluation of suspected change in hearing, tinnitus, or balance. ADULT AUDIOLOGIC EVALUATION      Aneudy Brown is a 80 y.o. female seen today, 5/25/2021 , for an initial audiologic evaluation. Patient was seen by Denzel Ca MD following today's evaluation. AUDIOLOGIC AND OTHER PERTINENT MEDICAL HISTORY:      Aneudy Brown noted tinnitus. Patient reports a gradual decrease in hearing, bilaterally. She also notes bilateral tinnitus that is intermittent in nature. Aneudy Brown denied otalgia, aural fullness, otorrhea, dizziness, imbalance, history of falls, history of significant noise exposure, history of head trauma, history of ear surgery and family history of hearing loss. Date: 5/25/2021     IMPRESSIONS:      AU: Moderate sloping to Profound SNHL, Good WRS, Type As tymps    Hearing loss consistent with Sensorineural Hearing Loss. Hearing loss significant enough to create hearing difficulty in most listening situations. Discussed hearing loss, tinnitus, and hearing aids with patient. Patient to follow medical recommendations per Denzel Ca MD .    ASSESSMENT AND FINDINGS:     Otoscopy revealed: Clear ear canals bilaterally    RIGHT EAR:  Hearing Sensitivity: Moderate to Profound Sensorineural hearing loss  Speech Recognition Threshold: 65 dB HL  Word Recognition: Good (80-89%), based on NU-6  25-word list at 95m dBHL using recorded speech stimuli. Tympanometry: Normal peak pressure with low compliance, Type As tympanogram, consistent with reduced tympanic membrane mobility.   Acoustic Reflexes: Ipsilateral: Did not test. Contralateral: Did not test.    LEFT EAR:  Hearing Sensitivity: Moderate to Profound Sensorineural hearing loss  Speech Recognition Threshold: 60 dB HL  Word Recognition: Good (80-89%), based on NU-6 25-word list at 90m dBHL using recorded speech stimuli. Tympanometry: Normal peak pressure with low compliance, Type As tympanogram, consistent with reduced tympanic membrane mobility. Acoustic Reflexes: Ipsilateral: Did not test. Contralateral: Did not test.    Reliability: Good  Transducer: Inserts    See scanned audiogram dated 5/25/2021  for results. PATIENT EDUCATION:       The following items were discussed with the patient:    - Good Communication Strategies   - Hearing Loss and Hearing Aids   - Tinnitus Management Strategies    Educational information was shared in the After Visit Summary. RECOMMENDATIONS:                                                                                                                                                                                                                                                            The following items are recommended based on patient report and results from today's appointment:   - Continue medical follow-up with Palma Costello MD.   - Retest hearing as medically indicated and/or sooner if a change in hearing is noted. - If desired, schedule a Hearing Aid Evaluation (HAE) appointment to discuss hearing aid options. - Maintain a sound enriched environment to assist in the management of tinnitus symptoms.          Ruby Rosario  Audiologist    Chart CC'd to: Mary Yan MD      Degree of   Hearing Sensitivity dB Range   Within Normal Limits (WNL) 0 - 20   Mild 20 - 40   Moderate 40 - 55   Moderately-Severe 55 - 70   Severe 70 - 90   Profound 90 +

## 2021-05-25 ENCOUNTER — PROCEDURE VISIT (OUTPATIENT)
Dept: AUDIOLOGY | Age: 86
End: 2021-05-25

## 2021-05-25 ENCOUNTER — OFFICE VISIT (OUTPATIENT)
Dept: ENT CLINIC | Age: 86
End: 2021-05-25
Payer: MEDICARE

## 2021-05-25 VITALS
HEIGHT: 60 IN | DIASTOLIC BLOOD PRESSURE: 81 MMHG | WEIGHT: 105 LBS | SYSTOLIC BLOOD PRESSURE: 154 MMHG | HEART RATE: 80 BPM | BODY MASS INDEX: 20.62 KG/M2 | TEMPERATURE: 98 F

## 2021-05-25 DIAGNOSIS — J31.0 CHRONIC RHINITIS: ICD-10-CM

## 2021-05-25 DIAGNOSIS — H93.13 TINNITUS OF BOTH EARS: ICD-10-CM

## 2021-05-25 DIAGNOSIS — H90.3 SENSORINEURAL HEARING LOSS (SNHL) OF BOTH EARS: Primary | ICD-10-CM

## 2021-05-25 PROCEDURE — 99203 OFFICE O/P NEW LOW 30 MIN: CPT | Performed by: STUDENT IN AN ORGANIZED HEALTH CARE EDUCATION/TRAINING PROGRAM

## 2021-05-25 RX ORDER — IPRATROPIUM BROMIDE 42 UG/1
2 SPRAY, METERED NASAL 4 TIMES DAILY
Qty: 1 BOTTLE | Refills: 3 | Status: SHIPPED | OUTPATIENT
Start: 2021-05-25 | End: 2021-07-10 | Stop reason: SDUPTHER

## 2021-05-25 NOTE — PROGRESS NOTES
Micky Redman 112 (:  1931) is a 80 y.o. female, here for evaluation of the following chief complaint(s):  Hearing Problem      ASSESSMENT/PLAN:  1. Sensorineural hearing loss (SNHL) of both ears  2. Chronic rhinitis  3. Tinnitus of both ears      This is a very pleasant 80 y.o. female here today for evaluation of the the above-noted complaints. I had a lengthy discussion with the patient regarding her audiogram and the very significant degree of sensorineural hearing loss that she has. This would certainly impact her ability to function in every day activities and be exacerbated in situations with increased background noise such as family functions or public gatherings. She would be an excellent candidate for hearing aids and I discussed with the patient how this should help with her tinnitus and the relationship between hearing loss and tinnitus. She is cleared for hearing aids should she wish to pursue them. Regarding the patient's chronic rhinitis, I suspect that she has some underlying vasomotor rhinitis. I would like to trial her on Atrovent to see if this can improve her nasal drainage. As she shared with me, she is not very good at taking pills and medications every day and would prefer not to do so if at all possible so I think this which she can use as needed would be preferable to something like a nasal steroid spray. She can follow-up with me in the future regarding this on an as-needed basis. SUBJECTIVE/OBJECTIVE:  AIMEE    Wiliam Neves is here today for evaluation of issues related to hearing loss and her nose. I independently reviewed her audiogram with her and it shows evidence of moderately severe sloping to severe sensorineural hearing loss with type a S tympanograms. She denies any prior otologic history including any prior surgeries or ear infections or other symptoms.   She states that her hearing has been decreasing over the years. She is very cheerful throughout the interview. She also notes that she gets issues related to nasal drainage. This will happen all the time. She occasionally will get trouble breathing to the nose but her chief complaint is clear drainage that comes out the front and back of her nose. She states her nose is drippy. REVIEW OF SYSTEMS  The following systems were reviewed and revealed the following in addition to any already discussed in the HPI:    PHYSICAL EXAM    GENERAL: No acute distress, alert and oriented, no hoarseness, strong voice  EYES: EOMI, Anti-icteric  HENT:   Head: Normocephalic and atraumatic. Face:  Symmetric, facial nerve intact, no sinus tenderness  Right Ear: Normal external ear, normal external auditory canal, intact tympanic membrane with normal mobility and aerated middle ear  Left Ear: Normal external ear, normal external auditory canal, intact tympanic membrane with normal mobility and aerated middle ear  Mouth/Oral Cavity:  normal lips, Uvula is midline, no mucosal lesions, no trismus, without dentition, normal salivary quality/flow  Oropharynx/Larynx:  normal oropharynx, unable to visualize tonsils; patient did not tolerate mirror exam due to excessive gag reflex  Nose:Normal external nasal appearance. Anterior rhinoscopy shows  a deviated septum preventing view posteriorly. turbinates. Normal mucosa   NECK: Normal range of motion, no thyromegaly, trachea is midline, no lymphadenopathy, no neck masses, no crepitus  CHEST: Normal respiratory effort, no retractions, breathing comfortably  SKIN: No rashes, normal appearing skin, no evidence of skin lesions/tumors  Neuro:  cranial nerve II-XII intact; normal gait  Cardio:  no edema        PROCEDURE      This note was generated completely or in part utilizing Dragon dictation speech recognition software.   Occasionally, words are mistranscribed and despite editing, the text may contain inaccuracies due to incorrect word recognition. If further clarification is needed please contact the office at [phone]. An electronic signature was used to authenticate this note.     --Katie Alarcon MD

## 2021-05-25 NOTE — PATIENT INSTRUCTIONS
Good Communication Strategies    Communication can be challenging for anyone, but can be especially difficult for those with some degree of hearing loss. While we may not be able to control every factor that may lead to difficulty with communication, there are Good Communication Strategies that we can all use in our day-to-day lives. Communication takes both parties working together for it to be successful. Tips as a Listener:   1. Control your environment. It is important to limit the amount of background noise in the room when possible. You should also consider having a good light source in the room to best see the other person. 2. Ask for clarification. Instead of saying \"What?\", you can use parts of what you heard to make a new question. For example, if you heard the word \"Thursday\" but not the rest of the week, you may ask \"What was that about Thursday? \" or \"What did you want to do Thursday? \". This shows the person talking that you are listening and will help them better explain what they are saying. 3. Be an advocate for yourself. If you are hearing but not understanding, tell the other person \"I can hear you, but I need you to slow down when you speak. \"  Or if someone is facing the other direction, say \"I cannot hear you when you are not looking at me when we talk. \"       Tips as a Talker:   - Sit or stand 3 to 6 feet away to maximize audibility         -- It is unrealistic to believe someone else will fully hear your message if you are speaking from across the room or in a different room in the house   - Stay at eye level to help with visual cues   - Make sure you have the persons attention before speaking   - Use facial expressions and gestures to accentuate your message   - Raise your voice slightly (do not scream)   - Speak slowly and distinctly   - Use short, simple sentences   - Rephrase your words if the person is having a hard time understanding you    - To avoid distortion, dont speak a \"Hearing Aid Evaluation\" with an audiologist to discuss your lifestyle, features of hearing aid technology, and styles of hearing aids available. It is recommended that you contact your insurance company to determine if you have a hearing aid benefit, as this may dictate who you can see for these services. · Have hearing tests as your doctor suggests. They can show whether your hearing has changed. Your hearing aid may need to be adjusted. · Use other assistive devices as needed. These may include:  ? Telephone amplifiers and hearing aids that can connect to a television, stereo, radio, or microphone. ? Devices that use lights or vibrations. These alert you to the doorbell, a ringing telephone, or a baby monitor. ? Television closed-captioning. This shows the words at the bottom of the screen. Most new TVs can do this. ? TTY (text telephone). This lets you type messages back and forth on the telephone instead of talking or listening. These devices are also called TDD. When messages are typed on the keyboard, they are sent over the phone line to a receiving TTY. The message is shown on a monitor. · Use pagers, fax machines, text, and email if it is hard for you to communicate by telephone. · Try to learn a listening technique called speech-reading. It is not lip-reading. You pay attention to people's gestures, expressions, posture, and tone of voice. These clues can help you understand what a person is saying. Face the person you are talking to, and have him or her face you. Make sure the lighting is good. You need to see the other person's face clearly. · Think about counseling if you need help to adjust to your hearing loss. When should you call for help? Watch closely for changes in your health, and be sure to contact your doctor if:    · You think your hearing is getting worse. · You have new symptoms, such as dizziness or nausea.            Tinnitus: Overview and Management Strategies          Many people have some ringing sounds in their ears once in a while. You may hear a roar, a hiss, a tinkle, or a buzz. The sound usually lasts only a few minutes. If it goes on all the time, you may have tinnitus. Tinnitus is usually caused by long-term exposure to loud noise. This damages the nerves in the inner ear. It can occur with all types of hearing loss. It may be a symptom of almost any ear problem. Tinnitus may be caused by a buildup of earwax. Or, it may be caused by ear infections or certain medicines (especially antibiotics or large amounts of aspirin). You can also hear noises in your ears because of an injury to the ears, drinking too much alcohol or caffeine, or a medical condition. Other conditions may also contribute to tinnitus, including: head and neck trauma, temporomandibular joint disorder (TMJ), sinus pressure and barometric trauma, traumatic brain injury, metabolic disorders, autoimmune disorders, stress, and high blood pressure. You may need tests to evaluate your hearing and to find causes of long-lasting tinnitus. Your doctor may suggest one or more treatments to help you cope with the tinnitus. You can also do things at home to help reduce symptoms. Causes of Tinnitus  We do not know the exact cause of tinnitus. One thing we do know is that you are not imagining it. If you have tinnitus, chances are the cause will remain a mystery. Conditions that might cause tinnitus include the following:    Hearing loss    Ménière's disease    Loud noise exposure    Migraines    Head injury    Drugs or medicines that are toxic to hearing    Anemia    High blood pressure    Stress    A lot of wax in the ear    Certain types of tumors    Having a lot of caffeine    Smoking cigarettes  You may find that your tinnitus is worse at night. This happens because it is quiet and you are not distracted.  Feeling tired and stressed may make your tinnitus worse.    Follow-up care is a key part of your treatment and safety. Be sure to make and go to all appointments, and call your doctor if you are having problems. It's also a good idea to know your test results and keep a list of the medicines you take. How can you care for yourself at home? · Limit or cut out alcohol, caffeine, and sodium. They can make your symptoms worse. · Do not smoke or use other tobacco products. Nicotine reduces blood flow to the ear and makes tinnitus worse. If you need help quitting, talk to your doctor about stop-smoking programs and medicines. These can increase your chances of quitting for good. · Talk to your doctor about whether to stop taking aspirin and similar products such as ibuprofen or naproxen. · Get exercise often. It can help improve blood flow to the ear. Hearing Aids and Other Devices  A hearing aid may help your tinnitus if you have a hearing loss. An audiologist can help you find and use the best hearing aid for you. Tinnitus maskers look like hearing aids. They make a sound that masks, or covers up, the tinnitus. The masking sound distracts you from the ringing in your ears. You may be able to use a masker and a hearing aid at the same time. Sound machines can be useful at night or during quiet times. There are machines you can buy at the store. Or, you can find apps on your phone that make sounds, like the ocean or rainfall. Fish tanks, fans, quiet music, and indoor waterfalls can help, as well. Ways to manage/cope with tinnitus  Some tinnitus may last a long time. To manage your tinnitus, try to:  · Avoid noises that you think caused your tinnitus. If you can't avoid loud noises, wear earplugs or earmuffs. · Ignore the sound by paying attention to other things. Keeping your brain busy with other tasks or background noise can help your brain not focus on the tinnitus. · Try to not give the tinnitus an emotional reaction.   Do your best to ignore the sound and not let it bother you. Relax using biofeedback, meditation, or yoga. Feeling stressed and being tired can make tinnitus worse. · Play music or white noise to help you sleep. Background noise may cover up the noise that you hear in your ears. You can buy a tabletop machine or a device that sits under your pillow to play soothing sounds, like ocean waves. · Smart phones have free apps, such as Whist, Relax Melodies, ReSound Relief, and White Noise Lite. These apps have different types of sounds/noise, some of which you can blend together to find sounds that are most soothing to you. · Hearing aid technology, especially when there is some hearing loss, may help reduce tinnitus symptoms by giving your brain better access to the sounds it is missing. There are some hearing aids with built-in noise generator programs, which may help when amplification alone is not enough. Additional resources may be found through the [employer]inn[employer] at www.angel.org    When should you call for help? Call 911 anytime you think you may need emergency care. For example, call if:    · You have symptoms of a stroke. These may include:  ? Sudden numbness, tingling, weakness, or loss of movement in your face, arm, or leg, especially on only one side of your body. ? Sudden vision changes. ? Sudden trouble speaking. ? Sudden confusion or trouble understanding simple statements. ? Sudden problems with walking or balance. ? A sudden, severe headache that is different from past headaches. Call your doctor now or seek immediate medical care if:    · You develop other symptoms. These may include hearing loss (or worse hearing loss), balance problems, dizziness, nausea, or vomiting. Watch closely for changes in your health, and be sure to contact your doctor if:    · Your tinnitus moves from both ears to one ear. · Your hearing loss gets worse within 1 day after an ear injury.      · Your tinnitus or

## 2021-05-25 NOTE — Clinical Note
Dr. Caryle Doyne,    Thank you for your referral for audiometric testing on this patient. Please see the scanned audiogram (under \"Media\" tab) and encounter note for details. Today's testing revealed:  AU: Moderate sloping to Profound SNHL, Good WRS, Type As tymps    Hearing loss consistent with Sensorineural Hearing Loss. If you have any questions, or if there is anything else you need, please let me know.       Coreen Cheadle, AuD  Audiologist  ---  [facility] - Audiology

## 2021-06-23 NOTE — PROGRESS NOTES
[facility]    H+P // CONSULT // OUTPATIENT VISIT // Jasvir Brown     Referring Doctor Emelyn Delgadillo MD   Encounter Type Followup     CHIEF COMPLAINT     Visit Type Chronic   Symptoms None   Problems AS s/p TAVR, HTN, AFIB     HISTORY OF PRESENT ILLNESS     GEN - Doing well. No new concerns  AS - Denies cp, sob, dizziness, syncope, palpitations. AFIB - single episode post tavr, no known recurrence  HTN - Ambulatory BP readings in range. No HA or dizziness. MED - Compliant with CV meds listed below without notable side effects. HISTORY/ALLERGIES/ROS     MedHx:   has a past medical history of Aortic stenosis and Arthritis. SurgHx:  has a past surgical history that includes Varicose vein surgery (Bilateral, 1980's); Cardiac catheterization (04/17/2018); Hysterectomy, total abdominal; and Aortic valve replacement (N/A, 5/21/2019). SocHx:   reports that she has never smoked. She has never used smokeless tobacco. She reports that she does not drink alcohol and does not use drugs. FamHx:  family history includes Asthma in her daughter; Cancer in her brother and sister; Diabetes in her mother; Heart Disease in her brother, father, mother, nephew, and sister. Allergies: Patient has no known allergies. ROS:  [x]Full ROS obtained and negative except as mentioned in HPI     MEDICATIONS      Current Outpatient Medications   Medication Sig Dispense Refill    ipratropium (ATROVENT) 0.06 % nasal spray 2 sprays by Each Nostril route 4 times daily 1 Bottle 3    meloxicam (MOBIC) 7.5 MG tablet Take 1 tablet by mouth daily 90 tablet 0    losartan (COZAAR) 50 MG tablet Take 1 tablet by mouth daily 90 tablet 3    EQ ASPIRIN ADULT LOW DOSE 81 MG EC tablet TAKE  TABLET BY MOUTH ONCE DAILY 90 tablet 2     No current facility-administered medications for this visit.      Reviewed with patient and will remain unchanged except as mentioned in A/P  PHYSICAL EXAM     Vitals:    07/01/21 1052   BP: 138/88 Pulse: 85   SpO2: 98%      Gen Alert, coop, no distress Heart  Rrr, no mrg   Head NC, AT, no abnorm Abd  Soft, NT, +BS, no mass, no OM   Eyes PER, conj/corn clear Ext  Ext nl, AT, no C/C/E   Nose Nares nl, no drain, NT Pulse 2+ and symmetric   Throat Lips, mucosa, tongue nl Skin Col/text/turg nl, no vis rash/les   Neck S/S, TM, NT, no bruit/JVD Psych Nl mood and affect   Lung CTA-B, unlabored, no DTP Lymph   No cervical or axillary LA   Ch wall NT, no deform Neuro  Nl gross M/S exam     ASSESSMENT AND PLAN     *AS    Date EF Detail   Sx   SOB, fatigue   Hx 5/19  TAVR with ES3 23mm   NYHA   I   TTE 11/16  3/19  5/19  7/19  7/20  7/21 60%  65%  65%  65%  65%  65% Mod AS MG 28  Severe AS MG 51  TAVR MG 8, no AI  TAVR MG 9  TAVR MG 9, no AI  TAVR MG 7, no AI   EKG   Sinus arrhythmia   Plan   Continued observation  Echo yearly   *HTN  Status Controlled, vitals and available ambulatory monitoring logs reviewed personally  Plan Counseled on diet/salt/exercise/weight, continue meds at doses above  *AFIB   Brief post TAVR, resolved with amio  Plan Continued observation  *COMPLIANCE  Status Compliant  Plan Discussed importance of compliance with meds/diet/salt/exercise; avoid tob/alc/drugs; patient verbalized understanding  *FOLLOWUP  12 months with echo    [address] am scribing for and in the presence of Bisi Acosta MD.   Jing Lerner 06/23/21 3:03 PM   Provider [address] is working as a scribe for and in the presence of me (Bisi Acosta MD). Working as a scribe, Jing Cheung may have prepopulated components of this note with my historical  intellectual property under my direct supervision. Any additions to this intellectual property were performed in my presence and at my direction.   Furthermore, the content and accuracy of this note have been reviewed by me Bisi Acosta MD).  7/1/2021 11:27 AM    CODING     Category Diagnosis   Stable chronic illness  (27598/75893 - 2 or more) AS, HTN, AFIB   Chronic illness with: Exac, progr or SA of Tx  (89136/49883 - 1 or more)    Undiagnosed new problem with: uncertain prognosis  (68427/45082 - 1 or more)    Acute illness with systemic Sx  (09121/00948 - 1 or more)    Acute, complicated injury  (61228/71935 - 1 or more)    18861 1 or more chronic illness with exacerbation, progression or SA of treatment    Time  30-39 minutes spent preparing to see patient including reviewing patient history/prior tests/prior consults, performing a medical exam, counseling and educating patient/family/caregiver, ordering medications/tests/procedures, referring and communicating with PCPs and other pertinent consultants, documenting information in the EMR, independently interpreting results and communicating to family and coordination of patient care.

## 2021-06-29 ENCOUNTER — OFFICE VISIT (OUTPATIENT)
Dept: FAMILY MEDICINE CLINIC | Age: 86
End: 2021-06-29
Payer: MEDICARE

## 2021-06-29 VITALS
OXYGEN SATURATION: 98 % | DIASTOLIC BLOOD PRESSURE: 72 MMHG | WEIGHT: 103 LBS | SYSTOLIC BLOOD PRESSURE: 154 MMHG | BODY MASS INDEX: 20.22 KG/M2 | HEIGHT: 60 IN | HEART RATE: 74 BPM

## 2021-06-29 DIAGNOSIS — N90.89 HEMATOMA OF LABIA MAJORA: Primary | ICD-10-CM

## 2021-06-29 DIAGNOSIS — R23.3 ABNORMAL BRUISING: ICD-10-CM

## 2021-06-29 PROCEDURE — 99213 OFFICE O/P EST LOW 20 MIN: CPT | Performed by: NURSE PRACTITIONER

## 2021-06-29 ASSESSMENT — ENCOUNTER SYMPTOMS
CHEST TIGHTNESS: 0
CONSTIPATION: 0
ABDOMINAL DISTENTION: 0
SHORTNESS OF BREATH: 0
SINUS PAIN: 0
COLOR CHANGE: 1
NAUSEA: 0
ABDOMINAL PAIN: 0
SINUS PRESSURE: 0
BACK PAIN: 0
COUGH: 0
DIARRHEA: 0
EYE DISCHARGE: 0

## 2021-06-29 NOTE — PROGRESS NOTES
Date of Service:  2021    Ashley Hamilton (:  1931) is a 80 y.o. female, here for evaluation of the following medical concerns:    Chief Complaint   Patient presents with    Bleeding/Bruising     pt is having a bruising on her left labial        HPI    Bruising  Pt is having bruising on right labia. Onset- yesterday after the shower. Location- right labia. Alleviating factors- none. Aggravating factors- none. Treatment- none. Denies any trauma, no sexual abuse concerns, no fall, did not bump it. Noticed it was swollen and bruised in the shower. Review of Systems   Constitutional: Negative for activity change, appetite change, fatigue, fever and unexpected weight change. HENT: Negative for congestion, ear pain, sinus pressure and sinus pain. Eyes: Negative for discharge and visual disturbance. Respiratory: Negative for cough, chest tightness and shortness of breath. Cardiovascular: Negative for chest pain, palpitations and leg swelling. Gastrointestinal: Negative for abdominal distention, abdominal pain, constipation, diarrhea and nausea. Endocrine: Negative for cold intolerance, heat intolerance, polydipsia, polyphagia and polyuria. Genitourinary: Negative for decreased urine volume, difficulty urinating, dysuria, flank pain, frequency and urgency. Musculoskeletal: Negative for arthralgias, back pain, gait problem, joint swelling, myalgias and neck pain. Skin: Positive for color change (right labia). Negative for rash and wound. Allergic/Immunologic: Negative for food allergies and immunocompromised state. Neurological: Negative for dizziness, tremors, speech difficulty, weakness, light-headedness, numbness and headaches. Hematological: Negative for adenopathy. Does not bruise/bleed easily. Psychiatric/Behavioral: Negative for confusion, decreased concentration, self-injury, sleep disturbance and suicidal ideas. The patient is not nervous/anxious.         Prior to Pulses:           Carotid pulses are 2+ on the right side and 2+ on the left side. Radial pulses are 2+ on the right side and 2+ on the left side. Posterior tibial pulses are 2+ on the right side and 2+ on the left side. Heart sounds: Normal heart sounds, S1 normal and S2 normal. No murmur heard. Pulmonary:      Effort: Pulmonary effort is normal.      Breath sounds: Normal breath sounds. Abdominal:      General: Bowel sounds are normal. There is no abdominal bruit. Palpations: Abdomen is soft. Tenderness: There is no abdominal tenderness. Genitourinary:     Pubic Area: No rash or pubic lice. Labia:         Right: Injury present. No rash, tenderness or lesion. Comments:  Bruising and hematoma in right groin/pubic area with bruising the worst near to posterior of labia  Musculoskeletal:         General: Normal range of motion. Cervical back: Full passive range of motion without pain, normal range of motion and neck supple. Right lower leg: No edema. Left lower leg: No edema. Lymphadenopathy:      Head:      Right side of head: No submental, submandibular, tonsillar, preauricular, posterior auricular or occipital adenopathy. Left side of head: No submental, submandibular, tonsillar, preauricular, posterior auricular or occipital adenopathy. Cervical: No cervical adenopathy. Right cervical: No superficial, deep or posterior cervical adenopathy. Left cervical: No superficial, deep or posterior cervical adenopathy. Upper Body:      Right upper body: No supraclavicular adenopathy. Left upper body: No supraclavicular adenopathy. Skin:     General: Skin is warm and dry. Capillary Refill: Capillary refill takes less than 2 seconds. Neurological:      General: No focal deficit present. Mental Status: She is alert and oriented to person, place, and time. Mental status is at baseline.       Sensory: Sensation is intact. Motor: Motor function is intact. Coordination: Coordination is intact. Gait: Gait is intact. Psychiatric:         Attention and Perception: Attention and perception normal.         Mood and Affect: Mood and affect normal.         Speech: Speech normal.         Behavior: Behavior normal. Behavior is cooperative. Thought Content: Thought content normal.         Cognition and Memory: Cognition and memory normal.         Judgment: Judgment normal.         ASSESSMENT/PLAN:  1. Hematoma of labia majora  2. Abnormal bruising   Hematoma noted in right pubic area with bruising down the right labia    Pt on daily ASA. No known trauma   Elevate as much as possible as gravity will make it worse   Cool compresses to help with any discomfort   Could likely get worse or more bruised before it gets better   Massage/apply pressure periodically   Follows up with cardiology later this week   Follow up for HTN and AWV this summer   BP elevated today but has been \"running around\" the past couple of hours         Care Gaps Addressed  COVID vaccine recommended  TDAP vaccine recommended- call insurance to discuss coverage  Call insurance company to discuss coverage for shingles vaccine (Shingrix) 2 dose series   PNA vaccine recommended  AWV recommended in 2021      I have reviewed patient's pertinent medical history, relevant laboratory and imaging studies, and past/future health maintenance. Discussed with the patient the importance of adhering to their current medication regimen as directed. Advised the patient that they should continue to work on eating a healthy balanced diet and staying active by exercising within their personal limits. Orders as listed above. Patient was advised to keep future appointments with their respective specialty care team(s). Patient had the opportunity to ask questions, all of which were answered to the best of my ability and with patient satisfaction.  Patient understands and is agreeable with the care plan following today's visit. Patient is to schedule an appointment for any new or worsening symptoms. Go to ER for significant shortness of breath, chest pain, or uncontrolled pain or fever. I discussed with patient the risk and benefits of any medications that were prescribed today. I verified that the patient understands their medications, labs, and/or procedures. The patient is doing well with current medication regimen and does not have any barriers to adherence. The patient's self-management abilities are good. Return in about 1 month (around 7/29/2021) for Annual Wellness Visit and fasting labs. An electronic signature was used to authenticate this note.     --CAROLYN Dunlap - CNP on 6/29/2021 at 12:47 PM

## 2021-07-01 ENCOUNTER — HOSPITAL ENCOUNTER (OUTPATIENT)
Dept: NON INVASIVE DIAGNOSTICS | Age: 86
Discharge: HOME | End: 2021-07-01
Payer: MEDICARE

## 2021-07-01 ENCOUNTER — OFFICE VISIT (OUTPATIENT)
Dept: CARDIOLOGY CLINIC | Age: 86
End: 2021-07-01
Payer: MEDICARE

## 2021-07-01 VITALS
HEART RATE: 85 BPM | BODY MASS INDEX: 20.22 KG/M2 | WEIGHT: 103 LBS | DIASTOLIC BLOOD PRESSURE: 88 MMHG | SYSTOLIC BLOOD PRESSURE: 138 MMHG | OXYGEN SATURATION: 98 % | HEIGHT: 60 IN

## 2021-07-01 DIAGNOSIS — I48.0 PAF (PAROXYSMAL ATRIAL FIBRILLATION) (HCC): ICD-10-CM

## 2021-07-01 DIAGNOSIS — I10 ESSENTIAL HYPERTENSION: ICD-10-CM

## 2021-07-01 DIAGNOSIS — Z95.2 S/P TAVR (TRANSCATHETER AORTIC VALVE REPLACEMENT): ICD-10-CM

## 2021-07-01 DIAGNOSIS — I35.0 NONRHEUMATIC AORTIC VALVE STENOSIS: Primary | ICD-10-CM

## 2021-07-01 DIAGNOSIS — I35.0 NONRHEUMATIC AORTIC VALVE STENOSIS: ICD-10-CM

## 2021-07-01 LAB
LV EF: 65 %
LVEF MODALITY: NORMAL

## 2021-07-01 PROCEDURE — 93306 TTE W/DOPPLER COMPLETE: CPT

## 2021-07-01 PROCEDURE — 99214 OFFICE O/P EST MOD 30 MIN: CPT | Performed by: INTERNAL MEDICINE

## 2021-07-12 RX ORDER — MELOXICAM 7.5 MG/1
7.5 TABLET ORAL DAILY
Qty: 90 TABLET | Refills: 0 | Status: SHIPPED | OUTPATIENT
Start: 2021-07-12 | End: 2021-07-12 | Stop reason: SDUPTHER

## 2021-07-12 RX ORDER — IPRATROPIUM BROMIDE 42 UG/1
2 SPRAY, METERED NASAL 4 TIMES DAILY
Qty: 1 BOTTLE | Refills: 3 | Status: SHIPPED | OUTPATIENT
Start: 2021-07-12 | End: 2021-07-12 | Stop reason: SDUPTHER

## 2021-07-12 RX ORDER — MELOXICAM 7.5 MG/1
7.5 TABLET ORAL DAILY
Qty: 90 TABLET | Refills: 1 | Status: ON HOLD
Start: 2021-07-12 | End: 2021-10-02 | Stop reason: HOSPADM

## 2021-07-12 RX ORDER — LOSARTAN POTASSIUM 50 MG/1
50 TABLET ORAL DAILY
Qty: 90 TABLET | Refills: 3 | Status: SHIPPED | OUTPATIENT
Start: 2021-07-12 | End: 2021-07-22 | Stop reason: SDUPTHER

## 2021-07-12 RX ORDER — IPRATROPIUM BROMIDE 42 UG/1
2 SPRAY, METERED NASAL 4 TIMES DAILY
Qty: 1 BOTTLE | Refills: 3 | Status: SHIPPED | OUTPATIENT
Start: 2021-07-12 | End: 2022-02-04 | Stop reason: ALTCHOICE

## 2021-07-23 RX ORDER — LOSARTAN POTASSIUM 50 MG/1
50 TABLET ORAL DAILY
Qty: 90 TABLET | Refills: 3 | Status: ON HOLD
Start: 2021-07-23 | End: 2021-10-02 | Stop reason: HOSPADM

## 2021-08-13 ENCOUNTER — OFFICE VISIT (OUTPATIENT)
Dept: FAMILY MEDICINE CLINIC | Age: 86
End: 2021-08-13
Payer: MEDICARE

## 2021-08-13 VITALS
HEIGHT: 60 IN | BODY MASS INDEX: 20.03 KG/M2 | DIASTOLIC BLOOD PRESSURE: 74 MMHG | WEIGHT: 102 LBS | HEART RATE: 74 BPM | RESPIRATION RATE: 14 BRPM | SYSTOLIC BLOOD PRESSURE: 128 MMHG

## 2021-08-13 DIAGNOSIS — R07.89 RIGHT-SIDED CHEST WALL PAIN: ICD-10-CM

## 2021-08-13 DIAGNOSIS — G47.62 NOCTURNAL LEG CRAMPS: ICD-10-CM
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
## 2021-08-13 PROCEDURE — 99214 OFFICE O/P EST MOD 30 MIN: CPT | Performed by: FAMILY MEDICINE

## 2021-08-13 NOTE — PROGRESS NOTES
[facility] Medicine  Clinic Note    Date: 8/13/2021                                               Subjective:     Chief Complaint   Patient presents with    Fall     PT HAD A FALL OUT OF HER BED AT 0200 DAUGHTER BANDAGED HER UP REFUSED ER CRAMP IN LEG AND LEG WENT OUT DID NOT HIT HER HEAD      HPI  LEG CRAMP STOOD UP OUT OF BED AND FELL ON RIGHT SIDE ON TOP OF BEDSIDE TABLE - CUT RIGHT UPPER ARM, THUMBS / RIGHT KNEE  ? LOC - DAUGHTER STATES PT RESPONSIVE NOT CONFUSED  A LOT OF BLEEDING 2/2 ASA  STOPPED ASA  PAIN FROM UNDER BREAST DOWN TO ABD ADRIENNE ON RIGHT       Patient Active Problem List    Diagnosis Date Noted    S/P TAVR (transcatheter aortic valve replacement) 10/02/2019    PAF (paroxysmal atrial fibrillation) ([address] 10/02/2019    Essential hypertension 04/03/2019    Carotid stenosis, asymptomatic, right 04/03/2019    Arthritis 02/05/2018    Hypercholesteremia 11/23/2016    Nonrheumatic aortic valve stenosis 11/23/2016    Right carotid artery occlusion 11/23/2016    Aortic calcification ([address] 11/09/2016     Past Medical History:   Diagnosis Date    Aortic stenosis     Arthritis      Past Surgical History:   Procedure Laterality Date    AORTIC VALVE REPLACEMENT N/A 5/21/2019    TRANSCATHETER AORTIC VALVE REPLACEMENT FEMORAL APPROACH performed by Durga Velasquez MD at [address]  04/17/2018    HYSTERECTOMY, TOTAL ABDOMINAL      VARICOSE VEIN SURGERY Bilateral [facility] Outpatient Visit on 07/01/2021   Component Date Value Ref Range Status    Left Ventricular Ejection Fraction 07/01/2021 65   Final-Edited    LVEF MODALITY 07/01/2021 ECHO   Final-Edited     Family History   Problem Relation Age of Onset    Diabetes Mother     Heart Disease Mother     Heart Disease Father     Cancer Brother     Asthma Daughter     Cancer Sister         Breast, Bone    Heart Disease Sister         Valve     Heart Disease Nephew         Valve    Heart Disease Brother Current Outpatient Medications   Medication Sig Dispense Refill    losartan (COZAAR) 50 MG tablet Take 1 tablet by mouth daily 90 tablet 3    ipratropium (ATROVENT) 0.06 % nasal spray 2 sprays by Each Nostril route 4 times daily 1 Bottle 3    meloxicam (MOBIC) 7.5 MG tablet Take 1 tablet by mouth daily 90 tablet 1    EQ ASPIRIN ADULT LOW DOSE 81 MG EC tablet TAKE  TABLET BY MOUTH ONCE DAILY 90 tablet 2     No current facility-administered medications for this visit. No Known Allergies    Review of Systems    Objective:  /74 (Site: Left Upper Arm, Position: Sitting, Cuff Size: Medium Adult)   Pulse 74   Resp 14   Ht 5' (1.524 m)   Wt 102 lb (46.3 kg)   LMP  (Exact Date)   Breastfeeding No   BMI 19.92 kg/m²     BP Readings from Last 3 Encounters:   08/13/21 128/74   07/01/21 138/88   06/29/21 (!) 154/72       Pulse Readings from Last 3 Encounters:   08/13/21 74   07/01/21 85   06/29/21 74       Wt Readings from Last 3 Encounters:   08/13/21 102 lb (46.3 kg)   07/01/21 103 lb (46.7 kg)   06/29/21 103 lb (46.7 kg)       Physical Exam  Constitutional:       General: She is not in acute distress. Appearance: She is well-developed. HENT:      Head: Normocephalic and atraumatic. Mouth/Throat:      Pharynx: No oropharyngeal exudate. Eyes:      General: No scleral icterus. Conjunctiva/sclera: Conjunctivae normal.   Neck:      Thyroid: No thyromegaly. Cardiovascular:      Rate and Rhythm: Normal rate and regular rhythm. Heart sounds: Normal heart sounds. No murmur heard. Pulmonary:      Effort: Pulmonary effort is normal. No respiratory distress. Breath sounds: Normal breath sounds. No wheezing or rales. Abdominal:      General: Bowel sounds are normal. There is no distension. Palpations: Abdomen is soft. Tenderness: There is no abdominal tenderness.    Musculoskeletal:      Comments: TENDER ANTERIOR LOWER COSTAL MARGIN   Lymphadenopathy:      Cervical: No cervical adenopathy. Skin:     General: Skin is warm and dry. Comments: LARGE SKIN TEAR W/ BRUISING RIGHT LATERAL UPPER ARM AND RIGHT KNEE W/ SMALLER NONBLEEDING LESIONS ON THUMBS   Neurological:      Mental Status: She is alert and oriented to person, place, and time. Assessment/Plan:      Diagnosis Orders   1. Fall, initial encounter     2. Nocturnal leg cramps     3.  Right-sided chest wall pain  XR RIBS RIGHT INCLUDE CHEST (MIN 3 VIEWS)   HOLD ASA FOR A WEEK  WOUND CARE DW/ PT - DRESSING DONE  MG/ B COMPLEX AT NIGHT FOR CRAMPS W/ HYDRATION  CONSIDER XRAY BUT WILL HOLD FOR NOW UNLESS WORSENING SXS AS NO SPECIFIC TX IF RIB FX  SAFETY PRECAUTIONS ADDRESSED  F/U PRN SXS OF INFECTION  TYLENOL AT NIGHT/ PRN  Lena Porras MD, MD  8/13/2021  10:47 AM

## 2021-09-07 DIAGNOSIS — J01.90 ACUTE SINUSITIS, RECURRENCE NOT SPECIFIED, UNSPECIFIED LOCATION: Primary | ICD-10-CM

## 2021-09-07 RX ORDER — AZITHROMYCIN 250 MG/1
250 TABLET, FILM COATED ORAL SEE ADMIN INSTRUCTIONS
Qty: 6 TABLET | Refills: 0 | Status: SHIPPED | OUTPATIENT
Start: 2021-09-07 | End: 2021-09-12

## 2021-09-13 ENCOUNTER — HOSPITAL ENCOUNTER (INPATIENT)
Age: 86
LOS: 19 days | Discharge: HOME | DRG: 177 | End: 2021-10-02
Attending: EMERGENCY MEDICINE | Admitting: INTERNAL MEDICINE
Payer: MEDICARE

## 2021-09-13 ENCOUNTER — APPOINTMENT (OUTPATIENT)
Dept: GENERAL RADIOLOGY | Age: 86
DRG: 177 | End: 2021-09-13
Payer: MEDICARE

## 2021-09-13 DIAGNOSIS — R09.02 HYPOXIA: Primary | ICD-10-CM

## 2021-09-13 DIAGNOSIS — J18.9 PNEUMONIA DUE TO INFECTIOUS ORGANISM, UNSPECIFIED LATERALITY, UNSPECIFIED PART OF LUNG: ICD-10-CM

## 2021-09-13 LAB
A/G RATIO: 0.9 (ref 1.1–2.2)
ALBUMIN SERPL-MCNC: 3.3 G/DL (ref 3.4–5)
ALP BLD-CCNC: 101 U/L (ref 40–129)
ALT SERPL-CCNC: 14 U/L (ref 10–40)
ANION GAP SERPL CALCULATED.3IONS-SCNC: 19 MMOL/L (ref 3–16)
AST SERPL-CCNC: 37 U/L (ref 15–37)
BASE EXCESS VENOUS: -3.9 MMOL/L
BASOPHILS ABSOLUTE: 0 K/UL (ref 0–0.2)
BASOPHILS RELATIVE PERCENT: 0.3 %
BILIRUB SERPL-MCNC: 0.7 MG/DL (ref 0–1)
BUN BLDV-MCNC: 33 MG/DL (ref 7–20)
C-REACTIVE PROTEIN: 100.9 MG/L (ref 0–5.1)
CALCIUM SERPL-MCNC: 9.2 MG/DL (ref 8.3–10.6)
CARBOXYHEMOGLOBIN: 0.9 %
CHLORIDE BLD-SCNC: 96 MMOL/L (ref 99–110)
CO2: 19 MMOL/L (ref 21–32)
CREAT SERPL-MCNC: 1.1 MG/DL (ref 0.6–1.2)
D DIMER: 446 NG/ML DDU (ref 0–229)
EKG ATRIAL RATE: 90 BPM
EKG DIAGNOSIS: NORMAL
EKG P AXIS: -23 DEGREES
EKG P-R INTERVAL: 144 MS
EKG Q-T INTERVAL: 358 MS
EKG QRS DURATION: 80 MS
EKG QTC CALCULATION (BAZETT): 437 MS
EKG R AXIS: 1 DEGREES
EKG T AXIS: 16 DEGREES
EKG VENTRICULAR RATE: 90 BPM
EOSINOPHILS ABSOLUTE: 0 K/UL (ref 0–0.6)
EOSINOPHILS RELATIVE PERCENT: 0 %
FERRITIN: 780.8 NG/ML (ref 15–150)
GFR AFRICAN AMERICAN: 56
GFR NON-AFRICAN AMERICAN: 47
GLOBULIN: 3.6 G/DL
GLUCOSE BLD-MCNC: 98 MG/DL (ref 70–99)
HCO3 VENOUS: 21 MMOL/L (ref 23–29)
HCT VFR BLD CALC: 39.9 % (ref 36–48)
HEMOGLOBIN: 13.3 G/DL (ref 12–16)
LACTIC ACID: 1.2 MMOL/L (ref 0.4–2)
LYMPHOCYTES ABSOLUTE: 0.5 K/UL (ref 1–5.1)
LYMPHOCYTES RELATIVE PERCENT: 11.4 %
MCH RBC QN AUTO: 27.7 PG (ref 26–34)
MCHC RBC AUTO-ENTMCNC: 33.4 G/DL (ref 31–36)
MCV RBC AUTO: 83 FL (ref 80–100)
METHEMOGLOBIN VENOUS: 0.3 %
MONOCYTES ABSOLUTE: 0.4 K/UL (ref 0–1.3)
MONOCYTES RELATIVE PERCENT: 10.8 %
NEUTROPHILS ABSOLUTE: 3.1 K/UL (ref 1.7–7.7)
NEUTROPHILS RELATIVE PERCENT: 77.5 %
O2 SAT, VEN: 26 %
O2 THERAPY: ABNORMAL
PCO2, VEN: 37.9 MMHG (ref 40–50)
PDW BLD-RTO: 14.7 % (ref 12.4–15.4)
PH VENOUS: 7.36 (ref 7.35–7.45)
PLATELET # BLD: 194 K/UL (ref 135–450)
PLATELET SLIDE REVIEW: ADEQUATE
PMV BLD AUTO: 10.1 FL (ref 5–10.5)
PO2, VEN: <30 MMHG
POTASSIUM REFLEX MAGNESIUM: 4.7 MMOL/L (ref 3.5–5.1)
PRO-BNP: 1634 PG/ML (ref 0–449)
PROCALCITONIN: 0.39 NG/ML (ref 0–0.15)
RBC # BLD: 4.81 M/UL (ref 4–5.2)
SLIDE REVIEW: ABNORMAL
SODIUM BLD-SCNC: 134 MMOL/L (ref 136–145)
TCO2 CALC VENOUS: 22 MMOL/L
TOTAL PROTEIN: 6.9 G/DL (ref 6.4–8.2)
TROPONIN: <0.01 NG/ML
WBC # BLD: 4.1 K/UL (ref 4–11)

## 2021-09-13 PROCEDURE — 36415 COLL VENOUS BLD VENIPUNCTURE: CPT

## 2021-09-13 PROCEDURE — 6360000002 HC RX W HCPCS: Performed by: INTERNAL MEDICINE

## 2021-09-13 PROCEDURE — 96365 THER/PROPH/DIAG IV INF INIT: CPT

## 2021-09-13 PROCEDURE — 6370000000 HC RX 637 (ALT 250 FOR IP): Performed by: INTERNAL MEDICINE

## 2021-09-13 PROCEDURE — 99285 EMERGENCY DEPT VISIT HI MDM: CPT

## 2021-09-13 PROCEDURE — 84484 ASSAY OF TROPONIN QUANT: CPT

## 2021-09-13 PROCEDURE — 82803 BLOOD GASES ANY COMBINATION: CPT

## 2021-09-13 PROCEDURE — 87040 BLOOD CULTURE FOR BACTERIA: CPT

## 2021-09-13 PROCEDURE — 94761 N-INVAS EAR/PLS OXIMETRY MLT: CPT

## 2021-09-13 PROCEDURE — 80053 COMPREHEN METABOLIC PANEL: CPT

## 2021-09-13 PROCEDURE — 86140 C-REACTIVE PROTEIN: CPT

## 2021-09-13 PROCEDURE — 6360000002 HC RX W HCPCS: Performed by: EMERGENCY MEDICINE

## 2021-09-13 PROCEDURE — 71045 X-RAY EXAM CHEST 1 VIEW: CPT

## 2021-09-13 PROCEDURE — 6370000000 HC RX 637 (ALT 250 FOR IP): Performed by: EMERGENCY MEDICINE

## 2021-09-13 PROCEDURE — 93010 ELECTROCARDIOGRAM REPORT: CPT | Performed by: INTERNAL MEDICINE

## 2021-09-13 PROCEDURE — 83880 ASSAY OF NATRIURETIC PEPTIDE: CPT

## 2021-09-13 PROCEDURE — 84145 PROCALCITONIN (PCT): CPT

## 2021-09-13 PROCEDURE — 82728 ASSAY OF FERRITIN: CPT

## 2021-09-13 PROCEDURE — 94640 AIRWAY INHALATION TREATMENT: CPT

## 2021-09-13 PROCEDURE — 83605 ASSAY OF LACTIC ACID: CPT

## 2021-09-13 PROCEDURE — 85025 COMPLETE CBC W/AUTO DIFF WBC: CPT

## 2021-09-13 PROCEDURE — U0005 INFEC AGEN DETEC AMPLI PROBE: HCPCS

## 2021-09-13 PROCEDURE — U0003 INFECTIOUS AGENT DETECTION BY NUCLEIC ACID (DNA OR RNA); SEVERE ACUTE RESPIRATORY SYNDROME CORONAVIRUS 2 (SARS-COV-2) (CORONAVIRUS DISEASE [COVID-19]), AMPLIFIED PROBE TECHNIQUE, MAKING USE OF HIGH THROUGHPUT TECHNOLOGIES AS DESCRIBED BY CMS-2020-01-R: HCPCS

## 2021-09-13 PROCEDURE — 96368 THER/DIAG CONCURRENT INF: CPT

## 2021-09-13 PROCEDURE — 93005 ELECTROCARDIOGRAM TRACING: CPT | Performed by: EMERGENCY MEDICINE

## 2021-09-13 PROCEDURE — 85379 FIBRIN DEGRADATION QUANT: CPT

## 2021-09-13 PROCEDURE — 2580000003 HC RX 258: Performed by: INTERNAL MEDICINE

## 2021-09-13 PROCEDURE — 1200000000 HC SEMI PRIVATE

## 2021-09-13 PROCEDURE — 2700000000 HC OXYGEN THERAPY PER DAY

## 2021-09-13 PROCEDURE — 2580000003 HC RX 258: Performed by: EMERGENCY MEDICINE

## 2021-09-13 RX ORDER — ALBUTEROL SULFATE 90 UG/1
2 AEROSOL, METERED RESPIRATORY (INHALATION)
Status: DISCONTINUED | OUTPATIENT
Start: 2021-09-13 | End: 2021-09-22

## 2021-09-13 RX ORDER — ACETAMINOPHEN 650 MG/1
650 SUPPOSITORY RECTAL EVERY 6 HOURS PRN
Status: DISCONTINUED | OUTPATIENT
Start: 2021-09-13 | End: 2021-10-02 | Stop reason: HOSPADM

## 2021-09-13 RX ORDER — SODIUM CHLORIDE 0.9 % (FLUSH) 0.9 %
10 SYRINGE (ML) INJECTION EVERY 12 HOURS SCHEDULED
Status: DISCONTINUED | OUTPATIENT
Start: 2021-09-13 | End: 2021-10-02 | Stop reason: HOSPADM

## 2021-09-13 RX ORDER — ACETAMINOPHEN 325 MG/1
650 TABLET ORAL EVERY 6 HOURS PRN
Status: DISCONTINUED | OUTPATIENT
Start: 2021-09-13 | End: 2021-10-02 | Stop reason: HOSPADM

## 2021-09-13 RX ORDER — HEPARIN SODIUM 5000 [USP'U]/ML
5000 INJECTION, SOLUTION INTRAVENOUS; SUBCUTANEOUS EVERY 8 HOURS SCHEDULED
Status: DISCONTINUED | OUTPATIENT
Start: 2021-09-13 | End: 2021-09-14

## 2021-09-13 RX ORDER — MAGNESIUM SULFATE IN WATER 40 MG/ML
2000 INJECTION, SOLUTION INTRAVENOUS PRN
Status: DISCONTINUED | OUTPATIENT
Start: 2021-09-13 | End: 2021-09-13

## 2021-09-13 RX ORDER — 0.9 % SODIUM CHLORIDE 0.9 %
30 INTRAVENOUS SOLUTION INTRAVENOUS ONCE
Status: COMPLETED | OUTPATIENT
Start: 2021-09-13 | End: 2021-09-13

## 2021-09-13 RX ORDER — ASPIRIN 81 MG/1
81 TABLET ORAL DAILY
Status: DISCONTINUED | OUTPATIENT
Start: 2021-09-14 | End: 2021-10-02 | Stop reason: HOSPADM

## 2021-09-13 RX ORDER — LOSARTAN POTASSIUM 25 MG/1
50 TABLET ORAL DAILY
Status: DISCONTINUED | OUTPATIENT
Start: 2021-09-14 | End: 2021-09-13 | Stop reason: SDUPTHER

## 2021-09-13 RX ORDER — LOSARTAN POTASSIUM 25 MG/1
50 TABLET ORAL DAILY
Status: DISCONTINUED | OUTPATIENT
Start: 2021-09-13 | End: 2021-09-21

## 2021-09-13 RX ORDER — ONDANSETRON 2 MG/ML
4 INJECTION INTRAMUSCULAR; INTRAVENOUS EVERY 6 HOURS PRN
Status: DISCONTINUED | OUTPATIENT
Start: 2021-09-13 | End: 2021-10-02 | Stop reason: HOSPADM

## 2021-09-13 RX ORDER — ALBUTEROL SULFATE 90 UG/1
2 AEROSOL, METERED RESPIRATORY (INHALATION) 4 TIMES DAILY
Status: DISCONTINUED | OUTPATIENT
Start: 2021-09-13 | End: 2021-09-13

## 2021-09-13 RX ORDER — POTASSIUM CHLORIDE 7.45 MG/ML
10 INJECTION INTRAVENOUS PRN
Status: DISCONTINUED | OUTPATIENT
Start: 2021-09-13 | End: 2021-09-13

## 2021-09-13 RX ORDER — ONDANSETRON 4 MG/1
4 TABLET, ORALLY DISINTEGRATING ORAL EVERY 8 HOURS PRN
Status: DISCONTINUED | OUTPATIENT
Start: 2021-09-13 | End: 2021-10-02 | Stop reason: HOSPADM

## 2021-09-13 RX ORDER — IPRATROPIUM BROMIDE 42 UG/1
2 SPRAY, METERED NASAL 4 TIMES DAILY
Status: DISCONTINUED | OUTPATIENT
Start: 2021-09-13 | End: 2021-10-02 | Stop reason: HOSPADM

## 2021-09-13 RX ORDER — SODIUM CHLORIDE 0.9 % (FLUSH) 0.9 %
10 SYRINGE (ML) INJECTION PRN
Status: DISCONTINUED | OUTPATIENT
Start: 2021-09-13 | End: 2021-10-02 | Stop reason: HOSPADM

## 2021-09-13 RX ORDER — ALBUTEROL SULFATE 90 UG/1
2 AEROSOL, METERED RESPIRATORY (INHALATION) 2 TIMES DAILY
Status: DISCONTINUED | OUTPATIENT
Start: 2021-09-14 | End: 2021-10-02 | Stop reason: HOSPADM

## 2021-09-13 RX ORDER — SODIUM CHLORIDE 9 MG/ML
25 INJECTION, SOLUTION INTRAVENOUS PRN
Status: DISCONTINUED | OUTPATIENT
Start: 2021-09-13 | End: 2021-10-02 | Stop reason: HOSPADM

## 2021-09-13 RX ADMIN — Medication 2 PUFF: at 21:45

## 2021-09-13 RX ADMIN — CEFTRIAXONE 1000 MG: 1 INJECTION, POWDER, FOR SOLUTION INTRAMUSCULAR; INTRAVENOUS at 15:06

## 2021-09-13 RX ADMIN — SODIUM CHLORIDE 1380 ML: 9 INJECTION, SOLUTION INTRAVENOUS at 15:05

## 2021-09-13 RX ADMIN — HEPARIN SODIUM 5000 UNITS: 5000 INJECTION INTRAVENOUS; SUBCUTANEOUS at 22:08

## 2021-09-13 RX ADMIN — SODIUM CHLORIDE, PRESERVATIVE FREE 10 ML: 5 INJECTION INTRAVENOUS at 22:08

## 2021-09-13 RX ADMIN — AZITHROMYCIN MONOHYDRATE 500 MG: 500 INJECTION, POWDER, LYOPHILIZED, FOR SOLUTION INTRAVENOUS at 14:50

## 2021-09-13 RX ADMIN — LOSARTAN POTASSIUM 50 MG: 25 TABLET, FILM COATED ORAL at 19:55

## 2021-09-13 ASSESSMENT — PAIN SCALES - GENERAL: PAINLEVEL_OUTOF10: 0

## 2021-09-13 NOTE — ED NOTES
Patient's HR noted to be 130s-150s, AFIB.  notified, see new orders.      Issa Vela RN  09/13/21 [id number]

## 2021-09-13 NOTE — PROGRESS NOTES
Medication Reconciliation    List of medications patient is currently taking is complete. Source of information: 1. Conversation with patient                                       2. EPIC records      Allergies  Patient has no known allergies. Notes regarding home medications:   1. Patient received all morning medications prior to arrival to the emergency department today.     Halima Young RPH, PharmD, BCPS  9/13/2021 2:42 PM

## 2021-09-13 NOTE — ED NOTES
Bed: D-39  Expected date:   Expected time:   Means of arrival:   Comments:  makenzie Bronson RN  09/13/21 [id number]

## 2021-09-13 NOTE — ED PROVIDER NOTES
Triage Chief Complaint:   Concern For COVID-19 (Patient hypoxic via EMS, hypoxic on arrival with room air trial to 87%. Family member also with similar symptoms, shortness of breath, hypoxic. Granddaughter that called EMS reports concern for covid.) and Shortness of Breath    Ak Chin:  Aamnda Valle is a 80 y.o. female past medical history including but not limited to CAD,AS s/p AVR who presents complaint of shortness of breath for the past \"few days\". The patient states that she was negative for Covid 3 days ago but she is here with her daughter who is also suffering the same symptoms. Does not use home oxygen but is requiring 3 L to meet saturations over 92% and was 87% on RA. No abdominal pain no nausea no vomiting reported    ROS:  At least 12 systems reviewed and otherwise acutely negative except as in the [address] Past Medical History:   Diagnosis Date    Aortic stenosis     Arthritis      Past Surgical History:   Procedure Laterality Date    AORTIC VALVE REPLACEMENT N/A 5/21/2019    TRANSCATHETER AORTIC VALVE REPLACEMENT FEMORAL APPROACH performed by Mele Sorenson MD at [address]  04/17/2018    HYSTERECTOMY, TOTAL ABDOMINAL      VARICOSE VEIN SURGERY Bilateral 1980's     Family History   Problem Relation Age of Onset    Diabetes Mother     Heart Disease Mother     Heart Disease Father     Cancer Brother     Asthma Daughter     Cancer Sister         Breast, Bone    Heart Disease Sister         Valve     Heart Disease Nephew         Valve    Heart Disease Brother      Social History     Socioeconomic History    Marital status:       Spouse name: Not on file    Number of children: Not on file    Years of education: Not on file    Highest education level: Not on file   Occupational History    Not on file   Tobacco Use    Smoking status: Never Smoker    Smokeless tobacco: Never Used   Vaping Use    Vaping Use: Never used   Substance and Sexual Activity  Alcohol use: Never    Drug use: No    Sexual activity: Not on file   Other Topics Concern    Not on file   Social History Narrative    Not on file     Social Determinants of Health     Financial Resource Strain: Low Risk     Difficulty of Paying Living Expenses: Not hard at all   Food Insecurity: No Food Insecurity    Worried About Running Out of Food in the Last Year: Never true    Omari of Food in the Last Year: Never true   Transportation Needs: No Transportation Needs    Lack of Transportation (Medical): No    Lack of Transportation (Non-Medical):  No   Physical Activity:     Days of Exercise per Week:     Minutes of Exercise per Session:    Stress:     Feeling of Stress :    Social Connections:     Frequency of Communication with Friends and Family:     Frequency of Social Gatherings with Friends and Family:     Attends Hindu Services:     Active Member of Clubs or Organizations:     Attends Club or Organization Meetings:     Marital Status:    Intimate Partner Violence:     Fear of Current or Ex-Partner:     Emotionally Abused:     Physically Abused:     Sexually Abused:      Current Facility-Administered Medications   Medication Dose Route Frequency Provider Last Rate Last Admin    [START ON 9/14/2021] aspirin EC tablet 81 mg  81 mg Oral Daily Yasmin Degroot MD        ipratropium (ATROVENT) 0.06 % nasal spray 2 spray  2 spray Each Nostril 4x Daily Yasmin Degroot MD        [START ON 9/14/2021] losartan (COZAAR) tablet 50 mg  50 mg Oral Daily Yasmin Degroot MD        losartan (COZAAR) tablet 50 mg  50 mg Oral Daily Leif Miranda MD         Current Outpatient Medications   Medication Sig Dispense Refill    losartan (COZAAR) 50 MG tablet Take 1 tablet by mouth daily 90 tablet 3    ipratropium (ATROVENT) 0.06 % nasal spray 2 sprays by Each Nostril route 4 times daily 1 Bottle 3    meloxicam (MOBIC) 7.5 MG tablet Take 1 tablet by mouth daily 90 tablet 1    EQ ASPIRIN ADULT LOW DOSE 81 MG EC tablet TAKE  TABLET BY MOUTH ONCE DAILY 90 tablet 2     No Known Allergies    [unfilled]    Nursing Notes Reviewed    Physical Exam:  Vitals:    09/13/21 1930   BP: (!) 117/59   Pulse: 114   Resp: (!) 34   Temp:    SpO2: 92%       GENERAL APPEARANCE: Awake and alert. Cooperative. No acute distress. HEAD: Normocephalic. Atraumatic. EYES: EOM's grossly intact. Sclera anicteric. ENT: Mucous membranes are moist. Tolerates saliva. No trismus. NECK: Supple. No meningismus. Trachea midline. HEART: RRR. Radial pulses 2+. LUNGS: Respirations unlabored. Very coarse breath sounds bilaterally  ABDOMEN: Soft. Non-tender. No guarding or rebound. EXTREMITIES: No acute deformities. SKIN: Warm and dry. NEUROLOGICAL: No gross facial drooping. Moves all 4 extremities spontaneously. PSYCHIATRIC: Normal mood. I have reviewed and interpreted all of the currently available lab results from this visit (if applicable):       Radiographs (if obtained):  [] The following radiograph was interpreted by myself in the absence of a radiologist:  [x] Radiologist's Report Reviewed:     XR CHEST PORTABLE (Final result)  Result time 09/13/21 13:25:01  Final result by Jenny Pantoja MD (09/13/21 13:25:01)                Impression:    Findings most consistent with multifocal pneumonia. Narrative:    EXAMINATION:   ONE XRAY VIEW OF THE CHEST     9/13/2021 1:18 pm     COMPARISON:   None.      HISTORY:   ORDERING SYSTEM PROVIDED HISTORY: Shortness of breath   TECHNOLOGIST PROVIDED HISTORY:   Reason for exam:->Shortness of breath   Reason for Exam: Concern For COVID-19; Shortness of Breath   Acuity: Acute   Type of Exam: Initial     FINDINGS:   There is patchy bibasilar, left greater than right, interstitial type   airspace disease.  The mediastinum cardiac silhouette are unremarkable.                       EKG (if obtained): (All EKG's are interpreted by myself in the absence of a cardiologist)  Initial EKG on my interpretation shows normal sinus rhythm with rate of 90 bpm with no ST segment elevation    MDM:  Differential diagnosis: Pneumonia, Covid, ACS, sepsis    The patient's labs are generally reassuring however her x-ray shows multifocal pneumonia and she is hypoxic. Covid testing sent out. I will empirically cover with antibiotics to make sure there is not a bacterial component at play. Patient will be admitted    Of note prior to transport upstairs, the patient converted into atrial fibrillation, which she has a history of pAF. Home Cozaar ordered. Old records reviewed. Labs and imaging reviewed and results discussed with patient. .        Patient was given scripts for the following medications. I counseled patient how to take these medications. New Prescriptions    No medications on file         CRITICAL CARE TIME   Total Critical Care time was 30 minutes, excluding separately reportable procedures. There was a high probability of clinically significant/life threatening deterioration in the patient's condition which required my urgent intervention.       Clinical Impression:  Hypoxia, multifocal pneumonia, a-fib  (Please note that portions of this note may have been completed with a voice recognition program. Efforts were made to edit the dictations but occasionally words are mis-transcribed.)    MD Helen Hernandez MD  09/13/21 [facility] [address] Lacy Thakkar MD  09/13/21 1952

## 2021-09-13 NOTE — H&P
Hospital Medicine History & Physical      PCP: Rebeca Vizcarra MD    Date of Admission: 9/13/2021    Chief Complaint:  SOB    History Of Present Illness:  Patient is a 70-year-old female with past medical history of CAD, aortic stenosis status post AVR who presented to hospital for shortness of breath. Patient is a poor historian, reportedly patient was brought to the hospital for shortness of breath. Patient was found hypoxic in upper 80s on room air, was transitioned to nasal cannula. No reports of fevers chills chest pain nausea vomiting or diarrhea. Past Medical History:          Diagnosis Date    Aortic stenosis     Arthritis        Past Surgical History:          Procedure Laterality Date    AORTIC VALVE REPLACEMENT N/A 5/21/2019    TRANSCATHETER AORTIC VALVE REPLACEMENT FEMORAL APPROACH performed by Nely Vazquez MD at [address]  04/17/2018    HYSTERECTOMY, TOTAL ABDOMINAL      VARICOSE VEIN SURGERY Bilateral 1980's       Medications Prior to Admission:      Prior to Admission medications    Medication Sig Start Date End Date Taking? Authorizing Provider   losartan (COZAAR) 50 MG tablet Take 1 tablet by mouth daily 7/23/21  Yes Tequila Douglas MD   ipratropium (ATROVENT) 0.06 % nasal spray 2 sprays by Each Nostril route 4 times daily 7/12/21  Yes Yehuda Coburn MD   meloxicam (MOBIC) 7.5 MG tablet Take 1 tablet by mouth daily 7/12/21  Yes Rebeca Vizcarra MD   EQ ASPIRIN ADULT LOW DOSE 81 MG EC tablet TAKE  TABLET BY MOUTH ONCE DAILY 9/23/19  Yes Tequila Douglas MD       Allergies:  Patient has no known allergies. Social History:      TOBACCO:   reports that she has never smoked. She has never used smokeless tobacco.  ETOH:   reports no history of alcohol use.       Family History:       Reviewed in detail and non contributory          Problem Relation Age of Onset    Diabetes Mother     Heart Disease Mother     Heart Disease Father     Cancer Brother  Asthma Daughter     Cancer Sister         Breast, Bone    Heart Disease Sister         Valve     Heart Disease Nephew         Valve    Heart Disease Brother        REVIEW OF SYSTEMS:   Pertinent positives as noted in the HPI. All other systems reviewed and negative. PHYSICAL EXAM PERFORMED:    /61   Pulse 106   Temp 98.4 °F (36.9 °C) (Oral)   Resp 26   Ht 5' (1.524 m)   Wt 101 lb 6.4 oz (46 kg)   SpO2 95%   BMI 19.80 kg/m²     General appearance:  seems weak and frail, on 3 L nasal cannula  HEENT:  Normal cephalic, atraumatic without obvious deformity. Conjunctivae/corneas clear. Neck: Supple, with full range of motion. No cervical lymphadenopathy  Respiratory:  Normal respiratory effort. Clear to auscultation, bilaterally without Rales/Wheezes/Rhonchi. Cardiovascular:  Regular rate and rhythm with normal S1/S2 without murmurs, rubs or gallops. Abdomen: Soft, non-tender, non-distended, normal bowel sounds. Musculoskeletal:  No edema noted bilaterally. No tenderness on palpation   Skin: no rash visible  Neurologic:  Neurologically intact without any focal sensory/motor deficits. grossly non-focal.  Psychiatric:  Alert and oriented, normal mood  Peripheral Pulses: +2 palpable, equal bilaterally       Labs:     Recent Labs     09/13/21  1303   WBC 4.1   HGB 13.3   HCT 39.9        Recent Labs     09/13/21  1303   *   K 4.7   CL 96*   CO2 19*   BUN 33*   CREATININE 1.1   CALCIUM 9.2     Recent Labs     09/13/21  1303   AST 37   ALT 14   BILITOT 0.7   ALKPHOS 101     No results for input(s): INR in the last 72 hours.   Recent Labs     09/13/21  1303   TROPONINI <0.01       Urinalysis:      Lab Results   Component Value Date    NITRU Negative 05/16/2019    WBCUA 2 05/16/2019    RBCUA 0-2 05/16/2019    BLOODU Negative 05/16/2019    SPECGRAV 1.016 05/16/2019    GLUCOSEU Negative 05/16/2019       Radiology:       XR CHEST PORTABLE   Final Result   Findings most consistent with multifocal pneumonia. Active Hospital Problems    Diagnosis Date Noted    Hypoxia [R09.02] 09/13/2021       Patient is a 70-year-old female with past medical history of CAD, aortic stenosis status post AVR who presented to hospital for shortness of breath. Patient is a poor historian, reportedly patient was brought to the hospital for shortness of breath. Patient was found hypoxic in upper 80s on room air, was transitioned to nasal cannula. No reports of fevers chills chest pain nausea vomiting or diarrhea. Assessment  Acute hypoxic respiratory failure satting less than 90% on room air  Multifocal pneumonia, rule out COVID-19  Tachypnea tachycardia, hypoxia-rule out PE  CAD  Aortic stenosis status post AVR    Plan  Continue oxygen supplementation, check COVID-19 PCR, check procalcitonin, started on empirical antibiotics with azithromycin, Rocephin, check blood cultures, proBNP  Will order CTA chest to rule out PE  DVT prophylaxis-Lovenox  Diet: No diet orders on file  Code Status: Prior    PT/OT Eval Status: ordered    Dispo - pending clinical improvement     Lxey Limon MD    The note was completed using EMR and Dragon dictation system. Every effort was made to ensure accuracy; however, inadvertent computerized transcription errors may be present. Thank you Chitra Tillman MD for the opportunity to be involved in this patient's care. If you have any questions or concerns please feel free to contact me at [phone].     Lexy Limon MD

## 2021-09-14 ENCOUNTER — APPOINTMENT (OUTPATIENT)
Dept: CT IMAGING | Age: 86
DRG: 177 | End: 2021-09-14
Payer: MEDICARE

## 2021-09-14 LAB
ANION GAP SERPL CALCULATED.3IONS-SCNC: 15 MMOL/L (ref 3–16)
BUN BLDV-MCNC: 20 MG/DL (ref 7–20)
CALCIUM SERPL-MCNC: 9 MG/DL (ref 8.3–10.6)
CHLORIDE BLD-SCNC: 103 MMOL/L (ref 99–110)
CO2: 18 MMOL/L (ref 21–32)
CREAT SERPL-MCNC: 0.9 MG/DL (ref 0.6–1.2)
GFR AFRICAN AMERICAN: >60
GFR NON-AFRICAN AMERICAN: 59
GLUCOSE BLD-MCNC: 88 MG/DL (ref 70–99)
HCT VFR BLD CALC: 37.3 % (ref 36–48)
HEMOGLOBIN: 12.4 G/DL (ref 12–16)
MCH RBC QN AUTO: 27.6 PG (ref 26–34)
MCHC RBC AUTO-ENTMCNC: 33.2 G/DL (ref 31–36)
MCV RBC AUTO: 83.2 FL (ref 80–100)
PDW BLD-RTO: 14.5 % (ref 12.4–15.4)
PLATELET # BLD: 185 K/UL (ref 135–450)
PLATELET SLIDE REVIEW: ADEQUATE
PMV BLD AUTO: 10.1 FL (ref 5–10.5)
POTASSIUM REFLEX MAGNESIUM: 4.3 MMOL/L (ref 3.5–5.1)
RBC # BLD: 4.48 M/UL (ref 4–5.2)
SARS-COV-2: DETECTED
SLIDE REVIEW: NORMAL
SODIUM BLD-SCNC: 136 MMOL/L (ref 136–145)
WBC # BLD: 4 K/UL (ref 4–11)

## 2021-09-14 PROCEDURE — 2700000000 HC OXYGEN THERAPY PER DAY

## 2021-09-14 PROCEDURE — 97530 THERAPEUTIC ACTIVITIES: CPT

## 2021-09-14 PROCEDURE — 94761 N-INVAS EAR/PLS OXIMETRY MLT: CPT

## 2021-09-14 PROCEDURE — 6360000002 HC RX W HCPCS: Performed by: INTERNAL MEDICINE

## 2021-09-14 PROCEDURE — 6370000000 HC RX 637 (ALT 250 FOR IP): Performed by: EMERGENCY MEDICINE

## 2021-09-14 PROCEDURE — 94640 AIRWAY INHALATION TREATMENT: CPT

## 2021-09-14 PROCEDURE — 6370000000 HC RX 637 (ALT 250 FOR IP): Performed by: INTERNAL MEDICINE

## 2021-09-14 PROCEDURE — 2500000003 HC RX 250 WO HCPCS: Performed by: INTERNAL MEDICINE

## 2021-09-14 PROCEDURE — 80048 BASIC METABOLIC PNL TOTAL CA: CPT

## 2021-09-14 PROCEDURE — 97535 SELF CARE MNGMENT TRAINING: CPT

## 2021-09-14 PROCEDURE — 1200000000 HC SEMI PRIVATE

## 2021-09-14 PROCEDURE — 97162 PT EVAL MOD COMPLEX 30 MIN: CPT

## 2021-09-14 PROCEDURE — 36415 COLL VENOUS BLD VENIPUNCTURE: CPT

## 2021-09-14 PROCEDURE — 97166 OT EVAL MOD COMPLEX 45 MIN: CPT

## 2021-09-14 PROCEDURE — 71260 CT THORAX DX C+: CPT

## 2021-09-14 PROCEDURE — 85027 COMPLETE CBC AUTOMATED: CPT

## 2021-09-14 PROCEDURE — 2580000003 HC RX 258: Performed by: INTERNAL MEDICINE

## 2021-09-14 PROCEDURE — 6360000004 HC RX CONTRAST MEDICATION: Performed by: INTERNAL MEDICINE

## 2021-09-14 RX ORDER — DEXAMETHASONE 6 MG/1
6 TABLET ORAL DAILY
Status: DISCONTINUED | OUTPATIENT
Start: 2021-09-14 | End: 2021-09-15

## 2021-09-14 RX ORDER — 0.9 % SODIUM CHLORIDE 0.9 %
30 INTRAVENOUS SOLUTION INTRAVENOUS PRN
Status: DISCONTINUED | OUTPATIENT
Start: 2021-09-14 | End: 2021-10-02 | Stop reason: HOSPADM

## 2021-09-14 RX ADMIN — AZITHROMYCIN MONOHYDRATE 250 MG: 500 INJECTION, POWDER, LYOPHILIZED, FOR SOLUTION INTRAVENOUS at 10:50

## 2021-09-14 RX ADMIN — SODIUM CHLORIDE 30 ML: 9 INJECTION, SOLUTION INTRAVENOUS at 16:44

## 2021-09-14 RX ADMIN — ACETAMINOPHEN 650 MG: 325 TABLET ORAL at 01:42

## 2021-09-14 RX ADMIN — IPRATROPIUM BROMIDE 2 SPRAY: 42 SPRAY NASAL at 16:41

## 2021-09-14 RX ADMIN — ASPIRIN 81 MG: 81 TABLET, COATED ORAL at 08:19

## 2021-09-14 RX ADMIN — LOSARTAN POTASSIUM 50 MG: 25 TABLET, FILM COATED ORAL at 08:19

## 2021-09-14 RX ADMIN — IPRATROPIUM BROMIDE 2 SPRAY: 42 SPRAY NASAL at 12:35

## 2021-09-14 RX ADMIN — Medication 2 PUFF: at 20:36

## 2021-09-14 RX ADMIN — REMDESIVIR 200 MG: 100 INJECTION, POWDER, LYOPHILIZED, FOR SOLUTION INTRAVENOUS at 16:45

## 2021-09-14 RX ADMIN — Medication 2 PUFF: at 11:28

## 2021-09-14 RX ADMIN — SODIUM CHLORIDE, PRESERVATIVE FREE 10 ML: 5 INJECTION INTRAVENOUS at 08:19

## 2021-09-14 RX ADMIN — BARICITINIB 1 MG: 1 TABLET, FILM COATED ORAL at 16:41

## 2021-09-14 RX ADMIN — IOPAMIDOL 75 ML: 755 INJECTION, SOLUTION INTRAVENOUS at 09:20

## 2021-09-14 RX ADMIN — SODIUM CHLORIDE 25 ML: 9 INJECTION, SOLUTION INTRAVENOUS at 08:28

## 2021-09-14 RX ADMIN — HEPARIN SODIUM 5000 UNITS: 5000 INJECTION INTRAVENOUS; SUBCUTANEOUS at 06:32

## 2021-09-14 RX ADMIN — SODIUM CHLORIDE 25 ML: 9 INJECTION, SOLUTION INTRAVENOUS at 10:49

## 2021-09-14 RX ADMIN — DEXAMETHASONE 6 MG: 6 TABLET ORAL at 09:45

## 2021-09-14 RX ADMIN — IPRATROPIUM BROMIDE 2 SPRAY: 42 SPRAY NASAL at 21:55

## 2021-09-14 RX ADMIN — IPRATROPIUM BROMIDE 2 SPRAY: 42 SPRAY NASAL at 09:45

## 2021-09-14 RX ADMIN — SODIUM CHLORIDE, PRESERVATIVE FREE 10 ML: 5 INJECTION INTRAVENOUS at 21:55

## 2021-09-14 RX ADMIN — ENOXAPARIN SODIUM 30 MG: 30 INJECTION SUBCUTANEOUS at 21:55

## 2021-09-14 RX ADMIN — ENOXAPARIN SODIUM 30 MG: 30 INJECTION SUBCUTANEOUS at 12:35

## 2021-09-14 RX ADMIN — CEFTRIAXONE 1000 MG: 1 INJECTION, POWDER, FOR SOLUTION INTRAMUSCULAR; INTRAVENOUS at 08:29

## 2021-09-14 ASSESSMENT — PAIN SCALES - GENERAL
PAINLEVEL_OUTOF10: 0

## 2021-09-14 NOTE — PROGRESS NOTES
Spoke to daughter Laurita Ling. Reviewed medication with daughter. Confirmed. Did a take home Covid test and was negative last week. Has been congested for two weeks. Patient normally up and independent. No assistance. Patient has become extremely weak in the past week from not being able to get up and move around. Patient normally takes care of her daughter at home.

## 2021-09-14 NOTE — PROGRESS NOTES
Admitted patient to room 4261 from the Emergency Room with shortness of breathe, weakness and fatigue. VS recorded (see flowsheet). Patient's breathing regular and labored, denies pain. Oriented to room, call light, TV, phone, patient rights and responsibilities. Bed in lowest position and locked, exit alarm in place. Non-slip socks on. ID bracelet on and correct per pt verbally reporting name and date of birth. Call light within reach. Needed items within reach.

## 2021-09-14 NOTE — PLAN OF CARE
Problem: Falls - Risk of:  Goal: Will remain free from falls  Description: Will remain free from falls  Outcome: Ongoing  Goal: Absence of physical injury  Description: Absence of physical injury  Outcome: Ongoing     Problem: Skin Integrity:  Goal: Will show no infection signs and symptoms  Description: Will show no infection signs and symptoms  Outcome: Ongoing  Goal: Absence of new skin breakdown  Description: Absence of new skin breakdown  Outcome: Ongoing     Problem: Safety:  Goal: Free from accidental physical injury  Description: Free from accidental physical injury  Outcome: Ongoing  Goal: Free from intentional harm  Description: Free from intentional harm  Outcome: Ongoing     Problem: Daily Care:  Goal: Daily care needs are met  Description: Daily care needs are met  Outcome: Ongoing

## 2021-09-14 NOTE — CARE COORDINATION
Advance Care Planning     Advance Care Planning Activator (Inpatient)  Conversation Note      Date of ACP Conversation: 9/14/2021     Conversation Conducted with: Patient with Gen [address]: Named in Advance Directive or Healthcare Power of  (name) [place]    ACP Activator: Yesenia Pena 45 Decision Maker:     Current Designated Health Care Decision Maker:     Primary Decision Maker: [facility] - [phone]    Secondary Decision Maker: Sophia Garo - [phone]    Today we documented Decision Maker(s). The patient will provide ACP documents. Care Preferences    Ventilation: \"If you were in your present state of health and suddenly became very ill and were unable to breathe on your own, what would your preference be about the use of a ventilator (breathing machine) if it were available to you? \"      Would the patient desire the use of ventilator (breathing machine)?: yes    \"If your health worsens and it becomes clear that your chance of recovery is unlikely, what would your preference be about the use of a ventilator (breathing machine) if it were available to you? \"     Would the patient desire the use of ventilator (breathing machine)?: No      Resuscitation  \"CPR works best to restart the heart when there is a sudden event, like a heart attack, in someone who is otherwise healthy. Unfortunately, CPR does not typically restart the heart for people who have serious health conditions or who are very sick. \"    \"In the event your heart stopped as a result of an underlying serious health condition, would you want attempts to be made to restart your heart (answer \"yes\" for attempt to resuscitate) or would you prefer a natural death (answer \"no\" for do not attempt to resuscitate)? \" yes       [] Yes   [] No   Educated Patient / Lawrnce Mater regarding differences between Advance Directives and portable DNR orders.     Length of ACP Conversation in minutes:  5 min    Conversation Outcomes:  [x] ACP discussion completed  [] Existing advance directive reviewed with patient; no changes to patient's previously recorded wishes  [] New Advance Directive completed  [] Portable Do Not Rescitate prepared for Provider review and signature  [] POLST/POST/MOLST/MOST prepared for Provider review and signature      Follow-up plan:    [] Schedule follow-up conversation to continue planning  [] Referred individual to Provider for additional questions/concerns   [] Advised patient/agent/surrogate to review completed ACP document and update if needed with changes in condition, patient preferences or care setting    [] This note routed to one or more involved healthcare providers        Dionne Dewitt RN, BSN,   [phone]  Electronically signed by Dionne Dewitt RN on 9/14/2021 at 9:54 AM

## 2021-09-14 NOTE — PROGRESS NOTES
Spoke to Rakan, Daughter of patient. Other family members will try and call as Adis Luna (granddaughter) to get information about patient. However, family does not want to have cousins or other family try and start trouble. They have been causing a lot of issue for patient at home. Might try to call and act as granddaughter. Informed by Rakan that granddaughters name is [facility], but goes by Adis Luna. However, for this situation, granddaughter will only use her biological name, not her nickname. If an individual calls for information and uses the name Adis Luna, it is not her granddaughter calling. If any questions, can call Rakan, other granddaughter.  Phone number under emergency contact list.

## 2021-09-14 NOTE — RT PROTOCOL NOTE
RT Inhaler-Nebulizer Bronchodilator Protocol Note    There is a bronchodilator order in the chart from a provider indicating to follow the RT Bronchodilator Protocol and there is an Initiate RT Bronchodilator Protocol order as well (see protocol at bottom of note). The findings from the last RT Protocol Assessment were as follows:  Smoking: Non smoker  Surgical Status: No surgery  Xray: Multiple lobe infiltrates/atelectasis/pleural effusion/edema  Respiratory Pattern: Dyspnea with exertion  Mental Status: Confused but follows commands  Breath Sounds: Diminished and/or crackles  Cough: Weak, productive  Activity Level: Mostly sedentary, minimal walking  Oxygen Requirement: 29% or 3LNC - 5LNC/40%  Indication for Bronchodilator Therapy: Decreased or absent breath sounds  Bronchodilator Assessment Score: 8    Aerosolized bronchodilator medication orders have been revised according to the RT Bronchodilator Protocol. RT Inhaler-Nebulizer Bronchodilator Protocol:    Respiratory Therapist to perform RT Therapy Protocol Assessment then follow the protocol. No Indications - adjust the frequency to every 6 hours PRN wheezing or bronchospasm, if no treatments needed after 48 hours then discontinue using Per Protocol order mode. If indication present, adjust the RT bronchodilator orders based on the Bronchodilator Assessment Score as follows:    0-6 - enter or revise RT bronchodilator order to Albuterol Inhaler order with frequency of every 2 hours PRN for wheezing or increased work of breathing using Per Protocol order mode. If Albuterol Inhaler not tolerated or not effective, then discontinue the Albuterol Inhaler order and enter Albuterol Nebulizer order with same frequency and PRN reasons. Repeat RT Therapy Protocol Assessment as needed.     7-10 - discontinue any other Inpatient aerosolized bronchodilator medication orders and enter or revise two Albuterol Inhaler orders, one with BID frequency and one with frequency of every 2 hours PRN wheezing or increased work of breathing using Per Protocol order mode. Repeat RT Therapy Protocol Assessment with second treatment then BID and as needed. If Albuterol Inhaler not tolerated or not effective, then discontinue the Albuterol Inhaler orders and enter two Albuterol Nebulizer orders with same frequencies and PRN reasons. 11-13 - discontinue any other Inpatient aerosolized bronchodilator medication orders and enter DuoNeb Nebulizer orders QID frequency and an Albuterol Nebulizer order every 2 hours PRN wheezing or increased work of breathing using Per Protocol order mode. Repeat RT Therapy Protocol Assessment with second treatment then QID and as needed. Greater than 13 - discontinue any other Inpatient bronchodilator aerosolized medication orders and enter DuoNeb Nebulizer order every 4 hours frequency and Albuterol Nebulizer every 2 hours PRN wheezing or increased work of breathing using Per Protocol order mode. Repeat RT Therapy Protocol Assessment with second treatment then every 4 hours and as needed. RT to enter RT Home Evaluation for COPD & MDI Assessment order using Per Protocol order mode.     Electronically signed by Sylvia Nunes RCP on 9/13/2021 at 9:45 PM

## 2021-09-14 NOTE — PROGRESS NOTES
Physical Therapy    Facility/Department: [address] MED SURG  Initial Assessment    NAME: Danielle Bucio  : 1931  MRN: [id number]    Date of Service: 2021    Discharge Recommendations:  Patient would benefit from continued therapy after discharge, 3-5 sessions per week     Danielle Courser scored a 16/24 on the AM-PAC short mobility form. Current research shows that an AM-PAC score of 17 or less is typically not associated with a discharge to the patient's home setting. Based on the patient's AM-PAC score and their current functional mobility deficits, it is recommended that the patient have 3-5 sessions per week of Physical Therapy at d/c to increase the patient's independence. Please see assessment section for further patient specific details. If patient discharges prior to next session this note will serve as a discharge summary. Please see below for the latest assessment towards goals. Assessment   Body structures, Functions, Activity limitations: Decreased functional mobility ; Decreased ADL status; Decreased balance;Decreased strength;Decreased endurance  Assessment: Pt is a 80 y.o. female who presented to the ED on 21 with SOB. Pt found to be COVID+. Prior to admission, pt living with dtr and granddtr in two level home with 4 CHRISTOS - pt independent with all ADLs and ambulation without device - helps take care of dtr with help of granddtr. Pt currently functioning below baseline - significant limitations to respiratory system - required titration from 4L to 8L. Recommend continued skilled therapy 3-5x/week to maximize independence and safety with functional mobility.   Treatment Diagnosis: impaired activity tolerance  Prognosis: Fair  Decision Making: Medium Complexity  History: see below  Exam: see below  Clinical Presentation: evolving  PT Education: Goals;PT Role;Plan of Care;General Safety;Transfer Training;Gait Training;Functional Mobility Training  REQUIRES PT FOLLOW UP: Yes  Activity Tolerance  Activity Tolerance: Patient limited by fatigue;Patient limited by endurance;Treatment limited secondary to medical complications (free text)  Activity Tolerance: limited by respiratory status       Patient Diagnosis(es): The primary encounter diagnosis was Hypoxia. A diagnosis of Pneumonia due to infectious organism, unspecified laterality, unspecified part of lung was also pertinent to this visit. has a past medical history of Aortic stenosis and Arthritis. has a past surgical history that includes Varicose vein surgery (Bilateral, 1980's); Cardiac catheterization (04/17/2018); Hysterectomy, total abdominal; and Aortic valve replacement (N/A, 5/21/2019). Restrictions  Restrictions/Precautions  Restrictions/Precautions: Contact Precautions, Isolation, Fall Risk  Position Activity Restriction  Other position/activity restrictions: COVID+; droplet precautions; 4L to 8L     Vision/Hearing  Hearing: Exceptions to [facility]  Hearing Exceptions: Hard of hearing/hearing concerns       Subjective  General  Chart Reviewed: Yes  Patient assessed for rehabilitation services?: Yes  Additional Pertinent Hx: Pt is a 80 y.o. female who presented to the ED on 9/13/21 with SOB. Pt found to be COVID+. Response To Previous Treatment: Not applicable  Family / Caregiver Present: No  Referring Practitioner: Brian Blakely MD  Referral Date : 09/13/21  Diagnosis: COVID+  Follows Commands: Within Functional Limits  Subjective  Subjective: Pt is agreeable to PT - very [place].   Pain Screening  Patient Currently in Pain: Denies          Orientation  Orientation  Overall Orientation Status: Within Functional Limits     Social/Functional History  Social/Functional History  Lives With: Family (dtr and granddtr (dtr requires assist with all ADLs))  Type of Home: House  Home Layout: Two level  Home Access: Stairs to enter with rails  Entrance Stairs - Number of Steps: 4STE  ADL Assistance: Independent  Homemaking Assistance: Independent  Ambulation Assistance: Independent  Transfer Assistance: Independent  Additional Comments: Pt and granddtr take care of the dtr. Cognition   Cognition  Overall Cognitive Status: Exceptions  Following Commands: Follows one step commands with increased time  Attention Span: Appears intact  Memory: Decreased short term memory    Objective  Strength RLE  Strength RLE: Exception  Comment: mild generalized weakness  Strength LLE  Strength LLE: Exception  Comment: mild generalized weakness  Motor Control  Gross Motor?: WFL     Bed mobility  Supine to Sit: Moderate assistance  Sit to Supine: Unable to assess (in recliner at end of session)  Scooting: Stand by assistance  Comment: O2 sats 88% on 4L while lying supine in bed - titrated to 6L with increase in O2 sats to 95% while sitting EOB. Transfers  Sit to Stand: Minimal Assistance  Stand to sit: Minimal Assistance  Ambulation  Ambulation?: Yes  Ambulation 1  Surface: level tile  Device: Rolling Walker  Other Apparatus: O2 (6L)  Assistance: Minimal assistance  Gait Deviations: Slow Mirian;Decreased step length;Decreased step height  Distance: 10' bed to toilet then 10' toilet to stretcher  Comments: O2 sats drop to 84% with ambulation, unable to recover into 90s while lying in stretcher, required titration from 6L to 8L (no 7L shane on portable O2 tank). Stairs/Curb  Stairs?: No     Balance  Posture: Fair  Sitting - Static: Good  Sitting - Dynamic: Good  Standing - Static: Fair (@RW)  Standing - Dynamic: Fair;- (@RW)        Plan   Plan  Times per week: 3-5x/week  Current Treatment Recommendations: Strengthening, Functional Mobility Training, Transfer Training, Balance Training, Endurance Training, Patient/Caregiver Education & Training, Safety Education & Training, Gait Training, Neuromuscular Re-education, Equipment Evaluation, Education, & procurement  Safety Devices  Type of devices:  All fall risk precautions in place, Call light within reach, Gait belt, Patient at risk for falls, Nurse notified (on stretcher to CT)    AM-PAC Score  AM-PAC Inpatient Mobility Raw Score : 16 (09/14/21 0930)  AM-PAC Inpatient T-Scale Score : 40.78 (09/14/21 0930)  Mobility Inpatient CMS 0-100% Score: 54.16 (09/14/21 0930)  Mobility Inpatient CMS G-Code Modifier : CK (09/14/21 0930)          Goals  Short term goals  Time Frame for Short term goals: by acute discharge  Short term goal 1: bed mobility with SBA  Short term goal 2: sit<>stand with SBA  Short term goal 3: ambulate >30' with LRAD and SBA  Patient Goals   Patient goals : none stated       Therapy Time   Individual Concurrent Group Co-treatment   Time In 0835         Time Out 0930         Minutes 55         Timed Code Treatment Minutes: 40 Minutes       Senait Penaloza, PT

## 2021-09-14 NOTE — PROGRESS NOTES
Pharmacy has discontinued the MOM,  Potassium and the Magnesium Protocol per hospital policy    CrCl = 24    Physician needs to order each dose as needed

## 2021-09-14 NOTE — PROGRESS NOTES
Hospitalist Progress Note      PCP: Andres Villa MD    Date of Admission: 9/13/2021    Chief Complaint: Shortness of breath    Hospital Course:  Patient is a 24-year-old female with past medical history of CAD, aortic stenosis status post AVR who presented to hospital for shortness of breath. Patient is a poor historian, reportedly patient was brought to the hospital for shortness of breath. Patient was found hypoxic in upper 80s on room air, was transitioned to nasal cannula. Subjective: Patient seen and examined. Still requiring supplemental oxygen. Patient's daughter is currently in the hospital as well. Continue steroids. Medications:  Reviewed    Infusion Medications    sodium chloride 25 mL (09/14/21 0828)     Scheduled Medications    sodium chloride flush  10 mL IntraVENous 2 times per day    heparin (porcine)  5,000 Units SubCUTAneous 3 times per day    aspirin  81 mg Oral Daily    ipratropium  2 spray Each Nostril 4x Daily    cefTRIAXone (ROCEPHIN) IV  1,000 mg IntraVENous Q24H    azithromycin  250 mg IntraVENous Q24H    losartan  50 mg Oral Daily    ipratropium  2 puff Inhalation BID    And    albuterol sulfate HFA  2 puff Inhalation BID     PRN Meds: sodium chloride flush, sodium chloride, ondansetron **OR** ondansetron, acetaminophen **OR** acetaminophen, albuterol sulfate HFA      Intake/Output Summary (Last 24 hours) at 9/14/2021 0834  Last data filed at 9/13/2021 2208  Gross per 24 hour   Intake 10 ml   Output --   Net 10 ml       Physical Exam Performed:    BP (!) 144/65   Pulse 106   Temp 98.3 °F (36.8 °C) (Axillary)   Resp 20   Ht 5' (1.524 m)   Wt 97 lb 10.6 oz (44.3 kg)   SpO2 91%   BMI 19.07 kg/m²     General appearance: No apparent distress, appears stated age and cooperative, frail  HEENT: Pupils equal, round, and reactive to light. Conjunctivae/corneas clear. Neck: Supple, with full range of motion. No jugular venous distention.  Trachea midline. Respiratory:  Normal respiratory effort. Diminished breath sounds bilateral  Cardiovascular: Regular rate and rhythm with normal S1/S2   Abdomen: Soft, non-tender, non-distended with normal bowel sounds. Musculoskeletal: No clubbing, cyanosis or edema bilaterally. Full range of motion without deformity. Skin: Skin color, texture, turgor normal.  No rashes or lesions. Neurologic:  Neurovascularly intact without any focal sensory/motor deficits. Cranial nerves: II-XII intact, grossly non-focal.  Psychiatric: Alert and oriented, thought content appropriate, normal insight  Capillary Refill: Brisk,< 3 seconds   Peripheral Pulses: +2 palpable, equal bilaterally       Labs:   Recent Labs     09/13/21  1303   WBC 4.1   HGB 13.3   HCT 39.9        Recent Labs     09/13/21  1303   *   K 4.7   CL 96*   CO2 19*   BUN 33*   CREATININE 1.1   CALCIUM 9.2     Recent Labs     09/13/21  1303   AST 37   ALT 14   BILITOT 0.7   ALKPHOS 101     No results for input(s): INR in the last 72 hours. Recent Labs     09/13/21  1303   TROPONINI <0.01       Urinalysis:      Lab Results   Component Value Date    NITRU Negative 05/16/2019    WBCUA 2 05/16/2019    RBCUA 0-2 05/16/2019    BLOODU Negative 05/16/2019    SPECGRAV 1.016 05/16/2019    GLUCOSEU Negative 05/16/2019       Radiology:  XR CHEST PORTABLE   Final Result   Findings most consistent with multifocal pneumonia. CTA CHEST W CONTRAST    (Results Pending)           Assessment/Plan:    COVID-19 pneumonia  Decadron 6 mg daily for 10 days  Start patient on Remdesivir and baricitinib September 14  Lovenox twice daily  Incentive spirometry  Encourage proning    Acute respiratory failure with hypoxia  Secondary to COVID-19 pneumonia  Titrate oxygen to keep saturations above 90%    Aortic stenosis status post AVR  Continue to monitor  Monitor fluid status    CAD  Continue aspirin, ACE inhibitor    DVT Prophylaxis: Lovenox  Diet: ADULT DIET;  Regular  Code

## 2021-09-14 NOTE — PROGRESS NOTES
Occupational Therapy   Occupational Therapy Initial Assessment  Date: 2021   Patient Name: Dixie Gardiner  MRN: [id number]     : 1931    Date of Service: 2021    Discharge Recommendations:  Continue to assess pending progress, Patient would benefit from continued therapy after discharge       Assessment   Performance deficits / Impairments: Decreased functional mobility ; Decreased ADL status; Decreased strength;Decreased cognition;Decreased endurance;Decreased balance;Decreased high-level IADLs  Assessment: Pt is a 80 y.o. female admitted with Acute hypoxic respiratory failure, Multifocal pneumonia, COVID + . At baseline pt lives with daughter (h/o CVA and pt is daughter's caregiver) and granddaughter, independent ADLs, IADLs, and fxl mobility. Pt currently functioning below baseline d/t the above deficits, today requiring min/mod A bed mobility, Min A fxl transfers/mobility, max A toileting and LB dressing, and anticipate pt would require overall mod A for ADLs. SpO2 on 6 L O2 84-87% with activity. Pt demonstrates need for ongoing OT services to maximize pt's safety and independence. Will cont to assess pending progress for d/c recommendations. Prognosis: Good  Decision Making: Medium Complexity  OT Education: OT Role;Plan of Care;ADL Adaptive Strategies;Transfer Training  Barriers to Learning: hearing  REQUIRES OT FOLLOW UP: Yes  Activity Tolerance  Activity Tolerance: Patient limited by fatigue  Activity Tolerance: SpO2 on 4 L O2 88% at rest, increased to 6 L O2 and sats 95%, decreased to 84-87% with activity. Increased to 8 L O2 to maintain O2 sats >90% at end of session. Safety Devices  Safety Devices in place: Not Applicable (pt leaving room with transport at end of session)           Patient Diagnosis(es): The primary encounter diagnosis was Hypoxia. A diagnosis of Pneumonia due to infectious organism, unspecified laterality, unspecified part of lung was also pertinent to this visit. has a past medical history of Aortic stenosis and Arthritis. has a past surgical history that includes Varicose vein surgery (Bilateral, 1980's); Cardiac catheterization (04/17/2018); Hysterectomy, total abdominal; and Aortic valve replacement (N/A, 5/21/2019). Restrictions  Restrictions/Precautions  Restrictions/Precautions: Contact Precautions, Isolation, Fall Risk  Position Activity Restriction  Other position/activity restrictions: COVID+; droplet precautions; 4L to 8L    Subjective   General  Chart Reviewed: Yes  Patient assessed for rehabilitation services?: Yes  Additional Pertinent Hx: Per H&P: \"Patient is a 58-year-old female with past medical history of CAD, aortic stenosis status post AVR who presented to hospital for shortness of breath. Patient is a poor historian, reportedly patient was brought to the hospital for shortness of breath. Patient was found hypoxic in upper 80s on room air, was transitioned to nasal cannula. No reports of fevers chills chest pain nausea vomiting or diarrhea. \" Pt found to be COVID+  Family / Caregiver Present: No  Referring Practitioner: Brigette Toledo MD  Diagnosis: Acute hypoxic respiratory failure, Multifocal pneumonia, COVID +  Subjective  Subjective: Pt met b/s for OT eval/tx. Pt in bed on arrival, agreeable to participate in therapy. Pt denies pain, but c/o weakness. Pt very pleasant and cooperative. Social/Functional History  Social/Functional History  Lives With: Family (dtr and granddtr (dtr requires assist with all ADLs))  Type of Home: House  Home Layout: Two level  Home Access: Stairs to enter with rails  Entrance Stairs - Number of Steps: 4STE  ADL Assistance: Independent  Homemaking Assistance: Independent  Ambulation Assistance: Independent  Transfer Assistance: Independent  Additional Comments: Pt and granddtr take care of the dtr.        Objective      Orientation  Overall Orientation Status: Within Functional Limits  Orientation Level: Oriented to person;Oriented to situation;Oriented to time;Oriented to place     Balance  Sitting Balance: Contact guard assistance (seated EOB with SBA/CGA)  Standing Balance: Contact guard assistance  Functional Mobility  Functional - Mobility Device: Rolling Walker  Activity: To/from bathroom  Assist Level: Minimal assistance  Functional Mobility Comments: Pt completed fxl mobility to/from BR ~10 ft, ~15 ft with RW and CGA/min A, no device and Min A/HHA. SpO2 on 6 L O2 84-87%    ADL  LE Dressing:  (assist to don socks, anticipate mod A for complete LB dressing)  Toileting: Maximum assistance (to void urine, assist to manage depends down/up, pericare in standing with CGA)  Additional Comments: Anticipate pt is independent feeding, mod A bathing and dressing based on balance, endurance, ROM. Bed mobility  Supine to Sit: Moderate assistance  Sit to Supine: Unable to assess (in recliner at end of session)  Scooting: Stand by assistance  Comment: O2 sats 88% on 4L while lying supine in bed - titrated to 6L with increase in O2 sats to 95% while sitting EOB. Transfers  Sit to stand: Minimal assistance  Stand to sit: Minimal assistance   Toilet Transfers  Toilet - Technique: Ambulating  Equipment Used: Grab bars  Toilet Transfer: Minimal assistance    Cognition  Overall Cognitive Status: Exceptions  Following Commands:  Follows one step commands with increased time  Attention Span: Appears intact  Memory: Decreased short term memory        Plan   Plan  Times per week: 3-5  Current Treatment Recommendations: Functional Mobility Training, Balance Training, Strengthening, Endurance Training, Safety Education & Training, Self-Care / ADL, Equipment Evaluation, Education, & procurement      AM-PAC Score        [facility] Inpatient Daily Activity Raw Score: 16 (09/14/21 0931)  AM-PAC Inpatient ADL T-Scale Score : 35.96 (09/14/21 0931)  ADL Inpatient CMS 0-100% Score: 53.32 (09/14/21 0931)  ADL Inpatient CMS G-Code Modifier : CK (09/14/21 0931)    Goals  Short term goals  Time Frame for Short term goals: Prior to d/c:  Short term goal 1: Pt will bathe with SBA. Short term goal 2: Pt will dress with SBA. Short term goal 3: Pt will toilet with SBA. Short term goal 4: Pt will complete fxl mobility and fxl transfers to/from ADL surfaces with SBA. Short term goal 5: Pt will tolerate standing >5 minutes for functional task with SBA while maintaining O2 sats >90%  Short term goal 6: Pt will tolerate 10-15 minutes of therex to improve strength/endurance for ADLs. Long term goals  Time Frame for Long term goals : STGs=LTGs  Patient Goals   Patient goals : to return home. Therapy Time   Individual Concurrent Group Co-treatment   Time In 9216         Time Out 0930         Minutes 55         Timed Code Treatment Minutes: 40 Minutes      This note to serve as OT d/c summary if pt is d/c-ed prior to next therapy session.       Laz Shipley, OTR/L 1156

## 2021-09-14 NOTE — PLAN OF CARE
Problem: Falls - Risk of:  Goal: Will remain free from falls  Description: Will remain free from falls  9/14/2021 1106 by Domenico Ruffin RN  Outcome: Ongoing  9/14/2021 0006 by Katt Centeno RN  Outcome: Ongoing  Goal: Absence of physical injury  Description: Absence of physical injury  9/14/2021 1106 by Domenico Ruffin RN  Outcome: Ongoing  9/14/2021 0006 by Katt Centeno RN  Outcome: Ongoing     Problem: Skin Integrity:  Goal: Will show no infection signs and symptoms  Description: Will show no infection signs and symptoms  9/14/2021 1106 by Domenico Ruffin RN  Outcome: Ongoing  9/14/2021 0006 by Katt Centeno RN  Outcome: Ongoing  Goal: Absence of new skin breakdown  Description: Absence of new skin breakdown  9/14/2021 1106 by Domenico Ruffin RN  Outcome: Ongoing  9/14/2021 0006 by Katt Centeno RN  Outcome: Ongoing     Problem: Safety:  Goal: Free from accidental physical injury  Description: Free from accidental physical injury  9/14/2021 1106 by Domenico Ruffin RN  Outcome: Ongoing  9/14/2021 0006 by Katt Centeno RN  Outcome: Ongoing  Goal: Free from intentional harm  Description: Free from intentional harm  9/14/2021 1106 by Domenico Ruffin RN  Outcome: Ongoing  9/14/2021 0006 by Katt Centeno RN  Outcome: Ongoing     Problem: Daily Care:  Goal: Daily care needs are met  Description: Daily care needs are met  9/14/2021 1106 by Domenico Ruffin RN  Outcome: Ongoing  9/14/2021 0006 by Katt Centeno RN  Outcome: Ongoing     Problem: Airway Clearance - Ineffective  Goal: Achieve or maintain patent airway  Outcome: Ongoing     Problem: Gas Exchange - Impaired  Goal: Absence of hypoxia  Outcome: Ongoing  Goal: Promote optimal lung function  Outcome: Ongoing     Problem: Breathing Pattern - Ineffective  Goal: Ability to achieve and maintain a regular respiratory rate  Outcome: Ongoing     Problem:  Body Temperature -  Risk of, Imbalanced  Goal: Ability to maintain a body temperature within defined limits  Outcome: Ongoing  Goal: Will regain or maintain usual level of consciousness  Outcome: Ongoing  Goal: Complications related to the disease process, condition or treatment will be avoided or minimized  Outcome: Ongoing     Problem: Isolation Precautions - Risk of Spread of Infection  Goal: Prevent transmission of infection  Outcome: Ongoing     Problem: Nutrition Deficits  Goal: Optimize nutritional status  Outcome: Ongoing     Problem: Risk for Fluid Volume Deficit  Goal: Maintain normal heart rhythm  Outcome: Ongoing  Goal: Maintain absence of muscle cramping  Outcome: Ongoing  Goal: Maintain normal serum potassium, sodium, calcium, phosphorus, and pH  Outcome: Ongoing     Problem: Loneliness or Risk for Loneliness  Goal: Demonstrate positive use of time alone when socialization is not possible  Outcome: Ongoing     Problem: Fatigue  Goal: Verbalize increase energy and improved vitality  Outcome: Ongoing     Problem: Patient Education: Go to Patient Education Activity  Goal: Patient/Family Education  Outcome: Ongoing

## 2021-09-14 NOTE — CARE COORDINATION
INITIAL CASE MANAGEMENT ASSESSMENT    Unable to meet with patient due to isolation status. Call patient's granddaughter, Jez Galvan due to patient NO [facility] & easily dyspnic, spoke with patient over the phone to assess possible discharge needs. Explained Case Management role/services. Living Situation: verified address, lives with daughter, granddaughter & granddaughters family, 2 story house with 5 CHRISTOS    ADLs: independent    PT/OT recommendations: pending     Transportation: active , granddaughter will be able to take her home at [email or web address]     Medications: no barriers, uses [facility] on [address]    PCP: Jose Elias Clements MD    PLAN/COMMENTS: denies any needs, plan is to return home at [place] but may need SNF vs C    CM provided contact information for patient or family to call with any questions. CM will follow and assist as needed.     Amadeo Lei RN, BSN, Case Management  [phone]  Electronically signed by Amadeo Lei RN on 9/14/2021 at 9:21 AM

## 2021-09-14 NOTE — PROGRESS NOTES
4 Eyes Skin Assessment     NAME:  Jesus Mesa  YOB: 1931  MEDICAL RECORD NUMBER:  [id number]    The patient is being assess for  Admission    I agree that 2 RN's have performed a thorough Head to Toe Skin Assessment on the patient. ALL assessment sites listed below have been assessed. Areas assessed by both nurses:    Head, Face, Ears, Shoulders, Back, Chest, Arms, Elbows, Hands, Sacrum. Buttock, Coccyx, Ischium and Legs. Feet and Heels        Does the Patient have a Wound?  No noted wound(s)       Darryl Prevention initiated:  No   Wound Care Orders initiated:  No    Pressure Injury (Stage 3,4, Unstageable, DTI, NWPT, and Complex wounds) if present place consult order under [de-identified] No    New and Established Ostomies if present place consult order under : No      Nurse 1 eSignature: Electronically signed by Emilia Allen RN on 9/13/21 at 11:50 PM EDT    **SHARE this note so that the co-signing nurse is able to place an eSignature**    Nurse 2 eSignature: Electronically signed by Danica Fields RN on 9/13/21 at 11:56 PM EDT

## 2021-09-15 LAB
A/G RATIO: 0.8 (ref 1.1–2.2)
ALBUMIN SERPL-MCNC: 2.8 G/DL (ref 3.4–5)
ALP BLD-CCNC: 97 U/L (ref 40–129)
ALT SERPL-CCNC: 15 U/L (ref 10–40)
ANION GAP SERPL CALCULATED.3IONS-SCNC: 15 MMOL/L (ref 3–16)
AST SERPL-CCNC: 26 U/L (ref 15–37)
BASOPHILS ABSOLUTE: 0 K/UL (ref 0–0.2)
BASOPHILS RELATIVE PERCENT: 0.3 %
BILIRUB SERPL-MCNC: 0.4 MG/DL (ref 0–1)
BUN BLDV-MCNC: 21 MG/DL (ref 7–20)
CALCIUM SERPL-MCNC: 9.1 MG/DL (ref 8.3–10.6)
CHLORIDE BLD-SCNC: 101 MMOL/L (ref 99–110)
CO2: 20 MMOL/L (ref 21–32)
CREAT SERPL-MCNC: 0.7 MG/DL (ref 0.6–1.2)
EOSINOPHILS ABSOLUTE: 0 K/UL (ref 0–0.6)
EOSINOPHILS RELATIVE PERCENT: 0 %
GFR AFRICAN AMERICAN: >60
GFR NON-AFRICAN AMERICAN: >60
GLOBULIN: 3.6 G/DL
GLUCOSE BLD-MCNC: 104 MG/DL (ref 70–99)
HCT VFR BLD CALC: 39.5 % (ref 36–48)
HEMOGLOBIN: 13.3 G/DL (ref 12–16)
LYMPHOCYTES ABSOLUTE: 0.4 K/UL (ref 1–5.1)
LYMPHOCYTES RELATIVE PERCENT: 12.7 %
MCH RBC QN AUTO: 27.5 PG (ref 26–34)
MCHC RBC AUTO-ENTMCNC: 33.6 G/DL (ref 31–36)
MCV RBC AUTO: 82.1 FL (ref 80–100)
MONOCYTES ABSOLUTE: 0.5 K/UL (ref 0–1.3)
MONOCYTES RELATIVE PERCENT: 15.4 %
NEUTROPHILS ABSOLUTE: 2.1 K/UL (ref 1.7–7.7)
NEUTROPHILS RELATIVE PERCENT: 71.6 %
PDW BLD-RTO: 14.9 % (ref 12.4–15.4)
PLATELET # BLD: 254 K/UL (ref 135–450)
PMV BLD AUTO: 9.8 FL (ref 5–10.5)
POTASSIUM REFLEX MAGNESIUM: 4.6 MMOL/L (ref 3.5–5.1)
POTASSIUM SERPL-SCNC: 4.6 MMOL/L (ref 3.5–5.1)
PROCALCITONIN: 0.3 NG/ML (ref 0–0.15)
RBC # BLD: 4.82 M/UL (ref 4–5.2)
SODIUM BLD-SCNC: 136 MMOL/L (ref 136–145)
TOTAL PROTEIN: 6.4 G/DL (ref 6.4–8.2)
WBC # BLD: 3 K/UL (ref 4–11)

## 2021-09-15 PROCEDURE — 2500000003 HC RX 250 WO HCPCS: Performed by: INTERNAL MEDICINE

## 2021-09-15 PROCEDURE — 6370000000 HC RX 637 (ALT 250 FOR IP): Performed by: EMERGENCY MEDICINE

## 2021-09-15 PROCEDURE — 84145 PROCALCITONIN (PCT): CPT

## 2021-09-15 PROCEDURE — 2700000000 HC OXYGEN THERAPY PER DAY

## 2021-09-15 PROCEDURE — 80053 COMPREHEN METABOLIC PANEL: CPT

## 2021-09-15 PROCEDURE — 97110 THERAPEUTIC EXERCISES: CPT

## 2021-09-15 PROCEDURE — 97530 THERAPEUTIC ACTIVITIES: CPT

## 2021-09-15 PROCEDURE — 85025 COMPLETE CBC W/AUTO DIFF WBC: CPT

## 2021-09-15 PROCEDURE — 1200000000 HC SEMI PRIVATE

## 2021-09-15 PROCEDURE — 2580000003 HC RX 258: Performed by: INTERNAL MEDICINE

## 2021-09-15 PROCEDURE — 6360000002 HC RX W HCPCS: Performed by: INTERNAL MEDICINE

## 2021-09-15 PROCEDURE — 94640 AIRWAY INHALATION TREATMENT: CPT

## 2021-09-15 PROCEDURE — 36415 COLL VENOUS BLD VENIPUNCTURE: CPT

## 2021-09-15 PROCEDURE — 6370000000 HC RX 637 (ALT 250 FOR IP): Performed by: INTERNAL MEDICINE

## 2021-09-15 PROCEDURE — 94760 N-INVAS EAR/PLS OXIMETRY 1: CPT

## 2021-09-15 RX ADMIN — IPRATROPIUM BROMIDE 2 SPRAY: 42 SPRAY NASAL at 17:07

## 2021-09-15 RX ADMIN — Medication 2 PUFF: at 20:24

## 2021-09-15 RX ADMIN — IPRATROPIUM BROMIDE 2 SPRAY: 42 SPRAY NASAL at 08:49

## 2021-09-15 RX ADMIN — Medication 2 PUFF: at 08:51

## 2021-09-15 RX ADMIN — REMDESIVIR 100 MG: 100 INJECTION, POWDER, LYOPHILIZED, FOR SOLUTION INTRAVENOUS at 17:10

## 2021-09-15 RX ADMIN — ENOXAPARIN SODIUM 30 MG: 30 INJECTION SUBCUTANEOUS at 20:42

## 2021-09-15 RX ADMIN — SODIUM CHLORIDE, PRESERVATIVE FREE 10 ML: 5 INJECTION INTRAVENOUS at 20:42

## 2021-09-15 RX ADMIN — ENOXAPARIN SODIUM 30 MG: 30 INJECTION SUBCUTANEOUS at 08:49

## 2021-09-15 RX ADMIN — IPRATROPIUM BROMIDE 2 SPRAY: 42 SPRAY NASAL at 20:41

## 2021-09-15 RX ADMIN — AZITHROMYCIN MONOHYDRATE 250 MG: 500 INJECTION, POWDER, LYOPHILIZED, FOR SOLUTION INTRAVENOUS at 10:52

## 2021-09-15 RX ADMIN — SODIUM CHLORIDE 25 ML: 9 INJECTION, SOLUTION INTRAVENOUS at 08:52

## 2021-09-15 RX ADMIN — BARICITINIB 1 MG: 1 TABLET, FILM COATED ORAL at 08:48

## 2021-09-15 RX ADMIN — LOSARTAN POTASSIUM 50 MG: 25 TABLET, FILM COATED ORAL at 08:49

## 2021-09-15 RX ADMIN — IPRATROPIUM BROMIDE 2 SPRAY: 42 SPRAY NASAL at 14:26

## 2021-09-15 RX ADMIN — SODIUM CHLORIDE 25 ML: 9 INJECTION, SOLUTION INTRAVENOUS at 17:08

## 2021-09-15 RX ADMIN — SODIUM CHLORIDE 25 ML: 9 INJECTION, SOLUTION INTRAVENOUS at 10:51

## 2021-09-15 RX ADMIN — DEXAMETHASONE SODIUM PHOSPHATE 20 MG: 4 INJECTION, SOLUTION INTRA-ARTICULAR; INTRALESIONAL; INTRAMUSCULAR; INTRAVENOUS; SOFT TISSUE at 13:50

## 2021-09-15 RX ADMIN — SODIUM CHLORIDE 25 ML: 9 INJECTION, SOLUTION INTRAVENOUS at 13:49

## 2021-09-15 RX ADMIN — ASPIRIN 81 MG: 81 TABLET, COATED ORAL at 08:49

## 2021-09-15 RX ADMIN — CEFTRIAXONE 1000 MG: 1 INJECTION, POWDER, FOR SOLUTION INTRAMUSCULAR; INTRAVENOUS at 08:53

## 2021-09-15 ASSESSMENT — PAIN SCALES - GENERAL
PAINLEVEL_OUTOF10: 0
PAINLEVEL_OUTOF10: 0

## 2021-09-15 NOTE — PROGRESS NOTES
Hospitalist Progress Note      PCP: Andie Kelley MD    Date of Admission: 9/13/2021    Chief Complaint: Shortness of breath    Hospital Course:  Patient is a 66-year-old female with past medical history of CAD, aortic stenosis status post AVR who presented to hospital for shortness of breath. Patient is a poor historian, reportedly patient was brought to the hospital for shortness of breath. Patient was found hypoxic in upper 80s on room air, was transitioned to nasal cannula. Patient initially requiring 7 to 8 L oxygen but now up to 15 L. Palliative care consulted    Subjective: Patient seen and examined. Medications:  Reviewed    Infusion Medications    sodium chloride Stopped (09/14/21 1234)     Scheduled Medications    dexamethasone  6 mg Oral Daily    enoxaparin  30 mg SubCUTAneous BID    remdesivir IVPB  100 mg IntraVENous Q24H    baricitinib  1 mg Oral Daily    sodium chloride flush  10 mL IntraVENous 2 times per day    aspirin  81 mg Oral Daily    ipratropium  2 spray Each Nostril 4x Daily    cefTRIAXone (ROCEPHIN) IV  1,000 mg IntraVENous Q24H    azithromycin  250 mg IntraVENous Q24H    losartan  50 mg Oral Daily    ipratropium  2 puff Inhalation BID    And    albuterol sulfate HFA  2 puff Inhalation BID     PRN Meds: sodium chloride, sodium chloride flush, sodium chloride, ondansetron **OR** ondansetron, acetaminophen **OR** acetaminophen, albuterol sulfate HFA      Intake/Output Summary (Last 24 hours) at 9/15/2021 0810  Last data filed at 9/14/2021 1844  Gross per 24 hour   Intake 644.45 ml   Output    Net 644.45 ml       Physical Exam Performed:    BP (!) 147/86   Pulse 90   Temp 97.4 °F (36.3 °C) (Axillary)   Resp 16   Ht 5' (1.524 m)   Wt 102 lb 1.2 oz (46.3 kg)   SpO2 95%   BMI 19.93 kg/m²     General appearance: No apparent distress, appears stated age and cooperative, frail  HEENT: Pupils equal, round, and reactive to light. Conjunctivae/corneas clear.   Neck: Supple, with full range of motion. No jugular venous distention. Trachea midline. Respiratory:  Normal respiratory effort. Diminished breath sounds bilateral  Cardiovascular: Regular rate and rhythm with normal S1/S2   Abdomen: Soft, non-tender, non-distended with normal bowel sounds. Musculoskeletal: No clubbing, cyanosis or edema bilaterally. Full range of motion without deformity. Skin: Skin color, texture, turgor normal.  No rashes or lesions. Neurologic:  Neurovascularly intact without any focal sensory/motor deficits. Cranial nerves: II-XII intact, grossly non-focal.  Psychiatric: Alert and oriented, thought content appropriate, normal insight  Capillary Refill: Brisk,< 3 seconds   Peripheral Pulses: +2 palpable, equal bilaterally       Labs:   Recent Labs     09/13/21  1303 09/14/21  0759   WBC 4.1 4.0   HGB 13.3 12.4   HCT 39.9 37.3    185     Recent Labs     09/13/21  1303 09/14/21  0759   * 136   K 4.7 4.3   CL 96* 103   CO2 19* 18*   BUN 33* 20   CREATININE 1.1 0.9   CALCIUM 9.2 9.0     Recent Labs     09/13/21  1303   AST 37   ALT 14   BILITOT 0.7   ALKPHOS 101     No results for input(s): INR in the last 72 hours. Recent Labs     09/13/21  1303   TROPONINI <0.01       Urinalysis:      Lab Results   Component Value Date    NITRU Negative 05/16/2019    WBCUA 2 05/16/2019    RBCUA 0-2 05/16/2019    BLOODU Negative 05/16/2019    SPECGRAV 1.016 05/16/2019    GLUCOSEU Negative 05/16/2019       Radiology:  CT CHEST PULMONARY EMBOLISM W CONTRAST   Final Result   No evidence of significant pulmonary embolism. Extensive multifocal bilateral airspace disease compatible with multifocal   pneumonia including viral pneumonia. Dilation of the main and proximal pulmonary arteries could represent   pulmonary arterial hypertension. Large hiatal hernia. XR CHEST PORTABLE   Final Result   Findings most consistent with multifocal pneumonia.                  Assessment/Plan:    COVID-19

## 2021-09-15 NOTE — PLAN OF CARE
Problem: Falls - Risk of:  Goal: Will remain free from falls  Description: Will remain free from falls  9/15/2021 0802 by Victoria Goncalves RN  Outcome: Ongoing  9/14/2021 2315 by Ruchi Foley RN  Outcome: Ongoing  Goal: Absence of physical injury  Description: Absence of physical injury  9/15/2021 0802 by Victoria Goncalves RN  Outcome: Ongoing  9/14/2021 2315 by Ruchi Foley RN  Outcome: Ongoing     Problem: Skin Integrity:  Goal: Will show no infection signs and symptoms  Description: Will show no infection signs and symptoms  9/15/2021 0802 by Victoria Goncalves RN  Outcome: Ongoing  9/14/2021 2315 by Ruchi Foley RN  Outcome: Ongoing  Goal: Absence of new skin breakdown  Description: Absence of new skin breakdown  9/15/2021 0802 by Victoria Goncalves RN  Outcome: Ongoing  9/14/2021 2315 by Ruchi Foley RN  Outcome: Ongoing     Problem: Safety:  Goal: Free from accidental physical injury  Description: Free from accidental physical injury  9/15/2021 0802 by Victoria Goncalves RN  Outcome: Ongoing  9/14/2021 2315 by Ruchi Foley RN  Outcome: Ongoing  Goal: Free from intentional harm  Description: Free from intentional harm  9/15/2021 0802 by Victoria Goncalves RN  Outcome: Ongoing  9/14/2021 2315 by Ruchi Foley RN  Outcome: Ongoing     Problem: Daily Care:  Goal: Daily care needs are met  Description: Daily care needs are met  9/15/2021 0802 by Victoria Goncalves RN  Outcome: Ongoing  9/14/2021 2315 by Ruchi Foley RN  Outcome: Ongoing     Problem: Airway Clearance - Ineffective  Goal: Achieve or maintain patent airway  9/15/2021 0802 by Victoria Goncalves RN  Outcome: Ongoing  9/14/2021 2315 by Ruchi Foley RN  Outcome: Ongoing     Problem: Gas Exchange - Impaired  Goal: Absence of hypoxia  9/15/2021 0802 by Victoria Goncalves RN  Outcome: Ongoing  9/14/2021 2315 by Ruchi Foley RN  Outcome: Ongoing  Goal: Promote optimal lung function  9/15/2021 0802 by Victoria Goncalves RN  Outcome: Ongoing  9/14/2021 2315 by Janes Cordon RN  Outcome: Ongoing     Problem: Breathing Pattern - Ineffective  Goal: Ability to achieve and maintain a regular respiratory rate  9/15/2021 0802 by Marcello Sanders RN  Outcome: Ongoing  9/14/2021 2315 by Janes Cordon RN  Outcome: Ongoing     Problem:  Body Temperature -  Risk of, Imbalanced  Goal: Ability to maintain a body temperature within defined limits  9/15/2021 0802 by Marcello Sanders RN  Outcome: Ongoing  9/14/2021 2315 by Janes Cordon RN  Outcome: Ongoing  Goal: Will regain or maintain usual level of consciousness  9/15/2021 0802 by Marcello Sanders RN  Outcome: Ongoing  9/14/2021 2315 by Janes Cordon RN  Outcome: Ongoing  Goal: Complications related to the disease process, condition or treatment will be avoided or minimized  9/15/2021 0802 by Marcello Sanders RN  Outcome: Ongoing  9/14/2021 2315 by Janes Cordon RN  Outcome: Ongoing     Problem: Isolation Precautions - Risk of Spread of Infection  Goal: Prevent transmission of infection  9/15/2021 0802 by Marcello Sanders RN  Outcome: Ongoing  9/14/2021 2315 by Janes Cordon RN  Outcome: Ongoing     Problem: Nutrition Deficits  Goal: Optimize nutritional status  9/15/2021 0802 by Marcello Sanders RN  Outcome: Ongoing  9/14/2021 2315 by Janes Cordon RN  Outcome: Ongoing     Problem: Risk for Fluid Volume Deficit  Goal: Maintain normal heart rhythm  9/15/2021 0802 by Marcello Sanders RN  Outcome: Ongoing  9/14/2021 2315 by Janes Cordon RN  Outcome: Ongoing  Goal: Maintain absence of muscle cramping  9/15/2021 0802 by Marcello Sanders RN  Outcome: Ongoing  9/14/2021 2315 by Janes Cordon RN  Outcome: Ongoing  Goal: Maintain normal serum potassium, sodium, calcium, phosphorus, and pH  9/15/2021 0802 by Marcello Sanders RN  Outcome: Ongoing  9/14/2021 2315 by Janes Cordon RN  Outcome: Ongoing     Problem: Loneliness or Risk for Loneliness  Goal: Demonstrate positive use of time alone when socialization is not possible  9/15/2021 0802 by Ginna Ashraf RN  Outcome: Ongoing  9/14/2021 2315 by Jacquelyn Villanueva RN  Outcome: Ongoing     Problem: Fatigue  Goal: Verbalize increase energy and improved vitality  9/15/2021 0802 by Ginna Ashraf RN  Outcome: Ongoing  9/14/2021 2315 by Jacquelyn Villanueva RN  Outcome: Ongoing     Problem: Patient Education: Go to Patient Education Activity  Goal: Patient/Family Education  9/15/2021 0802 by Ginna Ashraf RN  Outcome: Ongoing  9/14/2021 2315 by Jacquelyn Villanueva RN  Outcome: Ongoing

## 2021-09-15 NOTE — PROGRESS NOTES
Pts O2 saturation dropped to the mid 80's and remained around 85%. Pt was placed on 15 L HFNC and pt's O2 saturation is now 90-94%.  Electronically signed by Reji Rosa RN on 9/15/2021 at 5:02 AM

## 2021-09-15 NOTE — PROGRESS NOTES
5/21/2019). Restrictions  Restrictions/Precautions  Restrictions/Precautions: Contact Precautions, Isolation, Fall Risk  Position Activity Restriction  Other position/activity restrictions: COVID+; droplet precautions; 15L     Subjective   General  Chart Reviewed: Yes  Additional Pertinent Hx: Pt is a 80 y.o. female who presented to the ED on 9/13/21 with SOB. Pt found to be COVID+. Response To Previous Treatment: Patient with no complaints from previous session. Family / Caregiver Present: No  Referring Practitioner: Sukhi Rojas MD  Subjective  Subjective: Pt is agreeable to PT - very [place]. Cannot recall if she had the vaccine. Orientation  Orientation  Overall Orientation Status: Within Functional Limits     Cognition   Cognition  Overall Cognitive Status: Exceptions  Following Commands: Follows one step commands with increased time  Attention Span: Appears intact  Memory: Decreased short term memory     Objective   Bed mobility  Rolling to Right: Stand by assistance  Supine to Sit: Unable to assess  Sit to Supine: Unable to assess  Scooting: Unable to assess  Comment: O2 sats at 88% on 15L O2 upon entering room with pt in supine. NRB not present, therefore limiting ability to mobilize. Pt was able to roll to her right side with SBA with O2 sats dropping to 85%, but ultimately recovering to 95%. Pt with productive cough. Nursing and respiratory notified. Transfers  Sit to Stand: Unable to assess  Stand to sit: Unable to assess  Comment: Pt on 15L O2, no NRB to fall back on at this time in the event she did unsafely desaturate.   Ambulation  Ambulation?: No (limited by respiratory status - see above)        AM-PAC Score  AM-PAC Inpatient Mobility Raw Score : 10 (09/15/21 0842)  AM-PAC Inpatient T-Scale Score : 32.29 (09/15/21 0842)  Mobility Inpatient CMS 0-100% Score: 76.75 (09/15/21 1566)  Mobility Inpatient CMS G-Code Modifier : CL (09/15/21 0000)          Goals  Short term goals  Time Frame for Short term goals: by acute discharge - all goals ongoing as of 9/15/21  Short term goal 1: bed mobility with SBA  Short term goal 2: sit<>stand with SBA  Short term goal 3: ambulate >30' with LRAD and SBA  Patient Goals   Patient goals : none stated    Plan    Plan  Times per week: 3-5x/week  Current Treatment Recommendations: Strengthening, Functional Mobility Training, Transfer Training, Balance Training, Endurance Training, Patient/Caregiver Education & Training, Safety Education & Training, Gait Training, Neuromuscular Re-education, Equipment Evaluation, Education, & procurement  Safety Devices  Type of devices:  All fall risk precautions in place, Call light within reach, Patient at risk for falls, Left in bed, Bed alarm in place, Nurse notified     Therapy Time   Individual Concurrent Group Co-treatment   Time In 0810         Time Out 0840         Minutes 30         Timed Code Treatment Minutes: [address], PT

## 2021-09-15 NOTE — CONSULTS
PALLIATIVE MEDICINE CONSULTATION     Patient name:Mayra Ornelas   ZHL:[id number]    :1931  Room/Bed:B1I-6903/4261-01   LOS: 2 days         Date of consult:9/15/2021    Consult Information  Palliative Medicine Consult performed by: CAROLYN Kennedy CNP      Inpatient consult to Palliative Care  Consult performed by: CAROLYN Kennedy CNP  Consult ordered by: Lakesha Moulton MD        Reason for consult: Code status  Number of admissions past 12 months: 0      ASSESSMENT/RECOMMENDATIONS     80 y.o. female with Hypoxia and debility      Symptom Management:  Hypoxia- pt on 15L today concern high at her advanced age for respiratory decompensation   Debility- pt needs help with ADLs due to desat with exertion  Goals of Care- Talked to pt regarding code status today she is hopeful that she will recover from [place] but understands that her advanced age is a risk factor and that she has high risk that she will have a poor prognosis. Pt does not want to be intubated or to have CPR her wishes align with [facility]. She states that she has outlived 2 of the 3 of her children and her spouse if its her time she is ready. She would like to have more time to help her daughter but understands that her granddaughters are capable of caring for daughter too. Patient/Family Goals of Care :    Educated patient and family on CODE STATUS: Although in some cases it does save lives, CPR (cardiopulmonary resuscitation) frequently is not successful or does not benefit those who receive it, especially elderly people or those with serious medical conditions. Even if revived, the person can be left with painful injuries, or in a debilitated state, or with brain damage resulting from oxygen deprivation. Resuscitation can involve such things as drugs, forcefully pressing on the chest, giving electric shocks to restart the heart or placing a tube down the nose or throat to provide artificial breathing.  People with terminal illnesses or other serious health conditions may prefer not to be resuscitated. [address] [facility] has established two standardized DNR orders. DNR Comfort Care-Arrest Order you will receive all the appropriate medical treatment, including resuscitation, until the patient has a cardiac arrest (heart has stopped beating) or pulmonary arrest (breathing has stopped), at which point comfort care will be provided. DNR Comfort Care Order [facility]), a patient chooses other measures such as drugs to correct abnormal heart rhythms. With this order, comfort care or other requested treatment is provided at a point before the heart or breathing stops. Comfort care involves keeping the patient comfortable with pain medication and providing palliative (supportive medical) care. A DNR-CC does not mean do not treat.      Talked to pt regarding code status today she is hopeful that she will recover from [place] but understands that her advanced age is a risk factor and that she has high risk that she will have a poor prognosis. Pt does not want to be intubated or to have CPR her wishes align with [facility]. She states that she has outlived 2 of the 3 of her children and her spouse if its her time she is ready. She would like to have more time to help her daughter but understands that her granddaughters are capable of caring for daughter too. Disposition/Discharge Plan:   pending    Advance Directives:  Surrogate Decision Maker: no HCPOA  Code status:  DNR-CCA    Case discussed with: patient, floor RN  Thank you for allowing us to participate in the care of this patient. HISTORY     CC: Hypoxia  HPI: The patient is a 80 y.o. female with past medical history of CAD, aortic stenosis status post AVR who presented to hospital for shortness of breath. Patient is a poor historian, reportedly patient was brought to the hospital for shortness of breath.     Palliative Medicine SymptomScreening/ROS:  Review of Systems -   History obtained from chart review and the patient  General ROS: positive for  - fatigue and weight loss  Respiratory ROS: positive for - cough, shortness of breath and tachypnea  Musculoskeletal ROS: positive for - muscular weakness     A complete 10 count ROS was obtained. Pertinent positives mentioned above in HPI/ROS. All others if not mentioned are negative.        Pain:    Home med list and hospital medications reviewed in chart as of 9/15/2021     EXAM     Vitals:    09/15/21 0852   BP:    Pulse:    Resp:    Temp:    SpO2: 91%       Physical Examination:   General appearance - alert, well appearing, and in no distress and in mild to moderate distress  Mental status - alert, oriented to person, place, and time, normal mood, behavior, speech, dress, motor activity, and thought processes  Neck - supple, no significant adenopathy  Chest - no tachypnea, retractions or cyanosis  Abdomen - soft, nontender, nondistended, no masses or organomegaly  Musculoskeletal - no joint tenderness, deformity or swelling  Skin - normal coloration and turgor, no rashes, no suspicious skin lesions noted        Current labs in the epic chart reviewed as of 9/15/2021   Review of previous notes, admits, labs, radiology and testing relevant to this consult done in this chart today 9/15/2021      Total time: 75 minutes  >50% of time spent counseling patient at bedside or POA/family member if applicable , reviewing information and discussing care, coordinating with care team  Signed By: Electronically signed by CAROLYN Wilson CNP on 9/15/2021 at 12:10 PM  Palliative Medicine   [phone]    September 15, 2021

## 2021-09-15 NOTE — PROGRESS NOTES
Occupational Therapy  Facility/Department: [address] MED SURG  Daily Treatment Note  NAME: Peggy Mello  : 1931  MRN: [id number]    Date of Service: 9/15/2021    Discharge Recommendations:  Continue to assess pending progress, Patient would benefit from continued therapy after discharge       Assessment   Performance deficits / Impairments: Decreased functional mobility ; Decreased ADL status; Decreased strength;Decreased cognition;Decreased endurance;Decreased balance;Decreased high-level IADLs  Assessment: Pt now with higher O2 requirements at 15 L and limited to in-bed activity. Pt completed incentive spirometer and B UE therex with verbal and visual cues for technique. O2 sats 88-95% with therex. Cont per OT POC. Will cont to assess pending progress for d/c recommendations. Prognosis: Fair  OT Education: OT Role;Plan of Care;Home Exercise Program  Patient Education: PLB, incentive spirometer  Barriers to Learning: hearing  REQUIRES OT FOLLOW UP: Yes  Activity Tolerance  Activity Tolerance: Patient limited by fatigue  Activity Tolerance: SpO2 on 15 L O2 88-95% with in bed activity  Safety Devices  Safety Devices in place: Yes  Type of devices: Call light within reach; Bed alarm in place; Left in bed;Nurse notified         Patient Diagnosis(es): The primary encounter diagnosis was Hypoxia. A diagnosis of Pneumonia due to infectious organism, unspecified laterality, unspecified part of lung was also pertinent to this visit. has a past medical history of Aortic stenosis and Arthritis. has a past surgical history that includes Varicose vein surgery (Bilateral, ); Cardiac catheterization (2018); Hysterectomy, total abdominal; and Aortic valve replacement (N/A, 2019).     Restrictions  Restrictions/Precautions  Restrictions/Precautions: Contact Precautions, Isolation, Fall Risk  Position Activity Restriction  Other position/activity restrictions: COVID+; droplet precautions; 15L  Subjective   General  Chart Reviewed: Yes  Patient assessed for rehabilitation services?: Yes  Additional Pertinent Hx: Per H&P: \"Patient is a 72-year-old female with past medical history of CAD, aortic stenosis status post AVR who presented to hospital for shortness of breath. Patient is a poor historian, reportedly patient was brought to the hospital for shortness of breath. Patient was found hypoxic in upper 80s on room air, was transitioned to nasal cannula. No reports of fevers chills chest pain nausea vomiting or diarrhea. \" Pt found to be COVID+  Family / Caregiver Present: No  Referring Practitioner: Mega Sims MD  Diagnosis: Acute hypoxic respiratory failure, Multifocal pneumonia, COVID +  Subjective  Subjective: Pt met b/s for OT tx. Pt in bed on arrival, agreeable to participate in therapy. Pt reports she is doing whatever she can to get better. General Comment  Comments: RN okayed for in bed activity      =Objective    ADL  Additional Comments: not addressed     Balance  Sitting Balance: Unable to assess(comment) (deferred d/t respiratory status)  Standing Balance: Unable to assess(comment)     Cognition  Overall Cognitive Status: Exceptions  Following Commands: Follows one step commands with increased time  Attention Span: Appears intact  Memory: Decreased short term memory  Insights: Decreased awareness of deficits     Type of ROM/Therapeutic Exercise  Type of ROM/Therapeutic Exercise: AROM  Exercises  Horizontal ABduction: x10  Horizontal ADduction: x10  Other: forward arm punches x10, chest press x10, leg lifts x10, incentive spirometer x5. MAX verbal/visual cues for technique, pt had difficulty d/t being very [place].                     Plan   Plan  Times per week: 3-5  Current Treatment Recommendations: Functional Mobility Training, Balance Training, Strengthening, Endurance Training, Safety Education & Training, Self-Care / ADL, Equipment Evaluation, Education, & procurement    AM-PAC Score        AM-[facility] Inpatient Daily Activity Raw Score: 16 (09/14/21 0931)  AM-PAC Inpatient ADL T-Scale Score : 35.96 (09/14/21 0931)  ADL Inpatient CMS 0-100% Score: 53.32 (09/14/21 0931)  ADL Inpatient CMS G-Code Modifier : CK (09/14/21 0931)    Goals  Short term goals  Time Frame for Short term goals: Prior to d/c: STATUS GOALS 9/15: ALL GOALS ONGOING  Short term goal 1: Pt will bathe with SBA. Short term goal 2: Pt will dress with SBA. Short term goal 3: Pt will toilet with SBA. Short term goal 4: Pt will complete fxl mobility and fxl transfers to/from ADL surfaces with SBA. Short term goal 5: Pt will tolerate standing >5 minutes for functional task with SBA while maintaining O2 sats >90%  Short term goal 6: Pt will tolerate 10-15 minutes of therex to improve strength/endurance for ADLs. Long term goals  Time Frame for Long term goals : STGs=LTGs  Patient Goals   Patient goals : to return home. Therapy Time   Individual Concurrent Group Co-treatment   Time In 4618         Time Out 1130         Minutes 25               This note to serve as OT d/c summary if pt is d/c-ed prior to next therapy session.     Wilbert Chavez, OTR/L [id number]

## 2021-09-15 NOTE — PLAN OF CARE
Problem: Falls - Risk of:  Goal: Will remain free from falls  Description: Will remain free from falls  9/14/2021 2315 by Anika Mir RN  Outcome: Ongoing     Problem: Falls - Risk of:  Goal: Absence of physical injury  Description: Absence of physical injury  9/14/2021 2315 by Anika Mir RN  Outcome: Ongoing     Problem: Skin Integrity:  Goal: Will show no infection signs and symptoms  Description: Will show no infection signs and symptoms  9/14/2021 2315 by Anika Mir RN  Outcome: Ongoing     Problem: Skin Integrity:  Goal: Absence of new skin breakdown  Description: Absence of new skin breakdown  9/14/2021 2315 by Anika Mir RN  Outcome: Ongoing     Problem: Safety:  Goal: Free from accidental physical injury  Description: Free from accidental physical injury  9/14/2021 2315 by Anika Mir RN  Outcome: Ongoing     Problem: Safety:  Goal: Free from intentional harm  Description: Free from intentional harm  9/14/2021 2315 by Anika Mir RN  Outcome: Ongoing     Problem: Daily Care:  Goal: Daily care needs are met  Description: Daily care needs are met  9/14/2021 2315 by Anika Mir RN  Outcome: Ongoing     Problem: Airway Clearance - Ineffective  Goal: Achieve or maintain patent airway  9/14/2021 2315 by Anika Mir RN  Outcome: Ongoing     Problem: Gas Exchange - Impaired  Goal: Absence of hypoxia  9/14/2021 2315 by Anika Mir RN  Outcome: Ongoing     Problem: Gas Exchange - Impaired  Goal: Promote optimal lung function  9/14/2021 2315 by Anika Mir RN  Outcome: Ongoing     Problem: Breathing Pattern - Ineffective  Goal: Ability to achieve and maintain a regular respiratory rate  9/14/2021 2315 by Anika Mir RN  Outcome: Ongoing     Problem: Body Temperature -  Risk of, Imbalanced  Goal: Ability to maintain a body temperature within defined limits  9/14/2021 2315 by Anika Mir RN  Outcome: Ongoing     Problem:  Body Temperature -  Risk of, Imbalanced  Goal: Will regain or maintain usual level of consciousness  9/14/2021 2315 by Lolita Suero RN  Outcome: Ongoing     Problem:  Body Temperature -  Risk of, Imbalanced  Goal: Complications related to the disease process, condition or treatment will be avoided or minimized  9/14/2021 2315 by Lolita Suero RN  Outcome: Ongoing     Problem: Isolation Precautions - Risk of Spread of Infection  Goal: Prevent transmission of infection  9/14/2021 2315 by Lolita Suero RN  Outcome: Ongoing     Problem: Nutrition Deficits  Goal: Optimize nutritional status  9/14/2021 2315 by Lolita Suero RN  Outcome: Ongoing     Problem: Risk for Fluid Volume Deficit  Goal: Maintain normal heart rhythm  9/14/2021 2315 by Lolita Suero RN  Outcome: Ongoing     Problem: Risk for Fluid Volume Deficit  Goal: Maintain absence of muscle cramping  9/14/2021 2315 by Lolita Suero RN  Outcome: Ongoing     Problem: Risk for Fluid Volume Deficit  Goal: Maintain normal serum potassium, sodium, calcium, phosphorus, and pH  9/14/2021 2315 by Lolita Suero RN  Outcome: Ongoing     Problem: Loneliness or Risk for Loneliness  Goal: Demonstrate positive use of time alone when socialization is not possible  9/14/2021 2315 by Lolita Suero RN  Outcome: Ongoing     Problem: Fatigue  Goal: Verbalize increase energy and improved vitality  9/14/2021 2315 by Lolita Suero RN  Outcome: Ongoing     Problem: Patient Education: Go to Patient Education Activity  Goal: Patient/Family Education  9/14/2021 2315 by Lolita Suero RN  Outcome: Ongoing

## 2021-09-16 LAB
A/G RATIO: 0.8 (ref 1.1–2.2)
ACANTHOCYTES: ABNORMAL
ALBUMIN SERPL-MCNC: 3.1 G/DL (ref 3.4–5)
ALP BLD-CCNC: 110 U/L (ref 40–129)
ALT SERPL-CCNC: 16 U/L (ref 10–40)
ANION GAP SERPL CALCULATED.3IONS-SCNC: 19 MMOL/L (ref 3–16)
ANISOCYTOSIS: ABNORMAL
AST SERPL-CCNC: 24 U/L (ref 15–37)
BASOPHILS ABSOLUTE: 0 K/UL (ref 0–0.2)
BASOPHILS RELATIVE PERCENT: 0.1 %
BILIRUB SERPL-MCNC: 0.5 MG/DL (ref 0–1)
BUN BLDV-MCNC: 27 MG/DL (ref 7–20)
BURR CELLS: ABNORMAL
CALCIUM SERPL-MCNC: 9.4 MG/DL (ref 8.3–10.6)
CHLORIDE BLD-SCNC: 96 MMOL/L (ref 99–110)
CO2: 17 MMOL/L (ref 21–32)
CREAT SERPL-MCNC: 0.7 MG/DL (ref 0.6–1.2)
CRENATED RBC'S: ABNORMAL
EOSINOPHILS ABSOLUTE: 0 K/UL (ref 0–0.6)
EOSINOPHILS RELATIVE PERCENT: 0 %
GFR AFRICAN AMERICAN: >60
GFR NON-AFRICAN AMERICAN: >60
GLOBULIN: 3.7 G/DL
GLUCOSE BLD-MCNC: 144 MG/DL (ref 70–99)
GLUCOSE BLD-MCNC: 234 MG/DL (ref 70–99)
HCT VFR BLD CALC: 40.9 % (ref 36–48)
HEMOGLOBIN: 13.9 G/DL (ref 12–16)
LYMPHOCYTES ABSOLUTE: 0.4 K/UL (ref 1–5.1)
LYMPHOCYTES RELATIVE PERCENT: 4.9 %
MCH RBC QN AUTO: 27.8 PG (ref 26–34)
MCHC RBC AUTO-ENTMCNC: 34.1 G/DL (ref 31–36)
MCV RBC AUTO: 81.7 FL (ref 80–100)
MONOCYTES ABSOLUTE: 0.8 K/UL (ref 0–1.3)
MONOCYTES RELATIVE PERCENT: 10.7 %
NEUTROPHILS ABSOLUTE: 6.4 K/UL (ref 1.7–7.7)
NEUTROPHILS RELATIVE PERCENT: 84.3 %
OVALOCYTES: ABNORMAL
PDW BLD-RTO: 15 % (ref 12.4–15.4)
PERFORMED ON: ABNORMAL
PLATELET # BLD: 415 K/UL (ref 135–450)
PLATELET SLIDE REVIEW: ADEQUATE
PMV BLD AUTO: 9.9 FL (ref 5–10.5)
POIKILOCYTES: ABNORMAL
POTASSIUM REFLEX MAGNESIUM: 3.8 MMOL/L (ref 3.5–5.1)
POTASSIUM SERPL-SCNC: 3.8 MMOL/L (ref 3.5–5.1)
RBC # BLD: 5.01 M/UL (ref 4–5.2)
SLIDE REVIEW: ABNORMAL
SODIUM BLD-SCNC: 132 MMOL/L (ref 136–145)
TOTAL PROTEIN: 6.8 G/DL (ref 6.4–8.2)
WBC # BLD: 7.6 K/UL (ref 4–11)

## 2021-09-16 PROCEDURE — 80053 COMPREHEN METABOLIC PANEL: CPT

## 2021-09-16 PROCEDURE — 6360000002 HC RX W HCPCS: Performed by: INTERNAL MEDICINE

## 2021-09-16 PROCEDURE — 97530 THERAPEUTIC ACTIVITIES: CPT

## 2021-09-16 PROCEDURE — 2500000003 HC RX 250 WO HCPCS: Performed by: INTERNAL MEDICINE

## 2021-09-16 PROCEDURE — 94761 N-INVAS EAR/PLS OXIMETRY MLT: CPT

## 2021-09-16 PROCEDURE — 2580000003 HC RX 258: Performed by: INTERNAL MEDICINE

## 2021-09-16 PROCEDURE — 1200000000 HC SEMI PRIVATE

## 2021-09-16 PROCEDURE — 36415 COLL VENOUS BLD VENIPUNCTURE: CPT

## 2021-09-16 PROCEDURE — 94640 AIRWAY INHALATION TREATMENT: CPT

## 2021-09-16 PROCEDURE — 2700000000 HC OXYGEN THERAPY PER DAY

## 2021-09-16 PROCEDURE — 6370000000 HC RX 637 (ALT 250 FOR IP): Performed by: INTERNAL MEDICINE

## 2021-09-16 PROCEDURE — 85025 COMPLETE CBC W/AUTO DIFF WBC: CPT

## 2021-09-16 PROCEDURE — 6370000000 HC RX 637 (ALT 250 FOR IP): Performed by: EMERGENCY MEDICINE

## 2021-09-16 PROCEDURE — 2500000003 HC RX 250 WO HCPCS

## 2021-09-16 PROCEDURE — 97535 SELF CARE MNGMENT TRAINING: CPT

## 2021-09-16 RX ORDER — METOPROLOL TARTRATE 5 MG/5ML
5 INJECTION INTRAVENOUS EVERY 30 MIN PRN
Status: DISCONTINUED | OUTPATIENT
Start: 2021-09-16 | End: 2021-09-25

## 2021-09-16 RX ORDER — METOPROLOL TARTRATE 5 MG/5ML
5 INJECTION INTRAVENOUS ONCE
Status: DISCONTINUED | OUTPATIENT
Start: 2021-09-16 | End: 2021-09-21

## 2021-09-16 RX ORDER — METOPROLOL TARTRATE 5 MG/5ML
INJECTION INTRAVENOUS
Status: COMPLETED
Start: 2021-09-16 | End: 2021-09-16

## 2021-09-16 RX ADMIN — IPRATROPIUM BROMIDE 2 SPRAY: 42 SPRAY NASAL at 09:49

## 2021-09-16 RX ADMIN — REMDESIVIR 100 MG: 100 INJECTION, POWDER, LYOPHILIZED, FOR SOLUTION INTRAVENOUS at 15:59

## 2021-09-16 RX ADMIN — ENOXAPARIN SODIUM 30 MG: 30 INJECTION SUBCUTANEOUS at 09:46

## 2021-09-16 RX ADMIN — Medication 2 PUFF: at 08:37

## 2021-09-16 RX ADMIN — CEFTRIAXONE 1000 MG: 1 INJECTION, POWDER, FOR SOLUTION INTRAMUSCULAR; INTRAVENOUS at 09:54

## 2021-09-16 RX ADMIN — SODIUM CHLORIDE 25 ML: 9 INJECTION, SOLUTION INTRAVENOUS at 10:54

## 2021-09-16 RX ADMIN — IPRATROPIUM BROMIDE 2 SPRAY: 42 SPRAY NASAL at 16:39

## 2021-09-16 RX ADMIN — METOPROLOL TARTRATE 5 MG: 1 INJECTION, SOLUTION INTRAVENOUS at 21:42

## 2021-09-16 RX ADMIN — METOPROLOL TARTRATE 5 MG: 1 INJECTION, SOLUTION INTRAVENOUS at 02:43

## 2021-09-16 RX ADMIN — DILTIAZEM HYDROCHLORIDE 30 MG: 30 TABLET, FILM COATED ORAL at 03:47

## 2021-09-16 RX ADMIN — ASPIRIN 81 MG: 81 TABLET, COATED ORAL at 09:46

## 2021-09-16 RX ADMIN — DILTIAZEM HYDROCHLORIDE 30 MG: 30 TABLET, FILM COATED ORAL at 17:44

## 2021-09-16 RX ADMIN — DILTIAZEM HYDROCHLORIDE 30 MG: 30 TABLET, FILM COATED ORAL at 12:07

## 2021-09-16 RX ADMIN — AZITHROMYCIN MONOHYDRATE 250 MG: 500 INJECTION, POWDER, LYOPHILIZED, FOR SOLUTION INTRAVENOUS at 10:55

## 2021-09-16 RX ADMIN — SODIUM CHLORIDE, PRESERVATIVE FREE 10 ML: 5 INJECTION INTRAVENOUS at 09:47

## 2021-09-16 RX ADMIN — IPRATROPIUM BROMIDE 2 SPRAY: 42 SPRAY NASAL at 12:57

## 2021-09-16 RX ADMIN — DEXAMETHASONE SODIUM PHOSPHATE 20 MG: 4 INJECTION, SOLUTION INTRA-ARTICULAR; INTRALESIONAL; INTRAMUSCULAR; INTRAVENOUS; SOFT TISSUE at 14:23

## 2021-09-16 RX ADMIN — Medication 2 PUFF: at 20:15

## 2021-09-16 RX ADMIN — LOSARTAN POTASSIUM 50 MG: 25 TABLET, FILM COATED ORAL at 09:46

## 2021-09-16 RX ADMIN — METOPROLOL TARTRATE 5 MG: 1 INJECTION, SOLUTION INTRAVENOUS at 06:21

## 2021-09-16 RX ADMIN — ENOXAPARIN SODIUM 30 MG: 30 INJECTION SUBCUTANEOUS at 20:01

## 2021-09-16 RX ADMIN — SODIUM CHLORIDE, PRESERVATIVE FREE 10 ML: 5 INJECTION INTRAVENOUS at 20:01

## 2021-09-16 RX ADMIN — BARICITINIB 1 MG: 1 TABLET, FILM COATED ORAL at 09:46

## 2021-09-16 ASSESSMENT — PAIN SCALES - GENERAL
PAINLEVEL_OUTOF10: 0

## 2021-09-16 NOTE — PROGRESS NOTES
Hospitalist Progress Note      PCP: Chitra Tillman MD    Date of Admission: 9/13/2021    Chief Complaint: Shortness of breath    Hospital Course:  Patient is a 27-year-old female with past medical history of CAD, aortic stenosis status post AVR who presented to hospital for shortness of breath. Patient is a poor historian, reportedly patient was brought to the hospital for shortness of breath. Patient was found hypoxic in upper 80s on room air, was transitioned to nasal cannula. Subjective: Patient seen and evaluated at the bedside. Continue oxygen support, no acute events overnight, continue steroids. Medications:  Reviewed    Infusion Medications    sodium chloride 25 mL (09/16/21 1054)     Scheduled Medications    metoprolol  5 mg IntraVENous Once    dilTIAZem  30 mg Oral 4 times per day    dexamethasone  20 mg IntraVENous Daily    enoxaparin  30 mg SubCUTAneous BID    remdesivir IVPB  100 mg IntraVENous Q24H    baricitinib  1 mg Oral Daily    sodium chloride flush  10 mL IntraVENous 2 times per day    aspirin  81 mg Oral Daily    ipratropium  2 spray Each Nostril 4x Daily    cefTRIAXone (ROCEPHIN) IV  1,000 mg IntraVENous Q24H    azithromycin  250 mg IntraVENous Q24H    losartan  50 mg Oral Daily    ipratropium  2 puff Inhalation BID    And    albuterol sulfate HFA  2 puff Inhalation BID     PRN Meds: metoprolol, sodium chloride, sodium chloride flush, sodium chloride, ondansetron **OR** ondansetron, acetaminophen **OR** acetaminophen, albuterol sulfate HFA    No intake or output data in the 24 hours ending 09/16/21 7403    Physical Exam Performed:    /82   Pulse 98   Temp 97.3 °F (36.3 °C) (Oral)   Resp 20   Ht 5' (1.524 m)   Wt 98 lb 15.8 oz (44.9 kg)   SpO2 95%   BMI 19.33 kg/m²     General appearance: Appears frail, NAD, able to hold conversations  HEENT: Pupils equal, round, and reactive to light. Conjunctivae/corneas clear.   Neck: Supple, with full range of motion. No jugular venous distention. Trachea midline. Respiratory:  Normal respiratory effort. Diminished breath sounds bilateral  Cardiovascular: Regular rate and rhythm with normal S1/S2   Abdomen: Soft, non-tender, non-distended with normal bowel sounds. Musculoskeletal: No clubbing, cyanosis or edema bilaterally. Full range of motion without deformity. Skin: Skin color, texture, turgor normal.  No rashes or lesions. Neurologic:  Neurovascularly intact without any focal sensory/motor deficits. Cranial nerves: II-XII intact, grossly non-focal.  Psychiatric: Alert and oriented, thought content appropriate, normal insight  Capillary Refill: Brisk,< 3 seconds   Peripheral Pulses: +2 palpable, equal bilaterally       Labs:   Recent Labs     09/14/21  0759 09/15/21  0719 09/16/21  0948   WBC 4.0 3.0* 7.6   HGB 12.4 13.3 13.9   HCT 37.3 39.5 40.9    254 415     Recent Labs     09/14/21  0759 09/14/21  0759 09/15/21  0719 09/16/21  0948     --  136 132*   K 4.3   < > 4.6  4.6 3.8  3.8     --  101 96*   CO2 18*  --  20* 17*   BUN 20  --  21* 27*   CREATININE 0.9  --  0.7 0.7   CALCIUM 9.0  --  9.1 9.4    < > = values in this interval not displayed. Recent Labs     09/15/21  0719 09/16/21  0948   AST 26 24   ALT 15 16   BILITOT 0.4 0.5   ALKPHOS 97 110     No results for input(s): INR in the last 72 hours. No results for input(s): [place]urlie Lat in the last 72 hours. Urinalysis:      Lab Results   Component Value Date    NITRU Negative 05/16/2019    WBCUA 2 05/16/2019    RBCUA 0-2 05/16/2019    BLOODU Negative 05/16/2019    SPECGRAV 1.016 05/16/2019    GLUCOSEU Negative 05/16/2019       Radiology:  CT CHEST PULMONARY EMBOLISM W CONTRAST   Final Result   No evidence of significant pulmonary embolism. Extensive multifocal bilateral airspace disease compatible with multifocal   pneumonia including viral pneumonia.       Dilation of the main and proximal pulmonary arteries could represent   pulmonary arterial hypertension. Large hiatal hernia. XR CHEST PORTABLE   Final Result   Findings most consistent with multifocal pneumonia. Assessment/Plan:    COVID-19 pneumonia  Decadron 6 mg daily for 10 days  on Remdesivir and baricitinib September 14  Lovenox twice daily  Incentive spirometry  Encourage proning    Acute respiratory failure with hypoxia  Secondary to COVID-19 pneumonia  Titrate oxygen to keep saturations above 90%    Aortic stenosis status post AVR  Continue to monitor  Monitor fluid status    CAD  Continue aspirin, ACE inhibitor    DVT Prophylaxis: Lovenox  Diet: ADULT DIET; Regular  Code Status: DNR-CCA    PT/OT Eval Status:  Will need    Dispo -inpatient, likely discharge 1 to 2 days    Joel Figueroa MD

## 2021-09-16 NOTE — PLAN OF CARE
Problem: Falls - Risk of:  Goal: Will remain free from falls  Description: Will remain free from falls  Outcome: Ongoing  Goal: Absence of physical injury  Description: Absence of physical injury  Outcome: Ongoing     Problem: Skin Integrity:  Goal: Will show no infection signs and symptoms  Description: Will show no infection signs and symptoms  Outcome: Ongoing  Goal: Absence of new skin breakdown  Description: Absence of new skin breakdown  Outcome: Ongoing     Problem: Safety:  Goal: Free from accidental physical injury  Description: Free from accidental physical injury  Outcome: Ongoing  Goal: Free from intentional harm  Description: Free from intentional harm  Outcome: Ongoing     Problem: Daily Care:  Goal: Daily care needs are met  Description: Daily care needs are met  Outcome: Ongoing     Problem: Airway Clearance - Ineffective  Goal: Achieve or maintain patent airway  Outcome: Ongoing     Problem: Gas Exchange - Impaired  Goal: Absence of hypoxia  Outcome: Ongoing  Goal: Promote optimal lung function  Outcome: Ongoing     Problem: Breathing Pattern - Ineffective  Goal: Ability to achieve and maintain a regular respiratory rate  Outcome: Ongoing     Problem:  Body Temperature -  Risk of, Imbalanced  Goal: Ability to maintain a body temperature within defined limits  Outcome: Ongoing  Goal: Will regain or maintain usual level of consciousness  Outcome: Ongoing  Goal: Complications related to the disease process, condition or treatment will be avoided or minimized  Outcome: Ongoing     Problem: Isolation Precautions - Risk of Spread of Infection  Goal: Prevent transmission of infection  Outcome: Ongoing     Problem: Nutrition Deficits  Goal: Optimize nutritional status  Outcome: Ongoing     Problem: Risk for Fluid Volume Deficit  Goal: Maintain normal heart rhythm  Outcome: Ongoing  Goal: Maintain absence of muscle cramping  Outcome: Ongoing  Goal: Maintain normal serum potassium, sodium, calcium, phosphorus, and pH  Outcome: Ongoing     Problem: Loneliness or Risk for Loneliness  Goal: Demonstrate positive use of time alone when socialization is not possible  Outcome: Ongoing     Problem: Fatigue  Goal: Verbalize increase energy and improved vitality  Outcome: Ongoing     Problem: Patient Education: Go to Patient Education Activity  Goal: Patient/Family Education  Outcome: Ongoing

## 2021-09-16 NOTE — PROGRESS NOTES
Occupational Therapy  Facility/Department: [address] MED SURG  Daily Treatment Note  NAME: oDrothea Thomas  : 1931  MRN: [id number]    Date of Service: 2021    Discharge Recommendations:  3-5 sessions per week, Patient would benefit from continued therapy after discharge     Dorothea Thomas scored a  on the AM-PAC ADL Inpatient form. Current research shows that an AM-PAC score of 17 or less is typically not associated with a discharge to the patient's home setting. Based on the patient's AM-PAC score and their current ADL deficits, it is recommended that the patient have 3-5 sessions per week of Occupational Therapy at d/ to increase the patient's independence. Please see assessment section for further patient specific details. If patient discharges prior to next session this note will serve as a discharge summary. Please see below for the latest assessment towards goals. Assessment   Performance deficits / Impairments: Decreased functional mobility ; Decreased ADL status; Decreased strength;Decreased cognition;Decreased endurance;Decreased balance;Decreased high-level IADLs  Assessment: Pt still with high O2 requirements at 15 L, but was able to tolerate OOB activity/ADLs today. This date, pt required mod A for SPT to/from [facility], total A toileting, and anticipate pt would require overall max A for ADLs. SpO2 on 15 L O2 upper 80's with activity. Pt functioning below baseline and demonstrates need for ongoing skilled OT at d/c to maximize pt's safety and independence. Prognosis: Fair  OT Education: OT Role;Plan of Care;ADL Adaptive Strategies;Transfer Training  Barriers to Learning: hearing, cognition  REQUIRES OT FOLLOW UP: Yes  Activity Tolerance  Activity Tolerance: Patient limited by fatigue  Activity Tolerance: SpO2 on 15 L O2 upper 80's with activity  Safety Devices  Safety Devices in place: Yes  Type of devices: Call light within reach; Bed alarm in place; Left in bed;Nurse notified Patient Diagnosis(es): The primary encounter diagnosis was Hypoxia. A diagnosis of Pneumonia due to infectious organism, unspecified laterality, unspecified part of lung was also pertinent to this visit. has a past medical history of Aortic stenosis and Arthritis. has a past surgical history that includes Varicose vein surgery (Bilateral, 1980's); Cardiac catheterization (04/17/2018); Hysterectomy, total abdominal; and Aortic valve replacement (N/A, 5/21/2019). Restrictions  Restrictions/Precautions  Restrictions/Precautions: Contact Precautions, Isolation, Fall Risk  Position Activity Restriction  Other position/activity restrictions: COVID+; droplet precautions; 15L; HR 90-100bpm  Subjective   General  Chart Reviewed: Yes  Patient assessed for rehabilitation services?: Yes  Additional Pertinent Hx: Per H&P: \"Patient is a 51-year-old female with past medical history of CAD, aortic stenosis status post AVR who presented to hospital for shortness of breath. Patient is a poor historian, reportedly patient was brought to the hospital for shortness of breath. Patient was found hypoxic in upper 80s on room air, was transitioned to nasal cannula. No reports of fevers chills chest pain nausea vomiting or diarrhea. \" Pt found to be COVID+  Family / Caregiver Present: No  Referring Practitioner: Lexy Limon MD  Diagnosis: Acute hypoxic respiratory failure, Multifocal pneumonia, COVID +  Subjective  Subjective: Pt met b/s for OT tx. Pt in bed on arrival, agreeable to participate in therapy. Pt confused. Pt denies pain.   General Comment  Comments: RN okayed for in bed activity      Orientation  Orientation  Overall Orientation Status: Impaired  Orientation Level: Oriented to person;Disoriented to place  Objective    ADL  Feeding: Beverage management;Stand by assistance  LE Dressing: Dependent/Total (to doff saturated depends and don pull up briefs)  Toileting: Dependent/Total (pt depends saturated with urine, pt voided additional urine at [facility], total A of OT to manage depends down/briefs up and for pericare in standing while PT provided mod A for balance)     Balance  Sitting Balance: Stand by assistance  Standing Balance: Moderate assistance    Bed mobility  Supine to Sit: Minimal assistance; Moderate assistance  Sit to Supine: Minimal assistance; Moderate assistance     Transfers  Stand Pivot Transfers: Moderate assistance  Sit to stand: Moderate assistance  Stand to sit: Moderate assistance   Toilet Transfers  Toilet - Technique: Stand pivot  Equipment Used: Standard bedside commode  Toilet Transfer: Moderate assistance    Cognition  Overall Cognitive Status: Exceptions  Following Commands: Follows one step commands with increased time  Attention Span: Appears intact  Memory: Decreased short term memory  Safety Judgement: Decreased awareness of need for safety;Decreased awareness of need for assistance  Problem Solving: Decreased awareness of errors;Assistance required to identify errors made;Assistance required to generate solutions  Insights: Decreased awareness of deficits  Initiation: Requires cues for some  Sequencing: Requires cues for some        Plan   Plan  Times per week: 3-5  Current Treatment Recommendations: Functional Mobility Training, Balance Training, Strengthening, Endurance Training, Safety Education & Training, Self-Care / ADL, Equipment Evaluation, Education, & procurement    AM-PAC Score        AM-[facility] Inpatient Daily Activity Raw Score: 12 (09/16/21 1055)  AM-PAC Inpatient ADL T-Scale Score : 30.6 (09/16/21 1055)  ADL Inpatient CMS 0-100% Score: 66.57 (09/16/21 1055)  ADL Inpatient CMS G-Code Modifier : CL (09/16/21 1055)    Goals  Short term goals  Time Frame for Short term goals: Prior to d/c: STATUS GOALS 9/16: ALL GOALS ONGOING  Short term goal 1: Pt will bathe with SBA. Short term goal 2: Pt will dress with SBA. Short term goal 3: Pt will toilet with SBA.   Short term goal 4: Pt will complete fxl mobility and fxl transfers to/from ADL surfaces with SBA. Short term goal 5: Pt will tolerate standing >5 minutes for functional task with SBA while maintaining O2 sats >90%  Short term goal 6: Pt will tolerate 10-15 minutes of therex to improve strength/endurance for ADLs. Long term goals  Time Frame for Long term goals : STGs=LTGs  Patient Goals   Patient goals : to return home.        Therapy Time   Individual Concurrent Group Co-treatment   Time In 1018         Time Out 1050         Minutes [address], OTR/L [id number]

## 2021-09-16 NOTE — RT PROTOCOL NOTE
RT Inhaler-Nebulizer Bronchodilator Protocol Note    There is a bronchodilator order in the chart from a provider indicating to follow the RT Bronchodilator Protocol and there is an Initiate RT Bronchodilator Protocol order as well (see protocol at bottom of note). The findings from the last RT Protocol Assessment were as follows:  Smoking: Non smoker  Surgical Status: No surgery  Xray: Multiple lobe infiltrates/atelectasis/pleural effusion/edema  Respiratory Pattern: Dyspnea at rest  Mental Status: Confused but follows commands  Breath Sounds: Diminished and/or crackles  Cough: Weak, productive  Activity Level: Walking with assistance  Oxygen Requirement: 61% - 100%  Indication for Bronchodilator Therapy: Decreased or absent breath sounds  Bronchodilator Assessment Score: 9    Aerosolized bronchodilator medication orders have been revised according to the RT Bronchodilator Protocol. RT Inhaler-Nebulizer Bronchodilator Protocol:    Respiratory Therapist to perform RT Therapy Protocol Assessment then follow the protocol. No Indications - adjust the frequency to every 6 hours PRN wheezing or bronchospasm, if no treatments needed after 48 hours then discontinue using Per Protocol order mode. If indication present, adjust the RT bronchodilator orders based on the Bronchodilator Assessment Score as follows:    0-6 - enter or revise RT bronchodilator order to Albuterol Inhaler order with frequency of every 2 hours PRN for wheezing or increased work of breathing using Per Protocol order mode. If Albuterol Inhaler not tolerated or not effective, then discontinue the Albuterol Inhaler order and enter Albuterol Nebulizer order with same frequency and PRN reasons. Repeat RT Therapy Protocol Assessment as needed.     7-10 - discontinue any other Inpatient aerosolized bronchodilator medication orders and enter or revise two Albuterol Inhaler orders, one with BID frequency and one with frequency of every 2 hours PRN wheezing or increased work of breathing using Per Protocol order mode. Repeat RT Therapy Protocol Assessment with second treatment then BID and as needed. If Albuterol Inhaler not tolerated or not effective, then discontinue the Albuterol Inhaler orders and enter two Albuterol Nebulizer orders with same frequencies and PRN reasons. 11-13 - discontinue any other Inpatient aerosolized bronchodilator medication orders and enter DuoNeb Nebulizer orders QID frequency and an Albuterol Nebulizer order every 2 hours PRN wheezing or increased work of breathing using Per Protocol order mode. Repeat RT Therapy Protocol Assessment with second treatment then QID and as needed. Greater than 13 - discontinue any other Inpatient bronchodilator aerosolized medication orders and enter DuoNeb Nebulizer order every 4 hours frequency and Albuterol Nebulizer every 2 hours PRN wheezing or increased work of breathing using Per Protocol order mode. Repeat RT Therapy Protocol Assessment with second treatment then every 4 hours and as needed. RT to enter RT Home Evaluation for COPD & MDI Assessment order using Per Protocol order mode.     Electronically signed by Thiago Brooks RCP on 9/15/2021 at 9:21 PM

## 2021-09-16 NOTE — PROGRESS NOTES
Physical Therapy  Facility/Department: [address] MED SURG  Daily Treatment Note  NAME: Peggy Mello  : 1931  MRN: [id number]    Date of Service: 2021    Discharge Recommendations:  Continue to assess pending progress        Assessment   Body structures, Functions, Activity limitations: Decreased functional mobility ; Decreased ADL status; Decreased balance;Decreased strength;Decreased endurance  Assessment: Pt is a 80 y.o. female who presented to the ED on 21 with SOB. Pt found to be COVID+. Prior to admission, pt living with dtr and granddtr in two level home with 4 CHRISTOS - pt independent with all ADLs and ambulation without device - helps take care of dtr with help of granddtr. Pt currently functioning below baseline - significant limitations to respiratory system - pt now at 15L high flow with O2 sats hovering around 90 - desaturates to mid 80's with stand pivot. Will continue to assess for appropriate discharge plan. Treatment Diagnosis: impaired activity tolerance  Prognosis: Fair  Decision Making: Medium Complexity  History: see below  Exam: see below  Clinical Presentation: evolving  PT Education: Goals;PT Role;Plan of Care;General Safety;Transfer Training;Gait Training;Functional Mobility Training  REQUIRES PT FOLLOW UP: Yes  Activity Tolerance  Activity Tolerance: Patient limited by fatigue;Patient limited by endurance;Treatment limited secondary to medical complications (free text)  Activity Tolerance: limited by respiratory status     Patient Diagnosis(es): The primary encounter diagnosis was Hypoxia. A diagnosis of Pneumonia due to infectious organism, unspecified laterality, unspecified part of lung was also pertinent to this visit. has a past medical history of Aortic stenosis and Arthritis. has a past surgical history that includes Varicose vein surgery (Bilateral, ); Cardiac catheterization (2018);  Hysterectomy, total abdominal; and Aortic valve replacement (N/A, 5/21/2019). Restrictions  Restrictions/Precautions  Restrictions/Precautions: Contact Precautions, Isolation, Fall Risk  Position Activity Restriction  Other position/activity restrictions: COVID+; droplet precautions; 15L; HR 90-100bpm     Subjective   General  Chart Reviewed: Yes  Additional Pertinent Hx: Pt is a 80 y.o. female who presented to the ED on 9/13/21 with SOB. Pt found to be COVID+. Response To Previous Treatment: Patient with no complaints from previous session. Family / Caregiver Present: No  Referring Practitioner: Alyssa Abbott MD  Subjective  Subjective: Pt is agreeable to PT - very [place]. General Comment  Comments: Note that rapid response called early this morning due to HR up to 180bpm - metropolol on board to help manage rate. Orientation  Orientation  Overall Orientation Status: Within Functional Limits     Cognition   Cognition  Overall Cognitive Status: Exceptions  Following Commands: Follows one step commands with increased time  Attention Span: Appears intact  Memory: Decreased short term memory  Safety Judgement: Decreased awareness of need for safety;Decreased awareness of need for assistance  Problem Solving: Decreased awareness of errors;Assistance required to identify errors made;Assistance required to generate solutions  Insights: Decreased awareness of deficits  Initiation: Requires cues for some  Sequencing: Requires cues for some     Objective   Bed mobility  Supine to Sit: Minimal assistance; Moderate assistance  Sit to Supine: Minimal assistance; Moderate assistance  Transfers  Sit to Stand: Minimal Assistance; Moderate Assistance  Stand to sit: Minimal Assistance; Moderate Assistance  Stand Pivot Transfers: Minimal Assistance; Moderate Assistance  Comment: Pt transferred bed<>BSC to void. O2 sats drop as low as 85% with HR ~100bpm. Pt's O2 recovers to 92% ~2 minutes.   Ambulation  Ambulation?: No (limited by respiratory status - see above)     Balance  Posture: Fair  Sitting - Static: Good  Sitting - Dynamic: Good  Standing - Static: Fair (@RW)  Standing - Dynamic: Fair;- (@RW)              AM-PAC Score  AM-PAC Inpatient Mobility Raw Score : 11 (09/16/21 1058)  AM-PAC Inpatient T-Scale Score : 33.86 (09/16/21 1058)  Mobility Inpatient CMS 0-100% Score: 72.57 (09/16/21 1058)  Mobility Inpatient CMS G-Code Modifier : CL (09/16/21 1058)          Goals  Short term goals  Time Frame for Short term goals: by acute discharge - all goals ongoing as of 9/16/21  Short term goal 1: bed mobility with SBA  Short term goal 2: sit<>stand with SBA  Short term goal 3: ambulate >30' with LRAD and SBA  Patient Goals   Patient goals : none stated    Plan    Plan  Times per week: 3-5x/week  Current Treatment Recommendations: Strengthening, Functional Mobility Training, Transfer Training, Balance Training, Endurance Training, Patient/Caregiver Education & Training, Safety Education & Training, Gait Training, Neuromuscular Re-education, Equipment Evaluation, Education, & procurement  Safety Devices  Type of devices:  All fall risk precautions in place, Call light within reach, Patient at risk for falls, Left in bed, Bed alarm in place, Nurse notified     Therapy Time   Individual Concurrent Group Co-treatment   Time In 1015         Time Out 1055         Minutes 40         Timed Code Treatment Minutes: Donavon 64, PT

## 2021-09-16 NOTE — PROGRESS NOTES
Pt flipped from normal sinus/sinus tach into Afib. HR was ranging from 140-180. Rapid response called. 5 mg metroprolol pushed. If heart rate remains 130 or higher, NP ordered to give 5 mg more of metroprolol. Will monitor HR.  Electronically signed by Prasanth Bain RN on 9/16/2021 at 2:51 AM

## 2021-09-16 NOTE — PLAN OF CARE
Ongoing     Problem: Breathing Pattern - Ineffective  Goal: Ability to achieve and maintain a regular respiratory rate  9/16/2021 1229 by Sigrid Man RN  Outcome: Ongoing  9/15/2021 2303 by Mango Lynn RN  Outcome: Ongoing     Problem:  Body Temperature -  Risk of, Imbalanced  Goal: Ability to maintain a body temperature within defined limits  9/16/2021 1229 by Sigrid Man RN  Outcome: Ongoing  9/15/2021 2303 by Mango Lynn RN  Outcome: Ongoing  Goal: Will regain or maintain usual level of consciousness  9/16/2021 1229 by Sigrid Man RN  Outcome: Ongoing  9/15/2021 2303 by Mango Lynn RN  Outcome: Ongoing  Goal: Complications related to the disease process, condition or treatment will be avoided or minimized  9/16/2021 1229 by Sigrid Man RN  Outcome: Ongoing  9/15/2021 2303 by Mango Lynn RN  Outcome: Ongoing     Problem: Isolation Precautions - Risk of Spread of Infection  Goal: Prevent transmission of infection  9/16/2021 1229 by Sigrid Man RN  Outcome: Ongoing  9/15/2021 2303 by Mango Lynn RN  Outcome: Ongoing     Problem: Nutrition Deficits  Goal: Optimize nutritional status  9/16/2021 1229 by Sigrid Man RN  Outcome: Ongoing  9/15/2021 2303 by Mango Lynn RN  Outcome: Ongoing     Problem: Risk for Fluid Volume Deficit  Goal: Maintain normal heart rhythm  9/16/2021 1229 by Sigrid Man RN  Outcome: Ongoing  9/15/2021 2303 by Mango Lynn RN  Outcome: Ongoing  Goal: Maintain absence of muscle cramping  9/16/2021 1229 by Sigrid Man RN  Outcome: Ongoing  9/15/2021 2303 by Mango Lynn RN  Outcome: Ongoing  Goal: Maintain normal serum potassium, sodium, calcium, phosphorus, and pH  9/16/2021 1229 by Sigrid Man RN  Outcome: Ongoing  9/15/2021 2303 by Mango Lynn RN  Outcome: Ongoing     Problem: Loneliness or Risk for Loneliness  Goal: Demonstrate positive use of time alone when socialization is not possible  9/16/2021 1229 by Sigrid Man RN  Outcome: Ongoing  9/15/2021 [phone] by Debi Campoverde RN  Outcome: Ongoing     Problem: Fatigue  Goal: Verbalize increase energy and improved vitality  9/16/2021 1229 by Rashard Duran RN  Outcome: Ongoing  9/15/2021 2303 by Debi Campoverde RN  Outcome: Ongoing     Problem: Patient Education: Go to Patient Education Activity  Goal: Patient/Family Education  9/16/2021 1229 by Rashard Duran RN  Outcome: Ongoing  9/15/2021 2303 by Debi Campoverde RN  Outcome: Ongoing

## 2021-09-16 NOTE — PROGRESS NOTES
Patient alert to self and date, disoriented to place and situation, states \"I have been really sick. \"  Patient denies n/v, diarrhea, pain, states she is SOB. Oc high flow at 15 L. Patient denies needs at this time. Bed in lowest and locked position, non-slip socks on, call light within reach. Will continue to monitor pt needs.

## 2021-09-17 LAB
A/G RATIO: 0.9 (ref 1.1–2.2)
ALBUMIN SERPL-MCNC: 2.8 G/DL (ref 3.4–5)
ALP BLD-CCNC: 88 U/L (ref 40–129)
ALT SERPL-CCNC: 18 U/L (ref 10–40)
ANION GAP SERPL CALCULATED.3IONS-SCNC: 15 MMOL/L (ref 3–16)
AST SERPL-CCNC: 28 U/L (ref 15–37)
BASOPHILS ABSOLUTE: 0 K/UL (ref 0–0.2)
BASOPHILS RELATIVE PERCENT: 0.3 %
BILIRUB SERPL-MCNC: 0.4 MG/DL (ref 0–1)
BLOOD CULTURE, ROUTINE: NORMAL
BLOOD CULTURE, ROUTINE: NORMAL
BUN BLDV-MCNC: 27 MG/DL (ref 7–20)
CALCIUM SERPL-MCNC: 9.2 MG/DL (ref 8.3–10.6)
CHLORIDE BLD-SCNC: 104 MMOL/L (ref 99–110)
CO2: 19 MMOL/L (ref 21–32)
CREAT SERPL-MCNC: 0.8 MG/DL (ref 0.6–1.2)
CULTURE, BLOOD 2: NORMAL
EOSINOPHILS ABSOLUTE: 0 K/UL (ref 0–0.6)
EOSINOPHILS RELATIVE PERCENT: 0 %
GFR AFRICAN AMERICAN: >60
GFR NON-AFRICAN AMERICAN: >60
GLOBULIN: 3 G/DL
GLUCOSE BLD-MCNC: 150 MG/DL (ref 70–99)
HCT VFR BLD CALC: 37.2 % (ref 36–48)
HEMOGLOBIN: 12.5 G/DL (ref 12–16)
LYMPHOCYTES ABSOLUTE: 0.4 K/UL (ref 1–5.1)
LYMPHOCYTES RELATIVE PERCENT: 6.1 %
MCH RBC QN AUTO: 27.4 PG (ref 26–34)
MCHC RBC AUTO-ENTMCNC: 33.5 G/DL (ref 31–36)
MCV RBC AUTO: 81.7 FL (ref 80–100)
MONOCYTES ABSOLUTE: 0.8 K/UL (ref 0–1.3)
MONOCYTES RELATIVE PERCENT: 10.7 %
NEUTROPHILS ABSOLUTE: 6.1 K/UL (ref 1.7–7.7)
NEUTROPHILS RELATIVE PERCENT: 82.9 %
PDW BLD-RTO: 14.7 % (ref 12.4–15.4)
PLATELET # BLD: 350 K/UL (ref 135–450)
PLATELET SLIDE REVIEW: ADEQUATE
PMV BLD AUTO: 9.2 FL (ref 5–10.5)
POTASSIUM REFLEX MAGNESIUM: 4 MMOL/L (ref 3.5–5.1)
POTASSIUM SERPL-SCNC: 4 MMOL/L (ref 3.5–5.1)
RBC # BLD: 4.56 M/UL (ref 4–5.2)
SLIDE REVIEW: ABNORMAL
SODIUM BLD-SCNC: 138 MMOL/L (ref 136–145)
TOTAL PROTEIN: 5.8 G/DL (ref 6.4–8.2)
WBC # BLD: 7.3 K/UL (ref 4–11)

## 2021-09-17 PROCEDURE — 6360000002 HC RX W HCPCS: Performed by: INTERNAL MEDICINE

## 2021-09-17 PROCEDURE — 80053 COMPREHEN METABOLIC PANEL: CPT

## 2021-09-17 PROCEDURE — 6370000000 HC RX 637 (ALT 250 FOR IP): Performed by: EMERGENCY MEDICINE

## 2021-09-17 PROCEDURE — 2700000000 HC OXYGEN THERAPY PER DAY

## 2021-09-17 PROCEDURE — 94640 AIRWAY INHALATION TREATMENT: CPT

## 2021-09-17 PROCEDURE — 2580000003 HC RX 258: Performed by: INTERNAL MEDICINE

## 2021-09-17 PROCEDURE — 2500000003 HC RX 250 WO HCPCS: Performed by: INTERNAL MEDICINE

## 2021-09-17 PROCEDURE — 6370000000 HC RX 637 (ALT 250 FOR IP): Performed by: INTERNAL MEDICINE

## 2021-09-17 PROCEDURE — 85025 COMPLETE CBC W/AUTO DIFF WBC: CPT

## 2021-09-17 PROCEDURE — 94761 N-INVAS EAR/PLS OXIMETRY MLT: CPT

## 2021-09-17 PROCEDURE — 1200000000 HC SEMI PRIVATE

## 2021-09-17 PROCEDURE — 36415 COLL VENOUS BLD VENIPUNCTURE: CPT

## 2021-09-17 RX ADMIN — SODIUM CHLORIDE, PRESERVATIVE FREE 10 ML: 5 INJECTION INTRAVENOUS at 10:25

## 2021-09-17 RX ADMIN — ENOXAPARIN SODIUM 30 MG: 30 INJECTION SUBCUTANEOUS at 10:25

## 2021-09-17 RX ADMIN — DILTIAZEM HYDROCHLORIDE 30 MG: 30 TABLET, FILM COATED ORAL at 18:30

## 2021-09-17 RX ADMIN — REMDESIVIR 100 MG: 100 INJECTION, POWDER, LYOPHILIZED, FOR SOLUTION INTRAVENOUS at 16:46

## 2021-09-17 RX ADMIN — ASPIRIN 81 MG: 81 TABLET, COATED ORAL at 10:25

## 2021-09-17 RX ADMIN — IPRATROPIUM BROMIDE 2 SPRAY: 42 SPRAY NASAL at 16:46

## 2021-09-17 RX ADMIN — ENOXAPARIN SODIUM 30 MG: 30 INJECTION SUBCUTANEOUS at 20:27

## 2021-09-17 RX ADMIN — DILTIAZEM HYDROCHLORIDE 30 MG: 30 TABLET, FILM COATED ORAL at 12:45

## 2021-09-17 RX ADMIN — Medication 10 ML: at 20:28

## 2021-09-17 RX ADMIN — AZITHROMYCIN MONOHYDRATE 250 MG: 500 INJECTION, POWDER, LYOPHILIZED, FOR SOLUTION INTRAVENOUS at 11:00

## 2021-09-17 RX ADMIN — CEFTRIAXONE 1000 MG: 1 INJECTION, POWDER, FOR SOLUTION INTRAMUSCULAR; INTRAVENOUS at 10:25

## 2021-09-17 RX ADMIN — LOSARTAN POTASSIUM 50 MG: 25 TABLET, FILM COATED ORAL at 10:25

## 2021-09-17 RX ADMIN — BARICITINIB 1 MG: 1 TABLET, FILM COATED ORAL at 10:25

## 2021-09-17 RX ADMIN — Medication 2 PUFF: at 21:15

## 2021-09-17 RX ADMIN — SODIUM CHLORIDE, PRESERVATIVE FREE 10 ML: 5 INJECTION INTRAVENOUS at 20:28

## 2021-09-17 RX ADMIN — IPRATROPIUM BROMIDE 2 SPRAY: 42 SPRAY NASAL at 10:26

## 2021-09-17 RX ADMIN — DEXAMETHASONE SODIUM PHOSPHATE 20 MG: 4 INJECTION, SOLUTION INTRA-ARTICULAR; INTRALESIONAL; INTRAMUSCULAR; INTRAVENOUS; SOFT TISSUE at 16:10

## 2021-09-17 RX ADMIN — Medication 2 PUFF: at 08:35

## 2021-09-17 RX ADMIN — IPRATROPIUM BROMIDE 2 SPRAY: 42 SPRAY NASAL at 13:44

## 2021-09-17 ASSESSMENT — PAIN SCALES - GENERAL
PAINLEVEL_OUTOF10: 0

## 2021-09-17 NOTE — PROGRESS NOTES
Hospitalist Progress Note      PCP: Yancy Hall MD    Date of Admission: 9/13/2021    Chief Complaint: Shortness of breath    Hospital Course:  Patient is a 68-year-old female with past medical history of CAD, aortic stenosis status post AVR who presented to hospital for shortness of breath. Patient is a poor historian, reportedly patient was brought to the hospital for shortness of breath. Patient was found hypoxic in upper 80s on room air, was transitioned to nasal cannula. Subjective: Patient seen and evaluated at the bedside. On 8L NC today, no acute events overnight, continue steroids.        Medications:  Reviewed    Infusion Medications    sodium chloride Stopped (09/16/21 1256)     Scheduled Medications    metoprolol  5 mg IntraVENous Once    dilTIAZem  30 mg Oral 4 times per day    dexamethasone  20 mg IntraVENous Daily    enoxaparin  30 mg SubCUTAneous BID    remdesivir IVPB  100 mg IntraVENous Q24H    baricitinib  1 mg Oral Daily    sodium chloride flush  10 mL IntraVENous 2 times per day    aspirin  81 mg Oral Daily    ipratropium  2 spray Each Nostril 4x Daily    cefTRIAXone (ROCEPHIN) IV  1,000 mg IntraVENous Q24H    azithromycin  250 mg IntraVENous Q24H    losartan  50 mg Oral Daily    ipratropium  2 puff Inhalation BID    And    albuterol sulfate HFA  2 puff Inhalation BID     PRN Meds: metoprolol, sodium chloride, sodium chloride flush, sodium chloride, ondansetron **OR** ondansetron, acetaminophen **OR** acetaminophen, albuterol sulfate HFA      Intake/Output Summary (Last 24 hours) at 9/17/2021 1638  Last data filed at 9/17/2021 0551  Gross per 24 hour   Intake 1406.52 ml   Output --   Net 1406.52 ml       Physical Exam Performed:    BP (!) 146/82   Pulse 89   Temp 97.9 °F (36.6 °C) (Oral)   Resp 20   Ht 5' (1.524 m)   Wt 102 lb 8.2 oz (46.5 kg)   SpO2 93%   BMI 20.02 kg/m²     General appearance: frail, NAD, able to hold conversations  HEENT: Pupils equal, round, multifocal   pneumonia including viral pneumonia. Dilation of the main and proximal pulmonary arteries could represent   pulmonary arterial hypertension. Large hiatal hernia. XR CHEST PORTABLE   Final Result   Findings most consistent with multifocal pneumonia. Assessment/Plan:    COVID-19 pneumonia  Decadron 6 mg daily for 10 days  on Remdesivir and baricitinib September 14  Lovenox twice daily  Incentive spirometry  Encourage proning    Acute respiratory failure with hypoxia  Secondary to COVID-19 pneumonia  Titrate oxygen to keep saturations above 90%    Aortic stenosis status post AVR  Continue to monitor  Monitor fluid status    CAD  Continue aspirin, ACE inhibitor    DVT Prophylaxis: Lovenox  Diet: ADULT DIET; Regular  Code Status: DNR-CCA    PT/OT Eval Status:  Will need    Dispo -inpatient, likely discharge 1 to 2 days, continue to wean down O2 needs    Lu Kehr, MD

## 2021-09-17 NOTE — PLAN OF CARE
Problem: Falls - Risk of:  Goal: Will remain free from falls  Description: Will remain free from falls  9/17/2021 1257 by Deborah Crespo RN  Outcome: Ongoing  9/17/2021 0006 by Magali Jovel RN  Outcome: Ongoing  Goal: Absence of physical injury  Description: Absence of physical injury  9/17/2021 1257 by Deborah Crespo RN  Outcome: Ongoing  9/17/2021 0006 by Magali Jovel RN  Outcome: Ongoing     Problem: Skin Integrity:  Goal: Will show no infection signs and symptoms  Description: Will show no infection signs and symptoms  9/17/2021 1257 by Deborah Crespo RN  Outcome: Ongoing  9/17/2021 0006 by Magali Jovel RN  Outcome: Ongoing  Goal: Absence of new skin breakdown  Description: Absence of new skin breakdown  9/17/2021 1257 by Deborah Crespo RN  Outcome: Ongoing  9/17/2021 0006 by Magali Jovel RN  Outcome: Ongoing     Problem: Safety:  Goal: Free from accidental physical injury  Description: Free from accidental physical injury  9/17/2021 1257 by Deborah Crespo RN  Outcome: Ongoing  9/17/2021 0006 by Magali Jovel RN  Outcome: Ongoing  Goal: Free from intentional harm  Description: Free from intentional harm  9/17/2021 1257 by Deborah Crespo RN  Outcome: Ongoing  9/17/2021 0006 by Magali Jovel RN  Outcome: Ongoing     Problem: Daily Care:  Goal: Daily care needs are met  Description: Daily care needs are met  9/17/2021 1257 by Deborah Crespo RN  Outcome: Ongoing  9/17/2021 0006 by Magali Jovel RN  Outcome: Ongoing     Problem: Airway Clearance - Ineffective  Goal: Achieve or maintain patent airway  9/17/2021 1257 by Deborah Crespo RN  Outcome: Ongoing  9/17/2021 0006 by Magali Jovel RN  Outcome: Ongoing     Problem: Gas Exchange - Impaired  Goal: Absence of hypoxia  9/17/2021 1257 by Deborah Crespo RN  Outcome: Ongoing  9/17/2021 0006 by Magali Jovel RN  Outcome: Ongoing  Goal: Promote optimal lung function  9/17/2021 1257 by Deborah Crespo RN  Outcome: Ongoing  9/17/2021 0006 by Moe Fernandez RN  Outcome: Ongoing     Problem: Breathing Pattern - Ineffective  Goal: Ability to achieve and maintain a regular respiratory rate  9/17/2021 1257 by Dinora Gray RN  Outcome: Ongoing  9/17/2021 0006 by Moe Fernandez RN  Outcome: Ongoing     Problem:  Body Temperature -  Risk of, Imbalanced  Goal: Ability to maintain a body temperature within defined limits  9/17/2021 1257 by Dinora Gray RN  Outcome: Ongoing  9/17/2021 0006 by Moe Fernandez RN  Outcome: Ongoing  Goal: Will regain or maintain usual level of consciousness  9/17/2021 1257 by Dinora Gray RN  Outcome: Ongoing  9/17/2021 0006 by Moe Fernandez RN  Outcome: Ongoing  Goal: Complications related to the disease process, condition or treatment will be avoided or minimized  9/17/2021 1257 by Dinora Gray RN  Outcome: Ongoing  9/17/2021 0006 by Moe Fernandez RN  Outcome: Ongoing     Problem: Isolation Precautions - Risk of Spread of Infection  Goal: Prevent transmission of infection  9/17/2021 1257 by Dinora Gray RN  Outcome: Ongoing  9/17/2021 0006 by Moe Fernandez RN  Outcome: Ongoing     Problem: Nutrition Deficits  Goal: Optimize nutritional status  9/17/2021 1257 by Dinora Gray RN  Outcome: Ongoing  9/17/2021 0006 by Moe Fernandez RN  Outcome: Ongoing     Problem: Risk for Fluid Volume Deficit  Goal: Maintain normal heart rhythm  9/17/2021 1257 by Dinora Gray RN  Outcome: Ongoing  9/17/2021 0006 by Moe Fernandez RN  Outcome: Ongoing  Goal: Maintain absence of muscle cramping  9/17/2021 1257 by Dinora Gray RN  Outcome: Ongoing  9/17/2021 0006 by Moe Fernandez RN  Outcome: Ongoing  Goal: Maintain normal serum potassium, sodium, calcium, phosphorus, and pH  9/17/2021 1257 by Dinora Gray RN  Outcome: Ongoing  9/17/2021 0006 by Moe Fernandez RN  Outcome: Ongoing     Problem: Loneliness or Risk for Loneliness  Goal: Demonstrate positive use of time alone when socialization is not possible  9/17/2021 1257 by Dinora Gray RN  Outcome: Ongoing  9/17/2021 0006 by Moe Fernandez RN  Outcome: Ongoing     Problem: Fatigue  Goal: Verbalize increase energy and improved vitality  9/17/2021 1257 by Dinora Gray RN  Outcome: Ongoing  9/17/2021 0006 by Moe Fernandez RN  Outcome: Ongoing     Problem: Patient Education: Go to Patient Education Activity  Goal: Patient/Family Education  9/17/2021 1257 by Dinora Gray RN  Outcome: Ongoing  9/17/2021 0006 by Moe Fernandez RN  Outcome: Ongoing

## 2021-09-17 NOTE — CARE COORDINATION
Talked with grand-daughter Katie Mi, they would like for Andie Ordonez to come home at [facility]. Will call [facility] with referral.    Brennan Tabares RN, BSN,   [phone]  Electronically signed by Brennan Tabares RN on 9/17/2021 at 1:47 PM     Talked to [facility] with [facility] & she will put Andie Ordonez on her list & await dc. They won't be able to see her until Tuesday at the earliest, will update Katie Mi.     Brennan Tabares RN, BSN,   [phone]  Electronically signed by Brennan Tabares RN on 9/17/2021 at 1:51 PM

## 2021-09-17 NOTE — CARE COORDINATION
Talked with daughter Carlos Falk re: therapy's recommendations for skilled nursing facility. She is going to reach out to her sister Tequila Vásquez & discuss what they want to do, thinking they want to bring her home with home care. Tequila Vásquez or Carlos Falk will get back with me shortly.     Chacha Moses RN, BSN,   [phone]  Electronically signed by Chacha Moses RN on 9/17/2021 at 12:50 PM

## 2021-09-17 NOTE — PROGRESS NOTES
Pt confused and pulling at [place]. Found on RA with a SaO2 of 87%, HFNC was set to 13 L/m. After cannula was placed back on pt the flow was weaned to 8 L/m to achieve SaO2 of 91-94%.

## 2021-09-17 NOTE — DISCHARGE INSTR - COC
Continuity of Care Form    Patient Name: Shey Antunez   :  1931  MRN:  [id number]    Admit date:  2021  Discharge date:  ***    Code Status Order: DNR-CCA   Advance Directives:      Admitting Physician:  Ruthann Rosenthal MD  PCP: Andie Kelley MD    Discharging Nurse: [facility]/Room#: O7Z-5059/5597-61  Discharging Unit Phone Number: ***    Emergency Contact:   Extended Emergency Contact Information  Primary Emergency Contact: Salima Garcia [address] Phone: [phone]  Mobile Phone: [phone]  Relation: Grandchild  Secondary Emergency Contact: [facility] Phone: [phone]  Mobile Phone: [phone]  Relation: Grandchild    Past Surgical History:  Past Surgical History:   Procedure Laterality Date    AORTIC VALVE REPLACEMENT N/A 2019    TRANSCATHETER AORTIC VALVE REPLACEMENT FEMORAL APPROACH performed by Prashanth Carr MD at [address]  2018    HYSTERECTOMY, TOTAL ABDOMINAL      VARICOSE VEIN SURGERY Bilateral        Immunization History: There is no immunization history on file for this patient.     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    Arthritis M19.90    Essential hypertension I10    Carotid stenosis, asymptomatic, right I65.21    S/P TAVR (transcatheter aortic valve replacement) Z95.2    PAF (paroxysmal atrial fibrillation) ([facility]) I48.0    Hypoxia R09.02       Isolation/Infection:   Isolation            Droplet Plus          Patient Infection Status       Infection Onset Added Last Indicated Last Indicated By Review Planned Expiration Resolved Resolved By    COVID-19 21 COVID-19 21      Resolved    COVID-19 Rule Out 21 COVID-19 (Ordered)   21 Rule-Out Test Resulted            Nurse Assessment:  Last Vital Signs: BP (!) 146/82   Pulse 89   Temp 97.9 °F (36.6 °C) (Oral)   Resp 20   Ht 5' (1.524 m)   Wt 102 lb 8.2 oz (46.5 kg)   SpO2 93%   BMI 20.02 kg/m²     Last documented pain score (0-10 scale): Pain Level: 0  Last Weight:   Wt Readings from Last 1 Encounters:   09/17/21 102 lb 8.2 oz (46.5 kg)     Mental Status:  oriented, alert and forgetful to date, time, situation    IV Access:  - None    Nursing Mobility/ADLs:  Walking   Dependent  Transfer  Dependent  Bathing  Dependent  Dressing  Assisted  Toileting  Dependent  Feeding  Assisted  Med Admin  Assisted  Med Delivery   whole    Wound Care Documentation and Therapy:        Elimination:  Continence:   · Bowel: No  · Bladder: No  Urinary Catheter: None   Colostomy/Ileostomy/Ileal Conduit: No       Date of Last BM: 100/2/21    Intake/Output Summary (Last 24 hours) at 9/17/2021 1547  Last data filed at 9/17/2021 0551  Gross per 24 hour   Intake 1406.52 ml   Output --   Net 1406.52 ml     I/O last 3 completed shifts: In: 1406.5 [P.O.:60; I.V.:179.3; IV Piggyback:1167.2]  Out: -     Safety Concerns:     History of Falls (last 30 days) and At Risk for Falls; Aspiration risk    Impairments/Disabilities:      Vision and Hearing    Nutrition Therapy:  Current Nutrition Therapy:   - Oral Diet:  Dysphagia 1 pureed    Routes of Feeding: Oral  Liquids: Nectar Thick Liquids  Daily Fluid Restriction: no  Last Modified Barium Swallow with Video (Video Swallowing Test): not done    Treatments at the Time of Hospital Discharge:   Respiratory Treatments:   Oxygen Therapy:  is on oxygen at 3 L/min per nasal cannula.   Ventilator:    - No ventilator support    Rehab Therapies: Physical Therapy and Occupational Therapy  Weight Bearing Status/Restrictions: No weight bearing restirctions  Other Medical Equipment (for information only, NOT a DME order):  cane and walker  Other Treatments:     Patient's personal belongings (please select all that are sent with patient):  None, Dentures lower, upper    RN SIGNATURE:  Electronically signed by Kassie Miller RN on 10/2/21 at 1:35 PM EDT    CASE MANAGEMENT/SOCIAL WORK SECTION    Inpatient Status Date: ***    Readmission Risk Assessment Score:  Readmission Risk              Risk of Unplanned Readmission:  9         Discharging to Facility/ Agency   · Name:  [facility]    · Address: [address] [place] [address], [address] [place] [address]., [place] [address]  · Phone: [phone]  · Fax: [phone]       / signature: Electronically signed by Lb Guan RN on 9/17/21 at 3:47 PM EDT    PHYSICIAN SECTION    Prognosis: Guarded    Condition at Discharge: Stable    Rehab Potential (if transferring to Rehab): Fair    Recommended Labs or Other Treatments After Discharge: PT ,OT, RN    Physician Certification: I certify the above information and transfer of Nasir Goss  is necessary for the continuing treatment of the diagnosis listed and that she requires Home Care for greater 30 days.      Update Admission H&P: No change in H&P    PHYSICIAN SIGNATURE:  Electronically signed by Sangita Candelario DO on 10/2/21 at 11:24 AM EDT

## 2021-09-18 ENCOUNTER — APPOINTMENT (OUTPATIENT)
Dept: GENERAL RADIOLOGY | Age: 86
DRG: 177 | End: 2021-09-18
Payer: MEDICARE

## 2021-09-18 LAB
A/G RATIO: 0.7 (ref 1.1–2.2)
ALBUMIN SERPL-MCNC: 2.5 G/DL (ref 3.4–5)
ALP BLD-CCNC: 107 U/L (ref 40–129)
ALT SERPL-CCNC: 21 U/L (ref 10–40)
ANION GAP SERPL CALCULATED.3IONS-SCNC: 17 MMOL/L (ref 3–16)
AST SERPL-CCNC: 44 U/L (ref 15–37)
BASE EXCESS VENOUS: -1 MMOL/L
BASOPHILS ABSOLUTE: 0 K/UL (ref 0–0.2)
BASOPHILS RELATIVE PERCENT: 0.4 %
BILIRUB SERPL-MCNC: 0.4 MG/DL (ref 0–1)
BUN BLDV-MCNC: 23 MG/DL (ref 7–20)
CALCIUM SERPL-MCNC: 9.1 MG/DL (ref 8.3–10.6)
CARBOXYHEMOGLOBIN: 0.6 %
CHLORIDE BLD-SCNC: 101 MMOL/L (ref 99–110)
CO2: 13 MMOL/L (ref 21–32)
CREAT SERPL-MCNC: 0.6 MG/DL (ref 0.6–1.2)
EOSINOPHILS ABSOLUTE: 0 K/UL (ref 0–0.6)
EOSINOPHILS RELATIVE PERCENT: 0.1 %
GFR AFRICAN AMERICAN: >60
GFR NON-AFRICAN AMERICAN: >60
GLOBULIN: 3.4 G/DL
GLUCOSE BLD-MCNC: 198 MG/DL (ref 70–99)
HCO3 VENOUS: 21 MMOL/L (ref 23–29)
HCT VFR BLD CALC: 41.2 % (ref 36–48)
HEMOGLOBIN: 13.6 G/DL (ref 12–16)
LACTIC ACID: 1.8 MMOL/L (ref 0.4–2)
LYMPHOCYTES ABSOLUTE: 0.4 K/UL (ref 1–5.1)
LYMPHOCYTES RELATIVE PERCENT: 3.6 %
MCH RBC QN AUTO: 27.5 PG (ref 26–34)
MCHC RBC AUTO-ENTMCNC: 32.9 G/DL (ref 31–36)
MCV RBC AUTO: 83.6 FL (ref 80–100)
METHEMOGLOBIN VENOUS: 0.3 %
MONOCYTES ABSOLUTE: 0.8 K/UL (ref 0–1.3)
MONOCYTES RELATIVE PERCENT: 8.2 %
NEUTROPHILS ABSOLUTE: 8.9 K/UL (ref 1.7–7.7)
NEUTROPHILS RELATIVE PERCENT: 87.7 %
O2 SAT, VEN: 94 %
O2 THERAPY: ABNORMAL
PCO2, VEN: 26.8 MMHG (ref 40–50)
PDW BLD-RTO: 14.7 % (ref 12.4–15.4)
PH VENOUS: 7.5 (ref 7.35–7.45)
PLATELET # BLD: 349 K/UL (ref 135–450)
PMV BLD AUTO: 9.2 FL (ref 5–10.5)
PO2, VEN: 63 MMHG
POTASSIUM REFLEX MAGNESIUM: 4.8 MMOL/L (ref 3.5–5.1)
POTASSIUM SERPL-SCNC: 4.8 MMOL/L (ref 3.5–5.1)
RBC # BLD: 4.92 M/UL (ref 4–5.2)
SODIUM BLD-SCNC: 131 MMOL/L (ref 136–145)
TCO2 CALC VENOUS: 22 MMOL/L
TOTAL PROTEIN: 5.9 G/DL (ref 6.4–8.2)
WBC # BLD: 10.1 K/UL (ref 4–11)

## 2021-09-18 PROCEDURE — 6370000000 HC RX 637 (ALT 250 FOR IP): Performed by: INTERNAL MEDICINE

## 2021-09-18 PROCEDURE — 2580000003 HC RX 258: Performed by: INTERNAL MEDICINE

## 2021-09-18 PROCEDURE — 71045 X-RAY EXAM CHEST 1 VIEW: CPT

## 2021-09-18 PROCEDURE — 82803 BLOOD GASES ANY COMBINATION: CPT

## 2021-09-18 PROCEDURE — 6360000002 HC RX W HCPCS: Performed by: INTERNAL MEDICINE

## 2021-09-18 PROCEDURE — 2700000000 HC OXYGEN THERAPY PER DAY

## 2021-09-18 PROCEDURE — 83605 ASSAY OF LACTIC ACID: CPT

## 2021-09-18 PROCEDURE — 1200000000 HC SEMI PRIVATE

## 2021-09-18 PROCEDURE — 80053 COMPREHEN METABOLIC PANEL: CPT

## 2021-09-18 PROCEDURE — 94761 N-INVAS EAR/PLS OXIMETRY MLT: CPT

## 2021-09-18 PROCEDURE — 2500000003 HC RX 250 WO HCPCS: Performed by: INTERNAL MEDICINE

## 2021-09-18 PROCEDURE — 85025 COMPLETE CBC W/AUTO DIFF WBC: CPT

## 2021-09-18 PROCEDURE — 6370000000 HC RX 637 (ALT 250 FOR IP): Performed by: EMERGENCY MEDICINE

## 2021-09-18 PROCEDURE — 94640 AIRWAY INHALATION TREATMENT: CPT

## 2021-09-18 PROCEDURE — 36415 COLL VENOUS BLD VENIPUNCTURE: CPT

## 2021-09-18 RX ORDER — SODIUM CHLORIDE, SODIUM LACTATE, POTASSIUM CHLORIDE, AND CALCIUM CHLORIDE .6; .31; .03; .02 G/100ML; G/100ML; G/100ML; G/100ML
250 INJECTION, SOLUTION INTRAVENOUS ONCE
Status: COMPLETED | OUTPATIENT
Start: 2021-09-18 | End: 2021-09-18

## 2021-09-18 RX ADMIN — SODIUM CHLORIDE, PRESERVATIVE FREE 10 ML: 5 INJECTION INTRAVENOUS at 09:21

## 2021-09-18 RX ADMIN — SODIUM CHLORIDE, PRESERVATIVE FREE 10 ML: 5 INJECTION INTRAVENOUS at 20:17

## 2021-09-18 RX ADMIN — AZITHROMYCIN MONOHYDRATE 250 MG: 500 INJECTION, POWDER, LYOPHILIZED, FOR SOLUTION INTRAVENOUS at 10:30

## 2021-09-18 RX ADMIN — IPRATROPIUM BROMIDE 2 SPRAY: 42 SPRAY NASAL at 17:00

## 2021-09-18 RX ADMIN — IPRATROPIUM BROMIDE 2 SPRAY: 42 SPRAY NASAL at 09:21

## 2021-09-18 RX ADMIN — IPRATROPIUM BROMIDE 2 SPRAY: 42 SPRAY NASAL at 12:16

## 2021-09-18 RX ADMIN — IPRATROPIUM BROMIDE 2 SPRAY: 42 SPRAY NASAL at 00:33

## 2021-09-18 RX ADMIN — DILTIAZEM HYDROCHLORIDE 30 MG: 30 TABLET, FILM COATED ORAL at 06:12

## 2021-09-18 RX ADMIN — ENOXAPARIN SODIUM 30 MG: 30 INJECTION SUBCUTANEOUS at 20:17

## 2021-09-18 RX ADMIN — ASPIRIN 81 MG: 81 TABLET, COATED ORAL at 09:20

## 2021-09-18 RX ADMIN — Medication 2 PUFF: at 09:33

## 2021-09-18 RX ADMIN — DILTIAZEM HYDROCHLORIDE 30 MG: 30 TABLET, FILM COATED ORAL at 12:16

## 2021-09-18 RX ADMIN — ENOXAPARIN SODIUM 30 MG: 30 INJECTION SUBCUTANEOUS at 09:21

## 2021-09-18 RX ADMIN — REMDESIVIR 100 MG: 100 INJECTION, POWDER, LYOPHILIZED, FOR SOLUTION INTRAVENOUS at 15:26

## 2021-09-18 RX ADMIN — METOPROLOL TARTRATE 5 MG: 1 INJECTION, SOLUTION INTRAVENOUS at 04:07

## 2021-09-18 RX ADMIN — METOPROLOL TARTRATE 5 MG: 1 INJECTION, SOLUTION INTRAVENOUS at 21:05

## 2021-09-18 RX ADMIN — LOSARTAN POTASSIUM 50 MG: 25 TABLET, FILM COATED ORAL at 09:34

## 2021-09-18 RX ADMIN — SODIUM CHLORIDE, POTASSIUM CHLORIDE, SODIUM LACTATE AND CALCIUM CHLORIDE 250 ML: 600; 310; 30; 20 INJECTION, SOLUTION INTRAVENOUS at 12:14

## 2021-09-18 RX ADMIN — BARICITINIB 1 MG: 1 TABLET, FILM COATED ORAL at 09:21

## 2021-09-18 RX ADMIN — CEFTRIAXONE 1000 MG: 1 INJECTION, POWDER, FOR SOLUTION INTRAMUSCULAR; INTRAVENOUS at 09:27

## 2021-09-18 RX ADMIN — DEXAMETHASONE SODIUM PHOSPHATE 20 MG: 4 INJECTION, SOLUTION INTRA-ARTICULAR; INTRALESIONAL; INTRAMUSCULAR; INTRAVENOUS; SOFT TISSUE at 14:22

## 2021-09-18 RX ADMIN — DILTIAZEM HYDROCHLORIDE 30 MG: 30 TABLET, FILM COATED ORAL at 00:32

## 2021-09-18 ASSESSMENT — PAIN SCALES - GENERAL
PAINLEVEL_OUTOF10: 0

## 2021-09-18 NOTE — PLAN OF CARE
Problem: Falls - Risk of:  Goal: Will remain free from falls  Description: Will remain free from falls  9/17/2021 2330 by Rosalie Hamm RN  Outcome: Ongoing  9/17/2021 1257 by Rashard Duran RN  Outcome: Ongoing  Goal: Absence of physical injury  Description: Absence of physical injury  9/17/2021 2330 by Rosalie Hamm RN  Outcome: Ongoing  9/17/2021 1257 by Rashard Duran RN  Outcome: Ongoing     Problem: Skin Integrity:  Goal: Will show no infection signs and symptoms  Description: Will show no infection signs and symptoms  9/17/2021 2330 by Rosalie Hamm RN  Outcome: Ongoing  9/17/2021 1257 by Rashard Duran RN  Outcome: Ongoing  Goal: Absence of new skin breakdown  Description: Absence of new skin breakdown  9/17/2021 2330 by Rosalie Hamm RN  Outcome: Ongoing  9/17/2021 1257 by Rashard Duran RN  Outcome: Ongoing     Problem: Safety:  Goal: Free from accidental physical injury  Description: Free from accidental physical injury  9/17/2021 2330 by Rosalie Hamm RN  Outcome: Ongoing  9/17/2021 1257 by Rashard Duran RN  Outcome: Ongoing  Goal: Free from intentional harm  Description: Free from intentional harm  9/17/2021 2330 by Rosalie Hamm RN  Outcome: Ongoing  9/17/2021 1257 by Rashard Duran RN  Outcome: Ongoing     Problem: Daily Care:  Goal: Daily care needs are met  Description: Daily care needs are met  9/17/2021 2330 by Rosalie Hamm RN  Outcome: Ongoing  9/17/2021 1257 by Rashard Duran RN  Outcome: Ongoing

## 2021-09-18 NOTE — PROGRESS NOTES
Hospitalist Progress Note      PCP: Evelyn Stafford MD    Date of Admission: 9/13/2021    Chief Complaint: Shortness of breath    Hospital Course:  Patient is a 19-year-old female with past medical history of CAD, aortic stenosis status post AVR who presented to hospital for shortness of breath. Patient is a poor historian, reportedly patient was brought to the hospital for shortness of breath. Patient was found hypoxic in upper 80s on room air, was transitioned to nasal cannula. Subjective: Patient seen and evaluated at the bedside. On 9L NC today, no acute events overnight, continue steroids. Medications:  Reviewed    Infusion Medications    sodium chloride Stopped (09/16/21 1256)     Scheduled Medications    metoprolol  5 mg IntraVENous Once    dilTIAZem  30 mg Oral 4 times per day    dexamethasone  20 mg IntraVENous Daily    enoxaparin  30 mg SubCUTAneous BID    baricitinib  1 mg Oral Daily    sodium chloride flush  10 mL IntraVENous 2 times per day    aspirin  81 mg Oral Daily    ipratropium  2 spray Each Nostril 4x Daily    cefTRIAXone (ROCEPHIN) IV  1,000 mg IntraVENous Q24H    azithromycin  250 mg IntraVENous Q24H    losartan  50 mg Oral Daily    ipratropium  2 puff Inhalation BID    And    albuterol sulfate HFA  2 puff Inhalation BID     PRN Meds: metoprolol, sodium chloride, sodium chloride flush, sodium chloride, ondansetron **OR** ondansetron, acetaminophen **OR** acetaminophen, albuterol sulfate HFA      Intake/Output Summary (Last 24 hours) at 9/18/2021 1753  Last data filed at 9/18/2021 4622  Gross per 24 hour   Intake 600.43 ml   Output --   Net 600.43 ml       Physical Exam Performed:    /72   Pulse 94   Temp 98.4 °F (36.9 °C) (Axillary)   Resp 20   Ht 5' (1.524 m)   Wt 104 lb 4.4 oz (47.3 kg)   SpO2 94%   BMI 20.37 kg/m²     General appearance: frail, NAD, able to hold conversations, talking on phone  HEENT: Pupils equal, round, and reactive to light. embolism. Extensive multifocal bilateral airspace disease compatible with multifocal   pneumonia including viral pneumonia. Dilation of the main and proximal pulmonary arteries could represent   pulmonary arterial hypertension. Large hiatal hernia. XR CHEST PORTABLE   Final Result   Findings most consistent with multifocal pneumonia. Assessment/Plan:    COVID-19 pneumonia  Decadron 6 mg daily for 10 days  on Remdesivir and baricitinib September 14  Lovenox twice daily  Incentive spirometry  Encourage proning    Acute respiratory failure with hypoxia  Secondary to COVID-19 pneumonia  Titrate oxygen to keep saturations above 90%    Aortic stenosis status post AVR  Continue to monitor  Monitor fluid status    CAD  Continue aspirin, ACE inhibitor    DVT Prophylaxis: Lovenox  Diet: ADULT DIET; Regular  Code Status: DNR-CCA    PT/OT Eval Status:  Will need    Dispo -ilikely discharge 1 to 2 days, may need SNF or LTAC, continue to wean down O2 needs    Mari Felton MD

## 2021-09-18 NOTE — PROGRESS NOTES
Patient alert and oriented x4, VSS, sitting up in bed. Patient refused breakfast and continues with a poor appetite, RN encouraged fluids. Patient anxious and tearful, worried about her daughter and wanting to go home. Granddaughter, Nandini Levi, called and updated on patient status. Patient denies n/v, diarrhea, SOB, pain, states no needs at this time. Repositioned patient and pulled pt up in bed. Telemetry monitor and continuous pulse ox in place, WNL at this time. Bed in lowest and locked position, non-slip socks on, call light within reach. Will continue to monitor pt needs.

## 2021-09-18 NOTE — PLAN OF CARE
Problem: Falls - Risk of:  Goal: Will remain free from falls  Description: Will remain free from falls  9/18/2021 1054 by Caden Ibanez RN  Outcome: Ongoing  9/17/2021 2330 by Cesar Calix RN  Outcome: Ongoing  Goal: Absence of physical injury  Description: Absence of physical injury  9/18/2021 1054 by Caden Ibanez RN  Outcome: Ongoing  9/17/2021 2330 by Cesar Calix RN  Outcome: Ongoing     Problem: Skin Integrity:  Goal: Will show no infection signs and symptoms  Description: Will show no infection signs and symptoms  9/18/2021 1054 by Caden Ibanez RN  Outcome: Ongoing  9/17/2021 2330 by Cesar Calix RN  Outcome: Ongoing  Goal: Absence of new skin breakdown  Description: Absence of new skin breakdown  9/18/2021 1054 by Caden Ibanez RN  Outcome: Ongoing  9/17/2021 2330 by Cesar Calix RN  Outcome: Ongoing     Problem: Safety:  Goal: Free from accidental physical injury  Description: Free from accidental physical injury  9/18/2021 1054 by Caden Ibanez RN  Outcome: Ongoing  9/17/2021 2330 by Cesar Calix RN  Outcome: Ongoing  Goal: Free from intentional harm  Description: Free from intentional harm  9/18/2021 1054 by Caden Ibanez RN  Outcome: Ongoing  9/17/2021 2330 by Cesar aClix RN  Outcome: Ongoing     Problem: Daily Care:  Goal: Daily care needs are met  Description: Daily care needs are met  9/18/2021 1054 by Caden Ibanez RN  Outcome: Ongoing  9/17/2021 2330 by Cesar Calix RN  Outcome: Ongoing     Problem: Airway Clearance - Ineffective  Goal: Achieve or maintain patent airway  9/18/2021 1054 by Caden Ibanez RN  Outcome: Ongoing  9/17/2021 2330 by Cesar Calix RN  Outcome: Ongoing     Problem: Gas Exchange - Impaired  Goal: Absence of hypoxia  9/18/2021 1054 by Caden Ibanez RN  Outcome: Ongoing  9/17/2021 2330 by Cesar Calix RN  Outcome: Ongoing  Goal: Promote optimal lung function  9/18/2021 1054 by Caden Ibanez RN  Outcome: Ongoing  9/17/2021 2330 by Alyssa Zazueta RN  Outcome: Ongoing     Problem: Breathing Pattern - Ineffective  Goal: Ability to achieve and maintain a regular respiratory rate  9/18/2021 1054 by Luis Enrique Lawton RN  Outcome: Ongoing  9/17/2021 2330 by Alyssa Zazueta RN  Outcome: Ongoing     Problem:  Body Temperature -  Risk of, Imbalanced  Goal: Ability to maintain a body temperature within defined limits  9/18/2021 1054 by Luis Enrique Lawton RN  Outcome: Ongoing  9/17/2021 2330 by Alyssa Zazueta RN  Outcome: Ongoing  Goal: Will regain or maintain usual level of consciousness  9/18/2021 1054 by Luis Enrique Lawton RN  Outcome: Ongoing  9/17/2021 2330 by Alyssa Zazueta RN  Outcome: Ongoing  Goal: Complications related to the disease process, condition or treatment will be avoided or minimized  9/18/2021 1054 by Luis Enrique Lawton RN  Outcome: Ongoing  9/17/2021 2330 by Alyssa Zazueta RN  Outcome: Ongoing     Problem: Isolation Precautions - Risk of Spread of Infection  Goal: Prevent transmission of infection  9/18/2021 1054 by Luis Enrique Lawton RN  Outcome: Ongoing  9/17/2021 2330 by Alyssa Zazueta RN  Outcome: Ongoing     Problem: Nutrition Deficits  Goal: Optimize nutritional status  9/18/2021 1054 by Luis Enrique Lawton RN  Outcome: Ongoing  9/17/2021 2330 by Alyssa Zazueta RN  Outcome: Ongoing     Problem: Risk for Fluid Volume Deficit  Goal: Maintain normal heart rhythm  9/18/2021 1054 by Luis Enrique Lawton RN  Outcome: Ongoing  9/17/2021 2330 by Alyssa Zazueta RN  Outcome: Ongoing  Goal: Maintain absence of muscle cramping  9/18/2021 1054 by Luis Enrique Lawton RN  Outcome: Ongoing  9/17/2021 2330 by Alyssa Zazueta RN  Outcome: Ongoing  Goal: Maintain normal serum potassium, sodium, calcium, phosphorus, and pH  9/18/2021 1054 by Luis Enrique Lawton RN  Outcome: Ongoing  9/17/2021 2330 by Alyssa Zazueta RN  Outcome: Ongoing     Problem: Loneliness or Risk for Loneliness  Goal: Demonstrate positive use of time alone when socialization is not possible  9/18/2021 1054 by Jona Damon RN  Outcome: Ongoing  9/17/2021 2330 by Denise Rojas RN  Outcome: Ongoing     Problem: Fatigue  Goal: Verbalize increase energy and improved vitality  9/18/2021 1054 by Jona Damon RN  Outcome: Ongoing  9/17/2021 2330 by Denise Rojas RN  Outcome: Ongoing     Problem: Patient Education: Go to Patient Education Activity  Goal: Patient/Family Education  9/18/2021 1054 by Jona Damon RN  Outcome: Ongoing  9/17/2021 2330 by Denise Rojas RN  Outcome: Ongoing

## 2021-09-19 LAB
A/G RATIO: 1 (ref 1.1–2.2)
ALBUMIN SERPL-MCNC: 2.8 G/DL (ref 3.4–5)
ALP BLD-CCNC: 97 U/L (ref 40–129)
ALT SERPL-CCNC: 23 U/L (ref 10–40)
ANION GAP SERPL CALCULATED.3IONS-SCNC: 15 MMOL/L (ref 3–16)
AST SERPL-CCNC: 22 U/L (ref 15–37)
BASOPHILS ABSOLUTE: 0 K/UL (ref 0–0.2)
BASOPHILS RELATIVE PERCENT: 0.4 %
BILIRUB SERPL-MCNC: 0.5 MG/DL (ref 0–1)
BUN BLDV-MCNC: 22 MG/DL (ref 7–20)
CALCIUM SERPL-MCNC: 9 MG/DL (ref 8.3–10.6)
CHLORIDE BLD-SCNC: 104 MMOL/L (ref 99–110)
CO2: 18 MMOL/L (ref 21–32)
CREAT SERPL-MCNC: 0.6 MG/DL (ref 0.6–1.2)
EOSINOPHILS ABSOLUTE: 0 K/UL (ref 0–0.6)
EOSINOPHILS RELATIVE PERCENT: 0 %
GFR AFRICAN AMERICAN: >60
GFR NON-AFRICAN AMERICAN: >60
GLOBULIN: 2.9 G/DL
GLUCOSE BLD-MCNC: 197 MG/DL (ref 70–99)
HCT VFR BLD CALC: 37.7 % (ref 36–48)
HEMOGLOBIN: 12.7 G/DL (ref 12–16)
LYMPHOCYTES ABSOLUTE: 0.3 K/UL (ref 1–5.1)
LYMPHOCYTES RELATIVE PERCENT: 3.1 %
MCH RBC QN AUTO: 27.3 PG (ref 26–34)
MCHC RBC AUTO-ENTMCNC: 33.7 G/DL (ref 31–36)
MCV RBC AUTO: 81 FL (ref 80–100)
MONOCYTES ABSOLUTE: 0.9 K/UL (ref 0–1.3)
MONOCYTES RELATIVE PERCENT: 8 %
NEUTROPHILS ABSOLUTE: 9.9 K/UL (ref 1.7–7.7)
NEUTROPHILS RELATIVE PERCENT: 88.5 %
PDW BLD-RTO: 14.5 % (ref 12.4–15.4)
PLATELET # BLD: 368 K/UL (ref 135–450)
PMV BLD AUTO: 9.4 FL (ref 5–10.5)
POTASSIUM REFLEX MAGNESIUM: 3.6 MMOL/L (ref 3.5–5.1)
POTASSIUM SERPL-SCNC: 3.6 MMOL/L (ref 3.5–5.1)
RBC # BLD: 4.66 M/UL (ref 4–5.2)
SODIUM BLD-SCNC: 137 MMOL/L (ref 136–145)
TOTAL PROTEIN: 5.7 G/DL (ref 6.4–8.2)
WBC # BLD: 11.2 K/UL (ref 4–11)

## 2021-09-19 PROCEDURE — 6370000000 HC RX 637 (ALT 250 FOR IP): Performed by: INTERNAL MEDICINE

## 2021-09-19 PROCEDURE — 6360000002 HC RX W HCPCS: Performed by: INTERNAL MEDICINE

## 2021-09-19 PROCEDURE — 36415 COLL VENOUS BLD VENIPUNCTURE: CPT

## 2021-09-19 PROCEDURE — 2500000003 HC RX 250 WO HCPCS: Performed by: INTERNAL MEDICINE

## 2021-09-19 PROCEDURE — 85025 COMPLETE CBC W/AUTO DIFF WBC: CPT

## 2021-09-19 PROCEDURE — 80053 COMPREHEN METABOLIC PANEL: CPT

## 2021-09-19 PROCEDURE — 2580000003 HC RX 258: Performed by: INTERNAL MEDICINE

## 2021-09-19 PROCEDURE — 1200000000 HC SEMI PRIVATE

## 2021-09-19 PROCEDURE — 94761 N-INVAS EAR/PLS OXIMETRY MLT: CPT

## 2021-09-19 PROCEDURE — 6370000000 HC RX 637 (ALT 250 FOR IP): Performed by: EMERGENCY MEDICINE

## 2021-09-19 PROCEDURE — 2700000000 HC OXYGEN THERAPY PER DAY

## 2021-09-19 PROCEDURE — 94640 AIRWAY INHALATION TREATMENT: CPT

## 2021-09-19 RX ADMIN — DEXAMETHASONE SODIUM PHOSPHATE 20 MG: 4 INJECTION, SOLUTION INTRA-ARTICULAR; INTRALESIONAL; INTRAMUSCULAR; INTRAVENOUS; SOFT TISSUE at 14:26

## 2021-09-19 RX ADMIN — AZITHROMYCIN MONOHYDRATE 250 MG: 500 INJECTION, POWDER, LYOPHILIZED, FOR SOLUTION INTRAVENOUS at 10:08

## 2021-09-19 RX ADMIN — CEFTRIAXONE 1000 MG: 1 INJECTION, POWDER, FOR SOLUTION INTRAMUSCULAR; INTRAVENOUS at 08:26

## 2021-09-19 RX ADMIN — DILTIAZEM HYDROCHLORIDE 30 MG: 30 TABLET, FILM COATED ORAL at 04:55

## 2021-09-19 RX ADMIN — ACETAMINOPHEN 650 MG: 325 TABLET ORAL at 08:22

## 2021-09-19 RX ADMIN — ENOXAPARIN SODIUM 30 MG: 30 INJECTION SUBCUTANEOUS at 08:24

## 2021-09-19 RX ADMIN — IPRATROPIUM BROMIDE 2 SPRAY: 42 SPRAY NASAL at 17:48

## 2021-09-19 RX ADMIN — BARICITINIB 1 MG: 1 TABLET, FILM COATED ORAL at 08:24

## 2021-09-19 RX ADMIN — DILTIAZEM HYDROCHLORIDE 30 MG: 30 TABLET, FILM COATED ORAL at 12:13

## 2021-09-19 RX ADMIN — Medication 2 PUFF: at 20:11

## 2021-09-19 RX ADMIN — ASPIRIN 81 MG: 81 TABLET, COATED ORAL at 08:23

## 2021-09-19 RX ADMIN — Medication 2 PUFF: at 08:37

## 2021-09-19 RX ADMIN — Medication 2 PUFF: at 08:36

## 2021-09-19 RX ADMIN — METOPROLOL TARTRATE 5 MG: 1 INJECTION, SOLUTION INTRAVENOUS at 22:32

## 2021-09-19 RX ADMIN — LOSARTAN POTASSIUM 50 MG: 25 TABLET, FILM COATED ORAL at 08:23

## 2021-09-19 RX ADMIN — DILTIAZEM HYDROCHLORIDE 30 MG: 30 TABLET, FILM COATED ORAL at 23:34

## 2021-09-19 RX ADMIN — SODIUM CHLORIDE, PRESERVATIVE FREE 10 ML: 5 INJECTION INTRAVENOUS at 08:23

## 2021-09-19 RX ADMIN — IPRATROPIUM BROMIDE 2 SPRAY: 42 SPRAY NASAL at 08:23

## 2021-09-19 RX ADMIN — DILTIAZEM HYDROCHLORIDE 30 MG: 30 TABLET, FILM COATED ORAL at 17:48

## 2021-09-19 RX ADMIN — SODIUM CHLORIDE, PRESERVATIVE FREE 10 ML: 5 INJECTION INTRAVENOUS at 20:56

## 2021-09-19 RX ADMIN — METOPROLOL TARTRATE 5 MG: 1 INJECTION, SOLUTION INTRAVENOUS at 12:13

## 2021-09-19 RX ADMIN — IPRATROPIUM BROMIDE 2 SPRAY: 42 SPRAY NASAL at 12:14

## 2021-09-19 RX ADMIN — ENOXAPARIN SODIUM 30 MG: 30 INJECTION SUBCUTANEOUS at 20:56

## 2021-09-19 ASSESSMENT — PAIN SCALES - GENERAL
PAINLEVEL_OUTOF10: 0
PAINLEVEL_OUTOF10: 0
PAINLEVEL_OUTOF10: 4
PAINLEVEL_OUTOF10: 0

## 2021-09-19 ASSESSMENT — PAIN DESCRIPTION - LOCATION: LOCATION: GENERALIZED

## 2021-09-19 ASSESSMENT — PAIN DESCRIPTION - PROGRESSION: CLINICAL_PROGRESSION: NOT CHANGED

## 2021-09-19 ASSESSMENT — PAIN DESCRIPTION - PAIN TYPE
TYPE: ACUTE PAIN
TYPE: ACUTE PAIN;CHRONIC PAIN

## 2021-09-19 ASSESSMENT — PAIN DESCRIPTION - ORIENTATION: ORIENTATION: MID;RIGHT;LEFT

## 2021-09-19 ASSESSMENT — PAIN DESCRIPTION - FREQUENCY: FREQUENCY: INTERMITTENT

## 2021-09-19 ASSESSMENT — PAIN DESCRIPTION - ONSET: ONSET: ON-GOING

## 2021-09-19 ASSESSMENT — PAIN - FUNCTIONAL ASSESSMENT: PAIN_FUNCTIONAL_ASSESSMENT: PREVENTS OR INTERFERES SOME ACTIVE ACTIVITIES AND ADLS

## 2021-09-19 NOTE — PROGRESS NOTES
Pt HR been tachy staying up in 130's -pt medicated with prn metoprolol as ordered.  Electronically signed by Alicia Freed RN on 9/19/2021 at 2:13 PM

## 2021-09-19 NOTE — PLAN OF CARE
Problem: Falls - Risk of:  Goal: Will remain free from falls  Description: Will remain free from falls  9/19/2021 1454 by Mir Lozano RN  Outcome: Ongoing  Note: Pt free from falls this shift. Non skid socks provided. Pt educated on use of call light. Pt confused at times, bed alarm active. Tele camera in place. Call light always within reach. Pt able and agreeable to contact for safety appropriately. Problem: Falls - Risk of:  Goal: Absence of physical injury  Description: Absence of physical injury  9/19/2021 1454 by Mir Lozano RN  Outcome: Ongoing     Problem: Skin Integrity:  Goal: Will show no infection signs and symptoms  Description: Will show no infection signs and symptoms  9/19/2021 1454 by Mir Loznao RN  Outcome: Ongoing  Note: Skin assessment performed each shift per protocol. Pt encouraged to reposition every two hours and often. Will continue to monitor and assess for skin breakdown. 9/19/2021 0201 by Jewell Georges RN  Outcome: Ongoing     Problem: Safety:  Goal: Free from accidental physical injury  Description: Free from accidental physical injury  9/19/2021 0201 by Jewell Georges RN  Outcome: Ongoing     Problem: Daily Care:  Goal: Daily care needs are met  Description: Daily care needs are met  9/19/2021 0201 by Jewell Georges RN  Outcome: Ongoing     Problem: Airway Clearance - Ineffective  Goal: Achieve or maintain patent airway  9/19/2021 1454 by Mir Lozano RN  Outcome: Ongoing  9/19/2021 0201 by Jewell Georges RN  Outcome: Ongoing     Problem: Breathing Pattern - Ineffective  Goal: Ability to achieve and maintain a regular respiratory rate  9/19/2021 1454 by Mir Lozano RN  Outcome: Ongoing  9/19/2021 0201 by Jewell Georges RN  Outcome: Ongoing     Problem:  Body Temperature -  Risk of, Imbalanced  Goal: Ability to maintain a body temperature within defined limits  9/19/2021 1454 by Mir Lozano RN  Outcome: Ongoing  9/19/2021 0201 by Fany Gautam RN  Outcome: Ongoing     Problem: Isolation Precautions - Risk of Spread of Infection  Goal: Prevent transmission of infection  9/19/2021 1454 by Blaise Rios RN  Outcome: Ongoing  9/19/2021 0201 by Fany Gautam RN  Outcome: Ongoing     Problem: Risk for Fluid Volume Deficit  Goal: Maintain normal heart rhythm  9/19/2021 1454 by Blaise Rios RN  Outcome: Ongoing  9/19/2021 0201 by Fany Gautam RN  Outcome: Ongoing     Problem: Loneliness or Risk for Loneliness  Goal: Demonstrate positive use of time alone when socialization is not possible  9/19/2021 1454 by Blaise Rios RN  Outcome: Ongoing  9/19/2021 0201 by Fany Gautam RN  Outcome: Ongoing     Problem: Patient Education: Go to Patient Education Activity  Goal: Patient/Family Education  9/19/2021 1454 by Blaise Rios RN  Outcome: Ongoing  9/19/2021 0201 by Fany Gautam RN  Outcome: Ongoing     Problem: Fatigue  Goal: Verbalize increase energy and improved vitality  9/19/2021 1454 by Blaise Rios RN  Outcome: Ongoing

## 2021-09-19 NOTE — PLAN OF CARE
Problem: Falls - Risk of:  Goal: Will remain free from falls  Description: Will remain free from falls  Outcome: Ongoing  Goal: Absence of physical injury  Description: Absence of physical injury  Outcome: Ongoing     Problem: Skin Integrity:  Goal: Will show no infection signs and symptoms  Description: Will show no infection signs and symptoms  Outcome: Ongoing  Goal: Absence of new skin breakdown  Description: Absence of new skin breakdown  Outcome: Ongoing     Problem: Safety:  Goal: Free from accidental physical injury  Description: Free from accidental physical injury  Outcome: Ongoing  Goal: Free from intentional harm  Description: Free from intentional harm  Outcome: Ongoing     Problem: Daily Care:  Goal: Daily care needs are met  Description: Daily care needs are met  Outcome: Ongoing     Problem: Airway Clearance - Ineffective  Goal: Achieve or maintain patent airway  Outcome: Ongoing     Problem: Gas Exchange - Impaired  Goal: Absence of hypoxia  Outcome: Ongoing  Goal: Promote optimal lung function  Outcome: Ongoing     Problem: Breathing Pattern - Ineffective  Goal: Ability to achieve and maintain a regular respiratory rate  Outcome: Ongoing     Problem:  Body Temperature -  Risk of, Imbalanced  Goal: Ability to maintain a body temperature within defined limits  Outcome: Ongoing  Goal: Will regain or maintain usual level of consciousness  Outcome: Ongoing  Goal: Complications related to the disease process, condition or treatment will be avoided or minimized  Outcome: Ongoing     Problem: Isolation Precautions - Risk of Spread of Infection  Goal: Prevent transmission of infection  Outcome: Ongoing     Problem: Nutrition Deficits  Goal: Optimize nutritional status  Outcome: Ongoing     Problem: Risk for Fluid Volume Deficit  Goal: Maintain normal heart rhythm  Outcome: Ongoing  Goal: Maintain absence of muscle cramping  Outcome: Ongoing  Goal: Maintain normal serum potassium, sodium, calcium, phosphorus, and pH  Outcome: Ongoing

## 2021-09-19 NOTE — PROGRESS NOTES
Hospitalist Progress Note      PCP: Vikas Carrasco MD    Date of Admission: 9/13/2021    Chief Complaint: Shortness of breath    Hospital Course:  Patient is a 72-year-old female with past medical history of CAD, aortic stenosis status post AVR who presented to hospital for shortness of breath. Patient is a poor historian, reportedly patient was brought to the hospital for shortness of breath. Patient was found hypoxic in upper 80s on room air, was transitioned to nasal cannula. Subjective: sitting in chair, Patient seen and evaluated at the bedside. On 7L NC today, no acute events overnight, continue steroids. Medications:  Reviewed    Infusion Medications    sodium chloride Stopped (09/16/21 1256)     Scheduled Medications    metoprolol  5 mg IntraVENous Once    dilTIAZem  30 mg Oral 4 times per day    enoxaparin  30 mg SubCUTAneous BID    baricitinib  1 mg Oral Daily    sodium chloride flush  10 mL IntraVENous 2 times per day    aspirin  81 mg Oral Daily    ipratropium  2 spray Each Nostril 4x Daily    cefTRIAXone (ROCEPHIN) IV  1,000 mg IntraVENous Q24H    azithromycin  250 mg IntraVENous Q24H    losartan  50 mg Oral Daily    ipratropium  2 puff Inhalation BID    And    albuterol sulfate HFA  2 puff Inhalation BID     PRN Meds: metoprolol, sodium chloride, sodium chloride flush, sodium chloride, ondansetron **OR** ondansetron, acetaminophen **OR** acetaminophen, albuterol sulfate HFA      Intake/Output Summary (Last 24 hours) at 9/19/2021 1625  Last data filed at 9/19/2021 1246  Gross per 24 hour   Intake 1333.36 ml   Output --   Net 1333.36 ml       Physical Exam Performed:    BP (!) 146/92   Pulse 101   Temp 96.8 °F (36 °C) (Axillary)   Resp 20   Ht 5' (1.524 m)   Wt 104 lb 4.4 oz (47.3 kg)   SpO2 93%   BMI 20.37 kg/m²     General appearance: frail, NAD, able to hold conversations, on 7l NC  HEENT: Pupils equal, round, and reactive to light. Conjunctivae/corneas clear.   Neck: Supple, with full range of motion. No jugular venous distention. Trachea midline. Respiratory:  Normal respiratory effort. Diminished breath sounds bilateral  Cardiovascular: Regular rate and rhythm with normal S1/S2   Abdomen: Soft, non-tender, non-distended with normal bowel sounds. Musculoskeletal: No clubbing, cyanosis or edema bilaterally. Full range of motion without deformity. Skin: Skin color, texture, turgor normal.  No rashes or lesions. Neurologic:  Neurovascularly intact without any focal sensory/motor deficits. Cranial nerves: II-XII intact, grossly non-focal.  Psychiatric: Alert and oriented, thought content appropriate, normal insight  Capillary Refill: Brisk,< 3 seconds   Peripheral Pulses: +2 palpable, equal bilaterally       Labs:   Recent Labs     09/17/21  0809 09/18/21  0947 09/19/21  0933   WBC 7.3 10.1 11.2*   HGB 12.5 13.6 12.7   HCT 37.2 41.2 37.7    349 368     Recent Labs     09/17/21  0809 09/17/21  0809 09/18/21  0907 09/19/21  0933     --  131* 137   K 4.0  4.0   < > 4.8  4.8 3.6  3.6     --  101 104   CO2 19*  --  13* 18*   BUN 27*  --  23* 22*   CREATININE 0.8  --  0.6 0.6   CALCIUM 9.2  --  9.1 9.0    < > = values in this interval not displayed. Recent Labs     09/17/21  0809 09/18/21  0907 09/19/21  0933   AST 28 44* 22   ALT 18 21 23   BILITOT 0.4 0.4 0.5   ALKPHOS 88 107 97     No results for input(s): INR in the last 72 hours. No results for input(s): [place]avon [place] in the last 72 hours. Urinalysis:      Lab Results   Component Value Date    NITRU Negative 05/16/2019    WBCUA 2 05/16/2019    RBCUA 0-2 05/16/2019    BLOODU Negative 05/16/2019    SPECGRAV 1.016 05/16/2019    GLUCOSEU Negative 05/16/2019       Radiology:  XR CHEST 1 VIEW   Final Result   Slight increase in bilateral pulmonary opacities         CT CHEST PULMONARY EMBOLISM W CONTRAST   Final Result   No evidence of significant pulmonary embolism.       Extensive multifocal bilateral airspace disease compatible with multifocal   pneumonia including viral pneumonia. Dilation of the main and proximal pulmonary arteries could represent   pulmonary arterial hypertension. Large hiatal hernia. XR CHEST PORTABLE   Final Result   Findings most consistent with multifocal pneumonia. Assessment/Plan:    COVID-19 pneumonia  Decadron 6 mg daily for 10 days  completed Remdesivir, continue baricitinib September 14  Lovenox twice daily  Incentive spirometry  Encourage proning    Acute respiratory failure with hypoxia  Secondary to COVID-19 pneumonia  Titrate oxygen to keep saturations above 90%    Aortic stenosis status post AVR  Continue to monitor  Monitor fluid status    CAD  Continue aspirin, ACE inhibitor    DVT Prophylaxis: Lovenox  Diet: ADULT DIET; Regular  Code Status: DNR-CCA    PT/OT Eval Status:  Will need    Dispo -likely discharge 1 to 2 days, may need SNF or LTAC, continue to wean down O2 needs    Divya Osuna MD

## 2021-09-20 LAB
A/G RATIO: 1.1 (ref 1.1–2.2)
ALBUMIN SERPL-MCNC: 3.3 G/DL (ref 3.4–5)
ALP BLD-CCNC: 123 U/L (ref 40–129)
ALT SERPL-CCNC: 27 U/L (ref 10–40)
ANION GAP SERPL CALCULATED.3IONS-SCNC: 21 MMOL/L (ref 3–16)
AST SERPL-CCNC: 23 U/L (ref 15–37)
BASOPHILS ABSOLUTE: 0 K/UL (ref 0–0.2)
BASOPHILS RELATIVE PERCENT: 0.1 %
BILIRUB SERPL-MCNC: 0.6 MG/DL (ref 0–1)
BUN BLDV-MCNC: 24 MG/DL (ref 7–20)
CALCIUM SERPL-MCNC: 9.7 MG/DL (ref 8.3–10.6)
CHLORIDE BLD-SCNC: 96 MMOL/L (ref 99–110)
CO2: 17 MMOL/L (ref 21–32)
CREAT SERPL-MCNC: 0.6 MG/DL (ref 0.6–1.2)
EKG ATRIAL RATE: 326 BPM
EKG DIAGNOSIS: NORMAL
EKG Q-T INTERVAL: 338 MS
EKG QRS DURATION: 80 MS
EKG QTC CALCULATION (BAZETT): 404 MS
EKG R AXIS: 10 DEGREES
EKG T AXIS: 173 DEGREES
EKG VENTRICULAR RATE: 86 BPM
EOSINOPHILS ABSOLUTE: 0 K/UL (ref 0–0.6)
EOSINOPHILS RELATIVE PERCENT: 0 %
GFR AFRICAN AMERICAN: >60
GFR NON-AFRICAN AMERICAN: >60
GLOBULIN: 3.1 G/DL
GLUCOSE BLD-MCNC: 208 MG/DL (ref 70–99)
HCT VFR BLD CALC: 41 % (ref 36–48)
HEMOGLOBIN: 13.9 G/DL (ref 12–16)
LYMPHOCYTES ABSOLUTE: 0.4 K/UL (ref 1–5.1)
LYMPHOCYTES RELATIVE PERCENT: 2.7 %
MAGNESIUM: 2.4 MG/DL (ref 1.8–2.4)
MCH RBC QN AUTO: 27.6 PG (ref 26–34)
MCHC RBC AUTO-ENTMCNC: 33.8 G/DL (ref 31–36)
MCV RBC AUTO: 81.8 FL (ref 80–100)
MONOCYTES ABSOLUTE: 1.2 K/UL (ref 0–1.3)
MONOCYTES RELATIVE PERCENT: 7.4 %
NEUTROPHILS ABSOLUTE: 14.6 K/UL (ref 1.7–7.7)
NEUTROPHILS RELATIVE PERCENT: 89.8 %
PDW BLD-RTO: 14.7 % (ref 12.4–15.4)
PLATELET # BLD: 486 K/UL (ref 135–450)
PMV BLD AUTO: 10 FL (ref 5–10.5)
POTASSIUM REFLEX MAGNESIUM: 3.5 MMOL/L (ref 3.5–5.1)
POTASSIUM SERPL-SCNC: 3.5 MMOL/L (ref 3.5–5.1)
RBC # BLD: 5.02 M/UL (ref 4–5.2)
SODIUM BLD-SCNC: 134 MMOL/L (ref 136–145)
TOTAL PROTEIN: 6.4 G/DL (ref 6.4–8.2)
WBC # BLD: 16.2 K/UL (ref 4–11)

## 2021-09-20 PROCEDURE — 6360000002 HC RX W HCPCS: Performed by: INTERNAL MEDICINE

## 2021-09-20 PROCEDURE — 6370000000 HC RX 637 (ALT 250 FOR IP): Performed by: INTERNAL MEDICINE

## 2021-09-20 PROCEDURE — 94640 AIRWAY INHALATION TREATMENT: CPT

## 2021-09-20 PROCEDURE — 2580000003 HC RX 258: Performed by: INTERNAL MEDICINE

## 2021-09-20 PROCEDURE — 36415 COLL VENOUS BLD VENIPUNCTURE: CPT

## 2021-09-20 PROCEDURE — 85025 COMPLETE CBC W/AUTO DIFF WBC: CPT

## 2021-09-20 PROCEDURE — 1200000000 HC SEMI PRIVATE

## 2021-09-20 PROCEDURE — 94761 N-INVAS EAR/PLS OXIMETRY MLT: CPT

## 2021-09-20 PROCEDURE — 93005 ELECTROCARDIOGRAM TRACING: CPT | Performed by: INTERNAL MEDICINE

## 2021-09-20 PROCEDURE — 97530 THERAPEUTIC ACTIVITIES: CPT

## 2021-09-20 PROCEDURE — 83735 ASSAY OF MAGNESIUM: CPT

## 2021-09-20 PROCEDURE — 2500000003 HC RX 250 WO HCPCS: Performed by: INTERNAL MEDICINE

## 2021-09-20 PROCEDURE — 2700000000 HC OXYGEN THERAPY PER DAY

## 2021-09-20 PROCEDURE — 93010 ELECTROCARDIOGRAM REPORT: CPT | Performed by: INTERNAL MEDICINE

## 2021-09-20 PROCEDURE — 80053 COMPREHEN METABOLIC PANEL: CPT

## 2021-09-20 PROCEDURE — 6370000000 HC RX 637 (ALT 250 FOR IP): Performed by: EMERGENCY MEDICINE

## 2021-09-20 RX ORDER — SODIUM CHLORIDE 9 MG/ML
INJECTION, SOLUTION INTRAVENOUS CONTINUOUS
Status: DISCONTINUED | OUTPATIENT
Start: 2021-09-20 | End: 2021-09-26

## 2021-09-20 RX ADMIN — IPRATROPIUM BROMIDE 2 SPRAY: 42 SPRAY NASAL at 12:09

## 2021-09-20 RX ADMIN — SODIUM CHLORIDE, PRESERVATIVE FREE 10 ML: 5 INJECTION INTRAVENOUS at 09:38

## 2021-09-20 RX ADMIN — ASPIRIN 81 MG: 81 TABLET, COATED ORAL at 09:16

## 2021-09-20 RX ADMIN — IPRATROPIUM BROMIDE 2 SPRAY: 42 SPRAY NASAL at 09:38

## 2021-09-20 RX ADMIN — ENOXAPARIN SODIUM 30 MG: 30 INJECTION SUBCUTANEOUS at 21:06

## 2021-09-20 RX ADMIN — IPRATROPIUM BROMIDE 2 SPRAY: 42 SPRAY NASAL at 20:43

## 2021-09-20 RX ADMIN — LOSARTAN POTASSIUM 50 MG: 25 TABLET, FILM COATED ORAL at 09:16

## 2021-09-20 RX ADMIN — CEFTRIAXONE 1000 MG: 1 INJECTION, POWDER, FOR SOLUTION INTRAMUSCULAR; INTRAVENOUS at 09:33

## 2021-09-20 RX ADMIN — Medication 2 PUFF: at 08:00

## 2021-09-20 RX ADMIN — SODIUM CHLORIDE: 9 INJECTION, SOLUTION INTRAVENOUS at 12:04

## 2021-09-20 RX ADMIN — AZITHROMYCIN MONOHYDRATE 250 MG: 500 INJECTION, POWDER, LYOPHILIZED, FOR SOLUTION INTRAVENOUS at 10:54

## 2021-09-20 RX ADMIN — METOPROLOL TARTRATE 5 MG: 1 INJECTION, SOLUTION INTRAVENOUS at 06:51

## 2021-09-20 RX ADMIN — ENOXAPARIN SODIUM 30 MG: 30 INJECTION SUBCUTANEOUS at 09:16

## 2021-09-20 RX ADMIN — BARICITINIB 1 MG: 1 TABLET, FILM COATED ORAL at 09:33

## 2021-09-20 RX ADMIN — DILTIAZEM HYDROCHLORIDE 30 MG: 30 TABLET, FILM COATED ORAL at 12:10

## 2021-09-20 RX ADMIN — DILTIAZEM HYDROCHLORIDE 30 MG: 30 TABLET, FILM COATED ORAL at 18:09

## 2021-09-20 RX ADMIN — SODIUM CHLORIDE, PRESERVATIVE FREE 10 ML: 5 INJECTION INTRAVENOUS at 20:43

## 2021-09-20 ASSESSMENT — PAIN DESCRIPTION - PAIN TYPE: TYPE: ACUTE PAIN

## 2021-09-20 ASSESSMENT — PAIN SCALES - GENERAL
PAINLEVEL_OUTOF10: 0
PAINLEVEL_OUTOF10: 0

## 2021-09-20 ASSESSMENT — PAIN - FUNCTIONAL ASSESSMENT: PAIN_FUNCTIONAL_ASSESSMENT: PREVENTS OR INTERFERES SOME ACTIVE ACTIVITIES AND ADLS

## 2021-09-20 ASSESSMENT — PAIN DESCRIPTION - PROGRESSION: CLINICAL_PROGRESSION: NOT CHANGED

## 2021-09-20 ASSESSMENT — PAIN DESCRIPTION - ONSET: ONSET: ON-GOING

## 2021-09-20 ASSESSMENT — PAIN DESCRIPTION - LOCATION: LOCATION: GENERALIZED

## 2021-09-20 ASSESSMENT — PAIN DESCRIPTION - FREQUENCY: FREQUENCY: INTERMITTENT

## 2021-09-20 NOTE — PLAN OF CARE
Problem: Falls - Risk of:  Goal: Will remain free from falls  Description: Will remain free from falls  9/20/2021 0035 by Laila Mason RN  Outcome: Ongoing     Problem: Falls - Risk of:  Goal: Absence of physical injury  Description: Absence of physical injury  9/20/2021 0035 by Laila Mason RN  Outcome: Ongoing     Problem: Skin Integrity:  Goal: Will show no infection signs and symptoms  Description: Will show no infection signs and symptoms  9/20/2021 0035 by Laila Mason RN  Outcome: Ongoing     Problem: Skin Integrity:  Goal: Absence of new skin breakdown  Description: Absence of new skin breakdown  Outcome: Ongoing     Problem: Safety:  Goal: Free from accidental physical injury  Description: Free from accidental physical injury  Outcome: Ongoing     Problem: Gas Exchange - Impaired  Goal: Promote optimal lung function  9/20/2021 0035 by Laila Mason RN  Outcome: Ongoing     Problem: Gas Exchange - Impaired  Goal: Absence of hypoxia  9/20/2021 0035 by Laila Mason RN  Outcome: Ongoing     Problem: Breathing Pattern - Ineffective  Goal: Ability to achieve and maintain a regular respiratory rate  9/20/2021 0035 by Laila Mason RN  Outcome: Ongoing     Problem: Isolation Precautions - Risk of Spread of Infection  Goal: Prevent transmission of infection  9/20/2021 0035 by Laila Mason RN  Outcome: Ongoing     Problem: Nutrition Deficits  Goal: Optimize nutritional status  9/20/2021 0035 by Laila Mason RN  Outcome: Ongoing

## 2021-09-20 NOTE — PROGRESS NOTES
Patient heart rate started to elevate. CMU notified primary nurse. Heart rate stayed at 149 bpm. Followed PRN orders for elevated heart rate over 130 bpm. See MAR. Patients heart rate consistently staying between 89 bmp to 97 bpm. Will continue to monitor.

## 2021-09-20 NOTE — PROGRESS NOTES
Physical Therapy  Facility/Department: [address] MED SURG  Daily Treatment Note  NAME: Alexandr Bowie  : 1931  MRN: [id number]    Date of Service: 2021    Discharge Recommendations:  Patient would benefit from continued therapy after discharge, 3-5 sessions per week, Continue to assess pending progress     Alexandr Bowie scored a  on the AM-PAC short mobility form. Current research shows that an AM-PAC score of 17 or less is typically not associated with a discharge to the patient's home setting. Based on the patient's AM-PAC score and their current functional mobility deficits, it is recommended that the patient have 3-5 sessions per week of Physical Therapy at d/c to increase the patient's independence. Please see assessment section for further patient specific details. If patient discharges prior to next session this note will serve as a discharge summary. Please see below for the latest assessment towards goals. Assessment   Body structures, Functions, Activity limitations: Decreased functional mobility ; Decreased ADL status; Decreased balance;Decreased strength;Decreased endurance  Assessment: Pt is a 80 y.o. female who presented to the ED on 21 with SOB. Pt found to be COVID+. Prior to admission, pt living with dtr and granddtr in two level home with 4 CHRISTOS - pt independent with all ADLs and ambulation without device - helps take care of dtr with help of granddtr. Pt currently functioning below baseline - significant limitations to respiratory system - pt now at 11L high flow with O2 sats hovering around 90 - desaturates to upper 80s with sitting to EOB. Anticipate she will need continued skilled therapy 3-5x/week to maximize independence and safety with functional mobility.   Treatment Diagnosis: impaired activity tolerance  Prognosis: Fair  Decision Making: Medium Complexity  History: see below  Exam: see below  Clinical Presentation: evolving  PT Education: Goals;PT Role;Plan of Care;General Safety;Transfer Training;Gait Training;Functional Mobility Training  REQUIRES PT FOLLOW UP: Yes  Activity Tolerance  Activity Tolerance: Patient limited by fatigue;Patient limited by endurance;Treatment limited secondary to medical complications (free text)  Activity Tolerance: limited by respiratory status     Patient Diagnosis(es): The primary encounter diagnosis was Hypoxia. A diagnosis of Pneumonia due to infectious organism, unspecified laterality, unspecified part of lung was also pertinent to this visit. has a past medical history of Aortic stenosis and Arthritis. has a past surgical history that includes Varicose vein surgery (Bilateral, 1980's); Cardiac catheterization (04/17/2018); Hysterectomy, total abdominal; and Aortic valve replacement (N/A, 5/21/2019). Restrictions  Restrictions/Precautions  Restrictions/Precautions: Contact Precautions, Isolation, Fall Risk  Position Activity Restriction  Other position/activity restrictions: COVID+; droplet precautions; 11L O2     Subjective   General  Chart Reviewed: Yes  Additional Pertinent Hx: Pt is a 80 y.o. female who presented to the ED on 9/13/21 with SOB. Pt found to be COVID+. Response To Previous Treatment: Patient with no complaints from previous session. Family / Caregiver Present: No  Referring Practitioner: Lexy Limon MD  Subjective  Subjective: Pt is agreeable to PT - very [place]. Orientation  Orientation  Overall Orientation Status: Within Functional Limits     Cognition   Cognition  Overall Cognitive Status: Exceptions  Following Commands:  Follows one step commands with increased time  Attention Span: Appears intact  Memory: Decreased short term memory  Safety Judgement: Decreased awareness of need for safety;Decreased awareness of need for assistance  Problem Solving: Decreased awareness of errors;Assistance required to identify errors made;Assistance required to generate solutions  Insights: Decreased awareness of deficits  Initiation: Requires cues for some  Sequencing: Requires cues for some     Objective   Bed mobility  Supine to Sit: Minimal assistance  Sit to Supine: Minimal assistance  Scooting: Minimal assistance  Comment: Pt agreeable to sit up to edge of bed \"for only a few minutes because she want to watch TV\". Pt with O2 sats in low 90s supine in bed - range between 88-90% while sitting on EOB ~ 5 minutes. Pt does not want to sit in the recliner. Transfers  Sit to Stand: Unable to assess  Stand to sit: Unable to assess  Ambulation  Ambulation?: No     Balance  Posture: Fair  Sitting - Static: Good  Sitting - Dynamic: Fair;+         AM-PAC Score  AM-PAC Inpatient Mobility Raw Score : 11 (09/20/21 1110)  AM-PAC Inpatient T-Scale Score : 33.86 (09/20/21 1110)  Mobility Inpatient CMS 0-100% Score: 72.57 (09/20/21 1110)  Mobility Inpatient CMS G-Code Modifier : CL (09/20/21 1110)          Goals  Short term goals  Time Frame for Short term goals: by acute discharge - all goals ongoing as of 9/20/21  Short term goal 1: bed mobility with SBA  Short term goal 2: sit<>stand with SBA  Short term goal 3: ambulate >30' with LRAD and SBA  Patient Goals   Patient goals : none stated    Plan    Plan  Times per week: 3-5x/week  Current Treatment Recommendations: Strengthening, Functional Mobility Training, Transfer Training, Balance Training, Endurance Training, Patient/Caregiver Education & Training, Safety Education & Training, Gait Training, Neuromuscular Re-education, Equipment Evaluation, Education, & procurement  Safety Devices  Type of devices:  All fall risk precautions in place, Call light within reach, Patient at risk for falls, Left in bed, Bed alarm in place, Nurse notified     Therapy Time   Individual Concurrent Group Co-treatment   Time In 1045         Time Out 1110         Minutes 25         Timed Code Treatment Minutes: 25 Minutes       Inna Keating, PT

## 2021-09-20 NOTE — PROGRESS NOTES
Comprehensive Nutrition Assessment    Type and Reason for Visit:  Initial, RD Nutrition Re-Screen/LOS    Nutrition Recommendations/Plan:   Continue on regular diet  Add Ensure Enlive bid  Monitor meal and supplement intake    Nutrition Assessment:  Pt admitted with hypozia r/t COVID. PMH includes CAD, AS s/pAVR, A Fib. Pt meal record is limited, but what is recorded indicates only 1-25% of meals consumed on a regular diet. Will add Ensure Enlive bid to supplement poor po intake. Malnutrition Assessment:  RD did not conduct direct, in-person nutrition evaluation in efforts to reduce exposure and use of PPE for high risk persons, PUI persons, patients who have tested positive for Covid-19 or those awaiting respiratory panel results. EMR was screened for nutrition risk factors, as defined per nutrition standards of care. Estimated Daily Nutrient Needs:  Energy (kcal):  0631-1471 ( 25-30 x 48 CBW); Weight Used for Energy Requirements:  Current     Protein (g):  58-72 ( 1.2-1.5 x 48 CBW); Weight Used for Protein Requirements:  Current        Fluid (ml/day):  per provider; Method Used for Fluid Requirements:         Nutrition Related Findings:  no edema, BM 9/16      Wounds:  None       Current Nutrition Therapies:    ADULT DIET;  Regular  Adult Oral Nutrition Supplement; Standard High Calorie/High Protein Oral Supplement    Anthropometric Measures:  · Height: 5' (152.4 cm)  · Current Body Weight: 105 lb 3.2 oz (47.7 kg)   · Admission Body Weight: 101 lb 6.4 oz (46 kg)    · Usual Body Weight: 105 lb (47.6 kg)     · Ideal Body Weight: 100 lbs; % Ideal Body Weight 105.2 %   · BMI: 20.5  · Adjusted Body Weight:  ; No Adjustment   · BMI Categories: Underweight (BMI less than 22) age over 72       Nutrition Diagnosis:   · Inadequate oral intake related to inadequate protein-energy intake as evidenced by intake 0-25%      Nutrition Interventions:   Food and/or Nutrient Delivery:  Continue Current Diet, Start Oral Nutrition Supplement  Nutrition Education/Counseling:  No recommendation at this time   Coordination of Nutrition Care:  Continue to monitor while inpatient    Goals:  >50% of meals and supplement consumed       Nutrition Monitoring and Evaluation:   Behavioral-Environmental Outcomes:  None Identified   Food/Nutrient Intake Outcomes:  Food and Nutrient Intake, Supplement Intake  Physical Signs/Symptoms Outcomes:  Biochemical Data, Nutrition Focused Physical Findings, Skin, Weight     Discharge Planning:    Continue Oral Nutrition Supplement     Electronically signed by Barbara Medina RD, LD on 9/20/21 at 1:21 PM EDT    Contact: [phone]

## 2021-09-20 NOTE — PROGRESS NOTES
Patient refused to take medication this morning. Patient had a difficult time hearing primary nurse this morning.

## 2021-09-20 NOTE — PROGRESS NOTES
Hospitalist Progress Note      PCP: Mallorie Eaton MD    Date of Admission: 9/13/2021    Chief Complaint: Shortness of breath    Hospital Course:  Patient is a 44-year-old female with past medical history of CAD, aortic stenosis status post AVR who presented to hospital for shortness of breath. Patient is a poor historian, reportedly patient was brought to the hospital for shortness of breath. Patient was found hypoxic in upper 80s on room air, was transitioned to nasal cannula. Subjective:     11l/min this am.     Awake, very [place]. Somewhat confused. Very poor PO intake. Medications:  Reviewed      Infusion Medications    sodium chloride 75 mL/hr at 09/20/21 1204    sodium chloride Stopped (09/16/21 1256)     Scheduled Medications    metoprolol  5 mg IntraVENous Once    dilTIAZem  30 mg Oral 4 times per day    enoxaparin  30 mg SubCUTAneous BID    baricitinib  1 mg Oral Daily    sodium chloride flush  10 mL IntraVENous 2 times per day    aspirin  81 mg Oral Daily    ipratropium  2 spray Each Nostril 4x Daily    cefTRIAXone (ROCEPHIN) IV  1,000 mg IntraVENous Q24H    azithromycin  250 mg IntraVENous Q24H    losartan  50 mg Oral Daily    ipratropium  2 puff Inhalation BID    And    albuterol sulfate HFA  2 puff Inhalation BID     PRN Meds: metoprolol, sodium chloride, sodium chloride flush, sodium chloride, ondansetron **OR** ondansetron, acetaminophen **OR** acetaminophen, albuterol sulfate HFA      Intake/Output Summary (Last 24 hours) at 9/20/2021 1323  Last data filed at 9/19/2021 2056  Gross per 24 hour   Intake 130 ml   Output --   Net 130 ml       Physical Exam Performed:    BP (!) 144/76   Pulse 95   Temp 98 °F (36.7 °C) (Oral)   Resp 24   Ht 5' (1.524 m)   Wt 105 lb 3.2 oz (47.7 kg)   SpO2 90%   BMI 20.55 kg/m²     General appearance: frail, NAD, able to hold conversations, on 7l NC  HEENT: Pupils equal, round, and reactive to light. Conjunctivae/corneas clear.   Neck: Supple, with full range of motion. No jugular venous distention. Trachea midline. Respiratory:  Normal respiratory effort. Diminished breath sounds bilateral  Cardiovascular: Regular rate and rhythm with normal S1/S2   Abdomen: Soft, non-tender, non-distended with normal bowel sounds. Musculoskeletal: No clubbing, cyanosis or edema bilaterally. Full range of motion without deformity. Skin: Skin color, texture, turgor normal.  No rashes or lesions. Neurologic:  Neurovascularly intact without any focal sensory/motor deficits. Cranial nerves: II-XII intact, grossly non-focal.  Psychiatric: Alert and partially oriented. Capillary Refill: Brisk,< 3 seconds   Peripheral Pulses: +2 palpable, equal bilaterally       Labs:   Recent Labs     09/18/21  0947 09/19/21  0933 09/20/21  0749   WBC 10.1 11.2* 16.2*   HGB 13.6 12.7 13.9   HCT 41.2 37.7 41.0    368 486*     Recent Labs     09/18/21  0907 09/18/21  0907 09/19/21  0933 09/20/21  0749   *  --  137 134*   K 4.8  4.8   < > 3.6  3.6 3.5  3.5     --  104 96*   CO2 13*  --  18* 17*   BUN 23*  --  22* 24*   CREATININE 0.6  --  0.6 0.6   CALCIUM 9.1  --  9.0 9.7    < > = values in this interval not displayed. Recent Labs     09/18/21  0907 09/19/21  0933 09/20/21  0749   AST 44* 22 23   ALT 21 23 27   BILITOT 0.4 0.5 0.6   ALKPHOS 107 97 123       No results for input(s): INR in the last 72 hours. No results for input(s): [place] in the last 72 hours. Urinalysis:      Lab Results   Component Value Date    NITRU Negative 05/16/2019    WBCUA 2 05/16/2019    RBCUA 0-2 05/16/2019    BLOODU Negative 05/16/2019    SPECGRAV 1.016 05/16/2019    GLUCOSEU Negative 05/16/2019       Radiology:  XR CHEST 1 VIEW   Final Result   Slight increase in bilateral pulmonary opacities         CT CHEST PULMONARY EMBOLISM W CONTRAST   Final Result   No evidence of significant pulmonary embolism.       Extensive multifocal bilateral airspace disease compatible with multifocal   pneumonia including viral pneumonia. Dilation of the main and proximal pulmonary arteries could represent   pulmonary arterial hypertension. Large hiatal hernia. XR CHEST PORTABLE   Final Result   Findings most consistent with multifocal pneumonia. Assessment/Plan:      COVID-19 pneumonia. Decadron 6 mg daily for 10 days  completed Remdesivir, continue baricitinib started September 14  Lovenox twice daily  Incentive spirometry      Acute respiratory failure with hypoxia  Secondary to COVID-19 pneumonia  Titrate oxygen to keep saturations above 90%      Aortic stenosis status post AVR  Continue to monitor  Monitor fluid status      CAD  Continue aspirin, ACE inhibitor      Afib - no prior Hx   HR up to 130s on the monitor. Started on PO dilt last night. Will check EKG. Cont current dose lovenox. DVT Prophylaxis: Lovenox  Diet: ADULT DIET; Regular  Adult Oral Nutrition Supplement; Standard High Calorie/High Protein Oral Supplement  Code Status: DNR-CCA      PT/OT Eval Status: Will need      Dispo - cc. O2 up to 11l/min.        Amie Hadley MD

## 2021-09-20 NOTE — PROGRESS NOTES
Occupational Therapy    Attempted OT follow-up. Pt sleeping in bed but awakened to name. Pt confused and asking where everyone went. Pt oriented to being in the hospital.  Pt reporting she is too cold and asking if she can try later. Extra sheet placed on the bed. Will continue to follow.   Gwen Zhang, [address]

## 2021-09-20 NOTE — PLAN OF CARE
Problem: Falls - Risk of:  Goal: Will remain free from falls  Description: Will remain free from falls  Outcome: Ongoing  Goal: Absence of physical injury  Description: Absence of physical injury  Outcome: Ongoing     Problem: Skin Integrity:  Goal: Will show no infection signs and symptoms  Description: Will show no infection signs and symptoms  Outcome: Ongoing  Goal: Absence of new skin breakdown  Description: Absence of new skin breakdown  Outcome: Ongoing     Problem: Safety:  Goal: Free from accidental physical injury  Description: Free from accidental physical injury  Outcome: Ongoing  Goal: Free from intentional harm  Description: Free from intentional harm  Outcome: Ongoing     Problem: Daily Care:  Goal: Daily care needs are met  Description: Daily care needs are met  Outcome: Ongoing     Problem: Airway Clearance - Ineffective  Goal: Achieve or maintain patent airway  Outcome: Ongoing     Problem: Gas Exchange - Impaired  Goal: Absence of hypoxia  Outcome: Ongoing  Goal: Promote optimal lung function  Outcome: Ongoing     Problem: Breathing Pattern - Ineffective  Goal: Ability to achieve and maintain a regular respiratory rate  Outcome: Ongoing     Problem:  Body Temperature -  Risk of, Imbalanced  Goal: Ability to maintain a body temperature within defined limits  Outcome: Ongoing  Goal: Will regain or maintain usual level of consciousness  Outcome: Ongoing  Goal: Complications related to the disease process, condition or treatment will be avoided or minimized  Outcome: Ongoing     Problem: Isolation Precautions - Risk of Spread of Infection  Goal: Prevent transmission of infection  Outcome: Ongoing     Problem: Nutrition Deficits  Goal: Optimize nutritional status  Outcome: Ongoing     Problem: Risk for Fluid Volume Deficit  Goal: Maintain normal heart rhythm  Outcome: Ongoing  Goal: Maintain absence of muscle cramping  Outcome: Ongoing  Goal: Maintain normal serum potassium, sodium, calcium, phosphorus, and pH  Outcome: Ongoing     Problem: Loneliness or Risk for Loneliness  Goal: Demonstrate positive use of time alone when socialization is not possible  Outcome: Ongoing     Problem: Fatigue  Goal: Verbalize increase energy and improved vitality  Outcome: Ongoing     Problem: Patient Education: Go to Patient Education Activity  Goal: Patient/Family Education  Outcome: Ongoing     Problem: Nutrition  Goal: Optimal nutrition therapy  9/20/2021 1513 by Yosvany Loredo RN  Outcome: Ongoing  9/20/2021 1326 by Herminio Wilson RD, LD  Outcome: Ongoing

## 2021-09-21 LAB
A/G RATIO: 1.1 (ref 1.1–2.2)
ALBUMIN SERPL-MCNC: 2.9 G/DL (ref 3.4–5)
ALP BLD-CCNC: 92 U/L (ref 40–129)
ALT SERPL-CCNC: 20 U/L (ref 10–40)
ANION GAP SERPL CALCULATED.3IONS-SCNC: 12 MMOL/L (ref 3–16)
AST SERPL-CCNC: 15 U/L (ref 15–37)
BASOPHILS ABSOLUTE: 0.2 K/UL (ref 0–0.2)
BASOPHILS RELATIVE PERCENT: 1 %
BILIRUB SERPL-MCNC: 0.5 MG/DL (ref 0–1)
BUN BLDV-MCNC: 24 MG/DL (ref 7–20)
CALCIUM SERPL-MCNC: 9 MG/DL (ref 8.3–10.6)
CHLORIDE BLD-SCNC: 104 MMOL/L (ref 99–110)
CO2: 22 MMOL/L (ref 21–32)
CREAT SERPL-MCNC: 0.6 MG/DL (ref 0.6–1.2)
EOSINOPHILS ABSOLUTE: 0 K/UL (ref 0–0.6)
EOSINOPHILS RELATIVE PERCENT: 0.1 %
GFR AFRICAN AMERICAN: >60
GFR NON-AFRICAN AMERICAN: >60
GLOBULIN: 2.6 G/DL
GLUCOSE BLD-MCNC: 143 MG/DL (ref 70–99)
HCT VFR BLD CALC: 37.7 % (ref 36–48)
HEMOGLOBIN: 12.8 G/DL (ref 12–16)
LYMPHOCYTES ABSOLUTE: 0.3 K/UL (ref 1–5.1)
LYMPHOCYTES RELATIVE PERCENT: 1.7 %
MAGNESIUM: 2.3 MG/DL (ref 1.8–2.4)
MCH RBC QN AUTO: 27.7 PG (ref 26–34)
MCHC RBC AUTO-ENTMCNC: 34 G/DL (ref 31–36)
MCV RBC AUTO: 81.6 FL (ref 80–100)
MONOCYTES ABSOLUTE: 1.1 K/UL (ref 0–1.3)
MONOCYTES RELATIVE PERCENT: 6.8 %
NEUTROPHILS ABSOLUTE: 14.4 K/UL (ref 1.7–7.7)
NEUTROPHILS RELATIVE PERCENT: 90.4 %
PDW BLD-RTO: 14.5 % (ref 12.4–15.4)
PLATELET # BLD: 373 K/UL (ref 135–450)
PMV BLD AUTO: 9.3 FL (ref 5–10.5)
POTASSIUM REFLEX MAGNESIUM: 3.3 MMOL/L (ref 3.5–5.1)
POTASSIUM SERPL-SCNC: 3.3 MMOL/L (ref 3.5–5.1)
RBC # BLD: 4.62 M/UL (ref 4–5.2)
SODIUM BLD-SCNC: 138 MMOL/L (ref 136–145)
TOTAL PROTEIN: 5.5 G/DL (ref 6.4–8.2)
WBC # BLD: 15.9 K/UL (ref 4–11)

## 2021-09-21 PROCEDURE — 85025 COMPLETE CBC W/AUTO DIFF WBC: CPT

## 2021-09-21 PROCEDURE — 94640 AIRWAY INHALATION TREATMENT: CPT

## 2021-09-21 PROCEDURE — 94761 N-INVAS EAR/PLS OXIMETRY MLT: CPT

## 2021-09-21 PROCEDURE — 83735 ASSAY OF MAGNESIUM: CPT

## 2021-09-21 PROCEDURE — 2580000003 HC RX 258: Performed by: INTERNAL MEDICINE

## 2021-09-21 PROCEDURE — 6370000000 HC RX 637 (ALT 250 FOR IP): Performed by: INTERNAL MEDICINE

## 2021-09-21 PROCEDURE — 6360000002 HC RX W HCPCS: Performed by: INTERNAL MEDICINE

## 2021-09-21 PROCEDURE — 80053 COMPREHEN METABOLIC PANEL: CPT

## 2021-09-21 PROCEDURE — 99223 1ST HOSP IP/OBS HIGH 75: CPT | Performed by: INTERNAL MEDICINE

## 2021-09-21 PROCEDURE — 1200000000 HC SEMI PRIVATE

## 2021-09-21 PROCEDURE — XW0DXM6 INTRODUCTION OF BARICITINIB INTO MOUTH AND PHARYNX, EXTERNAL APPROACH, NEW TECHNOLOGY GROUP 6: ICD-10-PCS | Performed by: INTERNAL MEDICINE

## 2021-09-21 PROCEDURE — 36415 COLL VENOUS BLD VENIPUNCTURE: CPT

## 2021-09-21 PROCEDURE — XW033E5 INTRODUCTION OF REMDESIVIR ANTI-INFECTIVE INTO PERIPHERAL VEIN, PERCUTANEOUS APPROACH, NEW TECHNOLOGY GROUP 5: ICD-10-PCS | Performed by: INTERNAL MEDICINE

## 2021-09-21 PROCEDURE — 2700000000 HC OXYGEN THERAPY PER DAY

## 2021-09-21 PROCEDURE — 97530 THERAPEUTIC ACTIVITIES: CPT

## 2021-09-21 RX ORDER — DILTIAZEM HYDROCHLORIDE 60 MG/1
60 TABLET, FILM COATED ORAL EVERY 6 HOURS SCHEDULED
Status: DISCONTINUED | OUTPATIENT
Start: 2021-09-21 | End: 2021-09-22

## 2021-09-21 RX ADMIN — CEFTRIAXONE 1000 MG: 1 INJECTION, POWDER, FOR SOLUTION INTRAMUSCULAR; INTRAVENOUS at 18:32

## 2021-09-21 RX ADMIN — POTASSIUM BICARBONATE 40 MEQ: 782 TABLET, EFFERVESCENT ORAL at 10:35

## 2021-09-21 RX ADMIN — Medication 2 PUFF: at 08:28

## 2021-09-21 RX ADMIN — Medication 2 PUFF: at 20:04

## 2021-09-21 RX ADMIN — SODIUM CHLORIDE: 9 INJECTION, SOLUTION INTRAVENOUS at 13:40

## 2021-09-21 RX ADMIN — ENOXAPARIN SODIUM 30 MG: 30 INJECTION SUBCUTANEOUS at 10:35

## 2021-09-21 RX ADMIN — IPRATROPIUM BROMIDE 2 SPRAY: 42 SPRAY NASAL at 18:32

## 2021-09-21 RX ADMIN — DILTIAZEM HYDROCHLORIDE 60 MG: 60 TABLET, FILM COATED ORAL at 18:32

## 2021-09-21 RX ADMIN — DILTIAZEM HYDROCHLORIDE 60 MG: 60 TABLET, FILM COATED ORAL at 10:35

## 2021-09-21 RX ADMIN — ACETAMINOPHEN 650 MG: 325 TABLET ORAL at 21:23

## 2021-09-21 RX ADMIN — DILTIAZEM HYDROCHLORIDE 30 MG: 30 TABLET, FILM COATED ORAL at 00:58

## 2021-09-21 RX ADMIN — SODIUM CHLORIDE, PRESERVATIVE FREE 10 ML: 5 INJECTION INTRAVENOUS at 21:24

## 2021-09-21 RX ADMIN — BARICITINIB 1 MG: 1 TABLET, FILM COATED ORAL at 10:35

## 2021-09-21 RX ADMIN — ASPIRIN 81 MG: 81 TABLET, COATED ORAL at 10:35

## 2021-09-21 RX ADMIN — SODIUM CHLORIDE: 9 INJECTION, SOLUTION INTRAVENOUS at 21:11

## 2021-09-21 RX ADMIN — IPRATROPIUM BROMIDE 2 SPRAY: 42 SPRAY NASAL at 21:25

## 2021-09-21 RX ADMIN — IPRATROPIUM BROMIDE 2 SPRAY: 42 SPRAY NASAL at 10:35

## 2021-09-21 RX ADMIN — AZITHROMYCIN MONOHYDRATE 250 MG: 500 INJECTION, POWDER, LYOPHILIZED, FOR SOLUTION INTRAVENOUS at 15:55

## 2021-09-21 RX ADMIN — ENOXAPARIN SODIUM 30 MG: 30 INJECTION SUBCUTANEOUS at 21:24

## 2021-09-21 ASSESSMENT — PAIN DESCRIPTION - PROGRESSION: CLINICAL_PROGRESSION: NOT CHANGED

## 2021-09-21 ASSESSMENT — PAIN DESCRIPTION - ORIENTATION: ORIENTATION: OTHER (COMMENT)

## 2021-09-21 ASSESSMENT — PAIN SCALES - GENERAL
PAINLEVEL_OUTOF10: 0
PAINLEVEL_OUTOF10: 3
PAINLEVEL_OUTOF10: 0

## 2021-09-21 ASSESSMENT — PAIN DESCRIPTION - FREQUENCY: FREQUENCY: CONTINUOUS

## 2021-09-21 ASSESSMENT — PAIN DESCRIPTION - PAIN TYPE: TYPE: ACUTE PAIN

## 2021-09-21 ASSESSMENT — PAIN DESCRIPTION - DESCRIPTORS: DESCRIPTORS: ACHING

## 2021-09-21 ASSESSMENT — PAIN - FUNCTIONAL ASSESSMENT: PAIN_FUNCTIONAL_ASSESSMENT: PREVENTS OR INTERFERES SOME ACTIVE ACTIVITIES AND ADLS

## 2021-09-21 ASSESSMENT — PAIN DESCRIPTION - ONSET: ONSET: ON-GOING

## 2021-09-21 ASSESSMENT — PAIN DESCRIPTION - LOCATION: LOCATION: GENERALIZED

## 2021-09-21 NOTE — CONSULTS
Referring Physician: Dr. Napoleon Owen  Reason for Consultation: Ruiz Boudreaux new onset. Covid patient with significant hypoxia. \"  Chief Complaint: \"I am thirsty\"    Subjective:   History of Present Illness:  Pastora Rosenthal is a 80 y.o. patient who presented to the hospital with complaints of shortness of breath on 9/13/2021. She was in respiratory failure with hypoxia and ultimately diagnosed with COVID-19 pneumonia. She still requires supplemental oxygen. The patient is extremely hard of hearing and is unable to either hear or answer any questions. No family members are present to obtain history. History is obtained via chart review. The patient has a known history of CRISTIANA and had postprocedural atrial fibrillation. The patient appears to of converted to atrial fibrillation on 9/17/2021 and 4 days later a consultation was placed for atrial fibrillation. In general, the heart rate is well controlled. I am uncertain if the patient is oriented as she is so profoundly hard of hearing. She just repeats multiple times that she is thirsty and wants ice water. Past Medical History:   has a past medical history of Aortic stenosis and Arthritis. Surgical History:   has a past surgical history that includes Varicose vein surgery (Bilateral, 1980's); Cardiac catheterization (04/17/2018); Hysterectomy, total abdominal; and Aortic valve replacement (N/A, 5/21/2019). Social History:   reports that she has never smoked. She has never used smokeless tobacco. She reports that she does not drink alcohol and does not use drugs. Family History:  family history includes Asthma in her daughter; Cancer in her brother and sister; Diabetes in her mother; Heart Disease in her brother, father, mother, nephew, and sister. Home Medications:  Were reviewed and are listed in nursing record and/or below  Prior to Admission medications    Medication Sig Start Date End Date Taking?  Authorizing Provider   losartan (COZAAR) 50 MG tablet Take 1 tablet by mouth daily 7/23/21  Yes Tabitha Kelly MD   ipratropium (ATROVENT) 0.06 % nasal spray 2 sprays by Each Nostril route 4 times daily 7/12/21  Yes Josse Cornell MD   meloxicam (MOBIC) 7.5 MG tablet Take 1 tablet by mouth daily 7/12/21  Yes Vikas Carrasco MD   EQ ASPIRIN ADULT LOW DOSE 81 MG EC tablet TAKE  TABLET BY MOUTH ONCE DAILY 9/23/19  Yes Tabitha Kelly MD        CURRENT Medications:  dilTIAZem (CARDIZEM) tablet 60 mg, 4 times per day  0.9 % sodium chloride infusion, Continuous  metoprolol (LOPRESSOR) injection 5 mg, Q30 Min PRN  enoxaparin (LOVENOX) injection 30 mg, BID  0.9 % sodium chloride bolus, PRN  baricitinib (OLUMIANT) tablet 1 mg, Daily  sodium chloride flush 0.9 % injection 10 mL, 2 times per day  sodium chloride flush 0.9 % injection 10 mL, PRN  0.9 % sodium chloride infusion, PRN  ondansetron (ZOFRAN-ODT) disintegrating tablet 4 mg, Q8H PRN   Or  ondansetron (ZOFRAN) injection 4 mg, Q6H PRN  acetaminophen (TYLENOL) tablet 650 mg, Q6H PRN   Or  acetaminophen (TYLENOL) suppository 650 mg, Q6H PRN  aspirin EC tablet 81 mg, Daily  ipratropium (ATROVENT) 0.06 % nasal spray 2 spray, 4x Daily  cefTRIAXone (ROCEPHIN) 1000 mg IVPB in 50 mL D5W minibag, Q24H  azithromycin (ZITHROMAX) 250 mg in dextrose 5 % 250 mL IVPB, Q24H  ipratropium (ATROVENT HFA) 17 MCG/ACT inhaler 2 puff, BID   And  albuterol sulfate  (90 Base) MCG/ACT inhaler 2 puff, BID  albuterol sulfate  (90 Base) MCG/ACT inhaler 2 puff, Q2H PRN      Allergies:  Patient has no known allergies. Review of Systems:   · Unable to obtain secondary to her medical condition. Objective:   PHYSICAL EXAM:    Vitals:    09/21/21 1141   BP: 103/66   Pulse:    Resp: 20   Temp:    SpO2: 97% on 15 L    Weight: 105 lb 3.2 oz (47.7 kg)       General Appearance:  Alert. No respiratory distress. Elderly. Frail. Wearing O2. Head:  Normocephalic, without obvious abnormality, atraumatic. Eyes:  Pupils equal and round. No scleral icterus. Mouth: Moist mucosa, no pharyngeal erythema. Nose: Nares normal. No drainage or sinus tenderness. Neck: Supple, symmetrical, trachea midline. No adenopathy. No tenderness/mass/nodules. No carotid bruit or elevated JVD. Lungs:   Respirations unlabored. Diffusely diminished breath sounds. No crackles noted. Chest Wall:  No tenderness or deformity. Heart:   Irregularly irregular with a normal rate. S1/S2 normal. No murmur, rub, or gallop. Abdomen:   Soft, non-tender, bowel sounds active. Musculoskeletal:  + Generalized muscle wasting. No digital clubbing. Extremities: Extremities normal, atraumatic. No cyanosis or edema. Pulses: 2+ radial and carotid pulses, symmetric. Skin: No rashes or lesions. Pysch:  Alert. Unable to obtain assess orientation secondary to her profound hearing loss. Neurologic:  Moves all extremities spontaneously. Labs     CBC:   Lab Results   Component Value Date    WBC 15.9 09/21/2021    RBC 4.62 09/21/2021    HGB 12.8 09/21/2021    HCT 37.7 09/21/2021    MCV 81.6 09/21/2021    RDW 14.5 09/21/2021     09/21/2021     CMP:  Lab Results   Component Value Date     09/21/2021    K 3.3 09/21/2021    K 3.3 09/21/2021     09/21/2021    CO2 22 09/21/2021    BUN 24 09/21/2021    CREATININE 0.6 09/21/2021    GFRAA >60 09/21/2021    AGRATIO 1.1 09/21/2021    LABGLOM >60 09/21/2021    GLUCOSE 143 09/21/2021    PROT 5.5 09/21/2021    CALCIUM 9.0 09/21/2021    BILITOT 0.5 09/21/2021    ALKPHOS 92 09/21/2021    AST 15 09/21/2021    ALT 20 09/21/2021     PT/INR:  No results found for: PTINR  HgBA1c:No results found for: LABA1C  Lab Results   Component Value Date    TROPONINI <0.01 09/13/2021       Cardiac Data     EKG: Personally interpreted. 9/20/2021. Atrial fibrillation. Nonspecific T wave abnormality. Echo: 7/1/2021.   Left ventricle - normal size, thickness and function with EF of 65%  Aortic valve - well seated TAVR valve, MG 7mmHg, no regurgitation  Mitral valve - mild calcification and thickening, annular calcification, trivial regurgitation  Tricuspid valve - mild regurgitation with RVSP of 33mmHg    Cath: 4/2019  1. Left main is normal with CLARISSA 3 flow. No stenosis. 2.  Left anterior descending artery in the proximal portion has 60% stenosis. Fractional flow reserve is 0.84. Patent diagonal branches. 3.  Left circumflex artery is patent without significant stenosis. CLARISSA 3 flow. 4.  Right coronary artery is coming from right coronary cusp. It is right dominant. Proximal portion has 50% stenosis. Patent posterior descending artery. Telemetry: Personally interpreted. Atrial fibrillation. Assessment and Plan   1) Paroxysmal atrial fibrillation. Patient with known atrial fibrillation after CRISTIANA. CHADS-Vasc is at least 5. Seemingly asymptomatic and currently in atrial fibrillation. Would opt for rate control strategy. Heart rate is reasonably well controlled on Cardizem. Transition to Cardizem CD prior to discharge. Will defer discussion of chronic anticoagulation to the primary team with family but if patient/family agreeable would suggest Eliquis 2.5 mg p.o. twice daily and discontinue aspirin. No emergent indication for cardioversion. 2) Acute respiratory failure with hypoxia/COVID-19 pneumonia. Will defer treatment to primary team.    3) Aortic stenosis s/p CRISTIANA. Continue aspirin. Recent echo shows normal functioning of bioprosthetic valve. 4) CAD. Asymptomatic. Continue with medical management and risk factor modification including aspirin. Statin therapy would be indicated but was not on as an outpatient. If patient/family would be agreeable to starting therapy, would suggest Lipitor 10 mg daily. Overall, the problems requiring hospitalization are high in severity. Will sign off. Call with questions.       Thank you for allowing us to participate in the care of Delmy Hughes. Garcia Vazquez.  Robert Licea, [address] [place] [address] Road  9/21/2021 2:56 PM

## 2021-09-21 NOTE — PLAN OF CARE
Problem: Falls - Risk of:  Goal: Will remain free from falls  Description: Will remain free from falls  9/21/2021 1227 by Pati Wing RN  Outcome: Ongoing  9/20/2021 2320 by Nehal Riley RN  Outcome: Ongoing  Goal: Absence of physical injury  Description: Absence of physical injury  9/21/2021 1227 by Pati Wing RN  Outcome: Ongoing  9/20/2021 2320 by Nehal Riley RN  Outcome: Ongoing     Problem: Skin Integrity:  Goal: Will show no infection signs and symptoms  Description: Will show no infection signs and symptoms  9/21/2021 1227 by Pati Wing RN  Outcome: Ongoing  9/20/2021 2320 by Nehal Riley RN  Outcome: Ongoing  Goal: Absence of new skin breakdown  Description: Absence of new skin breakdown  9/21/2021 1227 by Pati Wing RN  Outcome: Ongoing  9/20/2021 2320 by Nehal Riley RN  Outcome: Ongoing     Problem: Safety:  Goal: Free from accidental physical injury  Description: Free from accidental physical injury  9/21/2021 1227 by Pati Wing RN  Outcome: Ongoing  9/20/2021 2320 by Nehal Riley RN  Outcome: Ongoing  Goal: Free from intentional harm  Description: Free from intentional harm  9/21/2021 1227 by Pati Wing RN  Outcome: Ongoing  9/20/2021 2320 by Nehal Riley RN  Outcome: Ongoing     Problem: Daily Care:  Goal: Daily care needs are met  Description: Daily care needs are met  9/21/2021 1227 by Pati Wing RN  Outcome: Ongoing  9/20/2021 2320 by Nehal Riley RN  Outcome: Ongoing     Problem: Airway Clearance - Ineffective  Goal: Achieve or maintain patent airway  9/21/2021 1227 by Pati Wing RN  Outcome: Ongoing  9/20/2021 2320 by Nehal Riley RN  Outcome: Ongoing     Problem: Gas Exchange - Impaired  Goal: Absence of hypoxia  9/21/2021 1227 by Pati Wing RN  Outcome: Ongoing  9/20/2021 2320 by Nehal Riley RN  Outcome: Ongoing  Goal: Promote optimal lung function  9/21/2021 1227 by Pati Wing RN  Outcome: Ongoing  9/20/2021 2320 by Renaldo Hinkle RN  Outcome: Ongoing     Problem: Breathing Pattern - Ineffective  Goal: Ability to achieve and maintain a regular respiratory rate  9/21/2021 1227 by Johnna Ibarra RN  Outcome: Ongoing  9/20/2021 2320 by Renaldo Hinkle RN  Outcome: Ongoing     Problem:  Body Temperature -  Risk of, Imbalanced  Goal: Ability to maintain a body temperature within defined limits  9/21/2021 1227 by Johnna Ibarra RN  Outcome: Ongoing  9/20/2021 2320 by Renaldo Hinkle RN  Outcome: Ongoing  Goal: Will regain or maintain usual level of consciousness  9/21/2021 1227 by Johnna Ibarra RN  Outcome: Ongoing  9/20/2021 2320 by Renaldo Hinkle RN  Outcome: Ongoing  Goal: Complications related to the disease process, condition or treatment will be avoided or minimized  9/21/2021 1227 by Johnna Ibarra RN  Outcome: Ongoing  9/20/2021 2320 by Renaldo Hinkle RN  Outcome: Ongoing     Problem: Isolation Precautions - Risk of Spread of Infection  Goal: Prevent transmission of infection  9/21/2021 1227 by Johnna Ibarra RN  Outcome: Ongoing  9/20/2021 2320 by Renaldo Hinkle RN  Outcome: Ongoing     Problem: Nutrition Deficits  Goal: Optimize nutritional status  9/21/2021 1227 by Johnna Ibarra RN  Outcome: Ongoing  9/20/2021 2320 by Renaldo Hinlke RN  Outcome: Ongoing     Problem: Risk for Fluid Volume Deficit  Goal: Maintain normal heart rhythm  9/21/2021 1227 by Johnna Ibarra RN  Outcome: Ongoing  9/20/2021 2320 by Renaldo Hinkle RN  Outcome: Ongoing  Goal: Maintain absence of muscle cramping  9/21/2021 1227 by Johnna Ibarra RN  Outcome: Ongoing  9/20/2021 2320 by Renaldo Hinkle RN  Outcome: Ongoing  Goal: Maintain normal serum potassium, sodium, calcium, phosphorus, and pH  9/21/2021 1227 by Johnna Ibarra RN  Outcome: Ongoing  9/20/2021 2320 by Renaldo Hinkle RN  Outcome: Ongoing     Problem: Loneliness or Risk for Loneliness  Goal: Demonstrate positive use of time alone when socialization is not possible  9/21/2021 1227 by Nely Nolan RN  Outcome: Ongoing  9/20/2021 2320 by Sandy Quick RN  Outcome: Ongoing     Problem: Fatigue  Goal: Verbalize increase energy and improved vitality  9/21/2021 1227 by Nely Nolan RN  Outcome: Ongoing  9/20/2021 2320 by Sandy Quick RN  Outcome: Ongoing     Problem: Patient Education: Go to Patient Education Activity  Goal: Patient/Family Education  9/21/2021 1227 by Nely Nolan RN  Outcome: Ongoing  9/20/2021 2320 by Sandy Quick RN  Outcome: Ongoing     Problem: Nutrition  Goal: Optimal nutrition therapy  9/21/2021 1227 by Nely Nolan RN  Outcome: Ongoing  9/20/2021 2320 by Sandy Quick RN  Outcome: Ongoing

## 2021-09-21 NOTE — PLAN OF CARE
Problem: Falls - Risk of:  Goal: Will remain free from falls  Description: Will remain free from falls  Outcome: Ongoing  Goal: Absence of physical injury  Description: Absence of physical injury  Outcome: Ongoing  Fall risk assessed. Precautions in place. Bed low and locked with side rails up x2. Nonskid socks on when OOB. Bed/chair alarm utilized. Call light within reach. Frequent checks performed. No falls at this time. Problem: Skin Integrity:  Goal: Will show no infection signs and symptoms  Description: Will show no infection signs and symptoms  Outcome: Ongoing  Goal: Absence of new skin breakdown  Description: Absence of new skin breakdown  Outcome: Ongoing  Skin assessed per protocol. Darryl score obtained. Skin kept clean, dry and warm. Encouraged pt to reposition to reduce the risk of PUs. Pillow support in place. Will continue to monitor. Problem: Safety:  Goal: Free from accidental physical injury  Description: Free from accidental physical injury  Outcome: Ongoing  Goal: Free from intentional harm  Description: Free from intentional harm  Outcome: Ongoing     Problem: Daily Care:  Goal: Daily care needs are met  Description: Daily care needs are met  Outcome: Ongoing     Problem: Airway Clearance - Ineffective  Goal: Achieve or maintain patent airway  Outcome: Ongoing     Problem: Gas Exchange - Impaired  Goal: Absence of hypoxia  Outcome: Ongoing  Goal: Promote optimal lung function  Outcome: Ongoing  Pt respiratory status assessed. No s/s of respiratory complications. Oxygen saturations >90% at all times with supplemental O2 as needed. Position upright to promote gas exchange. Encourage to cough and deep breath. Encourage IS. Will assess respiratory status every shift and PRN. Problem: Breathing Pattern - Ineffective  Goal: Ability to achieve and maintain a regular respiratory rate  Outcome: Ongoing     Problem:  Body Temperature -  Risk of, Imbalanced  Goal: Ability to maintain a body temperature within defined limits  Outcome: Ongoing  Goal: Will regain or maintain usual level of consciousness  Outcome: Ongoing  Goal: Complications related to the disease process, condition or treatment will be avoided or minimized  Outcome: Ongoing     Problem: Isolation Precautions - Risk of Spread of Infection  Goal: Prevent transmission of infection  Outcome: Ongoing     Problem: Nutrition Deficits  Goal: Optimize nutritional status  Outcome: Ongoing     Problem: Risk for Fluid Volume Deficit  Goal: Maintain normal heart rhythm  Outcome: Ongoing  Goal: Maintain absence of muscle cramping  Outcome: Ongoing  Goal: Maintain normal serum potassium, sodium, calcium, phosphorus, and pH  Outcome: Ongoing     Problem: Loneliness or Risk for Loneliness  Goal: Demonstrate positive use of time alone when socialization is not possible  Outcome: Ongoing     Problem: Fatigue  Goal: Verbalize increase energy and improved vitality  Outcome: Ongoing     Problem: Patient Education: Go to Patient Education Activity  Goal: Patient/Family Education  Outcome: Ongoing     Problem: Nutrition  Goal: Optimal nutrition therapy  Outcome: Ongoing

## 2021-09-21 NOTE — PROGRESS NOTES
Hospitalist Progress Note      PCP: Gage Conti MD    Date of Admission: 9/13/2021    Chief Complaint: Shortness of breath    Hospital Course:  Patient is a 54-year-old female with past medical history of CAD, aortic stenosis status post AVR who presented to hospital for shortness of breath. Patient is a poor historian, reportedly patient was brought to the hospital for shortness of breath. Patient was found hypoxic in upper 80s on room air, was transitioned to nasal cannula. Subjective:     11l/min - up to 15l/min. Awake, very [place]. Somewhat confused. Very poor PO intake. Medications:  Reviewed      Infusion Medications    sodium chloride 75 mL/hr at 09/21/21 1340    sodium chloride Stopped (09/16/21 1256)     Scheduled Medications    dilTIAZem  60 mg Oral 4 times per day    enoxaparin  30 mg SubCUTAneous BID    baricitinib  1 mg Oral Daily    sodium chloride flush  10 mL IntraVENous 2 times per day    aspirin  81 mg Oral Daily    ipratropium  2 spray Each Nostril 4x Daily    cefTRIAXone (ROCEPHIN) IV  1,000 mg IntraVENous Q24H    azithromycin  250 mg IntraVENous Q24H    ipratropium  2 puff Inhalation BID    And    albuterol sulfate HFA  2 puff Inhalation BID     PRN Meds: metoprolol, sodium chloride, sodium chloride flush, sodium chloride, ondansetron **OR** ondansetron, acetaminophen **OR** acetaminophen, albuterol sulfate HFA      Intake/Output Summary (Last 24 hours) at 9/21/2021 1432  Last data filed at 9/21/2021 0600  Gross per 24 hour   Intake 564.91 ml   Output --   Net 564.91 ml       Physical Exam Performed:    /66   Pulse 111   Temp 102 °F (38.9 °C) (Axillary)   Resp 20   Ht 5' (1.524 m)   Wt 105 lb 3.2 oz (47.7 kg)   SpO2 97%   BMI 20.55 kg/m²     General appearance: frail, NAD, able to hold conversations, on 7l NC  HEENT: Pupils equal, round, and reactive to light. Conjunctivae/corneas clear. Neck: Supple, with full range of motion.  No jugular venous distention. Trachea midline. Respiratory:  Normal respiratory effort. Diminished breath sounds bilateral  Cardiovascular: Regular rate and rhythm with normal S1/S2   Abdomen: Soft, non-tender, non-distended with normal bowel sounds. Musculoskeletal: No clubbing, cyanosis or edema bilaterally. Full range of motion without deformity. Skin: Skin color, texture, turgor normal.  No rashes or lesions. Neurologic:  Neurovascularly intact without any focal sensory/motor deficits. Cranial nerves: II-XII intact, grossly non-focal.  Psychiatric: Alert and partially oriented. Capillary Refill: Brisk,< 3 seconds   Peripheral Pulses: +2 palpable, equal bilaterally       Labs:   Recent Labs     09/19/21  0933 09/20/21  0749 09/21/21  0751   WBC 11.2* 16.2* 15.9*   HGB 12.7 13.9 12.8   HCT 37.7 41.0 37.7    486* 373     Recent Labs     09/19/21  0933 09/19/21  0933 09/20/21  0749 09/21/21  0751     --  134* 138   K 3.6  3.6   < > 3.5  3.5 3.3*  3.3*     --  96* 104   CO2 18*  --  17* 22   BUN 22*  --  24* 24*   CREATININE 0.6  --  0.6 0.6   CALCIUM 9.0  --  9.7 9.0    < > = values in this interval not displayed. Recent Labs     09/19/21 0933 09/20/21  0749 09/21/21  0751   AST 22 23 15   ALT 23 27 20   BILITOT 0.5 0.6 0.5   ALKPHOS 97 123 92       No results for input(s): INR in the last 72 hours. No results for input(s): Assunta Carrero in the last 72 hours. Urinalysis:      Lab Results   Component Value Date    NITRU Negative 05/16/2019    WBCUA 2 05/16/2019    RBCUA 0-2 05/16/2019    BLOODU Negative 05/16/2019    SPECGRAV 1.016 05/16/2019    GLUCOSEU Negative 05/16/2019       Radiology:  XR CHEST 1 VIEW   Final Result   Slight increase in bilateral pulmonary opacities         CT CHEST PULMONARY EMBOLISM W CONTRAST   Final Result   No evidence of significant pulmonary embolism.       Extensive multifocal bilateral airspace disease compatible with multifocal   pneumonia including viral pneumonia. Dilation of the main and proximal pulmonary arteries could represent   pulmonary arterial hypertension. Large hiatal hernia. XR CHEST PORTABLE   Final Result   Findings most consistent with multifocal pneumonia. Assessment/Plan:      COVID-19 pneumonia. Decadron 6 mg daily for 10 days  completed Remdesivir, continue baricitinib started September 14  Lovenox twice daily  Incentive spirometry      Acute respiratory failure with hypoxia  Secondary to COVID-19 pneumonia  Titrate oxygen to keep saturations above 90%      Aortic stenosis status post AVR  Continue to monitor  Monitor fluid status - very poor PO      CAD  Continue aspirin, ACE inhibitor. Afib - no prior Hx   HR up to 130s on the monitor. Started on PO dilt  - increase to 60QID  Will check EKG - confirms Afib. .   Cont current dose lovenox. Family requested cardiology eval.         DVT Prophylaxis: Lovenox  Diet: ADULT DIET; Regular  Adult Oral Nutrition Supplement; Standard High Calorie/High Protein Oral Supplement  Adult Oral Nutrition Supplement; Standard High Calorie/High Protein Oral Supplement  Code Status: DNR-CCA      PT/OT Eval Status: Will need      Dispo - cc. O2 up to 11l/min - up to 15l/min.        Oni Connell MD

## 2021-09-21 NOTE — PROGRESS NOTES
Physical Therapy  Facility/Department: [address] MED SURG  Daily Treatment Note  NAME: Bebeto Shelton  : 1931  MRN: [id number]    Date of Service: 2021    Discharge Recommendations:  Patient would benefit from continued therapy after discharge, 3-5 sessions per week, Continue to assess pending progress, Kitty Lazcano scored a  on the AM-PAC short mobility form. Current research shows that an AM-PAC score of 17 or less is typically not associated with a discharge to the patient's home setting. Based on the patient's AM-PAC score and their current functional mobility deficits, it is recommended that the patient have 3-5 sessions per week of Physical Therapy at d/c to increase the patient's independence. Please see assessment section for further patient specific details. If patient discharges prior to next session this note will serve as a discharge summary. Please see below for the latest assessment towards goals. Assessment   Body structures, Functions, Activity limitations: Decreased functional mobility ; Decreased ADL status; Decreased balance;Decreased strength;Decreased endurance  Assessment: Pt is a 80 y.o. female who presented to the ED on 21 with SOB. Pt found to be COVID+. Prior to admission, pt living with dtr and granddtr in two level home with 4 CHRISTOS - pt independent with all ADLs and ambulation without device - helps take care of dtr with help of granddtr. Pt currently functioning below baseline - significant limitations to respiratory system - pt now at 15L high flow with O2 sats hovering around 90 - desaturates to upper 70s with sitting to EOB - HR increasing to 170bpm. Anticipate she will need continued skilled therapy 3-5x/week vs LTACH to maximize independence and safety with functional mobility.   Treatment Diagnosis: impaired activity tolerance  Prognosis: Fair  Decision Making: Medium Complexity  History: see below  Exam: see below  Clinical Presentation: evolving  PT Education: Goals;PT Role;Plan of Care;General Safety;Transfer Training;Gait Training;Functional Mobility Training  REQUIRES PT FOLLOW UP: Yes  Activity Tolerance  Activity Tolerance: Patient limited by fatigue;Patient limited by endurance;Treatment limited secondary to medical complications (free text)  Activity Tolerance: limited by respiratory status     Patient Diagnosis(es): The primary encounter diagnosis was Hypoxia. A diagnosis of Pneumonia due to infectious organism, unspecified laterality, unspecified part of lung was also pertinent to this visit. has a past medical history of Aortic stenosis and Arthritis. has a past surgical history that includes Varicose vein surgery (Bilateral, 1980's); Cardiac catheterization (04/17/2018); Hysterectomy, total abdominal; and Aortic valve replacement (N/A, 5/21/2019). Restrictions  Restrictions/Precautions  Restrictions/Precautions: Contact Precautions, Isolation, Fall Risk  Position Activity Restriction  Other position/activity restrictions: COVID+; droplet precautions; 15L O2     Subjective   General  Additional Pertinent Hx: Pt is a 80 y.o. female who presented to the ED on 9/13/21 with SOB. Pt found to be COVID+. Response To Previous Treatment: Patient with no complaints from previous session. Family / Caregiver Present: No  Referring Practitioner: Merari Bosch MD  Subjective  Subjective: Pt is agreeable to PT - very [place]. Continues to question why she is here. Orientation  Orientation  Overall Orientation Status: Within Functional Limits     Cognition   Cognition  Overall Cognitive Status: Exceptions  Following Commands:  Follows one step commands with increased time  Attention Span: Appears intact  Memory: Decreased short term memory  Safety Judgement: Decreased awareness of need for safety;Decreased awareness of need for assistance  Problem Solving: Decreased awareness of errors;Assistance required to identify errors made;Assistance required to generate solutions  Insights: Decreased awareness of deficits  Initiation: Requires cues for some  Sequencing: Requires cues for some     Objective   Bed mobility  Supine to Sit: Minimal assistance  Sit to Supine: Minimal assistance  Scooting: Minimal assistance  Comment: O2 sats at 93% lying supine in bed while on 15L O2, HR at 110bpm. After lengthy conversation with the patient in regards to why she is here, pt assisted to sitting at EOB. HR increased to 170bpm while O2 sats dropped to 79%. Pt brought back into supine, required NRB to return O2 sats to the 90s and then was able to maintain in the low 90s with the NRB off. HR returned to 110bpm.  Transfers  Sit to Stand: Unable to assess  Stand to sit: Unable to assess  Ambulation  Ambulation?: No     Balance  Posture: Fair  Sitting - Static: Good  Sitting - Dynamic: Fair;+         AM-PAC Score  AM-PAC Inpatient Mobility Raw Score : 11 (09/21/21 1102)  AM-PAC Inpatient T-Scale Score : 33.86 (09/21/21 1102)  Mobility Inpatient CMS 0-100% Score: 72.57 (09/21/21 1102)  Mobility Inpatient CMS G-Code Modifier : CL (09/21/21 1102)          Goals  Short term goals  Time Frame for Short term goals: by acute discharge - all goals ongoing as of 9/21/21  Short term goal 1: bed mobility with SBA  Short term goal 2: sit<>stand with SBA  Short term goal 3: ambulate >30' with LRAD and SBA  Patient Goals   Patient goals : none stated    Plan    Plan  Times per week: 3-5x/week  Current Treatment Recommendations: Strengthening, Functional Mobility Training, Transfer Training, Balance Training, Endurance Training, Patient/Caregiver Education & Training, Safety Education & Training, Gait Training, Neuromuscular Re-education, Equipment Evaluation, Education, & procurement  Safety Devices  Type of devices:  All fall risk precautions in place, Call light within reach, Patient at risk for falls, Left in bed, Bed alarm in place, Nurse notified     Therapy Time   Individual Concurrent Group Co-treatment   Time In 0935         Time Out 1015         Minutes 40         Timed Code Treatment Minutes: 40 Minutes       Lanie Ling, PT

## 2021-09-22 LAB
ALBUMIN SERPL-MCNC: 2.6 G/DL (ref 3.4–5)
ALP BLD-CCNC: 89 U/L (ref 40–129)
ALT SERPL-CCNC: 16 U/L (ref 10–40)
ANION GAP SERPL CALCULATED.3IONS-SCNC: 11 MMOL/L (ref 3–16)
AST SERPL-CCNC: 19 U/L (ref 15–37)
BASOPHILS ABSOLUTE: 0 K/UL (ref 0–0.2)
BASOPHILS RELATIVE PERCENT: 0.2 %
BILIRUB SERPL-MCNC: 0.6 MG/DL (ref 0–1)
BILIRUBIN DIRECT: <0.2 MG/DL (ref 0–0.3)
BILIRUBIN, INDIRECT: ABNORMAL MG/DL (ref 0–1)
BUN BLDV-MCNC: 18 MG/DL (ref 7–20)
CALCIUM SERPL-MCNC: 8.6 MG/DL (ref 8.3–10.6)
CHLORIDE BLD-SCNC: 106 MMOL/L (ref 99–110)
CO2: 23 MMOL/L (ref 21–32)
CREAT SERPL-MCNC: <0.5 MG/DL (ref 0.6–1.2)
EOSINOPHILS ABSOLUTE: 0 K/UL (ref 0–0.6)
EOSINOPHILS RELATIVE PERCENT: 0 %
GFR AFRICAN AMERICAN: >60
GFR NON-AFRICAN AMERICAN: >60
GLUCOSE BLD-MCNC: 92 MG/DL (ref 70–99)
HCT VFR BLD CALC: 38.8 % (ref 36–48)
HEMOGLOBIN: 12.5 G/DL (ref 12–16)
LYMPHOCYTES ABSOLUTE: 0.4 K/UL (ref 1–5.1)
LYMPHOCYTES RELATIVE PERCENT: 2.5 %
MAGNESIUM: 2.1 MG/DL (ref 1.8–2.4)
MCH RBC QN AUTO: 26.6 PG (ref 26–34)
MCHC RBC AUTO-ENTMCNC: 32.3 G/DL (ref 31–36)
MCV RBC AUTO: 82.5 FL (ref 80–100)
MONOCYTES ABSOLUTE: 0.6 K/UL (ref 0–1.3)
MONOCYTES RELATIVE PERCENT: 3.9 %
NEUTROPHILS ABSOLUTE: 13.6 K/UL (ref 1.7–7.7)
NEUTROPHILS RELATIVE PERCENT: 93.4 %
PDW BLD-RTO: 14.1 % (ref 12.4–15.4)
PLATELET # BLD: 287 K/UL (ref 135–450)
PMV BLD AUTO: 9.7 FL (ref 5–10.5)
POTASSIUM REFLEX MAGNESIUM: 3.5 MMOL/L (ref 3.5–5.1)
RBC # BLD: 4.7 M/UL (ref 4–5.2)
SODIUM BLD-SCNC: 140 MMOL/L (ref 136–145)
TOTAL PROTEIN: 5.2 G/DL (ref 6.4–8.2)
WBC # BLD: 14.6 K/UL (ref 4–11)

## 2021-09-22 PROCEDURE — 6360000002 HC RX W HCPCS: Performed by: INTERNAL MEDICINE

## 2021-09-22 PROCEDURE — 94761 N-INVAS EAR/PLS OXIMETRY MLT: CPT

## 2021-09-22 PROCEDURE — 80076 HEPATIC FUNCTION PANEL: CPT

## 2021-09-22 PROCEDURE — 2580000003 HC RX 258: Performed by: INTERNAL MEDICINE

## 2021-09-22 PROCEDURE — 6370000000 HC RX 637 (ALT 250 FOR IP): Performed by: INTERNAL MEDICINE

## 2021-09-22 PROCEDURE — 36415 COLL VENOUS BLD VENIPUNCTURE: CPT

## 2021-09-22 PROCEDURE — 83735 ASSAY OF MAGNESIUM: CPT

## 2021-09-22 PROCEDURE — 2700000000 HC OXYGEN THERAPY PER DAY

## 2021-09-22 PROCEDURE — 85025 COMPLETE CBC W/AUTO DIFF WBC: CPT

## 2021-09-22 PROCEDURE — 94640 AIRWAY INHALATION TREATMENT: CPT

## 2021-09-22 PROCEDURE — 80048 BASIC METABOLIC PNL TOTAL CA: CPT

## 2021-09-22 PROCEDURE — 1200000000 HC SEMI PRIVATE

## 2021-09-22 PROCEDURE — 97530 THERAPEUTIC ACTIVITIES: CPT

## 2021-09-22 RX ORDER — DILTIAZEM HYDROCHLORIDE 240 MG/1
240 CAPSULE, COATED, EXTENDED RELEASE ORAL DAILY
Status: DISCONTINUED | OUTPATIENT
Start: 2021-09-22 | End: 2021-09-26

## 2021-09-22 RX ORDER — DEXAMETHASONE SODIUM PHOSPHATE 4 MG/ML
10 INJECTION, SOLUTION INTRA-ARTICULAR; INTRALESIONAL; INTRAMUSCULAR; INTRAVENOUS; SOFT TISSUE EVERY 24 HOURS
Status: DISCONTINUED | OUTPATIENT
Start: 2021-09-22 | End: 2021-09-23

## 2021-09-22 RX ORDER — POTASSIUM CHLORIDE 750 MG/1
40 TABLET, FILM COATED, EXTENDED RELEASE ORAL DAILY
Status: DISCONTINUED | OUTPATIENT
Start: 2021-09-22 | End: 2021-09-23

## 2021-09-22 RX ORDER — ALBUTEROL SULFATE 90 UG/1
2 AEROSOL, METERED RESPIRATORY (INHALATION) EVERY 4 HOURS PRN
Status: DISCONTINUED | OUTPATIENT
Start: 2021-09-22 | End: 2021-10-02 | Stop reason: HOSPADM

## 2021-09-22 RX ADMIN — DEXAMETHASONE SODIUM PHOSPHATE 10 MG: 4 INJECTION, SOLUTION INTRAMUSCULAR; INTRAVENOUS at 11:55

## 2021-09-22 RX ADMIN — ASPIRIN 81 MG: 81 TABLET, COATED ORAL at 09:04

## 2021-09-22 RX ADMIN — IPRATROPIUM BROMIDE 2 SPRAY: 42 SPRAY NASAL at 17:30

## 2021-09-22 RX ADMIN — ENOXAPARIN SODIUM 30 MG: 30 INJECTION SUBCUTANEOUS at 09:04

## 2021-09-22 RX ADMIN — IPRATROPIUM BROMIDE 2 SPRAY: 42 SPRAY NASAL at 20:55

## 2021-09-22 RX ADMIN — IPRATROPIUM BROMIDE 2 SPRAY: 42 SPRAY NASAL at 09:14

## 2021-09-22 RX ADMIN — DILTIAZEM HYDROCHLORIDE 60 MG: 60 TABLET, FILM COATED ORAL at 09:10

## 2021-09-22 RX ADMIN — Medication 2 PUFF: at 08:56

## 2021-09-22 RX ADMIN — DILTIAZEM HYDROCHLORIDE 240 MG: 240 CAPSULE, COATED, EXTENDED RELEASE ORAL at 11:55

## 2021-09-22 RX ADMIN — IPRATROPIUM BROMIDE 2 SPRAY: 42 SPRAY NASAL at 14:55

## 2021-09-22 RX ADMIN — BARICITINIB 1 MG: 1 TABLET, FILM COATED ORAL at 09:04

## 2021-09-22 RX ADMIN — POTASSIUM CHLORIDE 40 MEQ: 750 TABLET, FILM COATED, EXTENDED RELEASE ORAL at 11:55

## 2021-09-22 RX ADMIN — AZITHROMYCIN MONOHYDRATE 250 MG: 500 INJECTION, POWDER, LYOPHILIZED, FOR SOLUTION INTRAVENOUS at 10:21

## 2021-09-22 RX ADMIN — SODIUM CHLORIDE: 9 INJECTION, SOLUTION INTRAVENOUS at 09:08

## 2021-09-22 RX ADMIN — ACETAMINOPHEN 650 MG: 325 TABLET ORAL at 09:04

## 2021-09-22 RX ADMIN — Medication 2 PUFF: at 08:55

## 2021-09-22 RX ADMIN — CEFTRIAXONE 1000 MG: 1 INJECTION, POWDER, FOR SOLUTION INTRAMUSCULAR; INTRAVENOUS at 09:09

## 2021-09-22 RX ADMIN — ENOXAPARIN SODIUM 30 MG: 30 INJECTION SUBCUTANEOUS at 20:55

## 2021-09-22 ASSESSMENT — ENCOUNTER SYMPTOMS
GASTROINTESTINAL NEGATIVE: 1
EYES NEGATIVE: 1
SHORTNESS OF BREATH: 1

## 2021-09-22 ASSESSMENT — PAIN SCALES - GENERAL
PAINLEVEL_OUTOF10: 0

## 2021-09-22 NOTE — PROGRESS NOTES
Hospitalist Progress Note      PCP: Sushma Lozada MD    Date of Admission: 9/13/2021    Chief Complaint: Shortness of breath    Hospital Course:  Patient is a 80-year-old female with past medical history of CAD, aortic stenosis status post AVR who presented to hospital for shortness of breath. Patient is a poor historian, reportedly patient was brought to the hospital for shortness of breath. Patient was found hypoxic in upper 80s on room air, was transitioned to nasal cannula. Subjective:     11l/min - up to 15l/min. Very little improvement if any. Medications:  Reviewed      Infusion Medications    sodium chloride 75 mL/hr at 09/22/21 0908    sodium chloride Stopped (09/16/21 1256)     Scheduled Medications    dilTIAZem  240 mg Oral Daily    dexamethasone  10 mg IntraVENous Q24H    potassium chloride  40 mEq Oral Daily    enoxaparin  30 mg SubCUTAneous BID    baricitinib  1 mg Oral Daily    sodium chloride flush  10 mL IntraVENous 2 times per day    aspirin  81 mg Oral Daily    ipratropium  2 spray Each Nostril 4x Daily    cefTRIAXone (ROCEPHIN) IV  1,000 mg IntraVENous Q24H    azithromycin  250 mg IntraVENous Q24H    ipratropium  2 puff Inhalation BID    And    albuterol sulfate HFA  2 puff Inhalation BID     PRN Meds: metoprolol, sodium chloride, sodium chloride flush, sodium chloride, ondansetron **OR** ondansetron, acetaminophen **OR** acetaminophen, albuterol sulfate HFA      Intake/Output Summary (Last 24 hours) at 9/22/2021 1352  Last data filed at 9/22/2021 1154  Gross per 24 hour   Intake 450 ml   Output 350 ml   Net 100 ml       Physical Exam Performed:    /76   Pulse 90   Temp 98.3 °F (36.8 °C) (Axillary)   Resp 18   Ht 5' (1.524 m)   Wt 104 lb 0.9 oz (47.2 kg)   SpO2 95%   BMI 20.32 kg/m²     General appearance: frail, NAD, able to hold conversations, on 7l NC  HEENT: Pupils equal, round, and reactive to light. Conjunctivae/corneas clear.   Neck: Supple, with full range of motion. No jugular venous distention. Trachea midline. Respiratory:  Normal respiratory effort. Diminished breath sounds bilateral  Cardiovascular: Regular rate and rhythm with normal S1/S2   Abdomen: Soft, non-tender, non-distended with normal bowel sounds. Musculoskeletal: No clubbing, cyanosis or edema bilaterally. Full range of motion without deformity. Skin: Skin color, texture, turgor normal.  No rashes or lesions. Neurologic:  Neurovascularly intact without any focal sensory/motor deficits. Cranial nerves: II-XII intact, grossly non-focal.  Psychiatric: Alert and partially oriented. Capillary Refill: Brisk,< 3 seconds   Peripheral Pulses: +2 palpable, equal bilaterally       Labs:   Recent Labs     09/20/21  0749 09/21/21  0751 09/22/21  0809   WBC 16.2* 15.9* 14.6*   HGB 13.9 12.8 12.5   HCT 41.0 37.7 38.8   * 373 287     Recent Labs     09/20/21  0749 09/20/21  0749 09/21/21  0751 09/22/21  0809   *  --  138 140   K 3.5  3.5   < > 3.3*  3.3* 3.5   CL 96*  --  104 106   CO2 17*  --  22 23   BUN 24*  --  24* 18   CREATININE 0.6  --  0.6 <0.5*   CALCIUM 9.7  --  9.0 8.6    < > = values in this interval not displayed. Recent Labs     09/20/21  0749 09/21/21  0751 09/22/21  0809   AST 23 15 19   ALT 27 20 16   BILIDIR  --   --  <0.2   BILITOT 0.6 0.5 0.6   ALKPHOS 123 92 89       No results for input(s): INR in the last 72 hours. No results for input(s): Nahomi Comber in the last 72 hours. Urinalysis:      Lab Results   Component Value Date    NITRU Negative 05/16/2019    WBCUA 2 05/16/2019    RBCUA 0-2 05/16/2019    BLOODU Negative 05/16/2019    SPECGRAV 1.016 05/16/2019    GLUCOSEU Negative 05/16/2019       Radiology:  XR CHEST 1 VIEW   Final Result   Slight increase in bilateral pulmonary opacities         CT CHEST PULMONARY EMBOLISM W CONTRAST   Final Result   No evidence of significant pulmonary embolism.       Extensive multifocal bilateral airspace disease compatible with multifocal   pneumonia including viral pneumonia. Dilation of the main and proximal pulmonary arteries could represent   pulmonary arterial hypertension. Large hiatal hernia. XR CHEST PORTABLE   Final Result   Findings most consistent with multifocal pneumonia. Assessment/Plan:      COVID-19 pneumonia. Decadron 6 mg daily for 10 days - completed. - with O2 worsening - decadron resumed at 10mg dose on 9/22  completed Remdesivir, continue baricitinib started September 14  Lovenox twice daily  Incentive spirometry      Acute respiratory failure with hypoxia  Secondary to COVID-19 pneumonia  Titrate oxygen to keep saturations above 90%      Aortic stenosis status post AVR  Continue to monitor  Monitor fluid status - very poor PO      CAD  Continue aspirin, ACE inhibitor. Afib - no prior Hx   HR up to 130s on the monitor. Started on PO dilt  - increase to 60QID - transition to long acting dilt PO. Will check EKG - confirms Afib. Cont current dose lovenox. Family requested cardiology eval.    - recs appreciated. DVT Prophylaxis: Lovenox  Diet: ADULT DIET; Regular  Adult Oral Nutrition Supplement; Standard High Calorie/High Protein Oral Supplement  Adult Oral Nutrition Supplement; Standard High Calorie/High Protein Oral Supplement  Code Status: DNR-CCA      PT/OT Eval Status: as tolerates. Dispo - cc. O2 up to 11l/min - up to 15l/min.        Nathan Romero MD

## 2021-09-22 NOTE — PROGRESS NOTES
Pt O2 sats 83% on 15L high flow. Non re-breather placed on patient. O2 sats now 92-96%. No acute signs of distress noted.  Will continue to monitor

## 2021-09-22 NOTE — PROGRESS NOTES
pt refused her 12a and 6a Cardizem. hr was in the 114-140's at rest, I spoke with pharmacy and gave her 6a Cardizem late, see MAR. HR is slowly coming down, currently afib 114ish. She has a PRN metoprolol order currently but appears to be an ER order as its ordered every 30 mins PRN for HR > 130. Notified Dr Bienvenido Cardona of above, no new orders regarding HR at this time. Also notified Dr Bienvenido Cardona of increased oxygen needs overnight, had to add 15L NRB on top of 15L high flow. respiratory is recommending vapotherm for her. See new orders from Dr Bienvenido Cardona to order vapotherm. Respiratory therapist notified as well.

## 2021-09-22 NOTE — PROGRESS NOTES
Physical Therapy  Facility/Department: [address] MED SURG  Daily Treatment Note  NAME: Renea Limon  : 1931  MRN: [id number]    Date of Service: 2021    Discharge Recommendations:  Patient would benefit from continued therapy after discharge, 3-5 sessions per week, Continue to assess pending progress, LTACH        Assessment   Body structures, Functions, Activity limitations: Decreased functional mobility ; Decreased ADL status; Decreased balance;Decreased strength;Decreased endurance  Assessment: Pt is a 80 y.o. female who presented to the ED on 21 with SOB. Pt found to be COVID+. Prior to admission, pt living with dtr and granddtr in two level home with 4 CHRISTOS - pt independent with all ADLs and ambulation without device - helps take care of dtr with help of granddtr. Pt currently functioning below baseline - significant limitations to respiratory system - pt now at 20L vapotherm with sats maintaining in mid to upper 90s with bed mobility and standing at EOB. Anticipate she will need continued skilled therapy 3-5x/week vs LTACH to maximize independence and safety with functional mobility. Treatment Diagnosis: impaired activity tolerance  Prognosis: Fair  Decision Making: Medium Complexity  History: see below  Exam: see below  Clinical Presentation: evolving  PT Education: Goals;PT Role;Plan of Care;General Safety;Transfer Training;Gait Training;Functional Mobility Training  REQUIRES PT FOLLOW UP: Yes  Activity Tolerance  Activity Tolerance: Patient limited by fatigue;Patient limited by endurance;Treatment limited secondary to medical complications (free text)  Activity Tolerance: limited by respiratory status     Patient Diagnosis(es): The primary encounter diagnosis was Hypoxia. A diagnosis of Pneumonia due to infectious organism, unspecified laterality, unspecified part of lung was also pertinent to this visit.      has a past medical history of Aortic stenosis, Arthritis, COVID-19, and PAF (paroxysmal atrial fibrillation) ([facility] [address] has a past surgical history that includes Varicose vein surgery (Bilateral, 1980's); Cardiac catheterization (04/17/2018); Hysterectomy, total abdominal; and Aortic valve replacement (N/A, 5/21/2019). Restrictions  Restrictions/Precautions  Restrictions/Precautions: Contact Precautions, Isolation, Fall Risk  Position Activity Restriction  Other position/activity restrictions: COVID+; droplet precautions; 20L vapotherm     Subjective   General  Chart Reviewed: Yes  Additional Pertinent Hx: Pt is a 80 y.o. female who presented to the ED on 9/13/21 with SOB. Pt found to be COVID+. Response To Previous Treatment: Patient with no complaints from previous session. Family / Caregiver Present: No  Referring Practitioner: Lu Kehr, MD  Subjective  Subjective: Pt is agreeable to PT - very [place]. Continues to question why she is here. Orientation  Orientation  Overall Orientation Status: Within Functional Limits     Cognition   Cognition  Overall Cognitive Status: Exceptions  Following Commands: Follows one step commands with increased time  Attention Span: Appears intact  Memory: Decreased short term memory  Safety Judgement: Decreased awareness of need for safety;Decreased awareness of need for assistance  Problem Solving: Decreased awareness of errors;Assistance required to identify errors made;Assistance required to generate solutions  Insights: Decreased awareness of deficits  Initiation: Requires cues for some  Sequencing: Requires cues for some     Objective   Bed mobility  Supine to Sit: Minimal assistance  Sit to Supine: Minimal assistance  Scooting: Minimal assistance  Comment: O2 sats at 98% while lying supine in bed, HR ~90bpm. Pt able to sit to EOB with min A with O2 sats remaining in mid to upper 90s on 20L vapotherm.   Transfers  Sit to Stand: Minimal Assistance  Stand to sit: Minimal Assistance  Comment: Pt stood for ~ 1 minute with O2 sats staying at mid 90s.  Ambulation  Ambulation?: No     Balance  Posture: Fair  Sitting - Static: Good  Sitting - Dynamic: Fair;+  Standing - Static: Fair        AM-PAC Score  AM-PAC Inpatient Mobility Raw Score : 12 (09/22/21 1403)  AM-PAC Inpatient T-Scale Score : 35.33 (09/22/21 1403)  Mobility Inpatient CMS 0-100% Score: 68.66 (09/22/21 1403)  Mobility Inpatient CMS G-Code Modifier : CL (09/22/21 1403)          Goals  Short term goals  Time Frame for Short term goals: by acute discharge - all goals ongoing as of 9/22/21  Short term goal 1: bed mobility with SBA  Short term goal 2: sit<>stand with SBA  Short term goal 3: ambulate >30' with LRAD and SBA  Patient Goals   Patient goals : none stated    Plan    Plan  Times per week: 3-5x/week  Current Treatment Recommendations: Strengthening, Functional Mobility Training, Transfer Training, Balance Training, Endurance Training, Patient/Caregiver Education & Training, Safety Education & Training, Gait Training, Neuromuscular Re-education, Equipment Evaluation, Education, & procurement  Safety Devices  Type of devices:  All fall risk precautions in place, Call light within reach, Patient at risk for falls, Left in bed, Bed alarm in place, Nurse notified     Therapy Time   Individual Concurrent Group Co-treatment   Time In 1320         Time Out 1400         Minutes 40         Timed Code Treatment Minutes: 40 Minutes       Jt Bosch, PT

## 2021-09-22 NOTE — PROGRESS NOTES
too.       Disposition/Discharge Plan:   pending     Advance Directives:  Surrogate Decision Maker: no HCPOA, patient's daughter currently in ICU and not decisional.  Ike Paz and Gerson Lopez are patient's granddaughters and assisting with making health care decisions for the patient. Code status:  DNR-CCA     Case discussed with: patient, floor RN  Thank you for allowing us to participate in the care of this patient. SUBJECTIVE     Chief Complaint: Dyspnea. Last 24 hours:   Patient currently requiring 15 liters of oxygen via high flow nasal cannula. ROS:    Review of Systems   Constitutional: Positive for fatigue. HENT: Negative. Eyes: Negative. Respiratory: Positive for shortness of breath. Cardiovascular: Negative. Gastrointestinal: Negative. Musculoskeletal: Negative. Skin: Positive for pallor. Neurological: Positive for weakness. Psychiatric/Behavioral: Positive for confusion. All other systems reviewed and are negative. A complete 10 count ROS was obtained. Pertinent positives mentioned above in HPI/ROS. All others if not mentioned are negative. OBJECTIVE   /72   Pulse 87   Temp 97.9 °F (36.6 °C) (Oral)   Resp 16   Ht 5' (1.524 m)   Wt 104 lb 0.9 oz (47.2 kg)   SpO2 95%   BMI 20.32 kg/m²   I/O last 3 completed shifts: In: 18 [P.O.:570]  Out: 350 [Urine:350]  No intake/output data recorded. Physical Exam  Vitals reviewed. Constitutional:       Appearance: She is ill-appearing. HENT:      Head: Normocephalic and atraumatic. Nose: Nose normal.   Eyes:      Pupils: Pupils are equal, round, and reactive to light. Cardiovascular:      Rate and Rhythm: Normal rate. Pulses: Normal pulses. Pulmonary:      Breath sounds: Normal breath sounds. No rales. Abdominal:      General: Bowel sounds are normal. There is no distension. Palpations: Abdomen is soft. Musculoskeletal:         General: No tenderness.       Cervical back: Neck supple. Skin:     General: Skin is dry. Coloration: Skin is pale. Neurological:      Comments: Oriented to self and place. Not oriented to situation today.     Psychiatric:      Comments: lethargic            Total time: 45 minutes  >50% of time spent counseling patient at bedside or POA/family member if applicable , reviewing information and discussing care, coordinating with care team       Signed By: Electronically signed by CAROLYN Reeves CNP on 9/22/2021 at 5:57 PM   Palliative Medicine   [phone]    September 22, 2021

## 2021-09-22 NOTE — PROGRESS NOTES
Called pt's granddaughter Mian Thomas to give daily update, in agreement with plan to transition pt to vapotherm.

## 2021-09-22 NOTE — PROGRESS NOTES
Occupational Therapy    Attempted OT follow-up. Nursing and respiratory therapy in the room. Pt currently on 15L high flow and non rebreather to increase O2 sats. Respiratory therapy reports will be putting her on vapotherm soon. Hold therapy at this time due to increased oxygen demands. Will continue to follow and attempt later this date as pt able.   Joan Aquino, [address] [place] [address]

## 2021-09-22 NOTE — PLAN OF CARE
function  9/22/2021 1058 by Ash Adams RN  Outcome: Ongoing  9/22/2021 0215 by Sherril Mcardle, RN  Outcome: Ongoing     Problem: Breathing Pattern - Ineffective  Goal: Ability to achieve and maintain a regular respiratory rate  9/22/2021 1058 by Ash Adams RN  Outcome: Ongoing  9/22/2021 0215 by Sherril Mcardle, RN  Outcome: Ongoing     Problem:  Body Temperature -  Risk of, Imbalanced  Goal: Ability to maintain a body temperature within defined limits  9/22/2021 1058 by Ash Adams RN  Outcome: Ongoing  9/22/2021 0215 by Sherril Mcardle, RN  Outcome: Ongoing  Goal: Will regain or maintain usual level of consciousness  9/22/2021 1058 by Ash Adams RN  Outcome: Ongoing  9/22/2021 0215 by Sherril Mcardle, RN  Outcome: Ongoing  Goal: Complications related to the disease process, condition or treatment will be avoided or minimized  9/22/2021 1058 by Ash Adams RN  Outcome: Ongoing  9/22/2021 0215 by Sherril Mcardle, RN  Outcome: Ongoing     Problem: Isolation Precautions - Risk of Spread of Infection  Goal: Prevent transmission of infection  9/22/2021 1058 by Ash Adams RN  Outcome: Ongoing  9/22/2021 0215 by Sherril Mcardle, RN  Outcome: Ongoing     Problem: Nutrition Deficits  Goal: Optimize nutritional status  9/22/2021 1058 by Ash Adams RN  Outcome: Ongoing  9/22/2021 0215 by Sherril Mcardle, RN  Outcome: Ongoing     Problem: Risk for Fluid Volume Deficit  Goal: Maintain normal heart rhythm  9/22/2021 1058 by Ash Adams RN  Outcome: Ongoing  9/22/2021 0215 by Sherril Mcardle, RN  Outcome: Ongoing  Goal: Maintain absence of muscle cramping  9/22/2021 1058 by Ash Adams RN  Outcome: Ongoing  9/22/2021 0215 by Sherril Mcardle, RN  Outcome: Ongoing  Goal: Maintain normal serum potassium, sodium, calcium, phosphorus, and pH  9/22/2021 1058 by Ash Adams RN  Outcome: Ongoing  9/22/2021 0215 by Sher Winter RN  Outcome: Ongoing     Problem: Loneliness or Risk for Loneliness  Goal: Demonstrate positive use of time alone when socialization is not possible  9/22/2021 1058 by Luda May RN  Outcome: Ongoing  9/22/2021 0215 by Sher Winter RN  Outcome: Ongoing     Problem: Fatigue  Goal: Verbalize increase energy and improved vitality  9/22/2021 1058 by Luda May RN  Outcome: Ongoing  9/22/2021 0215 by Sher Winter RN  Outcome: Ongoing     Problem: Patient Education: Go to Patient Education Activity  Goal: Patient/Family Education  9/22/2021 1058 by Luda May RN  Outcome: Ongoing  9/22/2021 0215 by Sher Winter RN  Outcome: Ongoing     Problem: Nutrition  Goal: Optimal nutrition therapy  9/22/2021 1058 by Luda May RN  Outcome: Ongoing  9/22/2021 0215 by Sher Winter RN  Outcome: Ongoing

## 2021-09-22 NOTE — PLAN OF CARE
Problem: Falls - Risk of:  Goal: Will remain free from falls  Description: Will remain free from falls  9/22/2021 0215 by Aidee Carty RN  Outcome: Ongoing  9/21/2021 1227 by Pati Wing RN  Outcome: Ongoing  Goal: Absence of physical injury  Description: Absence of physical injury  9/22/2021 0215 by Aidee Carty RN  Outcome: Ongoing  9/21/2021 1227 by Pati Wing RN  Outcome: Ongoing     Problem: Skin Integrity:  Goal: Will show no infection signs and symptoms  Description: Will show no infection signs and symptoms  9/22/2021 0215 by Aidee Carty RN  Outcome: Ongoing  9/21/2021 1227 by Pati Wing RN  Outcome: Ongoing  Goal: Absence of new skin breakdown  Description: Absence of new skin breakdown  9/22/2021 0215 by Aidee Carty RN  Outcome: Ongoing  9/21/2021 1227 by Pati Wing RN  Outcome: Ongoing     Problem: Safety:  Goal: Free from accidental physical injury  Description: Free from accidental physical injury  9/22/2021 0215 by Aidee Carty RN  Outcome: Ongoing  9/21/2021 1227 by Pati Wing RN  Outcome: Ongoing  Goal: Free from intentional harm  Description: Free from intentional harm  9/22/2021 0215 by Aidee Carty RN  Outcome: Ongoing  9/21/2021 1227 by Pati Wing RN  Outcome: Ongoing     Problem: Daily Care:  Goal: Daily care needs are met  Description: Daily care needs are met  9/22/2021 0215 by Aidee Carty RN  Outcome: Ongoing  9/21/2021 1227 by Pati Wing RN  Outcome: Ongoing     Problem: Airway Clearance - Ineffective  Goal: Achieve or maintain patent airway  9/22/2021 0215 by Aidee Carty RN  Outcome: Ongoing  9/21/2021 1227 by Pati Wing RN  Outcome: Ongoing     Problem: Gas Exchange - Impaired  Goal: Absence of hypoxia  9/22/2021 0215 by Aidee Carty RN  Outcome: Ongoing  9/21/2021 1227 by Pati Wing RN  Outcome: Ongoing  Goal: Promote optimal lung function  9/22/2021 0215 by Aidee Carty RN  Outcome: Ongoing  9/21/2021 1227 by Silvia Blanco RN  Outcome: Ongoing     Problem: Breathing Pattern - Ineffective  Goal: Ability to achieve and maintain a regular respiratory rate  9/22/2021 0215 by Sherril Mcardle, RN  Outcome: Ongoing  9/21/2021 1227 by Silvia Blanco RN  Outcome: Ongoing     Problem:  Body Temperature -  Risk of, Imbalanced  Goal: Ability to maintain a body temperature within defined limits  9/22/2021 0215 by Sherril Mcardle, RN  Outcome: Ongoing  9/21/2021 1227 by Silvia Blanco RN  Outcome: Ongoing  Goal: Will regain or maintain usual level of consciousness  9/22/2021 0215 by Sherril Mcardle, RN  Outcome: Ongoing  9/21/2021 1227 by Silvia Blanco RN  Outcome: Ongoing  Goal: Complications related to the disease process, condition or treatment will be avoided or minimized  9/22/2021 0215 by Sherril Mcardle, RN  Outcome: Ongoing  9/21/2021 1227 by Silvia Blanco RN  Outcome: Ongoing     Problem: Isolation Precautions - Risk of Spread of Infection  Goal: Prevent transmission of infection  9/22/2021 0215 by Sherril Mcardle, RN  Outcome: Ongoing  9/21/2021 1227 by Silvia Blanco RN  Outcome: Ongoing     Problem: Nutrition Deficits  Goal: Optimize nutritional status  9/22/2021 0215 by Sherril Mcardle, RN  Outcome: Ongoing  9/21/2021 1227 by Silvia Blanco RN  Outcome: Ongoing     Problem: Risk for Fluid Volume Deficit  Goal: Maintain normal heart rhythm  9/22/2021 0215 by Sherril Mcardle, RN  Outcome: Ongoing  9/21/2021 1227 by Silvia Blanco RN  Outcome: Ongoing  Goal: Maintain absence of muscle cramping  9/22/2021 0215 by Sherril Mcardle, RN  Outcome: Ongoing  9/21/2021 1227 by Silvia Blanco RN  Outcome: Ongoing  Goal: Maintain normal serum potassium, sodium, calcium, phosphorus, and pH  9/22/2021 0215 by Sherril Mcardle, RN  Outcome: Ongoing  9/21/2021 1227 by Silvia Blanco RN  Outcome: Ongoing     Problem: Loneliness or Risk for Loneliness  Goal: Demonstrate positive use of time alone when socialization is not possible  9/22/2021 0215 by Whitney Caldwell RN  Outcome: Ongoing  9/21/2021 1227 by Rhonda Watts RN  Outcome: Ongoing     Problem: Fatigue  Goal: Verbalize increase energy and improved vitality  9/22/2021 0215 by Whitney Caldwell RN  Outcome: Ongoing  9/21/2021 1227 by Rhonda Watts, RN  Outcome: Ongoing     Problem: Patient Education: Go to Patient Education Activity  Goal: Patient/Family Education  9/22/2021 0215 by Whitney Caldwell RN  Outcome: Ongoing  9/21/2021 1227 by Rhonda Watts, RN  Outcome: Ongoing     Problem: Nutrition  Goal: Optimal nutrition therapy  9/22/2021 0215 by Whitney Caldwell RN  Outcome: Ongoing  9/21/2021 1227 by Rhonda Watts, RN  Outcome: Ongoing

## 2021-09-23 LAB
ANION GAP SERPL CALCULATED.3IONS-SCNC: 12 MMOL/L (ref 3–16)
BASOPHILS ABSOLUTE: 0 K/UL (ref 0–0.2)
BASOPHILS RELATIVE PERCENT: 0 %
BUN BLDV-MCNC: 13 MG/DL (ref 7–20)
CALCIUM SERPL-MCNC: 8.2 MG/DL (ref 8.3–10.6)
CHLORIDE BLD-SCNC: 106 MMOL/L (ref 99–110)
CO2: 20 MMOL/L (ref 21–32)
CREAT SERPL-MCNC: <0.5 MG/DL (ref 0.6–1.2)
EOSINOPHILS ABSOLUTE: 0 K/UL (ref 0–0.6)
EOSINOPHILS RELATIVE PERCENT: 0 %
GFR AFRICAN AMERICAN: >60
GFR NON-AFRICAN AMERICAN: >60
GLUCOSE BLD-MCNC: 125 MG/DL (ref 70–99)
HCT VFR BLD CALC: 34.3 % (ref 36–48)
HEMOGLOBIN: 11.5 G/DL (ref 12–16)
LYMPHOCYTES ABSOLUTE: 0.3 K/UL (ref 1–5.1)
LYMPHOCYTES RELATIVE PERCENT: 3.1 %
MCH RBC QN AUTO: 27.8 PG (ref 26–34)
MCHC RBC AUTO-ENTMCNC: 33.6 G/DL (ref 31–36)
MCV RBC AUTO: 82.7 FL (ref 80–100)
MONOCYTES ABSOLUTE: 0.5 K/UL (ref 0–1.3)
MONOCYTES RELATIVE PERCENT: 5.1 %
NEUTROPHILS ABSOLUTE: 8.5 K/UL (ref 1.7–7.7)
NEUTROPHILS RELATIVE PERCENT: 91.8 %
PDW BLD-RTO: 14.7 % (ref 12.4–15.4)
PLATELET # BLD: 278 K/UL (ref 135–450)
PMV BLD AUTO: 10.2 FL (ref 5–10.5)
POTASSIUM REFLEX MAGNESIUM: 4 MMOL/L (ref 3.5–5.1)
RBC # BLD: 4.15 M/UL (ref 4–5.2)
SODIUM BLD-SCNC: 138 MMOL/L (ref 136–145)
WBC # BLD: 9.2 K/UL (ref 4–11)

## 2021-09-23 PROCEDURE — 6370000000 HC RX 637 (ALT 250 FOR IP): Performed by: INTERNAL MEDICINE

## 2021-09-23 PROCEDURE — 97530 THERAPEUTIC ACTIVITIES: CPT

## 2021-09-23 PROCEDURE — 80048 BASIC METABOLIC PNL TOTAL CA: CPT

## 2021-09-23 PROCEDURE — 94640 AIRWAY INHALATION TREATMENT: CPT

## 2021-09-23 PROCEDURE — 2580000003 HC RX 258: Performed by: INTERNAL MEDICINE

## 2021-09-23 PROCEDURE — 2700000000 HC OXYGEN THERAPY PER DAY

## 2021-09-23 PROCEDURE — 36415 COLL VENOUS BLD VENIPUNCTURE: CPT

## 2021-09-23 PROCEDURE — 1200000000 HC SEMI PRIVATE

## 2021-09-23 PROCEDURE — 6360000002 HC RX W HCPCS: Performed by: INTERNAL MEDICINE

## 2021-09-23 PROCEDURE — 94761 N-INVAS EAR/PLS OXIMETRY MLT: CPT

## 2021-09-23 PROCEDURE — 85025 COMPLETE CBC W/AUTO DIFF WBC: CPT

## 2021-09-23 RX ORDER — DEXAMETHASONE SODIUM PHOSPHATE 10 MG/ML
10 INJECTION INTRAMUSCULAR; INTRAVENOUS EVERY 24 HOURS
Status: DISCONTINUED | OUTPATIENT
Start: 2021-09-23 | End: 2021-09-25

## 2021-09-23 RX ORDER — POTASSIUM CHLORIDE 750 MG/1
40 TABLET, FILM COATED, EXTENDED RELEASE ORAL ONCE
Status: COMPLETED | OUTPATIENT
Start: 2021-09-23 | End: 2021-09-23

## 2021-09-23 RX ADMIN — ASPIRIN 81 MG: 81 TABLET, COATED ORAL at 09:36

## 2021-09-23 RX ADMIN — Medication 2 PUFF: at 08:27

## 2021-09-23 RX ADMIN — Medication 2 PUFF: at 20:52

## 2021-09-23 RX ADMIN — IPRATROPIUM BROMIDE 2 SPRAY: 42 SPRAY NASAL at 13:05

## 2021-09-23 RX ADMIN — CEFTRIAXONE 1000 MG: 1 INJECTION, POWDER, FOR SOLUTION INTRAMUSCULAR; INTRAVENOUS at 09:41

## 2021-09-23 RX ADMIN — IPRATROPIUM BROMIDE 2 SPRAY: 42 SPRAY NASAL at 09:36

## 2021-09-23 RX ADMIN — SODIUM CHLORIDE: 9 INJECTION, SOLUTION INTRAVENOUS at 17:31

## 2021-09-23 RX ADMIN — AZITHROMYCIN MONOHYDRATE 250 MG: 500 INJECTION, POWDER, LYOPHILIZED, FOR SOLUTION INTRAVENOUS at 11:20

## 2021-09-23 RX ADMIN — IPRATROPIUM BROMIDE 2 SPRAY: 42 SPRAY NASAL at 23:53

## 2021-09-23 RX ADMIN — POTASSIUM CHLORIDE 40 MEQ: 750 TABLET, FILM COATED, EXTENDED RELEASE ORAL at 09:36

## 2021-09-23 RX ADMIN — DEXAMETHASONE SODIUM PHOSPHATE 10 MG: 10 INJECTION INTRAMUSCULAR; INTRAVENOUS at 11:17

## 2021-09-23 RX ADMIN — BARICITINIB 1 MG: 1 TABLET, FILM COATED ORAL at 09:35

## 2021-09-23 RX ADMIN — IPRATROPIUM BROMIDE 2 SPRAY: 42 SPRAY NASAL at 17:32

## 2021-09-23 RX ADMIN — SODIUM CHLORIDE: 9 INJECTION, SOLUTION INTRAVENOUS at 01:16

## 2021-09-23 RX ADMIN — ACETAMINOPHEN 650 MG: 325 TABLET ORAL at 23:52

## 2021-09-23 RX ADMIN — ENOXAPARIN SODIUM 30 MG: 30 INJECTION SUBCUTANEOUS at 09:37

## 2021-09-23 RX ADMIN — ENOXAPARIN SODIUM 30 MG: 30 INJECTION SUBCUTANEOUS at 20:49

## 2021-09-23 RX ADMIN — DILTIAZEM HYDROCHLORIDE 240 MG: 240 CAPSULE, COATED, EXTENDED RELEASE ORAL at 09:35

## 2021-09-23 ASSESSMENT — PAIN SCALES - WONG BAKER
WONGBAKER_NUMERICALRESPONSE: 0

## 2021-09-23 ASSESSMENT — PAIN SCALES - GENERAL
PAINLEVEL_OUTOF10: 0
PAINLEVEL_OUTOF10: 2

## 2021-09-23 NOTE — PROGRESS NOTES
Pt alert and oriented to self. No c/o pain at this time. Pt enc to cough, deep breath, and call for assistance when needed. Fall precautions in place. Call light with in reach. Will cont to monitor.

## 2021-09-23 NOTE — PLAN OF CARE
Problem: Falls - Risk of:  Goal: Will remain free from falls  Description: Will remain free from falls  9/23/2021 0011 by Marshall Child RN  Outcome: Ongoing     Problem: Falls - Risk of:  Goal: Absence of physical injury  Description: Absence of physical injury  9/23/2021 0011 by Marshall Child RN  Outcome: Ongoing     Problem: Skin Integrity:  Goal: Will show no infection signs and symptoms  Description: Will show no infection signs and symptoms  9/23/2021 0011 by Marshall Child RN  Outcome: Ongoing     Problem: Skin Integrity:  Goal: Absence of new skin breakdown  Description: Absence of new skin breakdown  9/23/2021 0011 by Marshall Child RN  Outcome: Ongoing    Problem: Safety:  Goal: Free from accidental physical injury  Description: Free from accidental physical injury  9/23/2021 0011 by Marshall Child RN  Outcome: Ongoing      Problem: Daily Care:  Goal: Daily care needs are met  Description: Daily care needs are met  9/23/2021 0011 by Marshall Child RN  Outcome: Ongoing      Problem: Gas Exchange - Impaired  Goal: Absence of hypoxia  9/23/2021 0011 by Marshall Child RN  Outcome: Ongoing     Problem: Gas Exchange - Impaired  Goal: Promote optimal lung function  9/23/2021 0011 by Marshall Child RN  Outcome: Ongoing     Problem: Breathing Pattern - Ineffective  Goal: Ability to achieve and maintain a regular respiratory rate  9/23/2021 0011 by Marshall Child RN  Outcome: Ongoing     Problem: Body Temperature -  Risk of, Imbalanced  Goal: Ability to maintain a body temperature within defined limits  9/23/2021 0011 by Marshall Child RN  Outcome: Ongoing     Problem: Body Temperature -  Risk of, Imbalanced  Goal: Will regain or maintain usual level of consciousness  9/23/2021 0011 by Marshall Child RN  Outcome: Ongoing     Problem:  Body Temperature -  Risk of, Imbalanced  Goal: Complications related to the disease process, condition or treatment will be avoided or minimized  9/23/2021 0011 by Marshall Child RN  Outcome: Ongoing     Continue with plan of care.

## 2021-09-23 NOTE — PROGRESS NOTES
Physical Therapy  Facility/Department: [address] MED SURG  Daily Treatment Note  NAME: Nasir Goss  : 1931  MRN: [id number]    Date of Service: 2021    Discharge Recommendations:  Patient would benefit from continued therapy after discharge, 3-5 sessions per week, Continue to assess pending progress, Laina Fair scored a  on the AM-PAC short mobility form. Current research shows that an AM-PAC score of 17 or less is typically not associated with a discharge to the patient's home setting. Based on the patient's AM-PAC score and their current functional mobility deficits, it is recommended that the patient have 3-5 sessions per week of Physical Therapy at d/c to increase the patient's independence. Please see assessment section for further patient specific details. If patient discharges prior to next session this note will serve as a discharge summary. Please see below for the latest assessment towards goals. Assessment   Body structures, Functions, Activity limitations: Decreased functional mobility ; Decreased ADL status; Decreased balance;Decreased strength;Decreased endurance  Assessment: Pt is a 80 y.o. female who presented to the ED on 21 with SOB. Pt found to be COVID+. Prior to admission, pt living with dtr and granddtr in two level home with 4 CHRISTOS - pt independent with all ADLs and ambulation without device - helps take care of dtr with help of granddtr. Pt currently functioning below baseline - significant limitations to respiratory system - pt now at 15L vapotherm - O2 sats drop from low 90s to low 80s with stand pivot transfer but able to recover back to low 90s without any O2 titration or NRB. Anticipate she will need continued skilled therapy 3-5x/week vs LTACH to maximize independence and safety with functional mobility.   Treatment Diagnosis: impaired activity tolerance  Prognosis: Fair  Decision Making: Medium Complexity  History: see below  Exam: see below  Clinical Presentation: evolving  PT Education: Goals;PT Role;Plan of Care;General Safety;Transfer Training;Gait Training;Functional Mobility Training  REQUIRES PT FOLLOW UP: Yes  Activity Tolerance  Activity Tolerance: Patient limited by fatigue;Patient limited by endurance;Treatment limited secondary to medical complications (free text)  Activity Tolerance: limited by respiratory status     Patient Diagnosis(es): The primary encounter diagnosis was Hypoxia. A diagnosis of Pneumonia due to infectious organism, unspecified laterality, unspecified part of lung was also pertinent to this visit. has a past medical history of Aortic stenosis, Arthritis, COVID-19, and PAF (paroxysmal atrial fibrillation) ([facility] [address] has a past surgical history that includes Varicose vein surgery (Bilateral, 1980's); Cardiac catheterization (04/17/2018); Hysterectomy, total abdominal; and Aortic valve replacement (N/A, 5/21/2019). Restrictions  Restrictions/Precautions  Restrictions/Precautions: Contact Precautions, Isolation, Fall Risk  Position Activity Restriction  Other position/activity restrictions: COVID+; droplet precautions; 15L vapotherm     Subjective   General  Chart Reviewed: Yes  Additional Pertinent Hx: Pt is a 80 y.o. female who presented to the ED on 9/13/21 with SOB. Pt found to be COVID+. Response To Previous Treatment: Patient with no complaints from previous session. Family / Caregiver Present: No  Referring Practitioner: Alyssa Abbott MD  Subjective  Subjective: Pt is agreeable to PT - very [place]. General Comment  Comments: Entered room to pt sleeping in recliner - almost sliding out of chair. Cognition   Cognition  Overall Cognitive Status: Exceptions  Following Commands:  Follows one step commands with increased time  Attention Span: Appears intact  Memory: Decreased short term memory  Safety Judgement: Decreased awareness of need for safety;Decreased awareness of need for assistance  Problem

## 2021-09-23 NOTE — PROGRESS NOTES
Occupational Therapy  Facility/Department: [address] MED SURG  Daily Treatment Note  NAME: Dorothea Thomas  : 1931  MRN: [id number]    Date of Service: 2021    Discharge Recommendations:  3-5 sessions per week, Patient would benefit from continued therapy after discharge       Assessment   Performance deficits / Impairments: Decreased functional mobility ; Decreased ADL status; Decreased strength;Decreased cognition;Decreased endurance;Decreased balance;Decreased high-level IADLs  Assessment: Pt on 15L vapotherm upon entry to the room. Pt requesting to get up to the recliner to watch television. She required min assist for bed mobility. She sat EOB for 5 minutes with SBA. she used the stedy to transfer to the recliner for energy conservation. She required the NRB to increase above 90 but then able to stay in low 90's after NRB removed sitting in the recliner. Pt functining below baseline and demonstrates need for ongoing skilled OT at d/c to maximize pt's safety and independence. Prognosis: Fair  REQUIRES OT FOLLOW UP: Yes  Activity Tolerance  Activity Tolerance: Patient limited by fatigue  Safety Devices  Type of devices: Call light within reach; Chair alarm in place; Left in chair;Gait belt         Patient Diagnosis(es): The primary encounter diagnosis was Hypoxia. A diagnosis of Pneumonia due to infectious organism, unspecified laterality, unspecified part of lung was also pertinent to this visit. has a past medical history of Aortic stenosis, Arthritis, COVID-19, and PAF (paroxysmal atrial fibrillation) ([facility] [place] [address] has a past surgical history that includes Varicose vein surgery (Bilateral, 's); Cardiac catheterization (2018); Hysterectomy, total abdominal; and Aortic valve replacement (N/A, 2019).     Restrictions  Restrictions/Precautions  Restrictions/Precautions: Contact Precautions, Isolation, Fall Risk  Position Activity Restriction  Other position/activity restrictions: COVID+; droplet precautions; 15L vapotherm  Subjective   General  Chart Reviewed: Yes, Progress Notes  Patient assessed for rehabilitation services?: Yes  Additional Pertinent Hx: Per H&P: \"Patient is a 77-year-old female with past medical history of CAD, aortic stenosis status post AVR who presented to hospital for shortness of breath. Patient is a poor historian, reportedly patient was brought to the hospital for shortness of breath. Patient was found hypoxic in upper 80s on room air, was transitioned to nasal cannula. No reports of fevers chills chest pain nausea vomiting or diarrhea. \" Pt found to be COVID+  Family / Caregiver Present: No  Referring Practitioner: Clayton Bright MD  Diagnosis: Acute hypoxic respiratory failure, Multifocal pneumonia, COVID +  Subjective  Subjective: Pt seen bedside and requesting to sit in the recliner to watch tv. General Comment  Comments: RN okayed for in bed activity      Objective    ADL  Feeding: Beverage management;Stand by assistance (Pt attempted to take bites of fruit but unable to bite. Pt drank Ensure with SBA.)        Functional Mobility  Functional Mobility Comments: Used stedy to get to the recliner for energy conservation. Wheelchair [employer] Transfers  Equipment Used: Other (recliner)  Level of Asssistance: Dependent/Total  Wheelchair Transfers Comments: min assist to stand to the stedy. Used stedy for energy conservation. Pt dropped to mid 80's with transfer. Pt did not improve above 90 so used NRB and pt quickly improved above 90.   Bed mobility  Supine to Sit: Minimal assistance  Sit to Supine: Unable to assess (Pt sitting in the recliner at the end of the session.)  Scooting: Minimal assistance  Transfers  Sit to stand: Minimal assistance  Stand to sit: Minimal assistance  Transfer Comments: Using stedy                                                                 Plan   Plan  Times per week: 3-5  Current Treatment Recommendations: Functional Mobility Training, Balance Training, Strengthening, Endurance Training, Safety Education & Training, Self-Care / ADL, Equipment Evaluation, Education, & procurement    AM-PAC Score        AM-PAC Inpatient Daily Activity Raw Score: 12 (09/23/21 0948)  AM-PAC Inpatient ADL T-Scale Score : 30.6 (09/23/21 0948)  ADL Inpatient CMS 0-100% Score: 66.57 (09/23/21 0948)  ADL Inpatient CMS G-Code Modifier : CL (09/23/21 7470)    Goals  Short term goals  Time Frame for Short term goals: Prior to d/c:  Short term goal 1: Pt will bathe with SBA. Short term goal 2: Pt will dress with SBA. Short term goal 3: Pt will toilet with SBA. Short term goal 4: Pt will complete fxl mobility and fxl transfers to/from ADL surfaces with SBA. Short term goal 5: Pt will tolerate standing >5 minutes for functional task with SBA while maintaining O2 sats >90%  Short term goal 6: Pt will tolerate 10-15 minutes of therex to improve strength/endurance for ADLs. Long term goals  Time Frame for Long term goals : STGs=LTGs  Patient Goals   Patient goals : to return home. Therapy Time   Individual Concurrent Group Co-treatment   Time In 5181         Time Out 0915         Minutes 40              This note to serve as OT d/c summary if pt is d/c-ed from hospital prior to next OT session.       Romain Velásquez, 0[address]

## 2021-09-23 NOTE — PLAN OF CARE
Problem: Falls - Risk of:  Goal: Will remain free from falls  Description: Will remain free from falls  Outcome: Ongoing  Goal: Absence of physical injury  Description: Absence of physical injury  Outcome: Ongoing     Problem: Skin Integrity:  Goal: Will show no infection signs and symptoms  Description: Will show no infection signs and symptoms  Outcome: Ongoing  Goal: Absence of new skin breakdown  Description: Absence of new skin breakdown  Outcome: Ongoing     Problem: Safety:  Goal: Free from accidental physical injury  Description: Free from accidental physical injury  Outcome: Ongoing  Goal: Free from intentional harm  Description: Free from intentional harm  Outcome: Ongoing     Problem: Daily Care:  Goal: Daily care needs are met  Description: Daily care needs are met  Outcome: Ongoing     Problem: Airway Clearance - Ineffective  Goal: Achieve or maintain patent airway  Outcome: Ongoing     Problem: Gas Exchange - Impaired  Goal: Absence of hypoxia  Outcome: Ongoing  Goal: Promote optimal lung function  Outcome: Ongoing     Problem: Breathing Pattern - Ineffective  Goal: Ability to achieve and maintain a regular respiratory rate  Outcome: Ongoing     Problem:  Body Temperature -  Risk of, Imbalanced  Goal: Ability to maintain a body temperature within defined limits  Outcome: Ongoing  Goal: Will regain or maintain usual level of consciousness  Outcome: Ongoing  Goal: Complications related to the disease process, condition or treatment will be avoided or minimized  Outcome: Ongoing     Problem: Isolation Precautions - Risk of Spread of Infection  Goal: Prevent transmission of infection  Outcome: Ongoing     Problem: Risk for Fluid Volume Deficit  Goal: Maintain normal heart rhythm  Outcome: Ongoing  Goal: Maintain absence of muscle cramping  Outcome: Ongoing  Goal: Maintain normal serum potassium, sodium, calcium, phosphorus, and pH  Outcome: Ongoing     Problem: Loneliness or Risk for Loneliness  Goal: Demonstrate positive use of time alone when socialization is not possible  Outcome: Ongoing     Problem: Fatigue  Goal: Verbalize increase energy and improved vitality  Outcome: Ongoing     Problem: Patient Education: Go to Patient Education Activity  Goal: Patient/Family Education  Outcome: Ongoing     Problem: Confusion - Acute:  Goal: Absence of continued neurological deterioration signs and symptoms  Description: Absence of continued neurological deterioration signs and symptoms  Outcome: Ongoing  Goal: Mental status will be restored to baseline  Description: Mental status will be restored to baseline  Outcome: Ongoing     Problem: Discharge Planning:  Goal: Ability to perform activities of daily living will improve  Description: Ability to perform activities of daily living will improve  Outcome: Ongoing  Goal: Participates in care planning  Description: Participates in care planning  Outcome: Ongoing     Problem: Injury - Risk of, Physical Injury:  Goal: Will remain free from falls  Description: Will remain free from falls  Outcome: Ongoing  Goal: Absence of physical injury  Description: Absence of physical injury  Outcome: Ongoing     Problem: Mood - Altered:  Goal: Mood stable  Description: Mood stable  Outcome: Ongoing  Goal: Absence of abusive behavior  Description: Absence of abusive behavior  Outcome: Ongoing  Goal: Verbalizations of feeling emotionally comfortable while being cared for will increase  Description: Verbalizations of feeling emotionally comfortable while being cared for will increase  Outcome: Ongoing     Problem: Psychomotor Activity - Altered:  Goal: Absence of psychomotor disturbance signs and symptoms  Description: Absence of psychomotor disturbance signs and symptoms  Outcome: Ongoing     Problem: Sensory Perception - Impaired:  Goal: Demonstrations of improved sensory functioning will increase  Description: Demonstrations of improved sensory functioning will increase  Outcome: Ongoing  Goal: Decrease in sensory misperception frequency  Description: Decrease in sensory misperception frequency  Outcome: Ongoing  Goal: Able to refrain from responding to false sensory perceptions  Description: Able to refrain from responding to false sensory perceptions  Outcome: Ongoing  Goal: Demonstrates accurate environmental perceptions  Description: Demonstrates accurate environmental perceptions  Outcome: Ongoing  Goal: Able to distinguish between reality-based and nonreality-based thinking  Description: Able to distinguish between reality-based and nonreality-based thinking  Outcome: Ongoing  Goal: Able to interrupt nonreality-based thinking  Description: Able to interrupt nonreality-based thinking  Outcome: Ongoing     Problem: Sleep Pattern Disturbance:  Goal: Appears well-rested  Description: Appears well-rested  Outcome: Ongoing

## 2021-09-23 NOTE — RT PROTOCOL NOTE
RT Inhaler-Nebulizer Bronchodilator Protocol Note    There is a bronchodilator order in the chart from a provider indicating to follow the RT Bronchodilator Protocol and there is an Initiate RT Inhaler-Nebulizer Bronchodilator Protocol order as well (see protocol at bottom of note). CXR Findings:  No results found. The findings from the last RT Protocol Assessment were as follows:   History Pulmonary Disease: None or smoker <15 pack years  Respiratory Pattern: Regular pattern and RR 12-20 bpm  Breath Sounds: Slightly diminished and/or crackles  Cough: Weak, non-productive  Indication for Bronchodilator Therapy: Decreased or absent breath sounds  Bronchodilator Assessment Score: 5    Aerosolized bronchodilator medication orders have been revised according to the RT Inhaler-Nebulizer Bronchodilator Protocol below. Respiratory Therapist to perform RT Therapy Protocol Assessment initially then follow the protocol. Repeat RT Therapy Protocol Assessment PRN for score 0-3 or on second treatment, BID, and PRN for scores above 3. No Indications - adjust the frequency to every 6 hours PRN wheezing or bronchospasm, if no treatments needed after 48 hours then discontinue using Per Protocol order mode. If indication present, adjust the RT bronchodilator orders based on the Bronchodilator Assessment Score as indicated below. Use Inhaler orders unless patient has one or more of the following: on home nebulizer, not able to hold breath for 10 seconds, is not alert and oriented, cannot activate and use MDI correctly, or respiratory rate 25 breaths per minute or more, then use the equivalent nebulizer order(s) with same Frequency and PRN reasons based on the score. If a patient is on this medication at home then do not decrease Frequency below that used at home.     0-3 - enter or revise RT bronchodilator order(s) to equivalent RT Bronchodilator order with Frequency of every 4 hours PRN for wheezing or increased work of breathing using Per Protocol order mode. 4-6 - enter or revise RT Bronchodilator order(s) to two equivalent RT bronchodilator orders with one order with BID Frequency and one order with Frequency of every 4 hours PRN wheezing or increased work of breathing using Per Protocol order mode. 7-10 - enter or revise RT Bronchodilator order(s) to two equivalent RT bronchodilator orders with one order with TID Frequency and one order with Frequency of every 4 hours PRN wheezing or increased work of breathing using Per Protocol order mode. 11-13 - enter or revise RT Bronchodilator order(s) to one equivalent RT bronchodilator order with QID Frequency and an Albuterol order with Frequency of every 4 hours PRN wheezing or increased work of breathing using Per Protocol order mode. Greater than 13 - enter or revise RT Bronchodilator order(s) to one equivalent RT bronchodilator order with every 4 hours Frequency and an Albuterol order with Frequency of every 2 hours PRN wheezing or increased work of breathing using Per Protocol order mode. RT to enter RT Home Evaluation for COPD & MDI Assessment order using Per Protocol order mode.     Electronically signed by Mannie Spencer RCP on 9/22/2021 at 8:47 PM

## 2021-09-23 NOTE — PROGRESS NOTES
Hospitalist Progress Note      PCP: Gaby Hadley MD    Date of Admission: 9/13/2021    Chief Complaint: Shortness of breath    Hospital Course:  Patient is a 40-year-old female with past medical history of CAD, aortic stenosis status post AVR who presented to hospital for shortness of breath. Patient is a poor historian, reportedly patient was brought to the hospital for shortness of breath. Patient was found hypoxic in upper 80s on room air, was transitioned to nasal cannula. Subjective:     Ongoing need for high volume O2 supplement - sats improved. Medications:  Reviewed      Infusion Medications    sodium chloride 75 mL/hr at 09/23/21 0116    sodium chloride Stopped (09/16/21 1256)     Scheduled Medications    dexamethasone  10 mg IntraVENous Q24H    dilTIAZem  240 mg Oral Daily    enoxaparin  30 mg SubCUTAneous BID    baricitinib  1 mg Oral Daily    sodium chloride flush  10 mL IntraVENous 2 times per day    aspirin  81 mg Oral Daily    ipratropium  2 spray Each Nostril 4x Daily    ipratropium  2 puff Inhalation BID    And    albuterol sulfate HFA  2 puff Inhalation BID     PRN Meds: albuterol sulfate HFA, metoprolol, sodium chloride, sodium chloride flush, sodium chloride, ondansetron **OR** ondansetron, acetaminophen **OR** acetaminophen      Intake/Output Summary (Last 24 hours) at 9/23/2021 1225  Last data filed at 9/22/2021 1728  Gross per 24 hour   Intake 240 ml   Output --   Net 240 ml       Physical Exam Performed:    /72   Pulse 94   Temp 97.7 °F (36.5 °C) (Oral)   Resp 20   Ht 5' (1.524 m)   Wt 99 lb 13.9 oz (45.3 kg)   SpO2 99%   BMI 19.50 kg/m²     General appearance: frail, NAD, able to hold conversations, on 7l NC  HEENT: Pupils equal, round, and reactive to light. Conjunctivae/corneas clear. Neck: Supple, with full range of motion. No jugular venous distention. Trachea midline. Respiratory:  Normal respiratory effort.  Diminished breath sounds bilateral  Cardiovascular: Regular rate and rhythm with normal S1/S2   Abdomen: Soft, non-tender, non-distended with normal bowel sounds. Musculoskeletal: No clubbing, cyanosis or edema bilaterally. Full range of motion without deformity. Skin: Skin color, texture, turgor normal.  No rashes or lesions. Neurologic:  Neurovascularly intact without any focal sensory/motor deficits. Cranial nerves: II-XII intact, grossly non-focal.  Psychiatric: Alert and partially oriented. Capillary Refill: Brisk,< 3 seconds   Peripheral Pulses: +2 palpable, equal bilaterally       Labs:   Recent Labs     09/21/21  0751 09/22/21  0809 09/23/21  0700   WBC 15.9* 14.6* 9.2   HGB 12.8 12.5 11.5*   HCT 37.7 38.8 34.3*    287 278     Recent Labs     09/21/21  0751 09/22/21  0809 09/23/21  0700    140 138   K 3.3*  3.3* 3.5 4.0    106 106   CO2 22 23 20*   BUN 24* 18 13   CREATININE 0.6 <0.5* <0.5*   CALCIUM 9.0 8.6 8.2*       Recent Labs     09/21/21  0751 09/22/21  0809   AST 15 19   ALT 20 16   BILIDIR  --  <0.2   BILITOT 0.5 0.6   ALKPHOS 92 89       No results for input(s): INR in the last 72 hours. No results for input(s): Dexter Ra in the last 72 hours. Urinalysis:      Lab Results   Component Value Date    NITRU Negative 05/16/2019    WBCUA 2 05/16/2019    RBCUA 0-2 05/16/2019    BLOODU Negative 05/16/2019    SPECGRAV 1.016 05/16/2019    GLUCOSEU Negative 05/16/2019       Radiology:  XR CHEST 1 VIEW   Final Result   Slight increase in bilateral pulmonary opacities         CT CHEST PULMONARY EMBOLISM W CONTRAST   Final Result   No evidence of significant pulmonary embolism. Extensive multifocal bilateral airspace disease compatible with multifocal   pneumonia including viral pneumonia. Dilation of the main and proximal pulmonary arteries could represent   pulmonary arterial hypertension. Large hiatal hernia.          XR CHEST PORTABLE   Final Result   Findings most consistent with multifocal pneumonia. Assessment/Plan:      COVID-19 pneumonia. Decadron 6 mg daily for 10 days - completed. - with O2 worsening - decadron resumed at 10mg dose on 9/22  completed Remdesivir, continue baricitinib started September 14  Lovenox twice daily  Incentive spirometry  She completed 10 days of empiric Zithro and Rocephin - stopped 9/23      Acute respiratory failure with hypoxia  Secondary to COVID-19 pneumonia  Titrate oxygen to keep saturations above 90%      Aortic stenosis status post AVR  Continue to monitor  Monitor fluid status - very poor PO      CAD  Continue aspirin, ACE inhibitor. Afib - no prior Hx   HR up to 130s on the monitor. Started on PO dilt  - increase to 60QID - transition to long acting dilt PO. EKG - confirms Afib. Cont current dose lovenox. Family requested cardiology eval.    - recs appreciated. DVT Prophylaxis: Lovenox  Diet: ADULT DIET; Regular  Adult Oral Nutrition Supplement; Standard High Calorie/High Protein Oral Supplement  Adult Oral Nutrition Supplement; Standard High Calorie/High Protein Oral Supplement  Code Status: DNR-CCA      PT/OT Eval Status: as tolerates. Dispo - cc. O2 up to 11l/min - up to 15l/min.        Amie Hadley MD

## 2021-09-24 LAB
ALBUMIN SERPL-MCNC: 2.9 G/DL (ref 3.4–5)
ALP BLD-CCNC: 101 U/L (ref 40–129)
ALT SERPL-CCNC: 17 U/L (ref 10–40)
ANION GAP SERPL CALCULATED.3IONS-SCNC: 11 MMOL/L (ref 3–16)
AST SERPL-CCNC: 15 U/L (ref 15–37)
BASOPHILS ABSOLUTE: 0 K/UL (ref 0–0.2)
BASOPHILS RELATIVE PERCENT: 0.2 %
BILIRUB SERPL-MCNC: 0.3 MG/DL (ref 0–1)
BILIRUBIN DIRECT: <0.2 MG/DL (ref 0–0.3)
BILIRUBIN, INDIRECT: ABNORMAL MG/DL (ref 0–1)
BUN BLDV-MCNC: 14 MG/DL (ref 7–20)
CALCIUM SERPL-MCNC: 8.5 MG/DL (ref 8.3–10.6)
CHLORIDE BLD-SCNC: 106 MMOL/L (ref 99–110)
CO2: 20 MMOL/L (ref 21–32)
CREAT SERPL-MCNC: <0.5 MG/DL (ref 0.6–1.2)
EOSINOPHILS ABSOLUTE: 0 K/UL (ref 0–0.6)
EOSINOPHILS RELATIVE PERCENT: 0 %
GFR AFRICAN AMERICAN: >60
GFR NON-AFRICAN AMERICAN: >60
GLUCOSE BLD-MCNC: 109 MG/DL (ref 70–99)
HCT VFR BLD CALC: 36.2 % (ref 36–48)
HEMOGLOBIN: 12.1 G/DL (ref 12–16)
LYMPHOCYTES ABSOLUTE: 0.3 K/UL (ref 1–5.1)
LYMPHOCYTES RELATIVE PERCENT: 1.7 %
MCH RBC QN AUTO: 27.6 PG (ref 26–34)
MCHC RBC AUTO-ENTMCNC: 33.3 G/DL (ref 31–36)
MCV RBC AUTO: 82.8 FL (ref 80–100)
MONOCYTES ABSOLUTE: 0.9 K/UL (ref 0–1.3)
MONOCYTES RELATIVE PERCENT: 6.3 %
NEUTROPHILS ABSOLUTE: 13.3 K/UL (ref 1.7–7.7)
NEUTROPHILS RELATIVE PERCENT: 91.8 %
PDW BLD-RTO: 14.4 % (ref 12.4–15.4)
PLATELET # BLD: 308 K/UL (ref 135–450)
PMV BLD AUTO: 9.5 FL (ref 5–10.5)
POTASSIUM REFLEX MAGNESIUM: 4.1 MMOL/L (ref 3.5–5.1)
RBC # BLD: 4.37 M/UL (ref 4–5.2)
SODIUM BLD-SCNC: 137 MMOL/L (ref 136–145)
TOTAL PROTEIN: 5.6 G/DL (ref 6.4–8.2)
WBC # BLD: 14.5 K/UL (ref 4–11)

## 2021-09-24 PROCEDURE — 2580000003 HC RX 258: Performed by: INTERNAL MEDICINE

## 2021-09-24 PROCEDURE — 6370000000 HC RX 637 (ALT 250 FOR IP): Performed by: INTERNAL MEDICINE

## 2021-09-24 PROCEDURE — 85025 COMPLETE CBC W/AUTO DIFF WBC: CPT

## 2021-09-24 PROCEDURE — 80048 BASIC METABOLIC PNL TOTAL CA: CPT

## 2021-09-24 PROCEDURE — 97530 THERAPEUTIC ACTIVITIES: CPT

## 2021-09-24 PROCEDURE — 80076 HEPATIC FUNCTION PANEL: CPT

## 2021-09-24 PROCEDURE — 2700000000 HC OXYGEN THERAPY PER DAY

## 2021-09-24 PROCEDURE — 99221 1ST HOSP IP/OBS SF/LOW 40: CPT | Performed by: PSYCHIATRY & NEUROLOGY

## 2021-09-24 PROCEDURE — 94761 N-INVAS EAR/PLS OXIMETRY MLT: CPT

## 2021-09-24 PROCEDURE — 1200000000 HC SEMI PRIVATE

## 2021-09-24 PROCEDURE — 94640 AIRWAY INHALATION TREATMENT: CPT

## 2021-09-24 PROCEDURE — 36415 COLL VENOUS BLD VENIPUNCTURE: CPT

## 2021-09-24 PROCEDURE — 6360000002 HC RX W HCPCS: Performed by: INTERNAL MEDICINE

## 2021-09-24 RX ADMIN — IPRATROPIUM BROMIDE 2 SPRAY: 42 SPRAY NASAL at 14:14

## 2021-09-24 RX ADMIN — IPRATROPIUM BROMIDE 2 SPRAY: 42 SPRAY NASAL at 20:49

## 2021-09-24 RX ADMIN — IPRATROPIUM BROMIDE 2 SPRAY: 42 SPRAY NASAL at 12:24

## 2021-09-24 RX ADMIN — SODIUM CHLORIDE: 9 INJECTION, SOLUTION INTRAVENOUS at 06:53

## 2021-09-24 RX ADMIN — Medication 2 PUFF: at 20:13

## 2021-09-24 RX ADMIN — SODIUM CHLORIDE: 9 INJECTION, SOLUTION INTRAVENOUS at 20:50

## 2021-09-24 RX ADMIN — BARICITINIB 1 MG: 1 TABLET, FILM COATED ORAL at 08:59

## 2021-09-24 RX ADMIN — DEXAMETHASONE SODIUM PHOSPHATE 10 MG: 10 INJECTION INTRAMUSCULAR; INTRAVENOUS at 12:21

## 2021-09-24 RX ADMIN — SODIUM CHLORIDE, PRESERVATIVE FREE 10 ML: 5 INJECTION INTRAVENOUS at 20:49

## 2021-09-24 RX ADMIN — ENOXAPARIN SODIUM 30 MG: 30 INJECTION SUBCUTANEOUS at 08:58

## 2021-09-24 RX ADMIN — DILTIAZEM HYDROCHLORIDE 240 MG: 240 CAPSULE, COATED, EXTENDED RELEASE ORAL at 08:58

## 2021-09-24 RX ADMIN — IPRATROPIUM BROMIDE 2 SPRAY: 42 SPRAY NASAL at 17:35

## 2021-09-24 RX ADMIN — Medication 2 PUFF: at 08:52

## 2021-09-24 RX ADMIN — ENOXAPARIN SODIUM 30 MG: 30 INJECTION SUBCUTANEOUS at 20:49

## 2021-09-24 RX ADMIN — ASPIRIN 81 MG: 81 TABLET, COATED ORAL at 08:58

## 2021-09-24 ASSESSMENT — PAIN SCALES - WONG BAKER
WONGBAKER_NUMERICALRESPONSE: 0

## 2021-09-24 ASSESSMENT — PAIN SCALES - GENERAL
PAINLEVEL_OUTOF10: 0
PAINLEVEL_OUTOF10: 0

## 2021-09-24 NOTE — PROGRESS NOTES
Comprehensive Nutrition Assessment    Type and Reason for Visit:  Reassess    Nutrition Recommendations/Plan:   - Continue current diet  - Continue Ensure Enlive  - Document meal and supplement intakes in flowsheet     RD did not conduct direct, in-person nutrition evaluation in efforts to reduce exposure and use of PPE for high risk persons, PUI persons, patients who have tested positive for Covid-19 or those awaiting respiratory panel results. EMR was screened for nutrition risk factors, as defined per nutrition standards of care. Nutrition Assessment:  Follow-up. COVID+ requiring high volume O2. No significant wt loss per EMR. Oriented to person only. On regular diet with recorded intakes 0%. Receiving Ensure Enlive at each meal with poor intakes. Will continue to monitor. Malnutrition Assessment:  Malnutrition Status:  Insufficient data    Context:  Acute Illness       Estimated Daily Nutrient Needs:  Energy (kcal):  3162-9921 (30-35kcal/46kg); Weight Used for Energy Requirements:  Current     Protein (g):  55-69 (1.2-1.5g/46kg); Weight Used for Protein Requirements:  Current        Fluid (ml/day):1 ml/kcal      Nutrition Related Findings:  +9 liters. Wounds:  None       Current Nutrition Therapies:    ADULT DIET;  Regular  Adult Oral Nutrition Supplement; Standard High Calorie/High Protein Oral Supplement    Anthropometric Measures:  · Height: 5' (152.4 cm)  · Current Body Weight: 101 lb (45.8 kg)   · Admission Body Weight: 101 lb 6.4 oz (46 kg)    · Usual Body Weight: 105 lb (47.6 kg)     · Ideal Body Weight: 100 lbs; % Ideal Body Weight 101 %   · BMI: 19.7  · BMI Categories: Underweight (BMI less than 22) age over 72       Nutrition Diagnosis:   · Inadequate oral intake related to impaired respiratory function as evidenced by intake 0-25%    Nutrition Interventions:   Food and/or Nutrient Delivery:  Continue Current Diet, Continue Oral Nutrition Supplement  Nutrition Education/Counseling:  No recommendation at this time   Coordination of Nutrition Care:  Continue to monitor while inpatient    Goals:  >50% of meals and supplement consumed       Nutrition Monitoring and Evaluation:   Behavioral-Environmental Outcomes:  None Identified   Food/Nutrient Intake Outcomes:  Food and Nutrient Intake, Supplement Intake  Physical Signs/Symptoms Outcomes:  Weight, Nutrition Focused Physical Findings     Discharge Planning:    Continue Oral Nutrition Supplement     Electronically signed by Sara Cadena RD, LD on 9/24/21 at 10:17 AM EDT    Contact: [phone]

## 2021-09-24 NOTE — CONSULTS
Psychiatry Consultation/Initial Inpatient Malcolm Flores M.D.  9/24/2021  11:42 AM      Referring Provider:  Chelo Arechiga MD    Recommendations:    1. Delirium-I suspect this pt may be a bit delirious. Perhaps not surprising, given her age and recent illness. Unfortunately my interview was restricted by this and the patient's hearing loss, as well as my own hearing loss and the fact that I had to wear a PAPR. I couldn't always make sense of her speech, though she didn't exactly note depression. I'm sorry I can't be more helpful in this circumstance. Perhaps when she is able to leave isolation? In any case, I leave my usual delirium recs below in case they are helpful. Delirium is a waxing and waning disturbance in cognition, often multifactorial in etiology. Myriad different factors associated with being hospitalized can cause it. Chief among these are any kind of inflammatory process, sedation-causing medicines, intoxication or withdrawal, anything that affects brain structure or function, metabolic dysregulation, dehydration/azotemia, sleep deprivation, stress hormone activation, or even just being in an unfamiliar place, especially when combined with day/night dysregulation. The effect is usually worse in people over age 54 or in anyone with some degree of more global brain pathology like dementia, brain vascular disease, intellectual disability, etc.    The first step is a medical workup for common causes of altered mental status. This generally consists of cbc c diff, renal, lft, TSH, ua, drug screen, beta hcg.  rpr or hiv testing may be indicated. Imaging like a CXR, brain CT, or even MRI is usually a good idea for any new mental status change, unless an obvious etiologic agent is already identified. EEG, LP, and ECG should be considered if clinically indicated. Abnormalities in the above should be corrected.       A UA with + LE may well indicate an inflammatory process causing delirium, even if the urine is considered \"dirty,\" or otherwise not a UTI. Strongly consider treating a UA with a + LE, especially in the elderly. Delirum/Altered Mental Status can be approached by both pharmacologic and non-pharmacologic means:    Non-pharmacologic recommendations:  1. Correct the underlying medical issues. 2.  Try to establish normal day/night light and sleep cycles. 3.  Have staff frequently reorient patients. 4.  Assess for pain issues. Pain can be quite disorienting and sometimes delirious patients have a hard time making pain needs known. Pharmacologic recommendations:  4. Avoid using ZAN-ergic meds liked benzos and ambien. 5.  Limit opiate meds as much as possible. 6.  Avoid anticholiergic meds like TCAs and benadryl as much as possible. 7.  If the patient is frequently agitated, consider using an antipsychotic medicine for dangerous behavior. If gentle pharmacologic help is needed, try buspar 5-10 bid. A bit of depakote 250 mg bid can be helpful as well, if pt doesn't have liver pathology. Likewise, seroquel 25-50 mg po [id number] prn for agitation is a good choice. If patient has difficulty swallowing, consider zyprexa zydis 5-10 mg po [id number] prn for agitation. If po is impossible, use zyprexa 5-10 mg im [id number] prn for sleep and agitation. Zyprexa must NOT be used with im ativan. Keep in mind that antipsychotics like zyprexa and seroquel are best used short term while pts are in hospital to help manage harmful behaviors. They carry extra risk longer term, especially in patients with dementia and psychosis associated with dementia. Thank you for allowing me to care for this patient. Please call the psych consult line with any further questions.     Diagnosis:    Axis I  Delirium    Axis III       Diagnosis Date    Aortic stenosis     Arthritis     COVID-19     PAF (paroxysmal atrial fibrillation) (HCC)       Active Problems:    History of transcatheter aortic valve implantation ([facility])    Hypoxia    Acute respiratory failure with hypoxia (HCC)    Pneumonia due to COVID-19 virus    Coronary artery disease involving native coronary artery of native heart without angina pectoris  Resolved Problems:    * No resolved hospital problems.  *              dexamethasone  10 mg IntraVENous Q24H    dilTIAZem  240 mg Oral Daily    enoxaparin  30 mg SubCUTAneous BID    baricitinib  1 mg Oral Daily    sodium chloride flush  10 mL IntraVENous 2 times per day    aspirin  81 mg Oral Daily    ipratropium  2 spray Each Nostril 4x Daily    ipratropium  2 puff Inhalation BID    And    albuterol sulfate HFA  2 puff Inhalation BID     albuterol sulfate HFA, metoprolol, sodium chloride, sodium chloride flush, sodium chloride, ondansetron **OR** ondansetron, acetaminophen **OR** acetaminophen    Examination  Review of Systems - + drowsiness, SOB    Recent Results (from the past 168 hour(s))   Basic Metabolic Panel w/ Reflex to MG    Collection Time: 09/18/21  9:07 AM   Result Value Ref Range    Sodium 131 (L) 136 - 145 mmol/L    Potassium reflex Magnesium 4.8 3.5 - 5.1 mmol/L    Chloride 101 99 - 110 mmol/L    CO2 13 (LL) 21 - 32 mmol/L    Anion Gap 17 (H) 3 - 16    Glucose 198 (H) 70 - 99 mg/dL    BUN 23 (H) 7 - 20 mg/dL    CREATININE 0.6 0.6 - 1.2 mg/dL    GFR Non-African American >60 >60    GFR African American >60 >60    Calcium 9.1 8.3 - 10.6 mg/dL   Comprehensive Metabolic Panel    Collection Time: 09/18/21  9:07 AM   Result Value Ref Range    Potassium 4.8 3.5 - 5.1 mmol/L    Total Protein 5.9 (L) 6.4 - 8.2 g/dL    Albumin 2.5 (L) 3.4 - 5.0 g/dL    Albumin/Globulin Ratio 0.7 (L) 1.1 - 2.2    Total Bilirubin 0.4 0.0 - 1.0 mg/dL    Alkaline Phosphatase 107 40 - 129 U/L    ALT 21 10 - 40 U/L    AST 44 (H) 15 - 37 U/L    Globulin 3.4 g/dL   CBC Auto Differential    Collection Time: 09/18/21  9:47 AM   Result Value Ref Range    WBC 10.1 4.0 - 11.0 K/uL    RBC 4.92 U/L    AST 22 15 - 37 U/L    Globulin 2.9 g/dL   CBC Auto Differential    Collection Time: 09/19/21  9:33 AM   Result Value Ref Range    WBC 11.2 (H) 4.0 - 11.0 K/uL    RBC 4.66 4.00 - 5.20 M/uL    Hemoglobin 12.7 12.0 - 16.0 g/dL    Hematocrit 37.7 36.0 - 48.0 %    MCV 81.0 80.0 - 100.0 fL    MCH 27.3 26.0 - 34.0 pg    MCHC 33.7 31.0 - 36.0 g/dL    RDW 14.5 12.4 - 15.4 %    Platelets 444 348 - 669 K/uL    MPV 9.4 5.0 - 10.5 fL    Neutrophils % 88.5 %    Lymphocytes % 3.1 %    Monocytes % 8.0 %    Eosinophils % 0.0 %    Basophils % 0.4 %    Neutrophils Absolute 9.9 (H) 1.7 - 7.7 K/uL    Lymphocytes Absolute 0.3 (L) 1.0 - 5.1 K/uL    Monocytes Absolute 0.9 0.0 - 1.3 K/uL    Eosinophils Absolute 0.0 0.0 - 0.6 K/uL    Basophils Absolute 0.0 0.0 - 0.2 K/uL   Basic Metabolic Panel w/ Reflex to MG    Collection Time: 09/20/21  7:49 AM   Result Value Ref Range    Sodium 134 (L) 136 - 145 mmol/L    Potassium reflex Magnesium 3.5 3.5 - 5.1 mmol/L    Chloride 96 (L) 99 - 110 mmol/L    CO2 17 (L) 21 - 32 mmol/L    Anion Gap 21 (H) 3 - 16    Glucose 208 (H) 70 - 99 mg/dL    BUN 24 (H) 7 - 20 mg/dL    CREATININE 0.6 0.6 - 1.2 mg/dL    GFR Non-African American >60 >60    GFR African American >60 >60    Calcium 9.7 8.3 - 10.6 mg/dL   Comprehensive Metabolic Panel    Collection Time: 09/20/21  7:49 AM   Result Value Ref Range    Potassium 3.5 3.5 - 5.1 mmol/L    Total Protein 6.4 6.4 - 8.2 g/dL    Albumin 3.3 (L) 3.4 - 5.0 g/dL    Albumin/Globulin Ratio 1.1 1.1 - 2.2    Total Bilirubin 0.6 0.0 - 1.0 mg/dL    Alkaline Phosphatase 123 40 - 129 U/L    ALT 27 10 - 40 U/L    AST 23 15 - 37 U/L    Globulin 3.1 g/dL   CBC Auto Differential    Collection Time: 09/20/21  7:49 AM   Result Value Ref Range    WBC 16.2 (H) 4.0 - 11.0 K/uL    RBC 5.02 4.00 - 5.20 M/uL    Hemoglobin 13.9 12.0 - 16.0 g/dL    Hematocrit 41.0 36.0 - 48.0 %    MCV 81.8 80.0 - 100.0 fL    MCH 27.6 26.0 - 34.0 pg    MCHC 33.8 31.0 - 36.0 g/dL    RDW 14.7 12.4 - 15.4 % Platelets 521 (H) 795 - 450 K/uL    MPV 10.0 5.0 - 10.5 fL    Neutrophils % 89.8 %    Lymphocytes % 2.7 %    Monocytes % 7.4 %    Eosinophils % 0.0 %    Basophils % 0.1 %    Neutrophils Absolute 14.6 (H) 1.7 - 7.7 K/uL    Lymphocytes Absolute 0.4 (L) 1.0 - 5.1 K/uL    Monocytes Absolute 1.2 0.0 - 1.3 K/uL    Eosinophils Absolute 0.0 0.0 - 0.6 K/uL    Basophils Absolute 0.0 0.0 - 0.2 K/uL   Magnesium    Collection Time: 09/20/21  7:49 AM   Result Value Ref Range    Magnesium 2.40 1.80 - 2.40 mg/dL   EKG 12 Lead    Collection Time: 09/20/21  1:46 PM   Result Value Ref Range    Ventricular Rate 86 BPM    Atrial Rate 326 BPM    QRS Duration 80 ms    Q-T Interval 338 ms    QTc Calculation (Bazett) 404 ms    R Axis 10 degrees    T Axis 173 degrees    Diagnosis       Atrial fibrillationNonspecific T wave abnormalityAbnormal ECGWhen compared with ECG of 13-SEP-2021 13:01,Atrial fibrillation has replaced Sinus rhythmNonspecific T wave abnormality now evident in Anterolateral leadsConfirmed by PAOLA CEBALLOS, Abby Lesches ([id number]) on 9/20/2021 2:10:46 PM   Basic Metabolic Panel w/ Reflex to MG    Collection Time: 09/21/21  7:51 AM   Result Value Ref Range    Sodium 138 136 - 145 mmol/L    Potassium reflex Magnesium 3.3 (L) 3.5 - 5.1 mmol/L    Chloride 104 99 - 110 mmol/L    CO2 22 21 - 32 mmol/L    Anion Gap 12 3 - 16    Glucose 143 (H) 70 - 99 mg/dL    BUN 24 (H) 7 - 20 mg/dL    CREATININE 0.6 0.6 - 1.2 mg/dL    GFR Non-African American >60 >60    GFR African American >60 >60    Calcium 9.0 8.3 - 10.6 mg/dL   Comprehensive Metabolic Panel    Collection Time: 09/21/21  7:51 AM   Result Value Ref Range    Potassium 3.3 (L) 3.5 - 5.1 mmol/L    Total Protein 5.5 (L) 6.4 - 8.2 g/dL    Albumin 2.9 (L) 3.4 - 5.0 g/dL    Albumin/Globulin Ratio 1.1 1.1 - 2.2    Total Bilirubin 0.5 0.0 - 1.0 mg/dL    Alkaline Phosphatase 92 40 - 129 U/L    ALT 20 10 - 40 U/L    AST 15 15 - 37 U/L    Globulin 2.6 g/dL   CBC Auto Differential    Collection Time: 09/21/21  7:51 AM   Result Value Ref Range    WBC 15.9 (H) 4.0 - 11.0 K/uL    RBC 4.62 4.00 - 5.20 M/uL    Hemoglobin 12.8 12.0 - 16.0 g/dL    Hematocrit 37.7 36.0 - 48.0 %    MCV 81.6 80.0 - 100.0 fL    MCH 27.7 26.0 - 34.0 pg    MCHC 34.0 31.0 - 36.0 g/dL    RDW 14.5 12.4 - 15.4 %    Platelets 041 505 - 081 K/uL    MPV 9.3 5.0 - 10.5 fL    Neutrophils % 90.4 %    Lymphocytes % 1.7 %    Monocytes % 6.8 %    Eosinophils % 0.1 %    Basophils % 1.0 %    Neutrophils Absolute 14.4 (H) 1.7 - 7.7 K/uL    Lymphocytes Absolute 0.3 (L) 1.0 - 5.1 K/uL    Monocytes Absolute 1.1 0.0 - 1.3 K/uL    Eosinophils Absolute 0.0 0.0 - 0.6 K/uL    Basophils Absolute 0.2 0.0 - 0.2 K/uL   Magnesium    Collection Time: 09/21/21  7:51 AM   Result Value Ref Range    Magnesium 2.30 1.80 - 2.40 mg/dL   CBC Auto Differential    Collection Time: 09/22/21  8:09 AM   Result Value Ref Range    WBC 14.6 (H) 4.0 - 11.0 K/uL    RBC 4.70 4.00 - 5.20 M/uL    Hemoglobin 12.5 12.0 - 16.0 g/dL    Hematocrit 38.8 36.0 - 48.0 %    MCV 82.5 80.0 - 100.0 fL    MCH 26.6 26.0 - 34.0 pg    MCHC 32.3 31.0 - 36.0 g/dL    RDW 14.1 12.4 - 15.4 %    Platelets 568 638 - 255 K/uL    MPV 9.7 5.0 - 10.5 fL    Neutrophils % 93.4 %    Lymphocytes % 2.5 %    Monocytes % 3.9 %    Eosinophils % 0.0 %    Basophils % 0.2 %    Neutrophils Absolute 13.6 (H) 1.7 - 7.7 K/uL    Lymphocytes Absolute 0.4 (L) 1.0 - 5.1 K/uL    Monocytes Absolute 0.6 0.0 - 1.3 K/uL    Eosinophils Absolute 0.0 0.0 - 0.6 K/uL    Basophils Absolute 0.0 0.0 - 0.2 K/uL   Hepatic Function Panel    Collection Time: 09/22/21  8:09 AM   Result Value Ref Range    Total Protein 5.2 (L) 6.4 - 8.2 g/dL    Albumin 2.6 (L) 3.4 - 5.0 g/dL    Alkaline Phosphatase 89 40 - 129 U/L    ALT 16 10 - 40 U/L    AST 19 15 - 37 U/L    Total Bilirubin 0.6 0.0 - 1.0 mg/dL    Bilirubin, Direct <0.2 0.0 - 0.3 mg/dL    Bilirubin, Indirect see below 0.0 - 1.0 mg/dL   Basic Metabolic Panel w/ Reflex to MG    Collection Time: 09/22/21 8:09 AM   Result Value Ref Range    Sodium 140 136 - 145 mmol/L    Potassium reflex Magnesium 3.5 3.5 - 5.1 mmol/L    Chloride 106 99 - 110 mmol/L    CO2 23 21 - 32 mmol/L    Anion Gap 11 3 - 16    Glucose 92 70 - 99 mg/dL    BUN 18 7 - 20 mg/dL    CREATININE <0.5 (L) 0.6 - 1.2 mg/dL    GFR Non-African American >60 >60    GFR African American >60 >60    Calcium 8.6 8.3 - 10.6 mg/dL   Magnesium    Collection Time: 09/22/21  8:09 AM   Result Value Ref Range    Magnesium 2.10 1.80 - 2.40 mg/dL   Basic Metabolic Panel w/ Reflex to MG    Collection Time: 09/23/21  7:00 AM   Result Value Ref Range    Sodium 138 136 - 145 mmol/L    Potassium reflex Magnesium 4.0 3.5 - 5.1 mmol/L    Chloride 106 99 - 110 mmol/L    CO2 20 (L) 21 - 32 mmol/L    Anion Gap 12 3 - 16    Glucose 125 (H) 70 - 99 mg/dL    BUN 13 7 - 20 mg/dL    CREATININE <0.5 (L) 0.6 - 1.2 mg/dL    GFR Non-African American >60 >60    GFR African American >60 >60    Calcium 8.2 (L) 8.3 - 10.6 mg/dL   CBC Auto Differential    Collection Time: 09/23/21  7:00 AM   Result Value Ref Range    WBC 9.2 4.0 - 11.0 K/uL    RBC 4.15 4.00 - 5.20 M/uL    Hemoglobin 11.5 (L) 12.0 - 16.0 g/dL    Hematocrit 34.3 (L) 36.0 - 48.0 %    MCV 82.7 80.0 - 100.0 fL    MCH 27.8 26.0 - 34.0 pg    MCHC 33.6 31.0 - 36.0 g/dL    RDW 14.7 12.4 - 15.4 %    Platelets 026 980 - 529 K/uL    MPV 10.2 5.0 - 10.5 fL    Neutrophils % 91.8 %    Lymphocytes % 3.1 %    Monocytes % 5.1 %    Eosinophils % 0.0 %    Basophils % 0.0 %    Neutrophils Absolute 8.5 (H) 1.7 - 7.7 K/uL    Lymphocytes Absolute 0.3 (L) 1.0 - 5.1 K/uL    Monocytes Absolute 0.5 0.0 - 1.3 K/uL    Eosinophils Absolute 0.0 0.0 - 0.6 K/uL    Basophils Absolute 0.0 0.0 - 0.2 K/uL   Basic Metabolic Panel w/ Reflex to MG    Collection Time: 09/24/21  8:09 AM   Result Value Ref Range    Sodium 137 136 - 145 mmol/L    Potassium reflex Magnesium 4.1 3.5 - 5.1 mmol/L    Chloride 106 99 - 110 mmol/L    CO2 20 (L) 21 - 32 mmol/L    Anion Gap 11 3 - 16    Glucose 109 (H) 70 - 99 mg/dL    BUN 14 7 - 20 mg/dL    CREATININE <0.5 (L) 0.6 - 1.2 mg/dL    GFR Non-African American >60 >60    GFR African American >60 >60    Calcium 8.5 8.3 - 10.6 mg/dL   CBC Auto Differential    Collection Time: 09/24/21  8:09 AM   Result Value Ref Range    WBC 14.5 (H) 4.0 - 11.0 K/uL    RBC 4.37 4.00 - 5.20 M/uL    Hemoglobin 12.1 12.0 - 16.0 g/dL    Hematocrit 36.2 36.0 - 48.0 %    MCV 82.8 80.0 - 100.0 fL    MCH 27.6 26.0 - 34.0 pg    MCHC 33.3 31.0 - 36.0 g/dL    RDW 14.4 12.4 - 15.4 %    Platelets 353 990 - 843 K/uL    MPV 9.5 5.0 - 10.5 fL    Neutrophils % 91.8 %    Lymphocytes % 1.7 %    Monocytes % 6.3 %    Eosinophils % 0.0 %    Basophils % 0.2 %    Neutrophils Absolute 13.3 (H) 1.7 - 7.7 K/uL    Lymphocytes Absolute 0.3 (L) 1.0 - 5.1 K/uL    Monocytes Absolute 0.9 0.0 - 1.3 K/uL    Eosinophils Absolute 0.0 0.0 - 0.6 K/uL    Basophils Absolute 0.0 0.0 - 0.2 K/uL   Hepatic Function Panel    Collection Time: 09/24/21  8:09 AM   Result Value Ref Range    Total Protein 5.6 (L) 6.4 - 8.2 g/dL    Albumin 2.9 (L) 3.4 - 5.0 g/dL    Alkaline Phosphatase 101 40 - 129 U/L    ALT 17 10 - 40 U/L    AST 15 15 - 37 U/L    Total Bilirubin 0.3 0.0 - 1.0 mg/dL    Bilirubin, Direct <0.2 0.0 - 0.3 mg/dL    Bilirubin, Indirect see below 0.0 - 1.0 mg/dL       Vital Signs BP (!) 150/77   Pulse 98   Temp 97.7 °F (36.5 °C) (Oral)   Resp 20   Ht 5' (1.524 m)   Wt 101 lb (45.8 kg)   SpO2 94%   BMI 19.73 kg/m²     Appearance    Thin, in gowns, some confused EC  Speech    Very difficult to understand  Mood    \"OK\"  Affect    A bit anxious  Thought Content   Very hard to assess because of our communication problems. Thought Process  Very hard to assess because of our communication problems. Associations  Very hard to assess because of our communication problems. Insight   Very hard to assess because of our communication problems.   Judgment   Very hard to assess because of our communication problems. No abnormal movements, tics or mannerisms. Orientation   Drowsy but arousable  Memory   Very hard to assess because of our communication problems. Attention/Concentration   impaired  Language  Very hard to assess because of our communication problems. Fund of Knowledge  Very hard to assess because of our communication problems. Suicide Assessment Very hard to assess because of our communication problems. History:      I have reviewed recent documentation for this patient:  Bebeto Shelton is a 80 y.o. female  who  has a past medical history of Aortic stenosis, Arthritis, COVID-19, and PAF (paroxysmal atrial fibrillation) ([facility] [address] CC:    Chief Complaint   Patient presents with    Concern For [id number]     Patient hypoxic via EMS, hypoxic on arrival with room air trial to 87%. Family member also with similar symptoms, shortness of breath, hypoxic. Granddaughter that called EMS reports concern for covid.  Shortness of Breath       Context:  80 y.o. female now to [facility] for SOB. Dx'd c covid. Also has had some afib. Sats slowly improving. Assc Sx:  Some confusion at times. Seemed to have a bit of trouble maintaining attention and concentration, even alertness a bit, though I did wake her from a sound sleep. Duration:  Days to weeks    Severity:  mild    Modifiers:  Illness isn't helping. Timing:  subacute    Past Medical History:   Diagnosis Date    Aortic stenosis     Arthritis     COVID-19     PAF (paroxysmal atrial fibrillation) ([facility])        No Known Allergies    PPsyHx:  None known    CD Hx:  None known. Social History     Socioeconomic History    Marital status:       Spouse name: None    Number of children: None    Years of education: None    Highest education level: None   Occupational History    None   Tobacco Use    Smoking status: Never Smoker    Smokeless tobacco: Never Used   Vaping Use    Vaping Use: Never used   Substance and Sexual Activity    Alcohol use: Never    Drug use: No    Sexual activity: None   Other Topics Concern    None   Social History Narrative    None     Social Determinants of Health     Financial Resource Strain: Low Risk     Difficulty of Paying Living Expenses: Not hard at all   Food Insecurity: No Food Insecurity    Worried About Running Out of Food in the Last Year: Never true    Omari of Food in the Last Year: Never true   Transportation Needs: No Transportation Needs    Lack of Transportation (Medical): No    Lack of Transportation (Non-Medical):  No   Physical Activity:     Days of Exercise per Week:     Minutes of Exercise per Session:    Stress:     Feeling of Stress :    Social Connections:     Frequency of Communication with Friends and Family:     Frequency of Social Gatherings with Friends and Family:     Attends Gnosticism Services:     Active Member of Clubs or Organizations:     Attends Club or Organization Meetings:     Marital Status:    Intimate Partner Violence:     Fear of Current or Ex-Partner:     Emotionally Abused:     Physically Abused:     Sexually Abused:        Family History   Problem Relation Age of Onset    Diabetes Mother     Heart Disease Mother     Heart Disease Father     Cancer Brother     Asthma Daughter     Cancer Sister         Breast, Bone    Heart Disease Sister         Valve     Heart Disease Nephew         Valve    Heart Disease Brother

## 2021-09-24 NOTE — PLAN OF CARE
Nutrition Problem #1: Inadequate oral intake  Intervention: Food and/or Nutrient Delivery: Continue Current Diet, Continue Oral Nutrition Supplement  Nutritional Goals: >50% of meals and supplement consumed

## 2021-09-24 NOTE — PROGRESS NOTES
Physical Therapy  Facility/Department: [address] MED SURG  Daily Treatment Note  NAME: Yared Coates  : 1931  MRN: [id number]    Date of Service: 2021    Discharge Recommendations:  Patient would benefit from continued therapy after discharge, 3-5 sessions per week, Continue to assess pending progress     Yared Coates scored a  on the AM-PAC short mobility form. Current research shows that an AM-PAC score of 17 or less is typically not associated with a discharge to the patient's home setting. Based on the patient's AM-PAC score and their current functional mobility deficits, it is recommended that the patient have 3-5 sessions per week of Physical Therapy at d/c to increase the patient's independence. Please see assessment section for further patient specific details. If patient discharges prior to next session this note will serve as a discharge summary. Please see below for the latest assessment towards goals. Assessment   Body structures, Functions, Activity limitations: Decreased functional mobility ; Decreased ADL status; Decreased balance;Decreased strength;Decreased endurance  Assessment: Pt is a 80 y.o. female who presented to the ED on 21 with SOB. Pt found to be COVID+. Prior to admission, pt living with dtr and granddtr in two level home with 4 CHRISTOS - pt independent with all ADLs and ambulation without device - helps take care of dtr with help of granddtr. Pt currently functioning below baseline - significant limitations to respiratory system - pt now at 8L vapotherm - O2 sats drop from low 90s to mid 80s with 3' ambulation with RW - but able to recover back to low 90s while remaining on 8L vapotherm. Anticipate she will need continued skilled therapy 3-5x/week to maximize independence and safety with functional mobility.   Treatment Diagnosis: impaired activity tolerance  Prognosis: Fair  Decision Making: Medium Complexity  History: see below  Exam: see below  Clinical Presentation: evolving  PT Education: Goals;PT Role;Plan of Care;General Safety;Transfer Training;Gait Training;Functional Mobility Training  REQUIRES PT FOLLOW UP: Yes  Activity Tolerance  Activity Tolerance: Patient limited by fatigue;Patient limited by endurance;Treatment limited secondary to medical complications (free text)  Activity Tolerance: limited by respiratory status     Patient Diagnosis(es): The primary encounter diagnosis was Hypoxia. A diagnosis of Pneumonia due to infectious organism, unspecified laterality, unspecified part of lung was also pertinent to this visit. has a past medical history of Aortic stenosis, Arthritis, COVID-19, and PAF (paroxysmal atrial fibrillation) ([facility] [address] has a past surgical history that includes Varicose vein surgery (Bilateral, 1980's); Cardiac catheterization (04/17/2018); Hysterectomy, total abdominal; and Aortic valve replacement (N/A, 5/21/2019). Restrictions  Restrictions/Precautions  Restrictions/Precautions: Contact Precautions, Isolation, Fall Risk  Position Activity Restriction  Other position/activity restrictions: COVID+; droplet precautions; 8L vapotherm     Subjective   General  Chart Reviewed: Yes  Additional Pertinent Hx: Pt is a 80 y.o. female who presented to the ED on 9/13/21 with SOB. Pt found to be COVID+. Response To Previous Treatment: Patient with no complaints from previous session. Family / Caregiver Present: No  Referring Practitioner: Saturnino Jin MD  Subjective  Subjective: Pt is agreeable to PT - very [place]. Orientation  Orientation  Overall Orientation Status: Within Functional Limits     Cognition   Cognition  Overall Cognitive Status: Exceptions  Following Commands:  Follows one step commands with increased time  Attention Span: Appears intact  Memory: Decreased short term memory  Safety Judgement: Decreased awareness of need for safety;Decreased awareness of need for assistance  Problem Solving: Decreased awareness of errors;Assistance required to identify errors made;Assistance required to generate solutions  Insights: Decreased awareness of deficits  Initiation: Requires cues for some  Sequencing: Requires cues for some     Objective   Bed mobility  Supine to Sit: Minimal assistance  Sit to Supine: Unable to assess (in recliner at end of session)  Scooting: Minimal assistance  Comment: O2 at 94% while supine in bed, decreases to 91% sitting on EOB while on 8L vapotherm. Transfers  Sit to Stand: Minimal Assistance (to RW)  Stand to sit: Minimal Assistance (to RW)  Ambulation  Ambulation?: Yes  Ambulation 1  Surface: level tile  Device: Rolling Walker  Other Apparatus: O2 (8L vapotherm)  Assistance: Minimal assistance  Gait Deviations: Slow Mirian;Decreased step length;Decreased step height  Distance: 3' bed to recliner  Comments: O2 sats drop to 85% - pt having a tough time keeping from messing with gown, O2 line, IV, etc, in order to get good O2 reading. Pt ultimately settles to 92% sitting in recliner on 8L vapotherm.   Stairs/Curb  Stairs?: No     Balance  Posture: Fair  Sitting - Static: Good  Sitting - Dynamic: Fair;+  Standing - Static: Poor;+ (@RW)  Standing - Dynamic: Poor;+ (@RW)              AM-PAC Score  AM-PAC Inpatient Mobility Raw Score : 12 (09/24/21 1326)  AM-PAC Inpatient T-Scale Score : 35.33 (09/24/21 1326)  Mobility Inpatient CMS 0-100% Score: 68.66 (09/24/21 1326)  Mobility Inpatient CMS G-Code Modifier : CL (09/24/21 1326)          Goals  Short term goals  Time Frame for Short term goals: by acute discharge - all goals ongoing as of 9/24/21  Short term goal 1: bed mobility with SBA  Short term goal 2: sit<>stand with SBA  Short term goal 3: ambulate >30' with LRAD and SBA  Patient Goals   Patient goals : none stated    Plan    Plan  Times per week: 3-5x/week  Current Treatment Recommendations: Strengthening, Functional Mobility Training, Transfer Training, Balance Training, Endurance Training, Patient/Caregiver Education & Training, Safety Education & Training, Gait Training, Neuromuscular Re-education, Equipment Evaluation, Education, & procurement  Safety Devices  Type of devices:  All fall risk precautions in place, Call light within reach, Patient at risk for falls, Nurse notified, Gait belt, Left in chair, Chair alarm in place     Therapy Time   Individual Concurrent Group Co-treatment   Time In 1240         Time Out 1320         Minutes 40         Timed Code Treatment Minutes: [place] [address], PT

## 2021-09-24 NOTE — PROGRESS NOTES
Hospitalist Progress Note      PCP: Jose L Owusu MD    Date of Admission: 9/13/2021    Chief Complaint: Shortness of breath    Hospital Course:  Patient is a 27-year-old female with past medical history of CAD, aortic stenosis status post AVR who presented to hospital for shortness of breath. Patient is a poor historian, reportedly patient was brought to the hospital for shortness of breath. Patient was found hypoxic in upper 80s on room air, was transitioned to nasal cannula. Subjective:     Ongoing need for high volume O2 supplement. Medications:  Reviewed      Infusion Medications    sodium chloride 75 mL/hr at 09/24/21 0654    sodium chloride Stopped (09/16/21 1256)     Scheduled Medications    dexamethasone  10 mg IntraVENous Q24H    dilTIAZem  240 mg Oral Daily    enoxaparin  30 mg SubCUTAneous BID    baricitinib  1 mg Oral Daily    sodium chloride flush  10 mL IntraVENous 2 times per day    aspirin  81 mg Oral Daily    ipratropium  2 spray Each Nostril 4x Daily    ipratropium  2 puff Inhalation BID    And    albuterol sulfate HFA  2 puff Inhalation BID     PRN Meds: albuterol sulfate HFA, metoprolol, sodium chloride, sodium chloride flush, sodium chloride, ondansetron **OR** ondansetron, acetaminophen **OR** acetaminophen      Intake/Output Summary (Last 24 hours) at 9/24/2021 1348  Last data filed at 9/24/2021 1056  Gross per 24 hour   Intake 4200.04 ml   Output --   Net 4200.04 ml       Physical Exam Performed:    /72   Pulse 95   Temp 98 °F (36.7 °C) (Oral)   Resp 20   Ht 5' (1.524 m)   Wt 101 lb (45.8 kg)   SpO2 96%   BMI 19.73 kg/m²     General appearance: frail, NAD, able to hold conversations, on 7l NC  HEENT: Pupils equal, round, and reactive to light. Conjunctivae/corneas clear. Neck: Supple, with full range of motion. No jugular venous distention. Trachea midline. Respiratory:  Normal respiratory effort.  Diminished breath sounds multifocal pneumonia. Assessment/Plan:      COVID-19 pneumonia. Decadron 6 mg daily for 10 days - completed. - with O2 worsening - decadron resumed at 10mg dose on 9/22  completed Remdesivir, continue baricitinib started September 14  Lovenox twice daily  Incentive spirometry  She completed 10 days of empiric Zithro and Rocephin - stopped 9/23      Acute respiratory failure with hypoxia  Secondary to COVID-19 pneumonia  Titrate oxygen to keep saturations above 90%      Aortic stenosis status post AVR  Continue to monitor  Monitor fluid status - very poor PO      CAD  Continue aspirin, ACE inhibitor. Afib - no prior Hx   HR up to 130s on the monitor. Started on PO dilt  - increase to 60QID - transition to long acting dilt PO. EKG - confirms Afib. Cont current dose lovenox. Family requested cardiology eval.    - recs appreciated. DVT Prophylaxis: Lovenox  Diet: ADULT DIET; Regular  Adult Oral Nutrition Supplement; Standard High Calorie/High Protein Oral Supplement  Code Status: DNR-CCA      PT/OT Eval Status: as tolerates. Dispo - cc. O2 up to 11l/min - up to 15l/min. Pt progressively more depressed. Intermittently confused. Psychiatry involved.      Oni Connell MD

## 2021-09-25 LAB
ANION GAP SERPL CALCULATED.3IONS-SCNC: 10 MMOL/L (ref 3–16)
BASOPHILS ABSOLUTE: 0 K/UL (ref 0–0.2)
BASOPHILS RELATIVE PERCENT: 0.1 %
BUN BLDV-MCNC: 16 MG/DL (ref 7–20)
C-REACTIVE PROTEIN: 6.2 MG/L (ref 0–5.1)
CALCIUM SERPL-MCNC: 8.5 MG/DL (ref 8.3–10.6)
CHLORIDE BLD-SCNC: 106 MMOL/L (ref 99–110)
CO2: 19 MMOL/L (ref 21–32)
CREAT SERPL-MCNC: <0.5 MG/DL (ref 0.6–1.2)
EOSINOPHILS ABSOLUTE: 0 K/UL (ref 0–0.6)
EOSINOPHILS RELATIVE PERCENT: 0 %
FERRITIN: 671.2 NG/ML (ref 15–150)
GFR AFRICAN AMERICAN: >60
GFR NON-AFRICAN AMERICAN: >60
GLUCOSE BLD-MCNC: 123 MG/DL (ref 70–99)
HCT VFR BLD CALC: 33.8 % (ref 36–48)
HEMOGLOBIN: 11.3 G/DL (ref 12–16)
LYMPHOCYTES ABSOLUTE: 0.2 K/UL (ref 1–5.1)
LYMPHOCYTES RELATIVE PERCENT: 1.4 %
MCH RBC QN AUTO: 27.6 PG (ref 26–34)
MCHC RBC AUTO-ENTMCNC: 33.4 G/DL (ref 31–36)
MCV RBC AUTO: 82.7 FL (ref 80–100)
MONOCYTES ABSOLUTE: 0.9 K/UL (ref 0–1.3)
MONOCYTES RELATIVE PERCENT: 5.8 %
NEUTROPHILS ABSOLUTE: 14.1 K/UL (ref 1.7–7.7)
NEUTROPHILS RELATIVE PERCENT: 92.7 %
PDW BLD-RTO: 14.6 % (ref 12.4–15.4)
PLATELET # BLD: 280 K/UL (ref 135–450)
PMV BLD AUTO: 10 FL (ref 5–10.5)
POTASSIUM REFLEX MAGNESIUM: 3.8 MMOL/L (ref 3.5–5.1)
RBC # BLD: 4.09 M/UL (ref 4–5.2)
SODIUM BLD-SCNC: 135 MMOL/L (ref 136–145)
WBC # BLD: 15.2 K/UL (ref 4–11)

## 2021-09-25 PROCEDURE — 2700000000 HC OXYGEN THERAPY PER DAY

## 2021-09-25 PROCEDURE — 80048 BASIC METABOLIC PNL TOTAL CA: CPT

## 2021-09-25 PROCEDURE — 94640 AIRWAY INHALATION TREATMENT: CPT

## 2021-09-25 PROCEDURE — 94761 N-INVAS EAR/PLS OXIMETRY MLT: CPT

## 2021-09-25 PROCEDURE — 86140 C-REACTIVE PROTEIN: CPT

## 2021-09-25 PROCEDURE — 6370000000 HC RX 637 (ALT 250 FOR IP): Performed by: INTERNAL MEDICINE

## 2021-09-25 PROCEDURE — 1200000000 HC SEMI PRIVATE

## 2021-09-25 PROCEDURE — 82728 ASSAY OF FERRITIN: CPT

## 2021-09-25 PROCEDURE — 6360000002 HC RX W HCPCS: Performed by: INTERNAL MEDICINE

## 2021-09-25 PROCEDURE — 2580000003 HC RX 258: Performed by: INTERNAL MEDICINE

## 2021-09-25 PROCEDURE — 36415 COLL VENOUS BLD VENIPUNCTURE: CPT

## 2021-09-25 PROCEDURE — 85025 COMPLETE CBC W/AUTO DIFF WBC: CPT

## 2021-09-25 RX ORDER — DEXAMETHASONE SODIUM PHOSPHATE 10 MG/ML
6 INJECTION INTRAMUSCULAR; INTRAVENOUS EVERY 24 HOURS
Status: DISCONTINUED | OUTPATIENT
Start: 2021-09-25 | End: 2021-09-26

## 2021-09-25 RX ORDER — QUETIAPINE FUMARATE 25 MG/1
6.25 TABLET, FILM COATED ORAL 2 TIMES DAILY
Status: DISCONTINUED | OUTPATIENT
Start: 2021-09-25 | End: 2021-09-26

## 2021-09-25 RX ADMIN — Medication 2 PUFF: at 07:41

## 2021-09-25 RX ADMIN — SODIUM CHLORIDE: 9 INJECTION, SOLUTION INTRAVENOUS at 12:30

## 2021-09-25 RX ADMIN — Medication 2 PUFF: at 19:31

## 2021-09-25 RX ADMIN — ASPIRIN 81 MG: 81 TABLET, COATED ORAL at 08:14

## 2021-09-25 RX ADMIN — SODIUM CHLORIDE, PRESERVATIVE FREE 10 ML: 5 INJECTION INTRAVENOUS at 20:31

## 2021-09-25 RX ADMIN — IPRATROPIUM BROMIDE 2 SPRAY: 42 SPRAY NASAL at 12:16

## 2021-09-25 RX ADMIN — BARICITINIB 1 MG: 1 TABLET, FILM COATED ORAL at 08:14

## 2021-09-25 RX ADMIN — ACETAMINOPHEN 650 MG: 325 TABLET ORAL at 20:29

## 2021-09-25 RX ADMIN — ENOXAPARIN SODIUM 30 MG: 30 INJECTION SUBCUTANEOUS at 08:13

## 2021-09-25 RX ADMIN — IPRATROPIUM BROMIDE 2 SPRAY: 42 SPRAY NASAL at 08:16

## 2021-09-25 RX ADMIN — DILTIAZEM HYDROCHLORIDE 240 MG: 240 CAPSULE, COATED, EXTENDED RELEASE ORAL at 08:14

## 2021-09-25 RX ADMIN — IPRATROPIUM BROMIDE 2 SPRAY: 42 SPRAY NASAL at 18:21

## 2021-09-25 RX ADMIN — IPRATROPIUM BROMIDE 2 SPRAY: 42 SPRAY NASAL at 20:29

## 2021-09-25 RX ADMIN — QUETIAPINE FUMARATE 6.25 MG: 25 TABLET ORAL at 12:11

## 2021-09-25 RX ADMIN — METOPROLOL TARTRATE 25 MG: 25 TABLET, FILM COATED ORAL at 20:28

## 2021-09-25 RX ADMIN — METOPROLOL TARTRATE 25 MG: 25 TABLET, FILM COATED ORAL at 12:11

## 2021-09-25 RX ADMIN — DEXAMETHASONE SODIUM PHOSPHATE 6 MG: 10 INJECTION INTRAMUSCULAR; INTRAVENOUS at 12:11

## 2021-09-25 RX ADMIN — QUETIAPINE FUMARATE 6.25 MG: 25 TABLET ORAL at 20:29

## 2021-09-25 RX ADMIN — APIXABAN 2.5 MG: 2.5 TABLET, FILM COATED ORAL at 20:29

## 2021-09-25 ASSESSMENT — PAIN SCALES - GENERAL
PAINLEVEL_OUTOF10: 1
PAINLEVEL_OUTOF10: 0
PAINLEVEL_OUTOF10: 1

## 2021-09-25 NOTE — PROGRESS NOTES
Pt desats to 75% on 6L vapotherm. Non-rebreather placed on pt and respiratory called. Pt now on 20 L vapotherm. Sats at 90-93%. No acute signs of distress noted. Will monitor.

## 2021-09-25 NOTE — PLAN OF CARE
Problem: Falls - Risk of:  Goal: Will remain free from falls  Description: Will remain free from falls  Outcome: Ongoing  Goal: Absence of physical injury  Description: Absence of physical injury  Outcome: Ongoing     Problem: Skin Integrity:  Goal: Will show no infection signs and symptoms  Description: Will show no infection signs and symptoms  Outcome: Ongoing  Goal: Absence of new skin breakdown  Description: Absence of new skin breakdown  Outcome: Ongoing     Problem: Safety:  Goal: Free from accidental physical injury  Description: Free from accidental physical injury  Outcome: Ongoing  Goal: Free from intentional harm  Description: Free from intentional harm  Outcome: Ongoing     Problem: Daily Care:  Goal: Daily care needs are met  Description: Daily care needs are met  Outcome: Ongoing     Problem: Airway Clearance - Ineffective  Goal: Achieve or maintain patent airway  Outcome: Ongoing     Problem: Gas Exchange - Impaired  Goal: Absence of hypoxia  Outcome: Ongoing  Goal: Promote optimal lung function  Outcome: Ongoing     Problem: Breathing Pattern - Ineffective  Goal: Ability to achieve and maintain a regular respiratory rate  Outcome: Ongoing     Problem:  Body Temperature -  Risk of, Imbalanced  Goal: Ability to maintain a body temperature within defined limits  Outcome: Ongoing  Goal: Will regain or maintain usual level of consciousness  Outcome: Ongoing  Goal: Complications related to the disease process, condition or treatment will be avoided or minimized  Outcome: Ongoing     Problem: Isolation Precautions - Risk of Spread of Infection  Goal: Prevent transmission of infection  Outcome: Ongoing     Problem: Nutrition Deficits  Goal: Optimize nutritional status  Outcome: Ongoing     Problem: Risk for Fluid Volume Deficit  Goal: Maintain normal heart rhythm  Outcome: Ongoing  Goal: Maintain absence of muscle cramping  Outcome: Ongoing  Goal: Maintain normal serum potassium, sodium, calcium, Impaired:  Goal: Demonstrations of improved sensory functioning will increase  Description: Demonstrations of improved sensory functioning will increase  Outcome: Ongoing  Goal: Decrease in sensory misperception frequency  Description: Decrease in sensory misperception frequency  Outcome: Ongoing  Goal: Able to refrain from responding to false sensory perceptions  Description: Able to refrain from responding to false sensory perceptions  Outcome: Ongoing  Goal: Demonstrates accurate environmental perceptions  Description: Demonstrates accurate environmental perceptions  Outcome: Ongoing  Goal: Able to distinguish between reality-based and nonreality-based thinking  Description: Able to distinguish between reality-based and nonreality-based thinking  Outcome: Ongoing  Goal: Able to interrupt nonreality-based thinking  Description: Able to interrupt nonreality-based thinking  Outcome: Ongoing     Problem: Sleep Pattern Disturbance:  Goal: Appears well-rested  Description: Appears well-rested  Outcome: Ongoing

## 2021-09-25 NOTE — PROGRESS NOTES
Hospitalist Progress Note      PCP: Desmond Griffin MD    Date of Admission: 9/13/2021    Chief Complaint: Shortness of breath    Hospital Course:  Patient is a 51-year-old female with past medical history of CAD, aortic stenosis status post AVR who presented to hospital for shortness of breath. Patient is a poor historian, reportedly patient was brought to the hospital for shortness of breath. Patient was found hypoxic in upper 80s on room air, was transitioned to nasal cannula. Subjective:     Ongoing need for high volume O2 supplement but improving slowly. Her mental status and functional status and PO intake show very little if any improvement. Medications:  Reviewed      Infusion Medications    sodium chloride 75 mL/hr at 09/24/21 2050    sodium chloride Stopped (09/16/21 1256)     Scheduled Medications    metoprolol tartrate  25 mg Oral BID    apixaban  2.5 mg Oral BID    QUEtiapine  6.25 mg Oral BID    dexamethasone  6 mg IntraVENous Q24H    dilTIAZem  240 mg Oral Daily    baricitinib  1 mg Oral Daily    sodium chloride flush  10 mL IntraVENous 2 times per day    aspirin  81 mg Oral Daily    ipratropium  2 spray Each Nostril 4x Daily    ipratropium  2 puff Inhalation BID    And    albuterol sulfate HFA  2 puff Inhalation BID     PRN Meds: albuterol sulfate HFA, sodium chloride, sodium chloride flush, sodium chloride, ondansetron **OR** ondansetron, acetaminophen **OR** acetaminophen      Intake/Output Summary (Last 24 hours) at 9/25/2021 1242  Last data filed at 9/25/2021 0809  Gross per 24 hour   Intake 300 ml   Output 375 ml   Net -75 ml       Physical Exam Performed:    BP (!) 165/89   Pulse 107   Temp 98 °F (36.7 °C) (Oral)   Resp 20   Ht 5' (1.524 m)   Wt 109 lb 5.6 oz (49.6 kg)   SpO2 94%   BMI 21.36 kg/m²     General appearance: frail, NAD, able to hold conversations, on 7l NC  HEENT: Pupils equal, round, and reactive to light.  Conjunctivae/corneas clear.  Neck: Supple, with full range of motion. No jugular venous distention. Trachea midline. Respiratory:  Normal respiratory effort. Diminished breath sounds bilateral  Cardiovascular: Regular rate and rhythm with normal S1/S2   Abdomen: Soft, non-tender, non-distended with normal bowel sounds. Musculoskeletal: No clubbing, cyanosis or edema bilaterally. Full range of motion without deformity. Skin: Skin color, texture, turgor normal.  No rashes or lesions. Neurologic:  Neurovascularly intact without any focal sensory/motor deficits. Cranial nerves: II-XII intact, grossly non-focal.  Psychiatric: Alert and partially oriented. Capillary Refill: Brisk,< 3 seconds   Peripheral Pulses: +2 palpable, equal bilaterally       Labs:   Recent Labs     09/23/21  0700 09/24/21  0809 09/25/21  0646   WBC 9.2 14.5* 15.2*   HGB 11.5* 12.1 11.3*   HCT 34.3* 36.2 33.8*    308 280     Recent Labs     09/23/21  0700 09/24/21  0809 09/25/21  0646    137 135*   K 4.0 4.1 3.8    106 106   CO2 20* 20* 19*   BUN 13 14 16   CREATININE <0.5* <0.5* <0.5*   CALCIUM 8.2* 8.5 8.5       Recent Labs     09/24/21  0809   AST 15   ALT 17   BILIDIR <0.2   BILITOT 0.3   ALKPHOS 101       No results for input(s): INR in the last 72 hours. No results for input(s): Sylvester Jakes in the last 72 hours. Urinalysis:      Lab Results   Component Value Date    NITRU Negative 05/16/2019    WBCUA 2 05/16/2019    RBCUA 0-2 05/16/2019    BLOODU Negative 05/16/2019    SPECGRAV 1.016 05/16/2019    GLUCOSEU Negative 05/16/2019       Radiology:  XR CHEST 1 VIEW   Final Result   Slight increase in bilateral pulmonary opacities         CT CHEST PULMONARY EMBOLISM W CONTRAST   Final Result   No evidence of significant pulmonary embolism. Extensive multifocal bilateral airspace disease compatible with multifocal   pneumonia including viral pneumonia.       Dilation of the main and proximal pulmonary arteries could represent pulmonary arterial hypertension. Large hiatal hernia. XR CHEST PORTABLE   Final Result   Findings most consistent with multifocal pneumonia. Assessment/Plan:      COVID-19 pneumonia. Decadron 6 mg daily for 10 days - completed. - with O2 worsening - decadron resumed at 10mg dose on 9/22  completed Remdesivir, continue baricitinib started September 14  Lovenox twice daily - transition to PO eliquis 2. 5BID  Incentive spirometry  She completed 10 days of empiric Zithro and Rocephin - stopped 9/23      Acute respiratory failure with hypoxia  Secondary to COVID-19 pneumonia  Titrate oxygen to keep saturations above 90%      Aortic stenosis status post AVR  Continue to monitor  Monitor fluid status - very poor PO      CAD  Continue aspirin, ACE inhibitor. Afib - no prior Hx - RVR now resolved. Started on PO dilt  - increase to 60QID - transition to long acting dilt PO. EKG - confirms Afib. Lovenox to eliquis 2. 5BID  Family requested cardiology eval.    - recs appreciated. Acute metabolic encephalopathy. Hospital delirium, hypoxia, steroids all likely contributory. Will try low dose seroquel to help with anxiety and agitation and restlessness - she actually oxygenates better when she is calm and sleeping. DVT Prophylaxis: Lovenox to eliquis 9/25  Diet: ADULT DIET; Regular  Adult Oral Nutrition Supplement; Standard High Calorie/High Protein Oral Supplement  Code Status: DNR-CCA      PT/OT Eval Status: as tolerates. Dispo - cc. Pt progressively more depressed. Intermittently confused. Psychiatry involved.          Lamonte Webster MD

## 2021-09-25 NOTE — PROGRESS NOTES
Respiratory Therapy    Attempting to wean pt from Vapotherm again today.  Pt was down to 6L yesterday but had episodes of low SPO2 overnight and required 20L to maintain SPO2 >90%  Currently 20L with SPO2 97%  Weaned to [employer]    Electronically signed by Sarah Mahoney RCP on 9/25/2021 at 8:22 AM

## 2021-09-25 NOTE — PROGRESS NOTES
Pt alert and oriented, resting in bed. Respirations even and unlabored. No acute signs of distress noted. Pt on 6L vapotherm with SPO2 of 94%. Bed alarm on, call light within reach.  Will monitor

## 2021-09-26 ENCOUNTER — APPOINTMENT (OUTPATIENT)
Dept: GENERAL RADIOLOGY | Age: 86
DRG: 177 | End: 2021-09-26
Payer: MEDICARE

## 2021-09-26 LAB
ALBUMIN SERPL-MCNC: 3 G/DL (ref 3.4–5)
ALP BLD-CCNC: 105 U/L (ref 40–129)
ALT SERPL-CCNC: 29 U/L (ref 10–40)
ANION GAP SERPL CALCULATED.3IONS-SCNC: 14 MMOL/L (ref 3–16)
AST SERPL-CCNC: 25 U/L (ref 15–37)
BASOPHILS ABSOLUTE: 0.1 K/UL (ref 0–0.2)
BASOPHILS RELATIVE PERCENT: 0.8 %
BILIRUB SERPL-MCNC: 0.4 MG/DL (ref 0–1)
BILIRUBIN DIRECT: <0.2 MG/DL (ref 0–0.3)
BILIRUBIN URINE: NEGATIVE
BILIRUBIN, INDIRECT: ABNORMAL MG/DL (ref 0–1)
BLOOD, URINE: NEGATIVE
BUN BLDV-MCNC: 14 MG/DL (ref 7–20)
CALCIUM SERPL-MCNC: 8.7 MG/DL (ref 8.3–10.6)
CHLORIDE BLD-SCNC: 105 MMOL/L (ref 99–110)
CLARITY: CLEAR
CO2: 19 MMOL/L (ref 21–32)
COLOR: YELLOW
COMMENT UA: ABNORMAL
CREAT SERPL-MCNC: <0.5 MG/DL (ref 0.6–1.2)
EOSINOPHILS ABSOLUTE: 0 K/UL (ref 0–0.6)
EOSINOPHILS RELATIVE PERCENT: 0 %
EPITHELIAL CELLS, UA: ABNORMAL /HPF (ref 0–5)
GFR AFRICAN AMERICAN: >60
GFR NON-AFRICAN AMERICAN: >60
GLUCOSE BLD-MCNC: 125 MG/DL (ref 70–99)
GLUCOSE URINE: 100 MG/DL
HCT VFR BLD CALC: 34.7 % (ref 36–48)
HEMOGLOBIN: 11.5 G/DL (ref 12–16)
KETONES, URINE: NEGATIVE MG/DL
LEUKOCYTE ESTERASE, URINE: NEGATIVE
LYMPHOCYTES ABSOLUTE: 0.2 K/UL (ref 1–5.1)
LYMPHOCYTES RELATIVE PERCENT: 1.4 %
MCH RBC QN AUTO: 27.5 PG (ref 26–34)
MCHC RBC AUTO-ENTMCNC: 33.1 G/DL (ref 31–36)
MCV RBC AUTO: 83.1 FL (ref 80–100)
MICROSCOPIC EXAMINATION: ABNORMAL
MONOCYTES ABSOLUTE: 1.3 K/UL (ref 0–1.3)
MONOCYTES RELATIVE PERCENT: 7.3 %
MUCUS: ABNORMAL /LPF
NEUTROPHILS ABSOLUTE: 15.9 K/UL (ref 1.7–7.7)
NEUTROPHILS RELATIVE PERCENT: 90.5 %
NITRITE, URINE: NEGATIVE
PDW BLD-RTO: 14.6 % (ref 12.4–15.4)
PH UA: 5.5 (ref 5–8)
PLATELET # BLD: 282 K/UL (ref 135–450)
PMV BLD AUTO: 9.6 FL (ref 5–10.5)
POTASSIUM REFLEX MAGNESIUM: 3.6 MMOL/L (ref 3.5–5.1)
PROCALCITONIN: 0.07 NG/ML (ref 0–0.15)
PROTEIN UA: NEGATIVE MG/DL
RBC # BLD: 4.17 M/UL (ref 4–5.2)
RBC UA: ABNORMAL /HPF (ref 0–4)
SODIUM BLD-SCNC: 138 MMOL/L (ref 136–145)
SPECIFIC GRAVITY UA: 1.01 (ref 1–1.03)
TOTAL PROTEIN: 5.4 G/DL (ref 6.4–8.2)
URINE TYPE: ABNORMAL
UROBILINOGEN, URINE: 0.2 E.U./DL
WBC # BLD: 17.6 K/UL (ref 4–11)
WBC UA: ABNORMAL /HPF (ref 0–5)
YEAST: PRESENT /HPF

## 2021-09-26 PROCEDURE — 6370000000 HC RX 637 (ALT 250 FOR IP): Performed by: INTERNAL MEDICINE

## 2021-09-26 PROCEDURE — 94640 AIRWAY INHALATION TREATMENT: CPT

## 2021-09-26 PROCEDURE — 80076 HEPATIC FUNCTION PANEL: CPT

## 2021-09-26 PROCEDURE — 84145 PROCALCITONIN (PCT): CPT

## 2021-09-26 PROCEDURE — 6360000002 HC RX W HCPCS: Performed by: INTERNAL MEDICINE

## 2021-09-26 PROCEDURE — 36415 COLL VENOUS BLD VENIPUNCTURE: CPT

## 2021-09-26 PROCEDURE — 81001 URINALYSIS AUTO W/SCOPE: CPT

## 2021-09-26 PROCEDURE — 2580000003 HC RX 258: Performed by: INTERNAL MEDICINE

## 2021-09-26 PROCEDURE — 71045 X-RAY EXAM CHEST 1 VIEW: CPT

## 2021-09-26 PROCEDURE — 1200000000 HC SEMI PRIVATE

## 2021-09-26 PROCEDURE — 94761 N-INVAS EAR/PLS OXIMETRY MLT: CPT

## 2021-09-26 PROCEDURE — 2700000000 HC OXYGEN THERAPY PER DAY

## 2021-09-26 PROCEDURE — 87040 BLOOD CULTURE FOR BACTERIA: CPT

## 2021-09-26 PROCEDURE — 85025 COMPLETE CBC W/AUTO DIFF WBC: CPT

## 2021-09-26 PROCEDURE — 80048 BASIC METABOLIC PNL TOTAL CA: CPT

## 2021-09-26 RX ORDER — FUROSEMIDE 10 MG/ML
20 INJECTION INTRAMUSCULAR; INTRAVENOUS ONCE
Status: COMPLETED | OUTPATIENT
Start: 2021-09-26 | End: 2021-09-26

## 2021-09-26 RX ORDER — DILTIAZEM HYDROCHLORIDE 60 MG/1
60 TABLET, FILM COATED ORAL EVERY 6 HOURS SCHEDULED
Status: DISCONTINUED | OUTPATIENT
Start: 2021-09-26 | End: 2021-10-02 | Stop reason: HOSPADM

## 2021-09-26 RX ORDER — METOPROLOL TARTRATE 50 MG/1
50 TABLET, FILM COATED ORAL 2 TIMES DAILY
Status: DISCONTINUED | OUTPATIENT
Start: 2021-09-26 | End: 2021-10-02 | Stop reason: HOSPADM

## 2021-09-26 RX ORDER — QUETIAPINE FUMARATE 25 MG/1
12.5 TABLET, FILM COATED ORAL 2 TIMES DAILY
Status: DISCONTINUED | OUTPATIENT
Start: 2021-09-26 | End: 2021-09-27

## 2021-09-26 RX ORDER — DEXAMETHASONE 4 MG/1
4 TABLET ORAL DAILY
Status: DISCONTINUED | OUTPATIENT
Start: 2021-09-26 | End: 2021-10-02 | Stop reason: HOSPADM

## 2021-09-26 RX ADMIN — QUETIAPINE FUMARATE 6.25 MG: 25 TABLET ORAL at 08:07

## 2021-09-26 RX ADMIN — DILTIAZEM HYDROCHLORIDE 60 MG: 60 TABLET, FILM COATED ORAL at 13:12

## 2021-09-26 RX ADMIN — ASPIRIN 81 MG: 81 TABLET, COATED ORAL at 08:07

## 2021-09-26 RX ADMIN — SODIUM CHLORIDE: 9 INJECTION, SOLUTION INTRAVENOUS at 04:00

## 2021-09-26 RX ADMIN — DEXAMETHASONE 4 MG: 4 TABLET ORAL at 13:23

## 2021-09-26 RX ADMIN — SODIUM CHLORIDE, PRESERVATIVE FREE 10 ML: 5 INJECTION INTRAVENOUS at 21:40

## 2021-09-26 RX ADMIN — METOPROLOL TARTRATE 50 MG: 50 TABLET, FILM COATED ORAL at 13:22

## 2021-09-26 RX ADMIN — METOPROLOL TARTRATE 25 MG: 25 TABLET, FILM COATED ORAL at 08:07

## 2021-09-26 RX ADMIN — QUETIAPINE FUMARATE 12.5 MG: 25 TABLET ORAL at 13:19

## 2021-09-26 RX ADMIN — FUROSEMIDE 20 MG: 10 INJECTION, SOLUTION INTRAMUSCULAR; INTRAVENOUS at 13:13

## 2021-09-26 RX ADMIN — APIXABAN 2.5 MG: 2.5 TABLET, FILM COATED ORAL at 08:07

## 2021-09-26 RX ADMIN — BARICITINIB 1 MG: 1 TABLET, FILM COATED ORAL at 13:23

## 2021-09-26 RX ADMIN — ALBUTEROL SULFATE 2 PUFF: 90 AEROSOL, METERED RESPIRATORY (INHALATION) at 12:38

## 2021-09-26 RX ADMIN — Medication 2 PUFF: at 09:16

## 2021-09-26 ASSESSMENT — PAIN SCALES - GENERAL: PAINLEVEL_OUTOF10: 0

## 2021-09-26 NOTE — PLAN OF CARE
process, condition or treatment will be avoided or minimized  9/26/2021 0108 by Marielena Gaming RN  Outcome: Ongoing     Problem: Isolation Precautions - Risk of Spread of Infection  Goal: Prevent transmission of infection  9/26/2021 0108 by Marielena Gaming RN  Outcome: Ongoing     Problem: Nutrition Deficits  Goal: Optimize nutritional status  9/26/2021 0108 by Marielena Gaming RN  Outcome: Ongoing     Problem: Risk for Fluid Volume Deficit  Goal: Maintain normal heart rhythm  9/26/2021 0108 by Marielena Gaming RN  Outcome: Ongoing  Goal: Maintain absence of muscle cramping  9/26/2021 0108 by Marielena Gaming RN  Outcome: Ongoing  Goal: Maintain normal serum potassium, sodium, calcium, phosphorus, and pH  9/26/2021 0108 by Marielena Gaming RN  Outcome: Ongoing     Problem: Loneliness or Risk for Loneliness  Goal: Demonstrate positive use of time alone when socialization is not possible  9/26/2021 0108 by Marielena Gaming RN  Outcome: Ongoing     Problem: Fatigue  Goal: Verbalize increase energy and improved vitality  9/26/2021 0108 by Marielena Gaming RN  Outcome: Ongoing    Problem: Patient Education: Go to Patient Education Activity  Goal: Patient/Family Education  9/26/2021 0108 by Marielena Gaming RN  Outcome: Ongoing    Problem: Nutrition  Goal: Optimal nutrition therapy  9/26/2021 0108 by Marielena Gaming RN  Outcome: Ongoing     Problem: Confusion - Acute:  Goal: Absence of continued neurological deterioration signs and symptoms  Description: Absence of continued neurological deterioration signs and symptoms  9/26/2021 0108 by Marielena Gaming RN  Outcome: Ongoing  Goal: Mental status will be restored to baseline  Description: Mental status will be restored to baseline  9/26/2021 0108 by Marielena Gaming RN  Outcome: Ongoing     Problem: Discharge Planning:  Goal: Ability to perform activities of daily living will improve  Description: Ability to perform activities of daily living will improve  9/26/2021 8032 by Aubrey Gonzalez RN  Outcome: Ongoing  Goal: Participates in care planning  Description: Participates in care planning  9/26/2021 0108 by Aubrey Gonzalez RN  Outcome: Ongoing     Problem: Injury - Risk of, Physical Injury:  Goal: Will remain free from falls  Description: Will remain free from falls  9/26/2021 0108 by Aubrey Gonzalez RN  Outcome: Ongoing  Goal: Absence of physical injury  Description: Absence of physical injury  9/26/2021 0108 by Aubrey Gonzalez, RN  Outcome: Ongoing     Problem: Mood - Altered:  Goal: Mood stable  Description: Mood stable  9/26/2021 0108 by Aubrey Gonzalez, RN  Outcome: Ongoing  Goal: Absence of abusive behavior  Description: Absence of abusive behavior  9/26/2021 0108 by Aubrey Gonazlez RN  Outcome: Ongoing  Goal: Verbalizations of feeling emotionally comfortable while being cared for will increase  Description: Verbalizations of feeling emotionally comfortable while being cared for will increase  9/26/2021 0108 by Aubrey Gonzalez, RN  Outcome: Ongoing    Problem: Psychomotor Activity - Altered:  Goal: Absence of psychomotor disturbance signs and symptoms  Description: Absence of psychomotor disturbance signs and symptoms  9/26/2021 0108 by Aubrey Gonzalez RN  Outcome: Ongoing    Problem: Sensory Perception - Impaired:  Goal: Demonstrations of improved sensory functioning will increase  Description: Demonstrations of improved sensory functioning will increase  9/26/2021 0108 by Aubrey Gonzalez RN  Outcome: Ongoing  Goal: Decrease in sensory misperception frequency  Description: Decrease in sensory misperception frequency  9/26/2021 0108 by Aubrey Gonzalez RN  Outcome: Ongoing  Goal: Able to refrain from responding to false sensory perceptions  Description: Able to refrain from responding to false sensory perceptions  9/26/2021 0108 by Aubrey Gonzalez, RN  Outcome: Ongoing  Goal: Demonstrates accurate environmental perceptions  Description: Demonstrates accurate environmental perceptions  9/26/2021 0108 by Camila Sanchez RN  Outcome: Ongoing    Goal: Able to distinguish between reality-based and nonreality-based thinking  Description: Able to distinguish between reality-based and nonreality-based thinking  9/26/2021 0108 by Camila Sanchez RN  Outcome: Ongoing    Goal: Able to interrupt nonreality-based thinking  Description: Able to interrupt nonreality-based thinking  9/26/2021 0108 by Camila Sanchez RN  Outcome: Ongoing    Problem: Sleep Pattern Disturbance:  Goal: Appears well-rested  Description: Appears well-rested  9/26/2021 0108 by Camila Sanchez RN  Outcome: Ongoing

## 2021-09-26 NOTE — PROGRESS NOTES
Urine sample collected, sent down to lab. Full linen change and repositioned patient.       Electronically signed by Roque Delong RN on 9/26/2021 at 3:23 PM

## 2021-09-26 NOTE — PROGRESS NOTES
Pt is anxious and not consolable. Pt cries out when given morning medications \"im going to choke! \" after reassurance and education pt took AM medications that were able to be crushed. Per pharmacy Cardizem cannot be crushed or broken to given,. Will not give this medication r/t pt lack of pt orientation and anxiety for the risk of choking on it.     Electronically signed by Ramy Simon RN on 9/26/2021 at 8:23 AM

## 2021-09-26 NOTE — PROGRESS NOTES
In room to check oxygen, patient very agitated, saying \"can't get my breath\". SpO2 90% on 8 lpm.  Increased to 9 lpm with no change, then up to 10 lpm, SpO2 93%.

## 2021-09-26 NOTE — PROGRESS NOTES
Hospitalist Progress Note      PCP: Joanie Livingston MD    Date of Admission: 9/13/2021    Chief Complaint: Shortness of breath    Hospital Course:  Patient is a 27-year-old female with past medical history of CAD, aortic stenosis status post AVR who presented to hospital for shortness of breath. Patient is a poor historian, reportedly patient was brought to the hospital for shortness of breath. Patient was found hypoxic in upper 80s on room air, was transitioned to nasal cannula. Subjective:     Ongoing need for high volume O2 supplement but improving slowly over the weekend. Her mental status and functional status and PO intake show very little if any improvement. Ongoing issues with anxiety, disorientation, and agitation.            Medications:  Reviewed      Infusion Medications    sodium chloride Stopped (09/16/21 1256)     Scheduled Medications    metoprolol tartrate  50 mg Oral BID    dexamethasone  4 mg Oral Daily    QUEtiapine  12.5 mg Oral BID    dilTIAZem  60 mg Oral 4 times per day    furosemide  20 mg IntraVENous Once    apixaban  2.5 mg Oral BID    baricitinib  1 mg Oral Daily    sodium chloride flush  10 mL IntraVENous 2 times per day    aspirin  81 mg Oral Daily    ipratropium  2 spray Each Nostril 4x Daily    ipratropium  2 puff Inhalation BID    And    albuterol sulfate HFA  2 puff Inhalation BID     PRN Meds: albuterol sulfate HFA, sodium chloride, sodium chloride flush, sodium chloride, ondansetron **OR** ondansetron, acetaminophen **OR** acetaminophen      Intake/Output Summary (Last 24 hours) at 9/26/2021 1155  Last data filed at 9/26/2021 0457  Gross per 24 hour   Intake 50 ml   Output 200 ml   Net -150 ml       Physical Exam Performed:    BP (!) 170/67   Pulse 84   Temp 97.4 °F (36.3 °C) (Axillary)   Resp 20   Ht 5' (1.524 m)   Wt 110 lb 0.2 oz (49.9 kg)   SpO2 98%   BMI 21.48 kg/m²     General appearance: frail, NAD, able to hold conversations, on 7l NC  HEENT: Pupils equal, round, and reactive to light. Conjunctivae/corneas clear. Neck: Supple, with full range of motion. No jugular venous distention. Trachea midline. Respiratory:  Normal respiratory effort. Diminished breath sounds bilateral  Cardiovascular: Regular rate and rhythm with normal S1/S2   Abdomen: Soft, non-tender, non-distended with normal bowel sounds. Musculoskeletal: No clubbing, cyanosis or edema bilaterally. Full range of motion without deformity. Skin: Skin color, texture, turgor normal.  No rashes or lesions. Neurologic:  Neurovascularly intact without any focal sensory/motor deficits. Cranial nerves: II-XII intact, grossly non-focal.  Psychiatric: Alert and partially oriented. Capillary Refill: Brisk,< 3 seconds   Peripheral Pulses: +2 palpable, equal bilaterally       Labs:   Recent Labs     09/24/21  0809 09/25/21  0646 09/26/21  0807   WBC 14.5* 15.2* 17.6*   HGB 12.1 11.3* 11.5*   HCT 36.2 33.8* 34.7*    280 282     Recent Labs     09/24/21  0809 09/25/21  0646 09/26/21  0807    135* 138   K 4.1 3.8 3.6    106 105   CO2 20* 19* 19*   BUN 14 16 14   CREATININE <0.5* <0.5* <0.5*   CALCIUM 8.5 8.5 8.7       Recent Labs     09/24/21  0809 09/26/21  0807   AST 15 25   ALT 17 29   BILIDIR <0.2 <0.2   BILITOT 0.3 0.4   ALKPHOS 101 105       No results for input(s): INR in the last 72 hours. No results for input(s): Renea Kras in the last 72 hours. Urinalysis:      Lab Results   Component Value Date    NITRU Negative 05/16/2019    WBCUA 2 05/16/2019    RBCUA 0-2 05/16/2019    BLOODU Negative 05/16/2019    SPECGRAV 1.016 05/16/2019    GLUCOSEU Negative 05/16/2019       Radiology:  XR CHEST PORTABLE   Preliminary Result   1. Study limited by patient rotation. 2. Interval progression of multifocal pneumonia. 3. Small bilateral pleural effusions.          XR CHEST 1 VIEW   Final Result   Slight increase in bilateral pulmonary opacities         CT CHEST PULMONARY EMBOLISM W CONTRAST   Final Result   No evidence of significant pulmonary embolism. Extensive multifocal bilateral airspace disease compatible with multifocal   pneumonia including viral pneumonia. Dilation of the main and proximal pulmonary arteries could represent   pulmonary arterial hypertension. Large hiatal hernia. XR CHEST PORTABLE   Final Result   Findings most consistent with multifocal pneumonia. Assessment/Plan:      COVID-19 pneumonia. Decadron 6 mg daily for 10 days - completed. - with O2 worsening - decadron resumed at 10mg dose on 9/22 - weaning slowly - down to 4mg PO 9/26  completed Remdesivir, continue baricitinib started September 14  Lovenox twice daily - transitioned to PO eliquis 2. 5BID  Incentive spirometry  She completed 10 days of empiric Zithro and Rocephin - stopped 9/23      Acute respiratory failure with hypoxia  Secondary to COVID-19 pneumonia  Titrate oxygen to keep saturations above 90%  Repeat CXR 9/26 - pleural effusions and progression of PNA   - stop IVF. - give 20 of IV lasix       Aortic stenosis status post AVR  Continue to monitor  Monitor fluid status - very poor PO      CAD  Continue aspirin, ACE inhibitor. Afib - no prior Hx - RVR now resolved. Started on PO dilt  - increase to 60QID - transition to long acting dilt PO - she is unabel to swallow it - back to 60 QID - can be crushed in apple sauce. EKG - confirms Afib. Lovenox to eliquis 2. 5BID  BB dose increase. She is actually back in sinus on 9/26. Family requested cardiology eval.    - recs appreciated. Acute metabolic encephalopathy. Hospital delirium, hypoxia, steroids all likely contributory. Cont low dose seroquel to help with anxiety and agitation and restlessness - she actually oxygenates better when she is calm and sleeping. Weaning steroids. DVT Prophylaxis: Lovenox to eliquis 9/25  Diet: ADULT DIET;  Regular  Adult Oral Nutrition Supplement; Standard High Calorie/High Protein Oral Supplement  Code Status: DNR-CCA      PT/OT Eval Status: as tolerates. Dispo - cc. Pt progressively more depressed. Intermittently confused. Psychiatry involved.          Nathan Romero MD

## 2021-09-26 NOTE — PLAN OF CARE
Problem: Falls - Risk of:  Goal: Will remain free from falls  Description: Will remain free from falls  9/26/2021 0756 by Tomasa Melgar RN  Outcome: Ongoing  9/26/2021 0108 by Celestino Felix RN  Outcome: Ongoing  Goal: Absence of physical injury  Description: Absence of physical injury  9/26/2021 0756 by Tomasa Melgar RN  Outcome: Ongoing  9/26/2021 0108 by Celestino Felix RN  Outcome: Ongoing     Problem: Skin Integrity:  Goal: Will show no infection signs and symptoms  Description: Will show no infection signs and symptoms  9/26/2021 0756 by Tomasa Melgar RN  Outcome: Ongoing  9/26/2021 0108 by Celestino Felix RN  Outcome: Ongoing  Goal: Absence of new skin breakdown  Description: Absence of new skin breakdown  9/26/2021 0756 by Tomasa Melgar RN  Outcome: Ongoing  9/26/2021 0108 by Celestino Felix RN  Outcome: Ongoing     Problem: Safety:  Goal: Free from accidental physical injury  Description: Free from accidental physical injury  9/26/2021 0756 by Tomasa Melgar RN  Outcome: Ongoing  9/26/2021 0108 by Celestino Felix RN  Outcome: Ongoing  Goal: Free from intentional harm  Description: Free from intentional harm  9/26/2021 0756 by Tomasa Melgar RN  Outcome: Ongoing  9/26/2021 0108 by Celestino Felix RN  Outcome: Ongoing     Problem: Daily Care:  Goal: Daily care needs are met  Description: Daily care needs are met  9/26/2021 0756 by Tomasa Melgar RN  Outcome: Ongoing  9/26/2021 0108 by Celestino Felix RN  Outcome: Ongoing     Problem: Airway Clearance - Ineffective  Goal: Achieve or maintain patent airway  9/26/2021 0756 by Tomasa Melgar RN  Outcome: Ongoing  9/26/2021 0108 by Celestino Felix RN  Outcome: Ongoing     Problem: Gas Exchange - Impaired  Goal: Absence of hypoxia  9/26/2021 0756 by Tomasa Melgar RN  Outcome: Ongoing  9/26/2021 0108 by Celestino Felix RN  Outcome: Ongoing  Goal: Promote optimal lung function  9/26/2021 0756 by Tomasa Melgar RN  Outcome: Ongoing  9/26/2021 0108 by Marina Kimball RN  Outcome: Ongoing     Problem: Breathing Pattern - Ineffective  Goal: Ability to achieve and maintain a regular respiratory rate  9/26/2021 0756 by Kyra Brito RN  Outcome: Ongoing  9/26/2021 0108 by Marina Kimball RN  Outcome: Ongoing     Problem: Isolation Precautions - Risk of Spread of Infection  Goal: Prevent transmission of infection  9/26/2021 0756 by Kyra Brito RN  Outcome: Ongoing  9/26/2021 0108 by Marina Kimball RN  Outcome: Ongoing     Problem:  Body Temperature -  Risk of, Imbalanced  Goal: Ability to maintain a body temperature within defined limits  9/26/2021 0756 by Kyra Brito RN  Outcome: Ongoing  9/26/2021 0108 by Marina Kimball RN  Outcome: Ongoing  Goal: Will regain or maintain usual level of consciousness  9/26/2021 0756 by Kyra Brito RN  Outcome: Ongoing  9/26/2021 0108 by Marina Kimball RN  Outcome: Ongoing  Goal: Complications related to the disease process, condition or treatment will be avoided or minimized  9/26/2021 0756 by Kyra Brito RN  Outcome: Ongoing  9/26/2021 0108 by Marina Kimball RN  Outcome: Ongoing     Problem: Isolation Precautions - Risk of Spread of Infection  Goal: Prevent transmission of infection  9/26/2021 0756 by Kyar Brito RN  Outcome: Ongoing  9/26/2021 0108 by Marina Kimball RN  Outcome: Ongoing     Problem: Nutrition Deficits  Goal: Optimize nutritional status  9/26/2021 0756 by Kyra Brito RN  Outcome: Ongoing  9/26/2021 0108 by Marina Kimball RN  Outcome: Ongoing     Problem: Risk for Fluid Volume Deficit  Goal: Maintain normal heart rhythm  9/26/2021 0756 by Kyra Brito RN  Outcome: Ongoing  9/26/2021 0108 by Marina Kimball RN  Outcome: Ongoing  Goal: Maintain absence of muscle cramping  9/26/2021 0756 by Kyra Brito RN  Outcome: Ongoing  9/26/2021 0108 by Marina Kimball RN  Outcome: Ongoing  Goal: Maintain normal serum potassium, sodium, calcium, phosphorus, and pH  9/26/2021 0756 by Oralia Lange RN  Outcome: Ongoing  9/26/2021 0108 by Corbin Burns RN  Outcome: Ongoing     Problem: Loneliness or Risk for Loneliness  Goal: Demonstrate positive use of time alone when socialization is not possible  9/26/2021 0756 by Oralia Lange RN  Outcome: Ongoing  9/26/2021 0108 by Corbin Burns RN  Outcome: Ongoing     Problem: Fatigue  Goal: Verbalize increase energy and improved vitality  9/26/2021 0756 by Oralia Lange RN  Outcome: Ongoing  9/26/2021 0108 by Corbin Burns RN  Outcome: Ongoing     Problem: Patient Education: Go to Patient Education Activity  Goal: Patient/Family Education  9/26/2021 0756 by Oralia Lange RN  Outcome: Ongoing  9/26/2021 0108 by Corbin Burns RN  Outcome: Ongoing     Problem: Nutrition  Goal: Optimal nutrition therapy  9/26/2021 0756 by Oralia Lange RN  Outcome: Ongoing  9/26/2021 0108 by Corbin Burns RN  Outcome: Ongoing     Problem: Confusion - Acute:  Goal: Absence of continued neurological deterioration signs and symptoms  Description: Absence of continued neurological deterioration signs and symptoms  9/26/2021 0756 by Oralia Lange RN  Outcome: Ongoing  9/26/2021 0108 by Corbin Burns RN  Outcome: Ongoing  Goal: Mental status will be restored to baseline  Description: Mental status will be restored to baseline  9/26/2021 0756 by Oralia Lange RN  Outcome: Ongoing  9/26/2021 0108 by Corbin Burns RN  Outcome: Ongoing     Problem: Confusion - Acute:  Goal: Absence of continued neurological deterioration signs and symptoms  Description: Absence of continued neurological deterioration signs and symptoms  9/26/2021 0756 by Oralia Lange RN  Outcome: Ongoing  9/26/2021 0108 by Corbin Burns RN  Outcome: Ongoing  Goal: Mental status will be restored to baseline  Description: Mental status will be restored to baseline  9/26/2021 0756 by Oralia Lange RN  Outcome: Ongoing  9/26/2021 0108 by Jazmyne Morgan RN  Outcome: Ongoing     Problem: Discharge Planning:  Goal: Ability to perform activities of daily living will improve  Description: Ability to perform activities of daily living will improve  9/26/2021 0756 by Thi Robertson RN  Outcome: Ongoing  9/26/2021 0108 by Jazmyne Morgan RN  Outcome: Ongoing  Goal: Participates in care planning  Description: Participates in care planning  9/26/2021 0756 by Thi Robertson RN  Outcome: Ongoing  9/26/2021 0108 by Jazmyne Morgan RN  Outcome: Ongoing     Problem: Injury - Risk of, Physical Injury:  Goal: Will remain free from falls  Description: Will remain free from falls  9/26/2021 0756 by Thi Robertson RN  Outcome: Ongoing  9/26/2021 0108 by Jazmyne Morgan RN  Outcome: Ongoing  Goal: Absence of physical injury  Description: Absence of physical injury  9/26/2021 0756 by Thi Robertson RN  Outcome: Ongoing  9/26/2021 0108 by Jazmyne Morgan RN  Outcome: Ongoing     Problem: Mood - Altered:  Goal: Mood stable  Description: Mood stable  9/26/2021 0756 by Thi Robertson RN  Outcome: Ongoing  9/26/2021 0108 by Jazmyne Morgan RN  Outcome: Ongoing  Goal: Absence of abusive behavior  Description: Absence of abusive behavior  9/26/2021 0756 by Thi Robertson RN  Outcome: Ongoing  9/26/2021 0108 by Jazmyne Morgan RN  Outcome: Ongoing  Goal: Verbalizations of feeling emotionally comfortable while being cared for will increase  Description: Verbalizations of feeling emotionally comfortable while being cared for will increase  9/26/2021 0756 by Thi Robertson RN  Outcome: Ongoing  9/26/2021 0108 by Jazmyne Morgan RN  Outcome: Ongoing     Problem: Psychomotor Activity - Altered:  Goal: Absence of psychomotor disturbance signs and symptoms  Description: Absence of psychomotor disturbance signs and symptoms  9/26/2021 0756 by Thi Robertson RN  Outcome: Ongoing  9/26/2021 0108 by Jazmyne Morgan RN  Outcome: Ongoing     Problem: Sensory

## 2021-09-27 LAB
ANION GAP SERPL CALCULATED.3IONS-SCNC: 11 MMOL/L (ref 3–16)
BASOPHILS ABSOLUTE: 0 K/UL (ref 0–0.2)
BASOPHILS RELATIVE PERCENT: 0 %
BUN BLDV-MCNC: 14 MG/DL (ref 7–20)
CALCIUM SERPL-MCNC: 8.4 MG/DL (ref 8.3–10.6)
CHLORIDE BLD-SCNC: 103 MMOL/L (ref 99–110)
CO2: 23 MMOL/L (ref 21–32)
CREAT SERPL-MCNC: <0.5 MG/DL (ref 0.6–1.2)
EOSINOPHILS ABSOLUTE: 0 K/UL (ref 0–0.6)
EOSINOPHILS RELATIVE PERCENT: 0 %
GFR AFRICAN AMERICAN: >60
GFR NON-AFRICAN AMERICAN: >60
GLUCOSE BLD-MCNC: 94 MG/DL (ref 70–99)
HCT VFR BLD CALC: 32 % (ref 36–48)
HEMOGLOBIN: 10.5 G/DL (ref 12–16)
LYMPHOCYTES ABSOLUTE: 0.1 K/UL (ref 1–5.1)
LYMPHOCYTES RELATIVE PERCENT: 1 %
MAGNESIUM: 2.3 MG/DL (ref 1.8–2.4)
MCH RBC QN AUTO: 27.3 PG (ref 26–34)
MCHC RBC AUTO-ENTMCNC: 32.8 G/DL (ref 31–36)
MCV RBC AUTO: 83.2 FL (ref 80–100)
MONOCYTES ABSOLUTE: 0.5 K/UL (ref 0–1.3)
MONOCYTES RELATIVE PERCENT: 4 %
NEUTROPHILS ABSOLUTE: 10.8 K/UL (ref 1.7–7.7)
NEUTROPHILS RELATIVE PERCENT: 95 %
OVALOCYTES: ABNORMAL
PDW BLD-RTO: 14.9 % (ref 12.4–15.4)
PLATELET # BLD: 196 K/UL (ref 135–450)
PLATELET SLIDE REVIEW: ADEQUATE
PMV BLD AUTO: 9.3 FL (ref 5–10.5)
POIKILOCYTES: ABNORMAL
POTASSIUM REFLEX MAGNESIUM: 3.4 MMOL/L (ref 3.5–5.1)
RBC # BLD: 3.85 M/UL (ref 4–5.2)
SLIDE REVIEW: ABNORMAL
SODIUM BLD-SCNC: 137 MMOL/L (ref 136–145)
TOXIC GRANULATION: PRESENT
WBC # BLD: 11.4 K/UL (ref 4–11)

## 2021-09-27 PROCEDURE — 6370000000 HC RX 637 (ALT 250 FOR IP): Performed by: INTERNAL MEDICINE

## 2021-09-27 PROCEDURE — 2580000003 HC RX 258: Performed by: INTERNAL MEDICINE

## 2021-09-27 PROCEDURE — 94640 AIRWAY INHALATION TREATMENT: CPT

## 2021-09-27 PROCEDURE — 2700000000 HC OXYGEN THERAPY PER DAY

## 2021-09-27 PROCEDURE — 94761 N-INVAS EAR/PLS OXIMETRY MLT: CPT

## 2021-09-27 PROCEDURE — 83735 ASSAY OF MAGNESIUM: CPT

## 2021-09-27 PROCEDURE — 85025 COMPLETE CBC W/AUTO DIFF WBC: CPT

## 2021-09-27 PROCEDURE — 80048 BASIC METABOLIC PNL TOTAL CA: CPT

## 2021-09-27 PROCEDURE — 97530 THERAPEUTIC ACTIVITIES: CPT

## 2021-09-27 PROCEDURE — 1200000000 HC SEMI PRIVATE

## 2021-09-27 PROCEDURE — 36415 COLL VENOUS BLD VENIPUNCTURE: CPT

## 2021-09-27 RX ORDER — FUROSEMIDE 10 MG/ML
20 INJECTION INTRAMUSCULAR; INTRAVENOUS 2 TIMES DAILY
Status: DISCONTINUED | OUTPATIENT
Start: 2021-09-27 | End: 2021-09-27

## 2021-09-27 RX ORDER — FLUCONAZOLE 100 MG/1
100 TABLET ORAL DAILY
Status: DISCONTINUED | OUTPATIENT
Start: 2021-09-27 | End: 2021-10-02 | Stop reason: HOSPADM

## 2021-09-27 RX ADMIN — Medication 2 PUFF: at 09:01

## 2021-09-27 RX ADMIN — METOPROLOL TARTRATE 50 MG: 50 TABLET, FILM COATED ORAL at 21:21

## 2021-09-27 RX ADMIN — Medication 2 PUFF: at 20:02

## 2021-09-27 RX ADMIN — IPRATROPIUM BROMIDE 2 SPRAY: 42 SPRAY NASAL at 21:28

## 2021-09-27 RX ADMIN — APIXABAN 2.5 MG: 2.5 TABLET, FILM COATED ORAL at 21:21

## 2021-09-27 RX ADMIN — SODIUM CHLORIDE, PRESERVATIVE FREE 10 ML: 5 INJECTION INTRAVENOUS at 21:23

## 2021-09-27 ASSESSMENT — PAIN SCALES - GENERAL
PAINLEVEL_OUTOF10: 0

## 2021-09-27 NOTE — PROGRESS NOTES
Patient slept almost all night this shift, got up twice to void. Patient was screaming, \"I have to go\" even with the external catheter. Patient restless and was unable to void. Bedpan was used and patient satisfied. Patient was awake for couple of hours. Patient did not take any of her medications this shift. Patient encouraged to take medication several times. Patient educated on importance to take ordered medication. Patient unable to verbalized understanding at this time. Patient resting quietly in bed. Will continue to monitor.

## 2021-09-27 NOTE — PROGRESS NOTES
Pt awake, rambling and mumbling, incomprehensible speech, refusing to eat or take meds. Dr. Prashanth Lieberman made aware. Pt disoriented x4. Tele-cam and bed alarm in use. Will continue to monitor.

## 2021-09-27 NOTE — PROGRESS NOTES
Patient refused to take her night meds this shift. Meds crushed in apple sauce and bite size. Patient spit out medication, and wiped her tongue with sheet covers. Encouraged patient to drink water, patient refused. Patient screaming and crying. Patient calmed down after a while and is resting in bed. Will continue to monitor.

## 2021-09-27 NOTE — PROGRESS NOTES
Physical Therapy  Facility/Department: [address] MED SURG  Daily Treatment Note  NAME: Stas Cooley  : 1931  MRN: [id number]    Date of Service: 2021    Discharge Recommendations:  Patient would benefit from continued therapy after discharge, 3-5 sessions per week, Continue to assess pending progress     Stas Cooley scored a  on the AM-PAC short mobility form. Current research shows that an AM-PAC score of 17 or less is typically not associated with a discharge to the patient's home setting. Based on the patient's AM-PAC score and their current functional mobility deficits, it is recommended that the patient have 3-5 sessions per week of Physical Therapy at d/c to increase the patient's independence. Please see assessment section for further patient specific details. If patient discharges prior to next session this note will serve as a discharge summary. Please see below for the latest assessment towards goals. Assessment   Body structures, Functions, Activity limitations: Decreased functional mobility ; Decreased ADL status; Decreased balance;Decreased strength;Decreased endurance  Assessment: Pt is a 80 y.o. female who presented to the ED on 21 with SOB. Pt found to be COVID+. Prior to admission, pt living with dtr and granddtr in two level home with 4 CHRISTOS - pt independent with all ADLs and ambulation without device - helps take care of dtr with help of granddtr. Pt currently functioning below baseline - significant limitations to respiratory system - pt now at 10L high flow - O2 sats drop from low 90s to mid 80s with 3' ambulation with RW - but able to recover back to low 90s while remaining on 10L high flow. Anticipate she will need continued skilled therapy 3-5x/week to maximize independence and safety with functional mobility.   Treatment Diagnosis: impaired activity tolerance  Prognosis: Fair  Decision Making: Medium Complexity  History: see below  Exam: see below  Clinical Presentation: evolving  PT Education: Goals;PT Role;Plan of Care;General Safety;Transfer Training;Gait Training;Functional Mobility Training  REQUIRES PT FOLLOW UP: Yes  Activity Tolerance  Activity Tolerance: Patient limited by fatigue;Patient limited by endurance;Treatment limited secondary to medical complications (free text)  Activity Tolerance: limited by respiratory status     Patient Diagnosis(es): The primary encounter diagnosis was Hypoxia. A diagnosis of Pneumonia due to infectious organism, unspecified laterality, unspecified part of lung was also pertinent to this visit. has a past medical history of Aortic stenosis, Arthritis, COVID-19, and PAF (paroxysmal atrial fibrillation) ([facility] [address] has a past surgical history that includes Varicose vein surgery (Bilateral, 1980's); Cardiac catheterization (04/17/2018); Hysterectomy, total abdominal; and Aortic valve replacement (N/A, 5/21/2019). Restrictions  Restrictions/Precautions  Restrictions/Precautions: Contact Precautions, Isolation, Fall Risk  Position Activity Restriction  Other position/activity restrictions: COVID+; droplet precautions; 10L high flow     Subjective   General  Chart Reviewed: Yes  Additional Pertinent Hx: Pt is a 80 y.o. female who presented to the ED on 9/13/21 with SOB. Pt found to be COVID+. Response To Previous Treatment: Patient with no complaints from previous session. Family / Caregiver Present: No  Referring Practitioner: Ligia Petty MD  Subjective  Subjective: Pt is agreeable to PT - very [place]. Orientation  Orientation  Overall Orientation Status: Within Functional Limits     Cognition   Cognition  Overall Cognitive Status: Exceptions  Following Commands:  Follows one step commands with increased time  Attention Span: Appears intact  Memory: Decreased short term memory  Safety Judgement: Decreased awareness of need for safety;Decreased awareness of need for assistance  Problem Solving: Decreased awareness of errors;Assistance required to identify errors made;Assistance required to generate solutions  Insights: Decreased awareness of deficits  Initiation: Requires cues for some  Sequencing: Requires cues for some     Objective   Bed mobility  Supine to Sit: Minimal assistance; Moderate assistance  Sit to Supine: Unable to assess (in recliner at end of session)  Scooting: Minimal assistance  Comment: O2 sats in low 90s upon arrival.  Transfers  Sit to Stand: Minimal Assistance; Moderate Assistance (to RW)  Stand to sit: Minimal Assistance; Moderate Assistance (to RW)  Ambulation  Ambulation?: Yes  Ambulation 1  Surface: level tile  Device: Rolling Walker  Other Apparatus: O2 (8L vapotherm)  Assistance: Minimal assistance  Gait Deviations: Slow Mirian;Decreased step length;Decreased step height  Distance: 3' bed to recliner  Comments: O2 sats drop to 85% - recovers in ~ 1 minute.   Stairs/Curb  Stairs?: No     Balance  Posture: Fair  Sitting - Static: Good  Sitting - Dynamic: Fair;+  Standing - Static: Poor;+ (@RW)  Standing - Dynamic: Poor;+ (@RW)            AM-PAC Score  AM-PAC Inpatient Mobility Raw Score : 12 (09/27/21 1343)  AM-PAC Inpatient T-Scale Score : 35.33 (09/27/21 1343)  Mobility Inpatient CMS 0-100% Score: 68.66 (09/27/21 1343)  Mobility Inpatient CMS G-Code Modifier : CL (09/27/21 1343)          Goals  Short term goals  Time Frame for Short term goals: by acute discharge - all goals ongoing as of 9/27/21  Short term goal 1: bed mobility with SBA  Short term goal 2: sit<>stand with SBA  Short term goal 3: ambulate >30' with LRAD and SBA  Patient Goals   Patient goals : none stated    Plan    Plan  Times per week: 3-5x/week  Current Treatment Recommendations: Strengthening, Functional Mobility Training, Transfer Training, Balance Training, Endurance Training, Patient/Caregiver Education & Training, Safety Education & Training, Gait Training, Neuromuscular Re-education, Equipment Evaluation, Education, & procurement  Safety Devices  Type of devices:  All fall risk precautions in place, Call light within reach, Patient at risk for falls, Nurse notified, Gait belt, Left in chair, Chair alarm in place     Therapy Time   Individual Concurrent Group Co-treatment   Time In 1240         Time Out 1335         Minutes 55         Timed Code Treatment Minutes: [address] [facility] 28, PT

## 2021-09-27 NOTE — PLAN OF CARE
Problem: Body Temperature -  Risk of, Imbalanced  Goal: Will regain or maintain usual level of consciousness  9/27/2021 1146 by Shelbi Nguyen RN  Outcome: Ongoing     Problem:  Body Temperature -  Risk of, Imbalanced  Goal: Complications related to the disease process, condition or treatment will be avoided or minimized  9/27/2021 1146 by Shelbi Nguyen RN  Outcome: Ongoing     Problem: Isolation Precautions - Risk of Spread of Infection  Goal: Prevent transmission of infection  9/27/2021 1146 by Shelbi Nguyen RN  Outcome: Ongoing     Problem: Nutrition Deficits  Goal: Optimize nutritional status  9/27/2021 1146 by Shelbi Nguyen RN  Outcome: Ongoing     Problem: Risk for Fluid Volume Deficit  Goal: Maintain normal heart rhythm  9/27/2021 1146 by Shelbi Nguyen RN  Outcome: Ongoing     Problem: Risk for Fluid Volume Deficit  Goal: Maintain absence of muscle cramping  9/27/2021 1146 by Shelbi Nguyen RN  Outcome: Ongoing     Problem: Risk for Fluid Volume Deficit  Goal: Maintain normal serum potassium, sodium, calcium, phosphorus, and pH  9/27/2021 1146 by Shelbi Nguyen RN  Outcome: Ongoing     Problem: Loneliness or Risk for Loneliness  Goal: Demonstrate positive use of time alone when socialization is not possible  9/27/2021 1146 by Shelbi Nguyen RN  Outcome: Ongoing     Problem: Fatigue  Goal: Verbalize increase energy and improved vitality  9/27/2021 1146 by Shelbi Nguyen RN  Outcome: Ongoing     Problem: Patient Education: Go to Patient Education Activity  Goal: Patient/Family Education  9/27/2021 1146 by Shelbi Nguyen RN  Outcome: Ongoing     Problem: Nutrition  Goal: Optimal nutrition therapy  9/27/2021 1146 by Shelbi Nguyen RN  Outcome: Ongoing     Problem: Confusion - Acute:  Goal: Absence of continued neurological deterioration signs and symptoms  Description: Absence of continued neurological deterioration signs and symptoms  9/27/2021 1146 by Krista Esteves RN  Outcome: Ongoing     Problem: Confusion - Acute:  Goal: Mental status will be restored to baseline  Description: Mental status will be restored to baseline  9/27/2021 1146 by Krista Esteves RN  Outcome: Ongoing     Problem: Discharge Planning:  Goal: Ability to perform activities of daily living will improve  Description: Ability to perform activities of daily living will improve  9/27/2021 1146 by Krista Esteves RN  Outcome: Ongoing     Problem: Discharge Planning:  Goal: Participates in care planning  Description: Participates in care planning  9/27/2021 1146 by Krista Esteves RN  Outcome: Ongoing     Problem: Injury - Risk of, Physical Injury:  Goal: Will remain free from falls  Description: Will remain free from falls  9/27/2021 1146 by Krista Esteves RN  Outcome: Ongoing     Problem: Injury - Risk of, Physical Injury:  Goal: Absence of physical injury  Description: Absence of physical injury  9/27/2021 1146 by Krista Esteves RN  Outcome: Ongoing     Problem: Mood - Altered:  Goal: Mood stable  Description: Mood stable  9/27/2021 1146 by Krista Esteves RN  Outcome: Ongoing     Problem: Mood - Altered:  Goal: Absence of abusive behavior  Description: Absence of abusive behavior  9/27/2021 1146 by Krista Esteves RN  Outcome: Ongoing     Problem: Mood - Altered:  Goal: Verbalizations of feeling emotionally comfortable while being cared for will increase  Description: Verbalizations of feeling emotionally comfortable while being cared for will increase  9/27/2021 1146 by Krista Esteves RN  Outcome: Ongoing     Problem: Psychomotor Activity - Altered:  Goal: Absence of psychomotor disturbance signs and symptoms  Description: Absence of psychomotor disturbance signs and symptoms  9/27/2021 1146 by Krista Esteves RN  Outcome: Ongoing     Problem: Sensory Perception - Impaired:  Goal: Demonstrations of improved sensory functioning will increase  Description: Demonstrations of improved sensory functioning will increase  9/27/2021 1146 by Roslyn Javier RN  Outcome: Ongoing     Problem: Sensory Perception - Impaired:  Goal: Decrease in sensory misperception frequency  Description: Decrease in sensory misperception frequency  9/27/2021 1146 by Roslyn Javier RN  Outcome: Ongoing     Problem: Sensory Perception - Impaired:  Goal: Able to refrain from responding to false sensory perceptions  Description: Able to refrain from responding to false sensory perceptions  9/27/2021 1146 by Roslyn Javier RN  Outcome: Ongoing     Problem: Sensory Perception - Impaired:  Goal: Demonstrates accurate environmental perceptions  Description: Demonstrates accurate environmental perceptions  9/27/2021 1146 by Roslyn Javier RN  Outcome: Ongoing     Problem: Sensory Perception - Impaired:  Goal: Able to distinguish between reality-based and nonreality-based thinking  Description: Able to distinguish between reality-based and nonreality-based thinking  9/27/2021 1146 by Roslyn Javier RN  Outcome: Ongoing     Problem: Sensory Perception - Impaired:  Goal: Able to interrupt nonreality-based thinking  Description: Able to interrupt nonreality-based thinking  9/27/2021 1146 by Roslyn Javier RN  Outcome: Ongoing     Problem: Sleep Pattern Disturbance:  Goal: Appears well-rested  Description: Appears well-rested  9/27/2021 1146 by Roslyn Javier RN  Outcome: Ongoing

## 2021-09-27 NOTE — PLAN OF CARE
Problem: Falls - Risk of:  Goal: Will remain free from falls  Description: Will remain free from falls  Outcome: Ongoing  Goal: Absence of physical injury  Description: Absence of physical injury  Outcome: Ongoing     Problem: Skin Integrity:  Goal: Will show no infection signs and symptoms  Description: Will show no infection signs and symptoms  Outcome: Ongoing  Goal: Absence of new skin breakdown  Description: Absence of new skin breakdown  Outcome: Ongoing     Problem: Safety:  Goal: Free from accidental physical injury  Description: Free from accidental physical injury  Outcome: Ongoing  Goal: Free from intentional harm  Description: Free from intentional harm  Outcome: Ongoing     Problem: Daily Care:  Goal: Daily care needs are met  Description: Daily care needs are met  Outcome: Ongoing     Problem: Airway Clearance - Ineffective  Goal: Achieve or maintain patent airway  Outcome: Ongoing     Problem: Gas Exchange - Impaired  Goal: Absence of hypoxia  Outcome: Ongoing  Goal: Promote optimal lung function  Outcome: Ongoing     Problem: Breathing Pattern - Ineffective  Goal: Ability to achieve and maintain a regular respiratory rate  Outcome: Ongoing     Problem:  Body Temperature -  Risk of, Imbalanced  Goal: Ability to maintain a body temperature within defined limits  Outcome: Ongoing  Goal: Will regain or maintain usual level of consciousness  Outcome: Ongoing  Goal: Complications related to the disease process, condition or treatment will be avoided or minimized  Outcome: Ongoing     Problem: Isolation Precautions - Risk of Spread of Infection  Goal: Prevent transmission of infection  Outcome: Ongoing     Problem: Nutrition Deficits  Goal: Optimize nutritional status  Outcome: Ongoing     Problem: Risk for Fluid Volume Deficit  Goal: Maintain normal heart rhythm  Outcome: Ongoing  Goal: Maintain absence of muscle cramping  Outcome: Ongoing  Goal: Maintain normal serum potassium, sodium, calcium, phosphorus, and pH  Outcome: Ongoing     Problem: Loneliness or Risk for Loneliness  Goal: Demonstrate positive use of time alone when socialization is not possible  Outcome: Ongoing     Problem: Fatigue  Goal: Verbalize increase energy and improved vitality  Outcome: Ongoing     Problem: Patient Education: Go to Patient Education Activity  Goal: Patient/Family Education  Outcome: Ongoing     Problem: Nutrition  Goal: Optimal nutrition therapy  Outcome: Ongoing     Problem: Confusion - Acute:  Goal: Absence of continued neurological deterioration signs and symptoms  Description: Absence of continued neurological deterioration signs and symptoms  Outcome: Ongoing  Goal: Mental status will be restored to baseline  Description: Mental status will be restored to baseline  Outcome: Ongoing     Problem: Discharge Planning:  Goal: Ability to perform activities of daily living will improve  Description: Ability to perform activities of daily living will improve  Outcome: Ongoing  Goal: Participates in care planning  Description: Participates in care planning  Outcome: Ongoing     Problem: Injury - Risk of, Physical Injury:  Goal: Will remain free from falls  Description: Will remain free from falls  Outcome: Ongoing  Goal: Absence of physical injury  Description: Absence of physical injury  Outcome: Ongoing     Problem: Mood - Altered:  Goal: Mood stable  Description: Mood stable  Outcome: Ongoing  Goal: Absence of abusive behavior  Description: Absence of abusive behavior  Outcome: Ongoing  Goal: Verbalizations of feeling emotionally comfortable while being cared for will increase  Description: Verbalizations of feeling emotionally comfortable while being cared for will increase  Outcome: Ongoing     Problem: Psychomotor Activity - Altered:  Goal: Absence of psychomotor disturbance signs and symptoms  Description: Absence of psychomotor disturbance signs and symptoms  Outcome: Ongoing     Problem: Sensory Perception - Impaired:  Goal: Demonstrations of improved sensory functioning will increase  Description: Demonstrations of improved sensory functioning will increase  Outcome: Ongoing  Goal: Decrease in sensory misperception frequency  Description: Decrease in sensory misperception frequency  Outcome: Ongoing  Goal: Able to refrain from responding to false sensory perceptions  Description: Able to refrain from responding to false sensory perceptions  Outcome: Ongoing  Goal: Demonstrates accurate environmental perceptions  Description: Demonstrates accurate environmental perceptions  Outcome: Ongoing  Goal: Able to distinguish between reality-based and nonreality-based thinking  Description: Able to distinguish between reality-based and nonreality-based thinking  Outcome: Ongoing  Goal: Able to interrupt nonreality-based thinking  Description: Able to interrupt nonreality-based thinking  Outcome: Ongoing     Problem: Sleep Pattern Disturbance:  Goal: Appears well-rested  Description: Appears well-rested  Outcome: Ongoing

## 2021-09-27 NOTE — PROGRESS NOTES
Hospitalist Progress Note      PCP: Maggi Perez MD    Date of Admission: 9/13/2021    Chief Complaint: Shortness of breath    Hospital Course:  Patient is a 25-year-old female with past medical history of CAD, aortic stenosis status post AVR who presented to hospital for shortness of breath. Patient is a poor historian, reportedly patient was brought to the hospital for shortness of breath. Patient was found hypoxic in upper 80s on room air, was transitioned to nasal cannula. Subjective:     10l/min via NC this am. This has held stable over the weekend. Her mental status and functional status show very little if any improvement. Ongoing issues with anxiety, disorientation, and agitation. Very little PO intake. Tried Seroquel over the weekend - it is making her too sedated without much additional benefit - stop seroquel today. Afib resolved - she is back in sinus rhythm now.              Medications:  Reviewed      Infusion Medications    sodium chloride Stopped (09/16/21 1256)     Scheduled Medications    fluconazole  100 mg Oral Daily    metoprolol tartrate  50 mg Oral BID    dexamethasone  4 mg Oral Daily    dilTIAZem  60 mg Oral 4 times per day    apixaban  2.5 mg Oral BID    baricitinib  1 mg Oral Daily    sodium chloride flush  10 mL IntraVENous 2 times per day    aspirin  81 mg Oral Daily    ipratropium  2 spray Each Nostril 4x Daily    ipratropium  2 puff Inhalation BID    And    albuterol sulfate HFA  2 puff Inhalation BID     PRN Meds: albuterol sulfate HFA, sodium chloride, sodium chloride flush, sodium chloride, ondansetron **OR** ondansetron, acetaminophen **OR** acetaminophen    No intake or output data in the 24 hours ending 09/27/21 1127    Physical Exam Performed:    BP (!) 162/64   Pulse 83   Temp 98 °F (36.7 °C) (Oral)   Resp 18   Ht 5' (1.524 m)   Wt 107 lb 2.3 oz (48.6 kg)   SpO2 95%   BMI 20.93 kg/m²     General appearance: frail, NAD, able to hold conversations, on 7l NC  HEENT: Pupils equal, round, and reactive to light. Conjunctivae/corneas clear. Neck: Supple, with full range of motion. No jugular venous distention. Trachea midline. Respiratory:  Normal respiratory effort. Diminished breath sounds bilateral  Cardiovascular: Regular rate and rhythm with normal S1/S2   Abdomen: Soft, non-tender, non-distended with normal bowel sounds. Musculoskeletal: No clubbing, cyanosis or edema bilaterally. Full range of motion without deformity. Skin: Skin color, texture, turgor normal.  No rashes or lesions. Neurologic:  Neurovascularly intact without any focal sensory/motor deficits. Cranial nerves: II-XII intact, grossly non-focal.  Psychiatric: Alert and partially oriented. Capillary Refill: Brisk,< 3 seconds   Peripheral Pulses: +2 palpable, equal bilaterally       Labs:   Recent Labs     09/25/21  0646 09/26/21  0807 09/27/21  0732   WBC 15.2* 17.6* 11.4*   HGB 11.3* 11.5* 10.5*   HCT 33.8* 34.7* 32.0*    282 196     Recent Labs     09/25/21  0646 09/26/21  0807 09/27/21  0732   * 138 137   K 3.8 3.6 3.4*    105 103   CO2 19* 19* 23   BUN 16 14 14   CREATININE <0.5* <0.5* <0.5*   CALCIUM 8.5 8.7 8.4       Recent Labs     09/26/21  0807   AST 25   ALT 29   BILIDIR <0.2   BILITOT 0.4   ALKPHOS 105       No results for input(s): INR in the last 72 hours. No results for input(s): Assunta Carrero in the last 72 hours. Urinalysis:      Lab Results   Component Value Date    NITRU Negative 09/26/2021    WBCUA 0-2 09/26/2021    RBCUA 0-2 09/26/2021    BLOODU Negative 09/26/2021    SPECGRAV 1.015 09/26/2021    GLUCOSEU 100 09/26/2021       Radiology:  XR CHEST PORTABLE   Final Result   1. Study limited by patient rotation. 2. Interval progression of multifocal pneumonia. 3. Small bilateral pleural effusions.          XR CHEST 1 VIEW   Final Result   Slight increase in bilateral pulmonary opacities         CT CHEST PULMONARY EMBOLISM W CONTRAST   Final Result   No evidence of significant pulmonary embolism. Extensive multifocal bilateral airspace disease compatible with multifocal   pneumonia including viral pneumonia. Dilation of the main and proximal pulmonary arteries could represent   pulmonary arterial hypertension. Large hiatal hernia. XR CHEST PORTABLE   Final Result   Findings most consistent with multifocal pneumonia. Assessment/Plan:      COVID-19 pneumonia. Decadron 6 mg daily for 10 days - completed. - with O2 worsening - decadron resumed at 10mg dose on 9/22 - weaning slowly - down to 4mg PO 9/26  completed Remdesivir, continue baricitinib started September 14  Lovenox twice daily - transitioned to PO eliquis 2. 5BID  Incentive spirometry  She completed 10 days of empiric Zithro and Rocephin - stopped 9/23      Acute respiratory failure with hypoxia  Secondary to COVID-19 pneumonia  Titrate oxygen to keep saturations above 90%  Repeat CXR 9/26 - pleural effusions and progression of PNA   - stop IVF. - given 20 of IV lasix 9/26    - repeat 20 of IV lasix 9/27   - hold off additional diuretic - she is not eating. Aortic stenosis status post AVR  Continue to monitor  Monitor fluid status - very poor PO      CAD  Continue aspirin, ACE inhibitor. Afib - no prior Hx - RVR now resolved. Started on PO dilt  - increase to 60QID - transition to long acting dilt PO - she is unabel to swallow it - back to 60 QID - can be crushed in apple sauce. EKG - confirms Afib. Lovenox to eliquis 2. 5BID  BB dose increase. She is actually back in sinus on 9/26. Family requested cardiology eval.    - recs appreciated. Acute metabolic encephalopathy. Hospital delirium, hypoxia, steroids all likely contributory. Wean steroid as able. S/p seroquel over the weekend - little if any benefit - stop seroquel. UTI not POA.    Yeast on UA - started diflucan 9/27            DVT Prophylaxis: Lovenox to eliquis 9/25  Diet: ADULT DIET; Regular  Adult Oral Nutrition Supplement; Standard High Calorie/High Protein Oral Supplement  Code Status: DNR-CCA      PT/OT Eval Status: as tolerates. Dispo - cc. Pt progressively more depressed. Intermittently confused. Psychiatry involved. Shows very little functional improvement though her O2 remains stable on 10l/min.      Prognosis guarded at best.         Huang Rangel MD

## 2021-09-28 PROBLEM — E44.1 MILD MALNUTRITION (HCC): Status: ACTIVE | Noted: 2021-09-28

## 2021-09-28 LAB
ALBUMIN SERPL-MCNC: 2.7 G/DL (ref 3.4–5)
ALP BLD-CCNC: 111 U/L (ref 40–129)
ALT SERPL-CCNC: 37 U/L (ref 10–40)
ANION GAP SERPL CALCULATED.3IONS-SCNC: 15 MMOL/L (ref 3–16)
AST SERPL-CCNC: 24 U/L (ref 15–37)
BANDED NEUTROPHILS RELATIVE PERCENT: 1 % (ref 0–7)
BASOPHILS ABSOLUTE: 0 K/UL (ref 0–0.2)
BASOPHILS RELATIVE PERCENT: 0 %
BILIRUB SERPL-MCNC: 0.5 MG/DL (ref 0–1)
BILIRUBIN DIRECT: <0.2 MG/DL (ref 0–0.3)
BILIRUBIN, INDIRECT: ABNORMAL MG/DL (ref 0–1)
BUN BLDV-MCNC: 12 MG/DL (ref 7–20)
CALCIUM SERPL-MCNC: 8.6 MG/DL (ref 8.3–10.6)
CHLORIDE BLD-SCNC: 101 MMOL/L (ref 99–110)
CO2: 22 MMOL/L (ref 21–32)
CREAT SERPL-MCNC: <0.5 MG/DL (ref 0.6–1.2)
EOSINOPHILS ABSOLUTE: 0 K/UL (ref 0–0.6)
EOSINOPHILS RELATIVE PERCENT: 0 %
GFR AFRICAN AMERICAN: >60
GFR NON-AFRICAN AMERICAN: >60
GLUCOSE BLD-MCNC: 99 MG/DL (ref 70–99)
HCT VFR BLD CALC: 35.8 % (ref 36–48)
HEMATOLOGY PATH CONSULT: NO
HEMOGLOBIN: 11.9 G/DL (ref 12–16)
LYMPHOCYTES ABSOLUTE: 0.2 K/UL (ref 1–5.1)
LYMPHOCYTES RELATIVE PERCENT: 1 %
MAGNESIUM: 2.2 MG/DL (ref 1.8–2.4)
MCH RBC QN AUTO: 27.5 PG (ref 26–34)
MCHC RBC AUTO-ENTMCNC: 33.3 G/DL (ref 31–36)
MCV RBC AUTO: 82.6 FL (ref 80–100)
MONOCYTES ABSOLUTE: 0.3 K/UL (ref 0–1.3)
MONOCYTES RELATIVE PERCENT: 2 %
NEUTROPHILS ABSOLUTE: 15.3 K/UL (ref 1.7–7.7)
NEUTROPHILS RELATIVE PERCENT: 96 %
PDW BLD-RTO: 14.6 % (ref 12.4–15.4)
PLATELET # BLD: 153 K/UL (ref 135–450)
PLATELET SLIDE REVIEW: ADEQUATE
PMV BLD AUTO: 9.6 FL (ref 5–10.5)
POTASSIUM REFLEX MAGNESIUM: 3.1 MMOL/L (ref 3.5–5.1)
RBC # BLD: 4.33 M/UL (ref 4–5.2)
SLIDE REVIEW: ABNORMAL
SODIUM BLD-SCNC: 138 MMOL/L (ref 136–145)
TOTAL PROTEIN: 5.4 G/DL (ref 6.4–8.2)
TOXIC GRANULATION: PRESENT
WBC # BLD: 15.8 K/UL (ref 4–11)

## 2021-09-28 PROCEDURE — 94761 N-INVAS EAR/PLS OXIMETRY MLT: CPT

## 2021-09-28 PROCEDURE — 2700000000 HC OXYGEN THERAPY PER DAY

## 2021-09-28 PROCEDURE — 85025 COMPLETE CBC W/AUTO DIFF WBC: CPT

## 2021-09-28 PROCEDURE — 36415 COLL VENOUS BLD VENIPUNCTURE: CPT

## 2021-09-28 PROCEDURE — 1200000000 HC SEMI PRIVATE

## 2021-09-28 PROCEDURE — 80048 BASIC METABOLIC PNL TOTAL CA: CPT

## 2021-09-28 PROCEDURE — 97530 THERAPEUTIC ACTIVITIES: CPT

## 2021-09-28 PROCEDURE — 2580000003 HC RX 258: Performed by: INTERNAL MEDICINE

## 2021-09-28 PROCEDURE — 6370000000 HC RX 637 (ALT 250 FOR IP): Performed by: INTERNAL MEDICINE

## 2021-09-28 PROCEDURE — 83735 ASSAY OF MAGNESIUM: CPT

## 2021-09-28 PROCEDURE — 94640 AIRWAY INHALATION TREATMENT: CPT

## 2021-09-28 PROCEDURE — 80076 HEPATIC FUNCTION PANEL: CPT

## 2021-09-28 RX ORDER — POTASSIUM CHLORIDE 20 MEQ/1
40 TABLET, EXTENDED RELEASE ORAL 2 TIMES DAILY WITH MEALS
Status: DISCONTINUED | OUTPATIENT
Start: 2021-09-28 | End: 2021-09-30

## 2021-09-28 RX ADMIN — METOPROLOL TARTRATE 50 MG: 50 TABLET, FILM COATED ORAL at 21:48

## 2021-09-28 RX ADMIN — Medication 2 PUFF: at 08:15

## 2021-09-28 RX ADMIN — DILTIAZEM HYDROCHLORIDE 60 MG: 60 TABLET, FILM COATED ORAL at 00:43

## 2021-09-28 RX ADMIN — Medication 2 PUFF: at 19:37

## 2021-09-28 RX ADMIN — DILTIAZEM HYDROCHLORIDE 60 MG: 60 TABLET, FILM COATED ORAL at 23:49

## 2021-09-28 RX ADMIN — APIXABAN 2.5 MG: 2.5 TABLET, FILM COATED ORAL at 21:48

## 2021-09-28 RX ADMIN — IPRATROPIUM BROMIDE 2 SPRAY: 42 SPRAY NASAL at 23:53

## 2021-09-28 RX ADMIN — DILTIAZEM HYDROCHLORIDE 60 MG: 60 TABLET, FILM COATED ORAL at 06:19

## 2021-09-28 RX ADMIN — SODIUM CHLORIDE, PRESERVATIVE FREE 10 ML: 5 INJECTION INTRAVENOUS at 21:49

## 2021-09-28 ASSESSMENT — ENCOUNTER SYMPTOMS
EYES NEGATIVE: 1
GASTROINTESTINAL NEGATIVE: 1
SHORTNESS OF BREATH: 1

## 2021-09-28 ASSESSMENT — PAIN SCALES - GENERAL: PAINLEVEL_OUTOF10: 0

## 2021-09-28 NOTE — PROGRESS NOTES
Hospitalist  Progress Note       Danielle Courser is a 80 y.o. female   4/14/1931     SUBJECTIVE:   Patient  Seen     And  Examined    letehrgic  And  confused  OBJECTIVE:    Review of Systems:  General appearance: alert, appears stated age and cooperative  Skin: Skin color, texture, normal. No rashes or lesions  HEENT: No nose bleed, headache, vision problems  CV: C/O chest pain, tightness, pressure,   Respiratory: C/o no SOB, DENTON, Orthopnea, PND  GI: No abdominal pain, black stool, bloating  Limbs: No c/o edema, pain, swelling, intermittent claudication, joint pains  Neuro: No dizziness, lightheadedness, syncope, gait problems, memory problems  Psych: grossly normal. No SI/depression. Vitals:   Blood pressure 131/74, pulse 84, temperature 98.4 °F (36.9 °C), temperature source Oral, resp. rate 16, height 5' (1.524 m), weight 106 lb 4.2 oz (48.2 kg), SpO2 93 %, not currently breastfeeding.     HEENT: AT, NC, PERRLA  Neck: No JVD  Heart: S1 S2 audible, no murmur   Lungs: bilat  Rhonchi    Abdomen: Nontender   Limbs: No edema   CNS: no focal deficit      Past Medical History:   Diagnosis Date    Aortic stenosis     Arthritis     COVID-19     PAF (paroxysmal atrial fibrillation) ([facility])         Patient Active Problem List   Diagnosis    Aortic calcification ([facility])    Hypercholesteremia    Nonrheumatic aortic valve stenosis    Right carotid artery occlusion    Arthritis    Essential hypertension    Carotid stenosis, asymptomatic, right    History of transcatheter aortic valve implantation ([facility])    PAF (paroxysmal atrial fibrillation) ([facility])    Hypoxia    Acute respiratory failure with hypoxia ([facility])    Pneumonia due to COVID-19 virus    Coronary artery disease involving native coronary artery of native heart without angina pectoris    Mild malnutrition ([facility] [address]        No Known Allergies     Current Inpatient Medications:    Current Facility-Administered Medications   Medication Dose Route Frequency Provider Last Rate Last Admin    potassium chloride (KLOR-CON M) extended release tablet 40 mEq  40 mEq Oral BID  Aniket Nevarez MD        fluconazole (DIFLUCAN) tablet 100 mg  100 mg Oral Daily Megan Bradshaw MD        metoprolol tartrate (LOPRESSOR) tablet 50 mg  50 mg Oral BID Megan Bradshaw MD   50 mg at 09/27/21 2121    dexamethasone (DECADRON) tablet 4 mg  4 mg Oral Daily Megan Bradshaw MD   4 mg at 09/26/21 1323    dilTIAZem (CARDIZEM) tablet 60 mg  60 mg Oral 4 times per day Megan Bradshaw MD   60 mg at 09/28/21 0804    apixaban (ELIQUIS) tablet 2.5 mg  2.5 mg Oral BID Megan Bradshaw MD   2.5 mg at 09/27/21 2121    albuterol sulfate  (90 Base) MCG/ACT inhaler 2 puff  2 puff Inhalation Q4H PRN Megan Bradshaw MD   2 puff at 09/26/21 1238    0.9 % sodium chloride bolus  30 mL IntraVENous PRN Abhishek Sexton MD   Stopped at 09/14/21 1706    sodium chloride flush 0.9 % injection 10 mL  10 mL IntraVENous 2 times per day Alyssa Abbott MD   10 mL at 09/27/21 2123    sodium chloride flush 0.9 % injection 10 mL  10 mL IntraVENous PRN Alyssa Abbott MD   10 mL at 09/17/21 2028    0.9 % sodium chloride infusion  25 mL IntraVENous PRN Alyssa Abbott MD   Stopped at 09/16/21 1256    ondansetron (ZOFRAN-ODT) disintegrating tablet 4 mg  4 mg Oral Q8H PRN Alyssa Abbott MD        Or    ondansetron (ZOFRAN) injection 4 mg  4 mg IntraVENous Q6H PRN Alyssa Abbott MD        acetaminophen (TYLENOL) tablet 650 mg  650 mg Oral Q6H PRN Alyssa Abbott MD   650 mg at 09/25/21 2029    Or    acetaminophen (TYLENOL) suppository 650 mg  650 mg Rectal Q6H PRN Alyssa Abbott MD        aspirin EC tablet 81 mg  81 mg Oral Daily Alyssa Abbott MD   81 mg at 09/26/21 0807    ipratropium (ATROVENT) 0.06 % nasal spray 2 spray  2 spray Each Nostril 4x Daily Alyssa Abbott MD   2 spray at 09/27/21 2128    ipratropium (ATROVENT HFA) 17 MCG/ACT inhaler 2 puff  2 puff Inhalation BID Irfan MD Rashad   2 puff at 09/28/21 0815    And    albuterol sulfate  (90 Base) MCG/ACT inhaler 2 puff  2 puff Inhalation BID Sidney Chin MD   2 puff at 09/28/21 0815           Labs:  CBC with Differential:    Lab Results   Component Value Date    WBC 15.8 09/28/2021    RBC 4.33 09/28/2021    HGB 11.9 09/28/2021    HCT 35.8 09/28/2021     09/28/2021    MCV 82.6 09/28/2021    MCH 27.5 09/28/2021    MCHC 33.3 09/28/2021    RDW 14.6 09/28/2021    BANDSPCT 1 09/28/2021    LYMPHOPCT 1.0 09/28/2021    MONOPCT 2.0 09/28/2021    BASOPCT 0.0 09/28/2021    MONOSABS 0.3 09/28/2021    LYMPHSABS 0.2 09/28/2021    EOSABS 0.0 09/28/2021    BASOSABS 0.0 09/28/2021     CMP:    Lab Results   Component Value Date     09/27/2021    K 3.4 09/27/2021     09/27/2021    CO2 23 09/27/2021    BUN 14 09/27/2021    CREATININE <0.5 09/27/2021    GFRAA >60 09/27/2021    AGRATIO 1.1 09/21/2021    LABGLOM >60 09/27/2021    GLUCOSE 94 09/27/2021    PROT 5.4 09/28/2021    LABALBU 2.7 09/28/2021    CALCIUM 8.4 09/27/2021    BILITOT 0.5 09/28/2021    ALKPHOS 111 09/28/2021    AST 24 09/28/2021    ALT 37 09/28/2021     Hepatic Function Panel:    Lab Results   Component Value Date    ALKPHOS 111 09/28/2021    ALT 37 09/28/2021    AST 24 09/28/2021    PROT 5.4 09/28/2021    BILITOT 0.5 09/28/2021    BILIDIR <0.2 09/28/2021    IBILI see below 09/28/2021    LABALBU 2.7 09/28/2021     Magnesium:    Lab Results   Component Value Date    MG 2.30 09/27/2021     PT/INR:    Lab Results   Component Value Date    PROTIME 10.6 05/16/2019    INR 0.93 05/16/2019     Last 3 Troponin:    Lab Results   Component Value Date    TROPONINI <0.01 09/13/2021    TROPONINI <0.01 05/21/2019     U/A:    Lab Results   Component Value Date    COLORU YELLOW 09/26/2021    PHUR 5.5 09/26/2021    WBCUA 0-2 09/26/2021    RBCUA 0-2 09/26/2021    MUCUS Rare 09/26/2021    YEAST Present 09/26/2021    CLARITYU Clear 09/26/2021    SPECGRAV 1.015 09/26/2021 LEUKOCYTESUR Negative 09/26/2021    UROBILINOGEN 0.2 09/26/2021    BILIRUBINUR Negative 09/26/2021    BLOODU Negative 09/26/2021    GLUCOSEU 100 09/26/2021     ABG:    Lab Results   Component Value Date    PHART 7.333 05/21/2019    AUV8HDU 45.7 05/21/2019    PO2ART 140.7 05/21/2019    FBJ9OYW 24.3 05/21/2019    BEART -2 05/21/2019    BZI2MUE 26 05/21/2019    M2HSVBLW 99 05/21/2019     FLP:    Lab Results   Component Value Date    TRIG 90 03/28/2019    HDL 59 03/28/2019    LDLCALC 120 03/28/2019    LABVLDL 18 03/28/2019     TSH:  No results found for: TSH   DATA:   ECG: Sinus Rhythm       ASSESSMENT:   1  Covid  19  Pneumonia  2  Acute  Hypoxic  Respiratory  fauilure  3  Acute  Metabolic  Encephalopathy  4  TAVR    Recent  Echo  7/21  Valve  Functioning  Well  5  UTI   Yeast      6  htn  Relatively  Controlled  PLAN    continue  Current  RX

## 2021-09-28 NOTE — PROGRESS NOTES
PALLIATIVE MEDICINE PROGRESS NOTE     Patient name:Mayra Morton    JUT:[id number]  Room/Bed:P6H-1793/4261-01    LOS: 15 days        ASSESSMENT/RECOMMENDATIONS     80 y.o. female with Hypoxia and debility.        Symptom Management:  Hypoxia- Patient now on 20 liters of heated high flow cannula. Debility- Patient remains lethargic and requires assist with ADL's. Goals of Care- Again met patient at bedside today. Patient only oriented to self today. Patient again not decisional regarding situation. Again phoned patient's granddaughter Andre Holland to update her on patient's current status. Code status is to remain DNR-CCA. Andre Holland also indicated the patient is to continue to receive her current level of care at this time.     Patient/Family Goals of Care :     - Again met patient at bedside today. Patient only oriented to self today. Patient again not decisional regarding situation. Again phoned patient's granddaughter Andre Holland to update her on patient's current status. Code status is to remain DNR-CCA. Andre Holland also indicated the patient is to continue to receive her current level of care at this time.  - Met patient at bedside today. Patient only oriented to self and place today. Patient not decisional regarding her situation today. Phoned Andre Holland, patient's granddaughter and reviewed the patient's current health status. Andre Holland indicated it is the family's wishes for the patient to continue with her current level of care. Andre Holland also reiterated that the family does not wish for the patient to be intubated and for the patient's code status to remain DNR-CCA at this time.      9/15 - Talked to pt regarding code status today she is hopeful that she will recover from [place] but understands that her advanced age is a risk factor and that she has high risk that she will have a poor prognosis. Pt does not want to be intubated or to have CPR her wishes align with [facility]. She states that she has outlived 2 of the 3 of her children and her spouse if its her time she is ready. She would like to have more time to help her daughter but understands that her granddaughters are capable of caring for daughter too.       Disposition/Discharge Plan:   pending     Advance Directives:  Surrogate Decision Maker: patt HCPOA, patient's daughter currently hospitalized and not decisional.  Kobe Abreu and Linda Cano are patient's granddaughters and assisting with making health care decisions for the patient. Code status:  DNR-CCA     Case discussed with: patient, floor RN  Thank you for allowing us to participate in the care of this patient. SUBJECTIVE     Chief Complaint: Dyspnea. Last 24 hours:   Patient now requiring 20 iters of oxygen via high flow nasal cannula. ROS:    Review of Systems   Constitutional: Positive for fatigue. HENT: Negative. Eyes: Negative. Respiratory: Positive for shortness of breath. Cardiovascular: Negative. Gastrointestinal: Negative. Musculoskeletal: Negative. Skin: Positive for pallor. Neurological: Positive for weakness. Psychiatric/Behavioral: Positive for confusion. Negative for agitation. All other systems reviewed and are negative. A complete 10 count ROS was obtained. Pertinent positives mentioned above in HPI/ROS. All others if not mentioned are negative. OBJECTIVE   /83   Pulse 80   Temp 98.4 °F (36.9 °C) (Oral)   Resp 18   Ht 5' (1.524 m)   Wt 106 lb 4.2 oz (48.2 kg)   SpO2 93%   BMI 20.75 kg/m²   I/O last 3 completed shifts: In: 660 [P.O.:660]  Out: 100 [Urine:100]  I/O this shift: In: 48 [P.O.:50]  Out: -       Physical Exam  Vitals reviewed. Constitutional:       Appearance: She is cachectic. She is ill-appearing. HENT:      Head: Normocephalic and atraumatic. Nose: Nose normal.   Eyes:      Extraocular Movements: Extraocular movements intact. Pupils: Pupils are equal, round, and reactive to light. Cardiovascular:      Rate and Rhythm: Normal rate. Pulses: Normal pulses. Pulmonary:      Breath sounds: Normal breath sounds. No rhonchi. Abdominal:      General: Bowel sounds are normal.      Palpations: Abdomen is soft. Tenderness: There is no guarding. Musculoskeletal:         General: No tenderness. Cervical back: Neck supple. Skin:     General: Skin is warm and dry. Coloration: Skin is pale. Neurological:      Comments: Oriented x1 to self. Again not oriented to situation today.     Psychiatric:      Comments: Patient was again lethargic today            Total time: 40 minutes  >50% of time spent counseling patient at bedside or POA/family member if applicable , reviewing information and discussing care, coordinating with care team       Signed By: Electronically signed by CAROLYN Carrasquillo CNP on 9/28/2021 at 7:16 PM   Palliative Medicine   [phone]    September 28, 2021

## 2021-09-28 NOTE — ACP (ADVANCE CARE PLANNING)
Signed             Advance Care Planning      Advance Care Planning Activator (Inpatient)  Conversation Note        Date of ACP Conversation: 9/14/2021      Conversation Conducted with: Patient with Gen 51: Named in Advance Directive or Healthcare Power of  (name) Meryle Beecham     ACP Activator: Raj Meade RN     Health Care Decision Maker:      Current Designated Health Care Decision Maker:     Primary Decision Maker: Madeline Megan - [phone]    Secondary Decision Maker: Marrina [facility] - [phone]     Today we documented Decision Maker(s). The patient will provide ACP documents.     Care Preferences     Ventilation: \"If you were in your present state of health and suddenly became very ill and were unable to breathe on your own, what would your preference be about the use of a ventilator (breathing machine) if it were available to you? \"       Would the patient desire the use of ventilator (breathing machine)?: yes     \"If your health worsens and it becomes clear that your chance of recovery is unlikely, what would your preference be about the use of a ventilator (breathing machine) if it were available to you? \"      Would the patient desire the use of ventilator (breathing machine)?: No        Resuscitation  \"CPR works best to restart the heart when there is a sudden event, like a heart attack, in someone who is otherwise healthy. Unfortunately, CPR does not typically restart the heart for people who have serious health conditions or who are very sick. \"     \"In the event your heart stopped as a result of an underlying serious health condition, would you want attempts to be made to restart your heart (answer \"yes\" for attempt to resuscitate) or would you prefer a natural death (answer \"no\" for do not attempt to resuscitate)? \" yes         []? Yes   []?  No   Educated Patient / Francy Marcano regarding differences between Art Sam and

## 2021-09-28 NOTE — PLAN OF CARE
Problem: Falls - Risk of:  Goal: Will remain free from falls  Description: Will remain free from falls  Outcome: Ongoing     Problem: Falls - Risk of:  Goal: Absence of physical injury  Description: Absence of physical injury  Outcome: Ongoing     Problem: Skin Integrity:  Goal: Will show no infection signs and symptoms  Description: Will show no infection signs and symptoms  Outcome: Ongoing     Problem: Skin Integrity:  Goal: Absence of new skin breakdown  Description: Absence of new skin breakdown  Outcome: Ongoing     Problem: Safety:  Goal: Free from accidental physical injury  Description: Free from accidental physical injury  Outcome: Ongoing     Problem: Safety:  Goal: Free from intentional harm  Description: Free from intentional harm  Outcome: Ongoing     Problem: Daily Care:  Goal: Daily care needs are met  Description: Daily care needs are met  Outcome: Ongoing     Problem: Airway Clearan  Problem: Gas Exchange - Impaired  Goal: Promote optimal lung function  Outcome: Ongoing     Problem: Breathing Pattern - Ineffective  Goal: Ability to achieve and maintain a regular respiratory rate  Outcome: Ongoing     Problem: Body Temperature -  Risk of, Imbalanced  Goal: Ability to maintain a body temperature within defined limits  Outcome: Ongoing     Problem: Body Temperature -  Risk of, Imbalanced  Goal: Will regain or maintain usual level of consciousness  Outcome: Ongoing     Problem:  Body Temperature -  Risk of, Imbalanced  Goal: Complications related to the disease process, condition or treatment will be avoided or minimized  Outcome: Ongoing     Problem: Isolation Precautions - Risk of Spread of Infection  Goal: Prevent transmission of infection  Outcome: Ongoing     Problem: Nutrition Deficits  Goal: Optimize nutritional status  Outcome: Ongoing     Problem: Risk for Fluid Volume Deficit  Goal: Maintain normal heart rhythm  Outcome: Ongoing     Problem: Risk for Fluid Volume Deficit  Goal: Maintain absence of muscle cramping  Outcome: Ongoing     Problem: Risk for Fluid Volume Deficit  Goal: Maintain normal serum potassium, sodium, calcium, phosphorus, and pH  Outcome: Ongoing     Problem: Loneliness or Risk for Loneliness  Goal: Demonstrate positive use of time alone when socialization is not possible  Outcome: Ongoing     Problem: Fatigue  Goal: Verbalize increase energy and improved vitality  Outcome: Ongoing     Problem: Patient Education: Go to Patient Education Activity  Goal: Patient/Family Education  Outcome: Ongoing     Problem: Nutrition  Goal: Optimal nutrition therapy  9/28/2021 1233 by Julian Monge RN  Outcome: Ongoing     Problem: Confusion - Acute:  Goal: Absence of continued neurological deterioration signs and symptoms  Description: Absence of continued neurological deterioration signs and symptoms  Outcome: Ongoing     Problem: Confusion - Acute:  Goal: Mental status will be restored to baseline  Description: Mental status will be restored to baseline  Outcome: Ongoing     Problem: Discharge Planning:  Goal: Ability to perform activities of daily living will improve  Description: Ability to perform activities of daily living will improve  Outcome: Ongoing     Problem: Discharge Planning:  Goal: Participates in care planning  Description: Participates in care planning  Outcome: Ongoing     Problem: Injury - Risk of, Physical Injury:  Goal: Will remain free from falls  Description: Will remain free from falls  Outcome: Ongoing     Problem: Injury - Risk of, Physical Injury:  Goal: Absence of physical injury  Description: Absence of physical injury  Outcome: Ongoing     Problem: Mood - Altered:  Goal: Mood stable  Description: Mood stable  Outcome: Ongoing     Problem: Mood - Altered:  Goal: Absence of abusive behavior  Description: Absence of abusive behavior  Outcome: Ongoing     Problem: Mood - Altered:  Goal: Verbalizations of feeling emotionally comfortable while being cared for will increase  Description: Verbalizations of feeling emotionally comfortable while being cared for will increase  Outcome: Ongoing     Problem: Psychomotor Activity - Altered:  Goal: Absence of psychomotor disturbance signs and symptoms  Description: Absence of psychomotor disturbance signs and symptoms  Outcome: Ongoing     Problem: Sensory Perception - Impaired:  Goal: Demonstrations of improved sensory functioning will increase  Description: Demonstrations of improved sensory functioning will increase  Outcome: Ongoing     Problem: Sensory Perception - Impaired:  Goal: Decrease in sensory misperception frequency  Description: Decrease in sensory misperception frequency  Outcome: Ongoing     Problem: Sensory Perception - Impaired:  Goal: Able to refrain from responding to false sensory perceptions  Description: Able to refrain from responding to false sensory perceptions  Outcome: Ongoing     Problem: Sensory Perception - Impaired:  Goal: Demonstrates accurate environmental perceptions  Description: Demonstrates accurate environmental perceptions  Outcome: Ongoing     Problem: Sensory Perception - Impaired:  Goal: Able to distinguish between reality-based and nonreality-based thinking  Description: Able to distinguish between reality-based and nonreality-based thinking  Outcome: Ongoing     Problem: Sensory Perception - Impaired:  Goal: Able to interrupt nonreality-based thinking  Description: Able to interrupt nonreality-based thinking  Outcome: Ongoing     Problem: Sleep Pattern Disturbance:  Goal: Appears well-rested  Description: Appears well-rested  Outcome: Ongoing   ce - Ineffective  Goal: Achieve or maintain patent airway  Outcome: Ongoing     Problem: Gas Exchange - Impaired  Goal: Absence of hypoxia  Outcome: Ongoing

## 2021-09-28 NOTE — PROGRESS NOTES
Comprehensive Nutrition Assessment    Type and Reason for Visit:  Reassess    Nutrition Recommendations/Plan:   - Continue current diet  - Continue Ensure Enlive   - RD available if nutrition support is desired    Nutrition Assessment:  Follow-up. COVID+ with stable O2 requirements at 10L NC. Pt refusing meals. Will continue to send Ensure at meal times. RD available if nutrition support is desired. Malnutrition Assessment:  Malnutrition Status:  Mild malnutrition    Context:  Acute Illness     Findings of the 6 clinical characteristics of malnutrition:  Energy Intake:  7 - 50% or less of estimated energy requirements for 5 or more days  Weight Loss:  No significant weight loss     Body Fat Loss:  Unable to assess     Muscle Mass Loss:  Unable to assess    Fluid Accumulation:  No significant fluid accumulation     Strength:  Not Performed    Estimated Daily Nutrient Needs:  Energy (kcal):  2865-8387 (30-35kcal/46kg); Weight Used for Energy Requirements:  Current     Protein (g):  55-69 (1.2-1.5g/46kg); Weight Used for Protein Requirements:  Current        Fluid (ml/day):  per provider; Method Used for Fluid Requirements:  1 ml/kcal      Nutrition Related Findings:  +9 liters. No edema. Wounds:  None       Current Nutrition Therapies:    ADULT DIET;  Regular  Adult Oral Nutrition Supplement; Standard High Calorie/High Protein Oral Supplement    Anthropometric Measures:  · Height: 5' (152.4 cm)  · Current Body Weight: 106 lb (48.1 kg)   · Admission Body Weight: 101 lb (45.8 kg)    · Usual Body Weight: 105 lb (47.6 kg)     · Ideal Body Weight: 100 lbs; % Ideal Body Weight 106 %   · BMI: 20.7    · BMI Categories: Underweight (BMI less than 22) age over 72       Nutrition Diagnosis:   · Mild malnutrition related to cognitive or neurological impairment, impaired respiratory function as evidenced by intake 0-25%    Nutrition Interventions:   Food and/or Nutrient Delivery:  Continue Current Diet, Continue

## 2021-09-28 NOTE — PROGRESS NOTES
Pt refused vitals and stated \"you're driving me crazy! \". Pt also refusing medications at this time. Will continue to monitor.   Electronically signed by Alisia Wing RN on 9/28/2021 at 6:04 PM

## 2021-09-28 NOTE — PROGRESS NOTES
Physical Therapy  Facility/Department: [address] MED SURG  Daily Treatment Note  NAME: Stas Cooley  : 1931  MRN: [id number]    Date of Service: 2021    Discharge Recommendations:  Patient would benefit from continued therapy after discharge, 3-5 sessions per week, Continue to assess pending progress     Stas Cooley scored a 8/24 on the AM-PAC short mobility form. Current research shows that an AM-PAC score of 17 or less is typically not associated with a discharge to the patient's home setting. Based on the patient's AM-PAC score and their current functional mobility deficits, it is recommended that the patient have 3-5 sessions per week of Physical Therapy at d/c to increase the patient's independence. Please see assessment section for further patient specific details. If patient discharges prior to next session this note will serve as a discharge summary. Please see below for the latest assessment towards goals. Assessment   Body structures, Functions, Activity limitations: Decreased functional mobility ; Decreased ADL status; Decreased balance;Decreased strength;Decreased endurance  Assessment: Pt is a 80 y.o. female who presented to the ED on 21 with SOB. Pt found to be COVID+. Prior to admission, pt living with dtr and granddtr in two level home with 4 CHRISTOS - pt independent with all ADLs and ambulation without device - helps take care of dtr with help of granddtr. Pt currently functioning below baseline - significant limitations to respiratory system - pt now at 20L vapotherm - O2 sats range from 90-95% throughout session - overall mobility continues to regress. Anticipate she will need continued skilled therapy 3-5x/week to maximize independence and safety with functional mobility.   Treatment Diagnosis: impaired activity tolerance  Prognosis: Fair  Decision Making: Medium Complexity  History: see below  Exam: see below  Clinical Presentation: evolving  PT Education: Goals;PT Role;Plan of Care;General Safety;Transfer Training;Gait Training;Functional Mobility Training  REQUIRES PT FOLLOW UP: Yes  Activity Tolerance  Activity Tolerance: Patient limited by fatigue;Patient limited by endurance;Treatment limited secondary to medical complications (free text)  Activity Tolerance: limited by respiratory status     Patient Diagnosis(es): The primary encounter diagnosis was Hypoxia. A diagnosis of Pneumonia due to infectious organism, unspecified laterality, unspecified part of lung was also pertinent to this visit. has a past medical history of Aortic stenosis, Arthritis, COVID-19, and PAF (paroxysmal atrial fibrillation) ([facility] [address] has a past surgical history that includes Varicose vein surgery (Bilateral, 1980's); Cardiac catheterization (04/17/2018); Hysterectomy, total abdominal; and Aortic valve replacement (N/A, 5/21/2019). Restrictions  Restrictions/Precautions  Restrictions/Precautions: Contact Precautions, Isolation, Fall Risk  Position Activity Restriction  Other position/activity restrictions: COVID+; droplet precautions; 20L vapotherm     Subjective   General  Chart Reviewed: Yes  Additional Pertinent Hx: Pt is a 80 y.o. female who presented to the ED on 9/13/21 with SOB. Pt found to be COVID+. Response To Previous Treatment: Patient with no complaints from previous session. Family / Caregiver Present: No  Referring Practitioner: Nick Harmon MD  Subjective  Subjective: Pt is agreeable to PT - very [place]. A little somnolent today. Orientation  Orientation  Overall Orientation Status: Within Functional Limits     Cognition   Cognition  Overall Cognitive Status: Exceptions  Following Commands:  Follows one step commands with increased time  Attention Span: Attends with cues to redirect  Memory: Decreased short term memory;Decreased recall of recent events;Decreased long term memory  Safety Judgement: Decreased awareness of need for safety;Decreased awareness of need for assistance  Problem Solving: Decreased awareness of errors;Assistance required to identify errors made;Assistance required to generate solutions  Insights: Decreased awareness of deficits  Initiation: Requires cues for all  Sequencing: Requires cues for all     Objective   Bed mobility  Supine to Sit: Moderate assistance;Maximum assistance  Sit to Supine: Unable to assess (in recliner at end of session)  Scooting: Moderate assistance;Maximal assistance  Comment: O2 sats at 94% supine and sitting at EOB. Transfers  Sit to Stand: Moderate Assistance;Maximum Assistance  Stand to sit: Moderate Assistance;Maximum Assistance  Stand Pivot Transfers: Moderate Assistance;Maximum Assistance  Comment: O2 sats drop to 90% on 20L vapotherm following stand pivot transfer. Continues to become weaker.   Ambulation  Ambulation?: No     Balance  Posture: Fair  Sitting - Static: Good  Sitting - Dynamic: Fair;+  Standing - Static: Poor  Standing - Dynamic: Poor         Strength RLE  Strength RLE: Exception  Comment: severe generalized weakness  Strength LLE  Strength LLE: Exception  Comment: severe generalized weakness                AM-PAC Score  -PAC Inpatient Mobility Raw Score : 8 (09/28/21 1016)  -PAC Inpatient T-Scale Score : 28.52 (09/28/21 1016)  Mobility Inpatient CMS 0-100% Score: 86.62 (09/28/21 1016)  Mobility Inpatient CMS G-Code Modifier : CM (09/28/21 1016)          Goals  Short term goals  Time Frame for Short term goals: by acute discharge - all goals ongoing as of 9/28/21  Short term goal 1: bed mobility with SBA  Short term goal 2: sit<>stand with SBA  Short term goal 3: ambulate >30' with LRAD and SBA  Patient Goals   Patient goals : none stated    Plan    Plan  Times per week: 3-5x/week  Current Treatment Recommendations: Strengthening, Functional Mobility Training, Transfer Training, Balance Training, Endurance Training, Patient/Caregiver Education & Training, Safety Education & Training, Gait Training, Neuromuscular Re-education, Equipment Evaluation, Education, & procurement  Safety Devices  Type of devices:  All fall risk precautions in place, Call light within reach, Patient at risk for falls, Nurse notified, Gait belt, Left in chair, Chair alarm in place, Telesitter in use     Therapy Time   Individual Concurrent Group Co-treatment   Time In 0945         Time Out 1010         Minutes 25         Timed Code Treatment Minutes: [address] [facility] 28, PT

## 2021-09-28 NOTE — PROGRESS NOTES
Pt more alert this morning; oriented to person and expressing needs. Pt refusing to take medications and stated \"maybe later\". Pt educated on importance of taking medications. Will attempt again this afternoon.   Electronically signed by Johanna Perrin RN on 9/28/2021 at 11:11 AM  ]

## 2021-09-28 NOTE — PLAN OF CARE
Nutrition Problem #1: Mild malnutrition  Intervention: Food and/or Nutrient Delivery: Continue Current Diet, Continue Oral Nutrition Supplement, Start Tube Feeding  Nutritional Goals: >50% of meals and supplement consumed

## 2021-09-29 LAB
ANION GAP SERPL CALCULATED.3IONS-SCNC: 13 MMOL/L (ref 3–16)
BUN BLDV-MCNC: 11 MG/DL (ref 7–20)
CALCIUM SERPL-MCNC: 8.5 MG/DL (ref 8.3–10.6)
CHLORIDE BLD-SCNC: 104 MMOL/L (ref 99–110)
CO2: 23 MMOL/L (ref 21–32)
CREAT SERPL-MCNC: <0.5 MG/DL (ref 0.6–1.2)
GFR AFRICAN AMERICAN: >60
GFR NON-AFRICAN AMERICAN: >60
GLUCOSE BLD-MCNC: 84 MG/DL (ref 70–99)
MAGNESIUM: 2.2 MG/DL (ref 1.8–2.4)
POTASSIUM REFLEX MAGNESIUM: 3 MMOL/L (ref 3.5–5.1)
SODIUM BLD-SCNC: 140 MMOL/L (ref 136–145)

## 2021-09-29 PROCEDURE — 6370000000 HC RX 637 (ALT 250 FOR IP): Performed by: INTERNAL MEDICINE

## 2021-09-29 PROCEDURE — 94761 N-INVAS EAR/PLS OXIMETRY MLT: CPT

## 2021-09-29 PROCEDURE — 6360000002 HC RX W HCPCS: Performed by: INTERNAL MEDICINE

## 2021-09-29 PROCEDURE — 1200000000 HC SEMI PRIVATE

## 2021-09-29 PROCEDURE — 80048 BASIC METABOLIC PNL TOTAL CA: CPT

## 2021-09-29 PROCEDURE — 94640 AIRWAY INHALATION TREATMENT: CPT

## 2021-09-29 PROCEDURE — 2580000003 HC RX 258: Performed by: INTERNAL MEDICINE

## 2021-09-29 PROCEDURE — 83735 ASSAY OF MAGNESIUM: CPT

## 2021-09-29 PROCEDURE — 36415 COLL VENOUS BLD VENIPUNCTURE: CPT

## 2021-09-29 PROCEDURE — 97530 THERAPEUTIC ACTIVITIES: CPT

## 2021-09-29 PROCEDURE — 2700000000 HC OXYGEN THERAPY PER DAY

## 2021-09-29 RX ORDER — POTASSIUM CHLORIDE 20 MEQ/1
40 TABLET, EXTENDED RELEASE ORAL ONCE
Status: COMPLETED | OUTPATIENT
Start: 2021-09-29 | End: 2021-09-29

## 2021-09-29 RX ORDER — POTASSIUM CHLORIDE 7.45 MG/ML
10 INJECTION INTRAVENOUS
Status: COMPLETED | OUTPATIENT
Start: 2021-09-29 | End: 2021-09-29

## 2021-09-29 RX ADMIN — Medication 10 MEQ: at 16:39

## 2021-09-29 RX ADMIN — METOPROLOL TARTRATE 50 MG: 50 TABLET, FILM COATED ORAL at 10:48

## 2021-09-29 RX ADMIN — Medication 2 PUFF: at 08:05

## 2021-09-29 RX ADMIN — IPRATROPIUM BROMIDE 2 SPRAY: 42 SPRAY NASAL at 10:46

## 2021-09-29 RX ADMIN — APIXABAN 2.5 MG: 2.5 TABLET, FILM COATED ORAL at 21:46

## 2021-09-29 RX ADMIN — SODIUM CHLORIDE 25 ML: 9 INJECTION, SOLUTION INTRAVENOUS at 11:01

## 2021-09-29 RX ADMIN — Medication 2 PUFF: at 20:22

## 2021-09-29 RX ADMIN — IPRATROPIUM BROMIDE 2 SPRAY: 42 SPRAY NASAL at 21:48

## 2021-09-29 RX ADMIN — ASPIRIN 81 MG: 81 TABLET, COATED ORAL at 10:47

## 2021-09-29 RX ADMIN — POTASSIUM CHLORIDE 40 MEQ: 20 TABLET, EXTENDED RELEASE ORAL at 10:47

## 2021-09-29 RX ADMIN — METOPROLOL TARTRATE 50 MG: 50 TABLET, FILM COATED ORAL at 21:46

## 2021-09-29 RX ADMIN — SODIUM CHLORIDE, PRESERVATIVE FREE 10 ML: 5 INJECTION INTRAVENOUS at 10:47

## 2021-09-29 RX ADMIN — Medication 10 MEQ: at 14:12

## 2021-09-29 RX ADMIN — FLUCONAZOLE 100 MG: 100 TABLET ORAL at 10:47

## 2021-09-29 RX ADMIN — IPRATROPIUM BROMIDE 2 SPRAY: 42 SPRAY NASAL at 14:11

## 2021-09-29 RX ADMIN — IPRATROPIUM BROMIDE 2 SPRAY: 42 SPRAY NASAL at 16:38

## 2021-09-29 RX ADMIN — APIXABAN 2.5 MG: 2.5 TABLET, FILM COATED ORAL at 10:48

## 2021-09-29 RX ADMIN — Medication 10 MEQ: at 12:30

## 2021-09-29 RX ADMIN — SODIUM CHLORIDE, PRESERVATIVE FREE 10 ML: 5 INJECTION INTRAVENOUS at 21:46

## 2021-09-29 RX ADMIN — DILTIAZEM HYDROCHLORIDE 60 MG: 60 TABLET, FILM COATED ORAL at 10:47

## 2021-09-29 RX ADMIN — DEXAMETHASONE 4 MG: 4 TABLET ORAL at 10:48

## 2021-09-29 RX ADMIN — Medication 10 MEQ: at 11:03

## 2021-09-29 ASSESSMENT — PAIN SCALES - GENERAL: PAINLEVEL_OUTOF10: 0

## 2021-09-29 NOTE — CARE COORDINATION
Discharge Planning:  ZHANE contacted pts grand daughterTameka  - [phone]. Alina Officer stated that at this time, she continues to plan to take this pt home at d/c.  ZHANE discussed with huber the possibility of looking into a LTACH for this pt and stated that this SW will talk with pts MD about this possibility. Perfectserve sent to Dr. Claudy Sevilla. Huber verbalized understanding. Pt remains on 20L of vapotherm and is confused. Macario [place], [place]  [phone]  Electronically signed by Benjamin Welch on 9/29/2021 at 9:28 AM     Addendum:  ZHANE spoke with Dr. Claudy Sevilla who stated that this SW can reach out to [facility] to see if this pt would be eligible at this time. ZHANE contacted pts grand daughter, Alina Officer. Alina Officer stated that she will talk with her sister, Carlos Braxton and will get back with this SW re: her choice of LTACH. ZHANE did contact Ignacio Martin at [place] [address]  who stated that she will review the pt to see if the pt meets the criteria for LTACH at this time. ZHANE also contacted Taya at [address] [place] [address] . Referral faxed.   Macario [place], [place]  [phone]  Electronically signed by Benjamin Welch on 9/29/2021 at 10:03 AM

## 2021-09-29 NOTE — PROGRESS NOTES
Pt remains in Afib ranging from 110-130 beats per minute. MD notified, cartizem ordered Q6 and given. Metroprolol 5mg IV also ordered PRN for HR above 130.  Electronically signed by Hermila Kwong RN on 9/16/2021 at 4:01 AM done

## 2021-09-29 NOTE — PLAN OF CARE
Problem: Falls - Risk of:  Goal: Will remain free from falls  Description: Will remain free from falls  9/29/2021 1451 by Lex Schroeder RN  Outcome: Ongoing     Problem: Falls - Risk of:  Goal: Absence of physical injury  Description: Absence of physical injury  9/29/2021 1451 by Lex Schroeder RN  Outcome: Ongoing     Problem: Skin Integrity:  Goal: Will show no infection signs and symptoms  Description: Will show no infection signs and symptoms  9/29/2021 1451 by Lex Schroeder RN  Outcome: Ongoing     Problem: Skin Integrity:  Goal: Absence of new skin breakdown  Description: Absence of new skin breakdown  9/29/2021 1451 by Lex Schroeder RN  Outcome: Ongoing     Problem: Safety:  Goal: Free from accidental physical injury  Description: Free from accidental physical injury  Outcome: Ongoing     Problem: Safety:  Goal: Free from intentional harm  Description: Free from intentional harm  Outcome: Ongoing     Problem: Daily Care:  Goal: Daily care needs are met  Description: Daily care needs are met  Outcome: Ongoing     Problem: Airway Clearance - Ineffective  Goal: Achieve or maintain patent airway  9/29/2021 1451 by Lex Schroeder RN  Outcome: Ongoing     Problem: Gas Exchange - Impaired  Goal: Absence of hypoxia  9/29/2021 1451 by Lex Schroeder RN  Outcome: Ongoing     Problem: Gas Exchange - Impaired  Goal: Promote optimal lung function  9/29/2021 1451 by Lex Schroeder RN  Outcome: Ongoing     Problem: Breathing Pattern - Ineffective  Goal: Ability to achieve and maintain a regular respiratory rate  9/29/2021 1451 by Lex Schroeder RN  Outcome: Ongoing     Problem: Body Temperature -  Risk of, Imbalanced  Goal: Ability to maintain a body temperature within defined limits  9/29/2021 1451 by Lex Schroeder RN  Outcome: Ongoing     Problem: Body Temperature -  Risk of, Imbalanced  Goal: Will regain or maintain usual level of consciousness  9/29/2021 1451 by Lex Schroeder RN  Outcome: Ongoing     Problem:  Body Temperature - Risk of, Imbalanced  Goal: Complications related to the disease process, condition or treatment will be avoided or minimized  9/29/2021 1451 by Linda Helms RN  Outcome: Ongoing     Problem: Isolation Precautions - Risk of Spread of Infection  Goal: Prevent transmission of infection  9/29/2021 1451 by Linda Helms RN  Outcome: Ongoing     Problem: Nutrition Deficits  Goal: Optimize nutritional status  9/29/2021 1451 by Linda Helms RN  Outcome: Ongoing     Problem: Risk for Fluid Volume Deficit  Goal: Maintain normal heart rhythm  9/29/2021 1451 by Linda Helms RN  Outcome: Ongoing     Problem: Risk for Fluid Volume Deficit  Goal: Maintain absence of muscle cramping  9/29/2021 1451 by Linda Helms RN  Outcome: Ongoing     Problem: Risk for Fluid Volume Deficit  Goal: Maintain normal serum potassium, sodium, calcium, phosphorus, and pH  9/29/2021 1451 by Linda Helms RN  Outcome: Ongoing     Problem: Loneliness or Risk for Loneliness  Goal: Demonstrate positive use of time alone when socialization is not possible  9/29/2021 1451 by Linda Helms RN  Outcome: Ongoing     Problem: Fatigue  Goal: Verbalize increase energy and improved vitality  9/29/2021 1451 by Linda Helms RN  Outcome: Ongoing     Problem: Patient Education: Go to Patient Education Activity  Goal: Patient/Family Education  9/29/2021 1451 by Linda Helms RN  Outcome: Ongoing     Problem: Nutrition  Goal: Optimal nutrition therapy  9/29/2021 1451 by Linda Helms RN  Outcome: Ongoing     Problem: Confusion - Acute:  Goal: Absence of continued neurological deterioration signs and symptoms  Description: Absence of continued neurological deterioration signs and symptoms  9/29/2021 1451 by Linda Helms RN  Outcome: Ongoing     Problem: Confusion - Acute:  Goal: Mental status will be restored to baseline  Description: Mental status will be restored to baseline  9/29/2021 1451 by Linda Helms RN  Outcome: Ongoing     Problem: Discharge Planning:  Goal: Ability to perform activities of daily living will improve  Description: Ability to perform activities of daily living will improve  9/29/2021 1451 by Cheri Torrez RN  Outcome: Ongoing     Problem: Discharge Planning:  Goal: Participates in care planning  Description: Participates in care planning  9/29/2021 1451 by Cheri Torrez RN  Outcome: Ongoing     Problem: Injury - Risk of, Physical Injury:  Goal: Will remain free from falls  Description: Will remain free from falls  9/29/2021 1451 by Cheri Torrez RN  Outcome: Ongoing     Problem: Injury - Risk of, Physical Injury:  Goal: Absence of physical injury  Description: Absence of physical injury  9/29/2021 1451 by Cheri Torrez RN  Outcome: Ongoing     Problem: Mood - Altered:  Goal: Mood stable  Description: Mood stable  9/29/2021 1451 by Cheri Torrez RN  Outcome: Ongoing     Problem: Mood - Altered:  Goal: Absence of abusive behavior  Description: Absence of abusive behavior  9/29/2021 1451 by Cheri Torrez RN  Outcome: Ongoing     Problem: Mood - Altered:  Goal: Verbalizations of feeling emotionally comfortable while being cared for will increase  Description: Verbalizations of feeling emotionally comfortable while being cared for will increase  9/29/2021 1451 by Cheri Torrez RN  Outcome: Ongoing     Problem: Psychomotor Activity - Altered:  Goal: Absence of psychomotor disturbance signs and symptoms  Description: Absence of psychomotor disturbance signs and symptoms  9/29/2021 1451 by Cheri Torrez RN  Outcome: Ongoing     Problem: Sensory Perception - Impaired:  Goal: Demonstrations of improved sensory functioning will increase  Description: Demonstrations of improved sensory functioning will increase  9/29/2021 1451 by Cheri Torrez RN  Outcome: Ongoing     Problem: Sensory Perception - Impaired:  Goal: Decrease in sensory misperception frequency  Description: Decrease in sensory misperception frequency  9/29/2021 1451 by Cheri Torrez RN  Outcome: Ongoing     Problem: Sensory Perception - Impaired:  Goal: Able to distinguish between reality-based and nonreality-based thinking  Description: Able to distinguish between reality-based and nonreality-based thinking  9/29/2021 1451 by Sharyle Goo, RN  Outcome: Ongoing     Problem: Sensory Perception - Impaired:  Goal: Able to interrupt nonreality-based thinking  Description: Able to interrupt nonreality-based thinking  9/29/2021 1451 by Sharyle Goo, RN  Outcome: Ongoing     Problem: Sleep Pattern Disturbance:  Goal: Appears well-rested  Description: Appears well-rested  9/29/2021 1451 by Sharyle Goo, RN  Outcome: Ongoing

## 2021-09-29 NOTE — PLAN OF CARE
Problem: Falls - Risk of:  Goal: Will remain free from falls  Description: Will remain free from falls  Outcome: Ongoing  Goal: Absence of physical injury  Description: Absence of physical injury  Outcome: Ongoing     Problem: Skin Integrity:  Goal: Will show no infection signs and symptoms  Description: Will show no infection signs and symptoms  Outcome: Ongoing  Goal: Absence of new skin breakdown  Description: Absence of new skin breakdown  Outcome: Ongoing     Problem: Airway Clearance - Ineffective  Goal: Achieve or maintain patent airway  Outcome: Ongoing     Problem: Gas Exchange - Impaired  Goal: Absence of hypoxia  Outcome: Ongoing  Goal: Promote optimal lung function  Outcome: Ongoing     Problem: Breathing Pattern - Ineffective  Goal: Ability to achieve and maintain a regular respiratory rate  Outcome: Ongoing     Problem:  Body Temperature -  Risk of, Imbalanced  Goal: Ability to maintain a body temperature within defined limits  Outcome: Ongoing  Goal: Will regain or maintain usual level of consciousness  Outcome: Ongoing  Goal: Complications related to the disease process, condition or treatment will be avoided or minimized  Outcome: Ongoing     Problem: Isolation Precautions - Risk of Spread of Infection  Goal: Prevent transmission of infection  Outcome: Ongoing     Problem: Nutrition Deficits  Goal: Optimize nutritional status  Outcome: Ongoing     Problem: Risk for Fluid Volume Deficit  Goal: Maintain normal heart rhythm  Outcome: Ongoing  Goal: Maintain absence of muscle cramping  Outcome: Ongoing  Goal: Maintain normal serum potassium, sodium, calcium, phosphorus, and pH  Outcome: Ongoing     Problem: Loneliness or Risk for Loneliness  Goal: Demonstrate positive use of time alone when socialization is not possible  Outcome: Ongoing     Problem: Fatigue  Goal: Verbalize increase energy and improved vitality  Outcome: Ongoing     Problem: Patient Education: Go to Patient Education Activity  Goal: Patient/Family Education  Outcome: Ongoing     Problem: Nutrition  Goal: Optimal nutrition therapy  Outcome: Ongoing     Problem: Confusion - Acute:  Goal: Absence of continued neurological deterioration signs and symptoms  Description: Absence of continued neurological deterioration signs and symptoms  Outcome: Ongoing  Goal: Mental status will be restored to baseline  Description: Mental status will be restored to baseline  Outcome: Ongoing     Problem: Discharge Planning:  Goal: Ability to perform activities of daily living will improve  Description: Ability to perform activities of daily living will improve  Outcome: Ongoing  Goal: Participates in care planning  Description: Participates in care planning  Outcome: Ongoing     Problem: Injury - Risk of, Physical Injury:  Goal: Will remain free from falls  Description: Will remain free from falls  Outcome: Ongoing  Goal: Absence of physical injury  Description: Absence of physical injury  Outcome: Ongoing     Problem: Mood - Altered:  Goal: Mood stable  Description: Mood stable  Outcome: Ongoing  Goal: Absence of abusive behavior  Description: Absence of abusive behavior  Outcome: Ongoing  Goal: Verbalizations of feeling emotionally comfortable while being cared for will increase  Description: Verbalizations of feeling emotionally comfortable while being cared for will increase  Outcome: Ongoing     Problem: Psychomotor Activity - Altered:  Goal: Absence of psychomotor disturbance signs and symptoms  Description: Absence of psychomotor disturbance signs and symptoms  Outcome: Ongoing     Problem: Sensory Perception - Impaired:  Goal: Demonstrations of improved sensory functioning will increase  Description: Demonstrations of improved sensory functioning will increase  Outcome: Ongoing  Goal: Decrease in sensory misperception frequency  Description: Decrease in sensory misperception frequency  Outcome: Ongoing  Goal: Able to refrain from responding to false sensory perceptions  Description: Able to refrain from responding to false sensory perceptions  Outcome: Ongoing  Goal: Demonstrates accurate environmental perceptions  Description: Demonstrates accurate environmental perceptions  Outcome: Ongoing  Goal: Able to distinguish between reality-based and nonreality-based thinking  Description: Able to distinguish between reality-based and nonreality-based thinking  Outcome: Ongoing  Goal: Able to interrupt nonreality-based thinking  Description: Able to interrupt nonreality-based thinking  Outcome: Ongoing     Problem: Sleep Pattern Disturbance:  Goal: Appears well-rested  Description: Appears well-rested  Outcome: Ongoing

## 2021-09-29 NOTE — PROGRESS NOTES
Speech Language Pathology      Speech Therapy note regarding MD orders for Swallowing Assessment    · This SLP attempted evaluation at 1540 pm  · Pt was asleep in bed  · Pt aroused to name and tactile cues  · Pt followed no commands  · Pt was unable to provide biographical identifiers  · Pt unable to participate in evaluation (all material drooled out of mouth and pt shaking head no)    Assessment attempts discontinued 2/2 to mental status and lethargy    Plan to re-attempt 9/30/2021. Discussed with NUPUR Worthington. Anton,MS,CCC,SLP [id number]  Speech and Language Pathologist

## 2021-09-29 NOTE — PROGRESS NOTES
Pt having trouble swallowing her lunch, coughing when drinking. Speech eval ordered per protocol. Dr. Lizzie Hsieh notified. Will continue to monitor.

## 2021-09-29 NOTE — PROGRESS NOTES
Pt alert to self only. Resting in bed. Appears anxious and tearful at the beginning of the shift. Remains in Droplet Plus isolation on Vapotherm at 20 liters. Oxygen saturation stable in the low 90's. After the pt calmed down with emotional support, she agreed to take her night time medications. Pt tolerated meds crushed in applesauce without issues. No signs or symptoms of distress noted at this time. Call light within reach. Will continue to monitor.

## 2021-09-29 NOTE — PROGRESS NOTES
Occupational Therapy  Facility/Department: [address] MED SURG  Daily Treatment Note  NAME: Dixie Gardiner  : 1931  MRN: [id number]    Date of Service: 2021    Discharge Recommendations:  3-5 sessions per week, Patient would benefit from continued therapy after discharge       Assessment   Performance deficits / Impairments: Decreased functional mobility ; Decreased ADL status; Decreased strength;Decreased cognition;Decreased endurance;Decreased balance;Decreased high-level IADLs  Assessment: Pt on 20L vapotherm. Pt more confused this date and difficulty with following directions throughout the session. She required max assist for bed mobility. she attempted stands at the EOB but unable to assist with standing. Pt requesting to return to bed but sat EOB with encouragement. Pt has regressed with transfers and mobility due to increase in oxygen needs. Prognosis: Fair  REQUIRES OT FOLLOW UP: Yes  Activity Tolerance  Activity Tolerance: Patient limited by fatigue;Treatment limited secondary to decreased cognition  Safety Devices  Type of devices: Call light within reach; Bed alarm in place; Left in bed;Gait belt         Patient Diagnosis(es): The primary encounter diagnosis was Hypoxia. A diagnosis of Pneumonia due to infectious organism, unspecified laterality, unspecified part of lung was also pertinent to this visit. has a past medical history of Aortic stenosis, Arthritis, COVID-19, and PAF (paroxysmal atrial fibrillation) ([facility] [address] has a past surgical history that includes Varicose vein surgery (Bilateral, 's); Cardiac catheterization (2018); Hysterectomy, total abdominal; and Aortic valve replacement (N/A, 2019).     Restrictions  Restrictions/Precautions  Restrictions/Precautions: Contact Precautions, Isolation, Fall Risk  Position Activity Restriction  Other position/activity restrictions: COVID+; droplet precautions; 20L vapotherm  Subjective   General  Chart Reviewed: Yes, Progress Notes  Patient assessed for rehabilitation services?: Yes  Additional Pertinent Hx: Per H&P: \"Patient is a 77-year-old female with past medical history of CAD, aortic stenosis status post AVR who presented to hospital for shortness of breath. Patient is a poor historian, reportedly patient was brought to the hospital for shortness of breath. Patient was found hypoxic in upper 80s on room air, was transitioned to nasal cannula. No reports of fevers chills chest pain nausea vomiting or diarrhea. \" Pt found to be COVID+  Family / Caregiver Present: No  Referring Practitioner: Mega Sims MD  Diagnosis: Acute hypoxic respiratory failure, Multifocal pneumonia, COVID +  Subjective  Subjective: Pt seen bedside. Pt more confused this date and difficulty with following directions. General Comment  Comments: RN okayed for in bed activity      Objective             Balance  Sitting Balance: Stand by assistance (Sat EOB for 10 minutes. Oxygen dropped to 92% but remained in mid 90% most of the session with 20L vapotherm.)  Wheelchair Bed Transfers  Wheelchair Transfers Comments: Pt unable to complete a stand this date due to not assisting with standing so did not attempt transfer  Bed mobility  Supine to Sit: Maximum assistance (Pt able to assist with moving her legs but required assist with most.)  Sit to Supine: Maximum assistance  Scooting: Moderate assistance;Maximal assistance  Transfers  Sit to stand: Dependent/Total (Attempted multiple sit-stands but pt uanble to unweight buttocks from the bed.   Pt with difficulty following directions and was not assisting with standing.)                                                                 Plan   Plan  Times per week: 3-5  Current Treatment Recommendations: Functional Mobility Training, Balance Training, Strengthening, Endurance Training, Safety Education & Training, Self-Care / ADL, Equipment Evaluation, Education, & procurement    AM-PAC Score        AM-PAC Inpatient Daily Activity Raw Score: 12 (09/23/21 0948)  AM-PAC Inpatient ADL T-Scale Score : 30.6 (09/23/21 0948)  ADL Inpatient CMS 0-100% Score: 66.57 (09/23/21 0948)  ADL Inpatient CMS G-Code Modifier : CL (09/23/21 8451)    Goals  Short term goals  Time Frame for Short term goals: Prior to d/c:  Short term goal 1: Pt will bathe with SBA. Short term goal 2: Pt will dress with SBA. Short term goal 3: Pt will toilet with SBA. Short term goal 4: Pt will complete fxl mobility and fxl transfers to/from ADL surfaces with SBA. Short term goal 5: Pt will tolerate standing >5 minutes for functional task with SBA while maintaining O2 sats >90%  Short term goal 6: Pt will tolerate 10-15 minutes of therex to improve strength/endurance for ADLs. Long term goals  Time Frame for Long term goals : STGs=LTGs  Patient Goals   Patient goals : to return home. Therapy Time   Individual Concurrent Group Co-treatment   Time In 1330         Time Out 1355         Minutes 25              This note to serve as OT d/c summary if pt is d/c-ed from hospital prior to next OT session.       Raza Chong, [address]

## 2021-09-29 NOTE — PROGRESS NOTES
Hospitalist  Progress Note       Tr Bean is a 80 y.o. female   4/14/1931     SUBJECTIVE:   Patient  Seen     And  Examined    letehrgic  And  Confused  9/29  Patient  Seen  And  Examined  No  Change  In  Condition  Social  Service  To  Look into  LTAC    OBJECTIVE:    Review of Systems:  General appearance: alert, appears stated age and cooperative  Skin: Skin color, texture, normal. No rashes or lesions  HEENT: No nose bleed, headache, vision problems  CV: C/O chest pain, tightness, pressure,   Respiratory: C/o no SOB, DENTON, Orthopnea, PND  GI: No abdominal pain, black stool, bloating  Limbs: No c/o edema, pain, swelling, intermittent claudication, joint pains  Neuro: No dizziness, lightheadedness, syncope, gait problems, memory problems  Psych: grossly normal. No SI/depression. Vitals:   Blood pressure (!) 153/79, pulse 71, temperature 98 °F (36.7 °C), temperature source Axillary, resp. rate 16, height 5' (1.524 m), weight 104 lb 15 oz (47.6 kg), SpO2 94 %, not currently breastfeeding.     HEENT: AT, NC, PERRLA  Neck: No JVD  Heart: S1 S2 audible, no murmur   Lungs: bilat  Rhonchi    Abdomen: Nontender   Limbs: No edema   CNS: no focal deficit      Past Medical History:   Diagnosis Date    Aortic stenosis     Arthritis     COVID-19     [facility]ox[facility])         Patient Active Problem List   Diagnosis    Aortic calcification ([facility])    Hypercholesteremia    Nonrheumatic aortic valve stenosis    Right carotid artery occlusion    Arthritis    Essential hypertension    Carotid stenosis, asymptomatic, right    History of transcatheter aortic valve implantation ([facility])    PAF (paroxysmal atrial fibrillation) ([facility])    Hypoxia    Acute respiratory failure with hypoxia ([facility])    Pneumonia due to COVID-19 virus    Coronary artery disease involving native coronary artery of native heart without angina pectoris    Mild malnutrition ([facility])        No Known Allergies     Current Inpatient Medications:    Current Facility-Administered Medications   Medication Dose Route Frequency Provider Last Rate Last Admin    potassium chloride 10 mEq/100 mL IVPB (Peripheral Line)  10 mEq IntraVENous Q1H Aniket Malone  mL/hr at 09/29/21 1230 10 mEq at 09/29/21 1230    potassium chloride (KLOR-CON M) extended release tablet 40 mEq  40 mEq Oral BID  Aniket Malone MD        fluconazole (DIFLUCAN) tablet 100 mg  100 mg Oral Daily Sada Gonzalez MD   100 mg at 09/29/21 1047    metoprolol tartrate (LOPRESSOR) tablet 50 mg  50 mg Oral BID Sada Gonzalez MD   50 mg at 09/29/21 1048    dexamethasone (DECADRON) tablet 4 mg  4 mg Oral Daily Sada Gonzalez MD   4 mg at 09/29/21 1048    dilTIAZem (CARDIZEM) tablet 60 mg  60 mg Oral 4 times per day Sada Gonzalez MD   60 mg at 09/29/21 1047    apixaban (ELIQUIS) tablet 2.5 mg  2.5 mg Oral BID Sada Gonzalez MD   2.5 mg at 09/29/21 1048    albuterol sulfate  (90 Base) MCG/ACT inhaler 2 puff  2 puff Inhalation Q4H PRN Sada Gonzalez MD   2 puff at 09/26/21 1238    0.9 % sodium chloride bolus  30 mL IntraVENous PRN Al Small MD   Stopped at 09/14/21 1706    sodium chloride flush 0.9 % injection 10 mL  10 mL IntraVENous 2 times per day Lexy Limon MD   10 mL at 09/29/21 1047    sodium chloride flush 0.9 % injection 10 mL  10 mL IntraVENous PRN Lexy Limon MD   10 mL at 09/17/21 2028    0.9 % sodium chloride infusion  25 mL IntraVENous PRN Lexy Limon  mL/hr at 09/29/21 1101 25 mL at 09/29/21 1101    ondansetron (ZOFRAN-ODT) disintegrating tablet 4 mg  4 mg Oral Q8H PRN Lexy Limon MD        Or    ondansetron (ZOFRAN) injection 4 mg  4 mg IntraVENous Q6H PRN Lexy Limon MD        acetaminophen (TYLENOL) tablet 650 mg  650 mg Oral Q6H PRN Lexy Limon MD   650 mg at 09/25/21 2029    Or    acetaminophen (TYLENOL) suppository 650 mg  650 mg Rectal Q6H PRN Jana Solorzano, MD        aspirin EC tablet 81 mg  81 mg Oral Daily Jana Solorzano MD   81 mg at 09/29/21 1047    ipratropium (ATROVENT) 0.06 % nasal spray 2 spray  2 spray Each Nostril 4x Daily Jeffrey West MD   2 spray at 09/29/21 1046    ipratropium (ATROVENT HFA) 17 MCG/ACT inhaler 2 puff  2 puff Inhalation BID Jeffrey West MD   2 puff at 09/29/21 0805    And    albuterol sulfate  (90 Base) MCG/ACT inhaler 2 puff  2 puff Inhalation BID Jeffrey West MD   2 puff at 09/29/21 0805           Labs:  CBC with Differential:    Lab Results   Component Value Date    WBC 15.8 09/28/2021    RBC 4.33 09/28/2021    HGB 11.9 09/28/2021    HCT 35.8 09/28/2021     09/28/2021    MCV 82.6 09/28/2021    MCH 27.5 09/28/2021    MCHC 33.3 09/28/2021    RDW 14.6 09/28/2021    BANDSPCT 1 09/28/2021    LYMPHOPCT 1.0 09/28/2021    MONOPCT 2.0 09/28/2021    BASOPCT 0.0 09/28/2021    MONOSABS 0.3 09/28/2021    LYMPHSABS 0.2 09/28/2021    EOSABS 0.0 09/28/2021    BASOSABS 0.0 09/28/2021     CMP:    Lab Results   Component Value Date     09/29/2021    K 3.0 09/29/2021     09/29/2021    CO2 23 09/29/2021    BUN 11 09/29/2021    CREATININE <0.5 09/29/2021    GFRAA >60 09/29/2021    AGRATIO 1.1 09/21/2021    LABGLOM >60 09/29/2021    GLUCOSE 84 09/29/2021    PROT 5.4 09/28/2021    LABALBU 2.7 09/28/2021    CALCIUM 8.5 09/29/2021    BILITOT 0.5 09/28/2021    ALKPHOS 111 09/28/2021    AST 24 09/28/2021    ALT 37 09/28/2021     Hepatic Function Panel:    Lab Results   Component Value Date    ALKPHOS 111 09/28/2021    ALT 37 09/28/2021    AST 24 09/28/2021    PROT 5.4 09/28/2021    BILITOT 0.5 09/28/2021    BILIDIR <0.2 09/28/2021    IBILI see below 09/28/2021    LABALBU 2.7 09/28/2021     Magnesium:    Lab Results   Component Value Date    MG 2.20 09/29/2021     PT/INR:    Lab Results   Component Value Date    PROTIME 10.6 05/16/2019    INR 0.93 05/16/2019     Last 3 Troponin:    Lab Results   Component Value Date    TROPONINI <0.01 09/13/2021    TROPONINI <0.01 05/21/2019     U/A:    Lab Results   Component Value Date    COLORU YELLOW 09/26/2021    PHUR 5.5 09/26/2021    WBCUA 0-2 09/26/2021    RBCUA 0-2 09/26/2021    MUCUS Rare 09/26/2021    YEAST Present 09/26/2021    CLARITYU Clear 09/26/2021    SPECGRAV 1.015 09/26/2021    LEUKOCYTESUR Negative 09/26/2021    UROBILINOGEN 0.2 09/26/2021    BILIRUBINUR Negative 09/26/2021    BLOODU Negative 09/26/2021    GLUCOSEU 100 09/26/2021     ABG:    Lab Results   Component Value Date    PHART 7.333 05/21/2019    NAA3RMJ 45.7 05/21/2019    PO2ART 140.7 05/21/2019    CUS5WZD 24.3 05/21/2019    BEART -2 05/21/2019    RWV4PCA 26 05/21/2019    W4JOBWIX 99 05/21/2019     FLP:    Lab Results   Component Value Date    TRIG 90 03/28/2019    HDL 59 03/28/2019    LDLCALC 120 03/28/2019    LABVLDL 18 03/28/2019     TSH:  No results found for: TSH   DATA:   ECG: Sinus Rhythm       ASSESSMENT:   1  Covid  19  Pneumonia  2  Acute  Hypoxic  Respiratory  fauilure  No  Change in  Condition   3  Acute  Metabolic  Encephalopathy  4  TAVR    Recent  Echo  7/21  Valve  Functioning  Well  5  UTI   Yeast      6  htn  Relatively  Controlled  PLAN    continue  Current  RX  LTAC   refferal  Per  Social  Service

## 2021-09-30 LAB
ALBUMIN SERPL-MCNC: 2.6 G/DL (ref 3.4–5)
ALP BLD-CCNC: 119 U/L (ref 40–129)
ALT SERPL-CCNC: 27 U/L (ref 10–40)
ANION GAP SERPL CALCULATED.3IONS-SCNC: 15 MMOL/L (ref 3–16)
AST SERPL-CCNC: 18 U/L (ref 15–37)
BILIRUB SERPL-MCNC: 0.5 MG/DL (ref 0–1)
BILIRUBIN DIRECT: <0.2 MG/DL (ref 0–0.3)
BILIRUBIN, INDIRECT: ABNORMAL MG/DL (ref 0–1)
BLOOD CULTURE, ROUTINE: NORMAL
BUN BLDV-MCNC: 15 MG/DL (ref 7–20)
CALCIUM SERPL-MCNC: 8.8 MG/DL (ref 8.3–10.6)
CHLORIDE BLD-SCNC: 102 MMOL/L (ref 99–110)
CO2: 22 MMOL/L (ref 21–32)
CREAT SERPL-MCNC: <0.5 MG/DL (ref 0.6–1.2)
CULTURE, BLOOD 2: NORMAL
GFR AFRICAN AMERICAN: >60
GFR NON-AFRICAN AMERICAN: >60
GLUCOSE BLD-MCNC: 109 MG/DL (ref 70–99)
POTASSIUM REFLEX MAGNESIUM: 4.1 MMOL/L (ref 3.5–5.1)
SODIUM BLD-SCNC: 139 MMOL/L (ref 136–145)
TOTAL PROTEIN: 5.3 G/DL (ref 6.4–8.2)

## 2021-09-30 PROCEDURE — 6370000000 HC RX 637 (ALT 250 FOR IP): Performed by: INTERNAL MEDICINE

## 2021-09-30 PROCEDURE — 92610 EVALUATE SWALLOWING FUNCTION: CPT

## 2021-09-30 PROCEDURE — 2700000000 HC OXYGEN THERAPY PER DAY

## 2021-09-30 PROCEDURE — 1200000000 HC SEMI PRIVATE

## 2021-09-30 PROCEDURE — 94761 N-INVAS EAR/PLS OXIMETRY MLT: CPT

## 2021-09-30 PROCEDURE — 9990000010 HC NO CHARGE VISIT

## 2021-09-30 PROCEDURE — 6360000002 HC RX W HCPCS: Performed by: INTERNAL MEDICINE

## 2021-09-30 PROCEDURE — 2580000003 HC RX 258: Performed by: INTERNAL MEDICINE

## 2021-09-30 PROCEDURE — 94640 AIRWAY INHALATION TREATMENT: CPT

## 2021-09-30 PROCEDURE — 97129 THER IVNTJ 1ST 15 MIN: CPT

## 2021-09-30 PROCEDURE — 80076 HEPATIC FUNCTION PANEL: CPT

## 2021-09-30 PROCEDURE — 36415 COLL VENOUS BLD VENIPUNCTURE: CPT

## 2021-09-30 PROCEDURE — 80048 BASIC METABOLIC PNL TOTAL CA: CPT

## 2021-09-30 RX ADMIN — IPRATROPIUM BROMIDE 2 SPRAY: 42 SPRAY NASAL at 17:38

## 2021-09-30 RX ADMIN — SODIUM CHLORIDE, PRESERVATIVE FREE 10 ML: 5 INJECTION INTRAVENOUS at 22:09

## 2021-09-30 RX ADMIN — ASPIRIN 81 MG: 81 TABLET, COATED ORAL at 09:59

## 2021-09-30 RX ADMIN — METOPROLOL TARTRATE 50 MG: 50 TABLET, FILM COATED ORAL at 22:08

## 2021-09-30 RX ADMIN — POTASSIUM BICARBONATE 40 MEQ: 782 TABLET, EFFERVESCENT ORAL at 17:37

## 2021-09-30 RX ADMIN — METOPROLOL TARTRATE 50 MG: 50 TABLET, FILM COATED ORAL at 09:58

## 2021-09-30 RX ADMIN — Medication 2 PUFF: at 09:47

## 2021-09-30 RX ADMIN — DILTIAZEM HYDROCHLORIDE 60 MG: 60 TABLET, FILM COATED ORAL at 06:52

## 2021-09-30 RX ADMIN — IPRATROPIUM BROMIDE 2 SPRAY: 42 SPRAY NASAL at 22:08

## 2021-09-30 RX ADMIN — APIXABAN 2.5 MG: 2.5 TABLET, FILM COATED ORAL at 09:59

## 2021-09-30 RX ADMIN — FLUCONAZOLE 100 MG: 100 TABLET ORAL at 09:58

## 2021-09-30 RX ADMIN — Medication 2 PUFF: at 20:32

## 2021-09-30 RX ADMIN — DILTIAZEM HYDROCHLORIDE 60 MG: 60 TABLET, FILM COATED ORAL at 00:22

## 2021-09-30 RX ADMIN — IPRATROPIUM BROMIDE 2 SPRAY: 42 SPRAY NASAL at 09:59

## 2021-09-30 RX ADMIN — DEXAMETHASONE 4 MG: 4 TABLET ORAL at 09:58

## 2021-09-30 RX ADMIN — DILTIAZEM HYDROCHLORIDE 60 MG: 60 TABLET, FILM COATED ORAL at 12:30

## 2021-09-30 RX ADMIN — Medication 2 PUFF: at 20:33

## 2021-09-30 RX ADMIN — DILTIAZEM HYDROCHLORIDE 60 MG: 60 TABLET, FILM COATED ORAL at 17:37

## 2021-09-30 RX ADMIN — POTASSIUM BICARBONATE 40 MEQ: 782 TABLET, EFFERVESCENT ORAL at 12:37

## 2021-09-30 RX ADMIN — SODIUM CHLORIDE, PRESERVATIVE FREE 10 ML: 5 INJECTION INTRAVENOUS at 09:59

## 2021-09-30 RX ADMIN — IPRATROPIUM BROMIDE 2 SPRAY: 42 SPRAY NASAL at 15:00

## 2021-09-30 RX ADMIN — APIXABAN 2.5 MG: 2.5 TABLET, FILM COATED ORAL at 22:07

## 2021-09-30 ASSESSMENT — PAIN SCALES - GENERAL: PAINLEVEL_OUTOF10: 0

## 2021-09-30 ASSESSMENT — PAIN SCALES - WONG BAKER: WONGBAKER_NUMERICALRESPONSE: 0

## 2021-09-30 NOTE — PROGRESS NOTES
Comprehensive Nutrition Assessment    Type and Reason for Visit:  Reassess    RD did not conduct direct, in-person nutrition evaluation in efforts to reduce exposure and use of PPE for high risk persons, PUI persons, patients who have tested positive for Covid-19 or those awaiting respiratory panel results. EMR was screened for nutrition risk factors, as defined per nutrition standards of care. Nutrition Recommendations/Plan:   Continue current diet  Continue Ensure Enlive   Monitor PO intakes and need for nutrition support     Nutrition Assessment:  Follow-up. Pt in [facility] isolation. Current diet is dysphagia; pureed mildly thick and Ensure Enlive at meal times with intakes under 50% of meals. RD availible if nutrition support is desired. Malnutrition Assessment:  Malnutrition Status: At risk for malnutrition   Context:  Acute Illness     Findings of the 6 clinical characteristics of malnutrition:  Energy Intake:  7 - 50% or less of estimated energy requirements for 5 or more days  Weight Loss:  No significant weight loss     Body Fat Loss:  Unable to assess   Muscle Mass Loss:  Unable to assess  Fluid Accumulation:  No significant fluid accumulation   Strength:  Not Performed    Estimated Daily Nutrient Needs:  Energy (kcal):  3998-0358 kcal (30-35 kcal/kg CBW 48 kg); Weight Used for Energy Requirements:  Current     Protein (g):  57-72 gm (1.2-1.5 g/kg CBW 48 kg); Weight Used for Protein Requirements:  Current        Fluid (ml/day):  per provider; Method Used for Fluid Requirements:  1 ml/kcal      Nutrition Related Findings:  No edema. Labs reviewed. Wounds:  None       Current Nutrition Therapies:    Adult Oral Nutrition Supplement; Standard High Calorie/High Protein Oral Supplement  ADULT DIET;  Dysphagia - Pureed; Mildly Thick (Nectar)    Anthropometric Measures:  · Height: 5' (152.4 cm)  · Current Body Weight: 104 lb (47.2 kg)   · Admission Body Weight: 101 lb (45.8 kg)    · Usual Body Weight: 105 lb (47.6 kg)     · Ideal Body Weight: 100 lbs; % Ideal Body Weight 106 %   · BMI: 20.3  · BMI Categories: Underweight (BMI less than 22) age over 72       Nutrition Diagnosis:   · Mild malnutrition related to cognitive or neurological impairment, impaired respiratory function as evidenced by intake 0-25%      Nutrition Interventions:   Food and/or Nutrient Delivery:  Continue Current Diet, Continue Oral Nutrition Supplement  Nutrition Education/Counseling:  No recommendation at this time   Coordination of Nutrition Care:  Continue to monitor while inpatient    Goals:  Consume greater than 50% of meals and supplements       Nutrition Monitoring and Evaluation:   Behavioral-Environmental Outcomes:  None Identified   Food/Nutrient Intake Outcomes:  Food and Nutrient Intake, Supplement Intake  Physical Signs/Symptoms Outcomes:  Biochemical Data, Nutrition Focused Physical Findings, Skin, Weight, Fluid Status or Edema     Discharge Planning:    Continue Oral Nutrition Supplement     Electronically signed by Tom Lange on 9/30/21 at 1:44 PM EDT    Contact: [phone]

## 2021-09-30 NOTE — PROGRESS NOTES
Patient resting with eyes closed without s/s of distress, respirations easy. Patient alert, took pills well crushed in applesauce. Brief CDI at this time. No further needs noted. Bed in lowest and locked position with bed alarm in place. Non-slip socks on, call light within reach. Will continue to monitor pt needs.

## 2021-09-30 NOTE — PLAN OF CARE
Problem: Falls - Risk of:  Goal: Will remain free from falls  Description: Will remain free from falls  9/30/2021 0310 by Caroline Lindo RN  Outcome: Ongoing     Problem: Falls - Risk of:  Goal: Absence of physical injury  Description: Absence of physical injury  9/30/2021 0310 by Caroline Lindo RN  Outcome: Ongoing     Problem: Skin Integrity:  Goal: Will show no infection signs and symptoms  Description: Will show no infection signs and symptoms  9/30/2021 0310 by Caroline Lindo RN  Outcome: Ongoing     Problem: Daily Care:  Goal: Daily care needs are met  Description: Daily care needs are met  9/30/2021 0310 by Caroline Lindo RN  Outcome: Ongoing     Problem: Breathing Pattern - Ineffective  Goal: Ability to achieve and maintain a regular respiratory rate  9/30/2021 0310 by Caroline Lindo RN  Outcome: Ongoing     Problem: Nutrition Deficits  Goal: Optimize nutritional status  9/30/2021 0310 by Caroline Lindo RN  Outcome: Ongoing

## 2021-09-30 NOTE — PROGRESS NOTES
Hospitalist  Progress Note       Carlos Rock is a 80 y.o. female   4/14/1931     SUBJECTIVE: no acute evnts. Pt is down to 20 L vapotherm, fio2 70%. OBJECTIVE:          Vitals:   Blood pressure 135/68, pulse 79, temperature 97.9 °F (36.6 °C), temperature source Axillary, resp. rate 18, height 5' (1.524 m), weight 104 lb 15 oz (47.6 kg), SpO2 93 %, not currently breastfeeding.     General appearance: alert, appears stated age and cooperative  Skin: Skin color, texture, normal. No rashes or lesions  HEENT: AT, NC, PERRLA  Neck: No JVD, supple  Heart: S1 S2, rrr, no murmur   Lungs: diminished, no wheezing    Abdomen: Nontender, normal s  Limbs: No edema   CNS: no focal deficit, cn intact      Past Medical History:   Diagnosis Date    Aortic stenosis     Arthritis     COVID-19     PAF (paroxysmal atrial fibrillation) ([facility])         Patient Active Problem List   Diagnosis    Aortic calcification ([facility])    Hypercholesteremia    Nonrheumatic aortic valve stenosis    Right carotid artery occlusion    Arthritis    Essential hypertension    Carotid stenosis, asymptomatic, right    History of transcatheter aortic valve implantation ([facility])    PAF (paroxysmal atrial fibrillation) ([facility])    Hypoxia    Acute respiratory failure with hypoxia ([facility])    Pneumonia due to COVID-19 virus    Coronary artery disease involving native coronary artery of native heart without angina pectoris    Mild malnutrition ([facility])        No Known Allergies     Current Inpatient Medications:    Current Facility-Administered Medications   Medication Dose Route Frequency Provider Last Rate Last Admin    potassium chloride (KLOR-CON M) extended release tablet 40 mEq  40 mEq Oral BID  Aniket Palm MD        fluconazole (DIFLUCAN) tablet 100 mg  100 mg Oral Daily Nishant Bajwa MD   100 mg at 09/29/21 1047    metoprolol tartrate (LOPRESSOR) tablet 50 mg  50 mg Oral BID Nishant Bajwa MD   50 mg at 09/29/21 4478 09/28/2021    HCT 35.8 09/28/2021     09/28/2021    MCV 82.6 09/28/2021    MCH 27.5 09/28/2021    MCHC 33.3 09/28/2021    RDW 14.6 09/28/2021    BANDSPCT 1 09/28/2021    LYMPHOPCT 1.0 09/28/2021    MONOPCT 2.0 09/28/2021    BASOPCT 0.0 09/28/2021    MONOSABS 0.3 09/28/2021    LYMPHSABS 0.2 09/28/2021    EOSABS 0.0 09/28/2021    BASOSABS 0.0 09/28/2021     CMP:    Lab Results   Component Value Date     09/30/2021    K 4.1 09/30/2021     09/30/2021    CO2 22 09/30/2021    BUN 15 09/30/2021    CREATININE <0.5 09/30/2021    GFRAA >60 09/30/2021    AGRATIO 1.1 09/21/2021    LABGLOM >60 09/30/2021    GLUCOSE 109 09/30/2021    PROT 5.3 09/30/2021    LABALBU 2.6 09/30/2021    CALCIUM 8.8 09/30/2021    BILITOT 0.5 09/30/2021    ALKPHOS 119 09/30/2021    AST 18 09/30/2021    ALT 27 09/30/2021     Hepatic Function Panel:    Lab Results   Component Value Date    ALKPHOS 119 09/30/2021    ALT 27 09/30/2021    AST 18 09/30/2021    PROT 5.3 09/30/2021    BILITOT 0.5 09/30/2021    BILIDIR <0.2 09/30/2021    IBILI see below 09/30/2021    LABALBU 2.6 09/30/2021     Magnesium:    Lab Results   Component Value Date    MG 2.20 09/29/2021     PT/INR:    Lab Results   Component Value Date    PROTIME 10.6 05/16/2019    INR 0.93 05/16/2019     Last 3 Troponin:    Lab Results   Component Value Date    TROPONINI <0.01 09/13/2021    TROPONINI <0.01 05/21/2019     U/A:    Lab Results   Component Value Date    COLORU YELLOW 09/26/2021    PHUR 5.5 09/26/2021    WBCUA 0-2 09/26/2021    RBCUA 0-2 09/26/2021    MUCUS Rare 09/26/2021    YEAST Present 09/26/2021    CLARITYU Clear 09/26/2021    SPECGRAV 1.015 09/26/2021    LEUKOCYTESUR Negative 09/26/2021    UROBILINOGEN 0.2 09/26/2021    BILIRUBINUR Negative 09/26/2021    BLOODU Negative 09/26/2021    GLUCOSEU 100 09/26/2021     ABG:    Lab Results   Component Value Date    PHART 7.333 05/21/2019    NBE7GAP 45.7 05/21/2019    PO2ART 140.7 05/21/2019    WUP7FFH 24.3 05/21/2019 BEART -2 05/21/2019    HCX7DDJ 26 05/21/2019    B3NXVVZS 99 05/21/2019     FLP:    Lab Results   Component Value Date    TRIG 90 03/28/2019    HDL 59 03/28/2019    LDLCALC 120 03/28/2019    LABVLDL 18 03/28/2019     TSH:  No results found for: TSH   DATA:   ECG: Sinus Rhythm       ASSESSMENT:   Covid pna -tested + 9/13/21  Acute hypoxic respiratory failure, POA - ongoing, currently on 20 lpm, 70% fio2  - with criteria: < 90% on RA, accessory muscle use, RR> 18  - supplemental oxygen as necessary to keep SaO2 > 90%  - closely monitor for thromboemboli - lovenox bid  - on decadron 4 daily, steroid day 14, stop after today  - remdesivir/baricitinib completed  - she has been afebrile for > 36 hrs, improving O2 needs, although still high, and test was 17 days ago. She can come out of isolation so her family can visit her.      Acute encephalopathy - slowly improving  - metabolic   - due to covid infection  - head CT negative  - treat associated conditions  - avoid sedating meds as able  - supportive care    UTI   Yeast      diflucan    Chronic medical conditions - cont home meds unless otherwise indicated   AS, S/p TAVR    Recent  Echo  7/21  Valve  Functioning  Well  htn    Code status - [id number]    Dispo: possible ltac when ready for dc

## 2021-09-30 NOTE — PROGRESS NOTES
Physical Therapy  Daily Treatment Attempt    21    Name: Tresa Womack   : 1931    MRN: [id number]    PT follow-up attempt. Patient politely refusing to mobilize at this time - requesting all lights to be turned off. Will continue to follow.     Electronically signed by Tyrone Gaines PT on 2021 at 2:43 PM

## 2021-09-30 NOTE — CARE COORDINATION
Spoke in length with [facility] Negron & explained LTACH vs LTC. They want to come in & see Angelina Figueroa this evening & then discuss options. They have my number & will give me a call back with their decision. Reiterated there are 3 locations of LTACH; [facility] at [employer] at [employer].     Lesly Mena RN, BSN,   [phone]  Electronically signed by Lesly Mena RN on 9/30/2021 at 3:26 PM

## 2021-09-30 NOTE — PROGRESS NOTES
Speech Language Pathology  Facility/Department: [address] MED SURG   CLINICAL BEDSIDE SWALLOW EVALUATION    NAME: Ramiro Dozier  : 1931  MRN: [id number]    ADMISSION DATE: 2021  ADMITTING DIAGNOSIS: The primary encounter diagnosis was Hypoxia. A diagnosis of Pneumonia due to infectious organism, unspecified laterality, unspecified part of lung was also pertinent to this visit. · covid 19 droplet plus contact precautions  ·  has Aortic calcification ([facility] Hypercholesteremia; Nonrheumatic aortic valve stenosis; Right carotid artery occlusion; Arthritis; Essential hypertension; Carotid stenosis, asymptomatic, right; History of transcatheter aortic valve implantation ([facility]); PAF (paroxysmal atrial fibrillation) ([facility] Hypoxia; Acute respiratory failure with hypoxia ([facility] Pneumonia due to COVID-19 virus; Coronary artery disease involving native coronary artery of native heart without angina pectoris; and Mild malnutrition (HCC) on their problem list.  ·  has a past medical history of Aortic stenosis, Arthritis, COVID-19, and PAF (paroxysmal atrial fibrillation) ([facility] · Pt;s family member also recently hospitalized at time of pt's admit  · No known allergies  · Baseline: per SW documentation: lives with dtr and granddaughter. Reporting IND with ADLs. No other information available. Pt is a poor historian. Pt's primary Decision maker is documented at [facility] (grand child; [phone])per orders documentation goal is for return home with family assist for care. ONSET DATE: 2021    Date of Eval: 2021  Evaluating Therapist: Aretha Lao SLP     Chart Review  · MD History and Physical Documentation revealed:  History Of Present Illness:  Patient is a 77-year-old female with past medical history of CAD, aortic stenosis status post AVR who presented to hospital for shortness of breath. Patient is a poor historian, reportedly patient was brought to the hospital for shortness of breath. straw presentations of nectar thick. · Pt appeared to better tolerate tsp and cup presentations of mildly thick and moderately thick (nectar and homey thick)  liquids and puree. Recommend downgrade to mildly thick liquids via tsp or cup and puree. Ongoing assessment, as mental status and acceptance of textured foods improve, for diet upgrade readiness. · Discussed with RN     Dysphagia Outcome Severity Scale: Level 3: Moderate dysphagia- Total assistance, supervision or strategies. Two or more diet consistencies restricted     Treatment Plan  Requires SLP Intervention: Yes  Duration/Frequency of Treatment: 3-5 times a week while on acute medical floor  D/C Recommendations: To be determined       Recommended Diet and Intervention  Diet Solids Recommendation: Dysphagia Pureed (Dysphagia I)  Liquid Consistency Recommendation: Mildly Thick (Nectar)   Meds: crush if able and give with thick liquid or puree     Therapeutic Interventions: Diet tolerance monitoring; Therapeutic PO trials with SLP with ongoing assessment of mastication and diet upgrade readiness; Patient/Family education    Compensatory Swallowing Strategies  Compensatory Swallowing Strategies: No straws;Upright as possible for all oral intake;Small bites/sips;Swallow 2 times per bite/sip; Remain upright for 30-45 minutes after meals;Assist feed    Treatment/Goals   pt did not identify any goals staff goal is for po intake  Dysphagia Goals: The patient will tolerate recommended diet without observed clinical signs of aspiration; The patient/caregiver will demonstrate understanding of compensatory strategies for improved swallowing safety. General  Chart Reviewed: Yes  Behavior/Cognition: Alert; Cooperative;Pleasant mood;Confused; Requires cueing ([place] and did not follow commands. pt verbally responsive and identifying likes/dislikes.  Needs cues.)  Respiratory Status: O2 via nasal cannula (20L heated high flow via nasal canula)  Communication Observation: (poor historian. limited to likes/dislikes. Pt wants to go home)  Follows Directions: Simple (66% with moderate to max cues)  Dentition: Edentulous (Pt has upper and lower dentures but refused to put in)  Prior Dysphagia History: No reports of history of dysphagia. Will check chart in review more thoroughly    Patient Positioning: Upright in bed (dependent on SLP in getting upright in bed ~70-80 degrees. pt kept scooting down)    Consistencies Administered: Dysphagia Soft and Bite-Sized (Dysphagia III); Reg solid; Dysphagia Pureed (Dysphagia I); Honey - cup;Nectar - cup; Thin - cup; Thin - teaspoon;Nectar - teaspoon      Pain; denied    Vision/Hearing  Vision  Vision: Within Functional Limits (Pt unable to provide history on inquiry. appeared [facility] for testing)  Hearing  Hearing: Exceptions to [facility]  Hearing Exceptions: Hard of hearing/hearing concerns; No hearing aid    Oral Motor Deficits  Oral/Motor  Oral Motor: Exceptions to [facility] (Pt did not follow commands for volitional oral motor rom assessment.)  Labial ROM:  (fairly symmetrical rom during po trials and when providing visual cues (ie mouth opening; labial seal for tsp/cup/straw))  Labial Sensation:  (good response bilaterally)  Lingual ROM:  (limited rom overall)  Velum:  (fairly symmetrical on phonation of /a/)  Gag:  (diminished to absent)   Vocal Quality:  (unremarkable)  Volitional Cough:  (No response to instruction for volitional cough)  Volitional Swallow:  (no response to instruction for volitional swallow)    Oral Phase Dysfunction  Oral Phase  Oral Phase: Exceptions  Oral Phase Dysfunction  Impaired Mastication:  (Pt refused all textured foods (soft/bite size and regular))  Decreased Anterior to Posterior Transit: All (episodic holding in mouth; lingual mashing pattern)  Suspected Premature Bolus Loss: All (and suspected gradual pooling of material to pharynx)  Lingual/Palatal Residue:  (intermittent residue of puree and most evident with items she did not like)     Indicators of Pharyngeal Phase Dysfunction   Pharyngeal Phase  Pharyngeal Phase: Exceptions  Indicators of Pharyngeal Phase Dysfunction  Delayed Swallow: All  Decreased Laryngeal Elevation: All  Throat Clearing - Immediate: Thin - cup; Thin - teaspoon  Throat Clearing - Delayed: Nectar - straw  Cough - Immediate: Thin - teaspoon; Thin - cup    Prognosis  Prognosis  Prognosis for safe diet advancement: good (guarded medical DX;medical co-morbidities; mental status)  Individuals consulted  Consulted and agree with results and recommendations: Patient;RN (PCA)    Education  Patient Education Response: No evidence of learning  Safety Devices in place: Yes          Therapy Time  SLP Individual Minutes  Time In: 0745  Time Out: 0816  Minutes: 31   coded treatment time: 12 minutes       Signed  Irish WaltonMS,CCC,SLP [id number]  Speech and Language Pathologist  9/30/2021 8:17 AM

## 2021-09-30 NOTE — PLAN OF CARE
Problem: Falls - Risk of:  Goal: Will remain free from falls  Description: Will remain free from falls  9/30/2021 1309 by Pilar Ramirez RN  Outcome: Ongoing  9/30/2021 0310 by Dane Monique RN  Outcome: Ongoing  Goal: Absence of physical injury  Description: Absence of physical injury  9/30/2021 1309 by Pilar Ramirez RN  Outcome: Ongoing  9/30/2021 0310 by Dane Monique RN  Outcome: Ongoing     Problem: Skin Integrity:  Goal: Will show no infection signs and symptoms  Description: Will show no infection signs and symptoms  9/30/2021 1309 by Pilar Ramirez RN  Outcome: Ongoing  9/30/2021 0310 by Dane Monique RN  Outcome: Ongoing  Goal: Absence of new skin breakdown  Description: Absence of new skin breakdown  Outcome: Ongoing     Problem: Safety:  Goal: Free from accidental physical injury  Description: Free from accidental physical injury  Outcome: Ongoing  Goal: Free from intentional harm  Description: Free from intentional harm  Outcome: Ongoing     Problem: Daily Care:  Goal: Daily care needs are met  Description: Daily care needs are met  9/30/2021 1309 by Pilar Ramirez RN  Outcome: Ongoing  9/30/2021 0310 by Dane Monique RN  Outcome: Ongoing     Problem: Airway Clearance - Ineffective  Goal: Achieve or maintain patent airway  Outcome: Ongoing     Problem: Gas Exchange - Impaired  Goal: Absence of hypoxia  Outcome: Ongoing  Goal: Promote optimal lung function  Outcome: Ongoing     Problem: Breathing Pattern - Ineffective  Goal: Ability to achieve and maintain a regular respiratory rate  9/30/2021 1309 by Pilar Ramirez RN  Outcome: Ongoing  9/30/2021 0310 by Dane Monique RN  Outcome: Ongoing     Problem:  Body Temperature -  Risk of, Imbalanced  Goal: Ability to maintain a body temperature within defined limits  Outcome: Ongoing  Goal: Will regain or maintain usual level of consciousness  Outcome: Ongoing  Goal: Complications related to the disease process, condition or treatment will be avoided or minimized  Outcome: Ongoing     Problem: Isolation Precautions - Risk of Spread of Infection  Goal: Prevent transmission of infection  Outcome: Ongoing     Problem: Nutrition Deficits  Goal: Optimize nutritional status  9/30/2021 1309 by Jona Damon RN  Outcome: Ongoing  9/30/2021 0310 by Sinai Bill RN  Outcome: Ongoing     Problem: Risk for Fluid Volume Deficit  Goal: Maintain normal heart rhythm  Outcome: Ongoing  Goal: Maintain absence of muscle cramping  Outcome: Ongoing  Goal: Maintain normal serum potassium, sodium, calcium, phosphorus, and pH  Outcome: Ongoing     Problem: Loneliness or Risk for Loneliness  Goal: Demonstrate positive use of time alone when socialization is not possible  Outcome: Ongoing     Problem: Fatigue  Goal: Verbalize increase energy and improved vitality  Outcome: Ongoing     Problem: Patient Education: Go to Patient Education Activity  Goal: Patient/Family Education  Outcome: Ongoing     Problem: Nutrition  Goal: Optimal nutrition therapy  Outcome: Ongoing     Problem: Confusion - Acute:  Goal: Absence of continued neurological deterioration signs and symptoms  Description: Absence of continued neurological deterioration signs and symptoms  Outcome: Ongoing  Goal: Mental status will be restored to baseline  Description: Mental status will be restored to baseline  Outcome: Ongoing     Problem: Discharge Planning:  Goal: Ability to perform activities of daily living will improve  Description: Ability to perform activities of daily living will improve  Outcome: Ongoing  Goal: Participates in care planning  Description: Participates in care planning  Outcome: Ongoing     Problem: Injury - Risk of, Physical Injury:  Goal: Will remain free from falls  Description: Will remain free from falls  9/30/2021 1309 by Jona Damon RN  Outcome: Ongoing  9/30/2021 0310 by Sinai Bill RN  Outcome: Ongoing  Goal: Absence of physical injury  Description: Absence of physical injury  9/30/2021 1309 by Luis Enrique Lawton RN  Outcome: Ongoing  9/30/2021 0310 by Seven Gamez RN  Outcome: Ongoing     Problem: Mood - Altered:  Goal: Mood stable  Description: Mood stable  Outcome: Ongoing  Goal: Absence of abusive behavior  Description: Absence of abusive behavior  Outcome: Ongoing  Goal: Verbalizations of feeling emotionally comfortable while being cared for will increase  Description: Verbalizations of feeling emotionally comfortable while being cared for will increase  Outcome: Ongoing     Problem: Psychomotor Activity - Altered:  Goal: Absence of psychomotor disturbance signs and symptoms  Description: Absence of psychomotor disturbance signs and symptoms  Outcome: Ongoing     Problem: Sensory Perception - Impaired:  Goal: Demonstrations of improved sensory functioning will increase  Description: Demonstrations of improved sensory functioning will increase  Outcome: Ongoing  Goal: Decrease in sensory misperception frequency  Description: Decrease in sensory misperception frequency  Outcome: Ongoing  Goal: Able to refrain from responding to false sensory perceptions  Description: Able to refrain from responding to false sensory perceptions  Outcome: Ongoing  Goal: Demonstrates accurate environmental perceptions  Description: Demonstrates accurate environmental perceptions  Outcome: Ongoing  Goal: Able to distinguish between reality-based and nonreality-based thinking  Description: Able to distinguish between reality-based and nonreality-based thinking  Outcome: Ongoing  Goal: Able to interrupt nonreality-based thinking  Description: Able to interrupt nonreality-based thinking  Outcome: Ongoing     Problem: Sleep Pattern Disturbance:  Goal: Appears well-rested  Description: Appears well-rested  Outcome: Ongoing

## 2021-10-01 LAB
ANION GAP SERPL CALCULATED.3IONS-SCNC: 12 MMOL/L (ref 3–16)
BUN BLDV-MCNC: 15 MG/DL (ref 7–20)
CALCIUM SERPL-MCNC: 8.9 MG/DL (ref 8.3–10.6)
CHLORIDE BLD-SCNC: 102 MMOL/L (ref 99–110)
CO2: 24 MMOL/L (ref 21–32)
CREAT SERPL-MCNC: <0.5 MG/DL (ref 0.6–1.2)
GFR AFRICAN AMERICAN: >60
GFR NON-AFRICAN AMERICAN: >60
GLUCOSE BLD-MCNC: 109 MG/DL (ref 70–99)
POTASSIUM REFLEX MAGNESIUM: 4.9 MMOL/L (ref 3.5–5.1)
SODIUM BLD-SCNC: 138 MMOL/L (ref 136–145)

## 2021-10-01 PROCEDURE — 94680 O2 UPTK RST&XERS DIR SIMPLE: CPT

## 2021-10-01 PROCEDURE — 6370000000 HC RX 637 (ALT 250 FOR IP): Performed by: INTERNAL MEDICINE

## 2021-10-01 PROCEDURE — 97530 THERAPEUTIC ACTIVITIES: CPT

## 2021-10-01 PROCEDURE — 80048 BASIC METABOLIC PNL TOTAL CA: CPT

## 2021-10-01 PROCEDURE — 1200000000 HC SEMI PRIVATE

## 2021-10-01 PROCEDURE — 36415 COLL VENOUS BLD VENIPUNCTURE: CPT

## 2021-10-01 PROCEDURE — 2700000000 HC OXYGEN THERAPY PER DAY

## 2021-10-01 PROCEDURE — 92526 ORAL FUNCTION THERAPY: CPT

## 2021-10-01 PROCEDURE — 94640 AIRWAY INHALATION TREATMENT: CPT

## 2021-10-01 PROCEDURE — 6360000002 HC RX W HCPCS: Performed by: INTERNAL MEDICINE

## 2021-10-01 PROCEDURE — 97129 THER IVNTJ 1ST 15 MIN: CPT

## 2021-10-01 PROCEDURE — 2580000003 HC RX 258: Performed by: INTERNAL MEDICINE

## 2021-10-01 PROCEDURE — 94761 N-INVAS EAR/PLS OXIMETRY MLT: CPT

## 2021-10-01 PROCEDURE — 97535 SELF CARE MNGMENT TRAINING: CPT

## 2021-10-01 RX ADMIN — IPRATROPIUM BROMIDE 2 SPRAY: 42 SPRAY NASAL at 17:20

## 2021-10-01 RX ADMIN — DILTIAZEM HYDROCHLORIDE 60 MG: 60 TABLET, FILM COATED ORAL at 00:52

## 2021-10-01 RX ADMIN — POTASSIUM BICARBONATE 40 MEQ: 782 TABLET, EFFERVESCENT ORAL at 09:12

## 2021-10-01 RX ADMIN — DILTIAZEM HYDROCHLORIDE 60 MG: 60 TABLET, FILM COATED ORAL at 17:20

## 2021-10-01 RX ADMIN — SODIUM CHLORIDE, PRESERVATIVE FREE 10 ML: 5 INJECTION INTRAVENOUS at 09:14

## 2021-10-01 RX ADMIN — ASPIRIN 81 MG: 81 TABLET, COATED ORAL at 09:12

## 2021-10-01 RX ADMIN — METOPROLOL TARTRATE 50 MG: 50 TABLET, FILM COATED ORAL at 21:23

## 2021-10-01 RX ADMIN — DILTIAZEM HYDROCHLORIDE 60 MG: 60 TABLET, FILM COATED ORAL at 12:51

## 2021-10-01 RX ADMIN — DEXAMETHASONE 4 MG: 4 TABLET ORAL at 09:13

## 2021-10-01 RX ADMIN — METOPROLOL TARTRATE 50 MG: 50 TABLET, FILM COATED ORAL at 09:12

## 2021-10-01 RX ADMIN — Medication 2 PUFF: at 19:52

## 2021-10-01 RX ADMIN — IPRATROPIUM BROMIDE 2 SPRAY: 42 SPRAY NASAL at 12:51

## 2021-10-01 RX ADMIN — FLUCONAZOLE 100 MG: 100 TABLET ORAL at 09:13

## 2021-10-01 RX ADMIN — Medication 2 PUFF: at 08:53

## 2021-10-01 RX ADMIN — SODIUM CHLORIDE, PRESERVATIVE FREE 10 ML: 5 INJECTION INTRAVENOUS at 21:24

## 2021-10-01 RX ADMIN — APIXABAN 2.5 MG: 2.5 TABLET, FILM COATED ORAL at 21:23

## 2021-10-01 RX ADMIN — APIXABAN 2.5 MG: 2.5 TABLET, FILM COATED ORAL at 09:13

## 2021-10-01 RX ADMIN — IPRATROPIUM BROMIDE 2 SPRAY: 42 SPRAY NASAL at 09:13

## 2021-10-01 RX ADMIN — IPRATROPIUM BROMIDE 2 SPRAY: 42 SPRAY NASAL at 21:23

## 2021-10-01 RX ADMIN — DILTIAZEM HYDROCHLORIDE 60 MG: 60 TABLET, FILM COATED ORAL at 06:15

## 2021-10-01 ASSESSMENT — PAIN SCALES - GENERAL
PAINLEVEL_OUTOF10: 0
PAINLEVEL_OUTOF10: 0

## 2021-10-01 NOTE — PROGRESS NOTES
Physical Therapy  Facility/Department: [address] MED SURG  Daily Treatment Note  NAME: Renea Limon  : 1931  MRN: [id number]    Date of Service: 10/1/2021    Discharge Recommendations:  Patient would benefit from continued therapy after discharge, 3-5 sessions per week, Continue to assess pending progress   PT Equipment Recommendations  Other: If they don't already have these - rolling walker, manual w/c, BSC, and hospital bed  Renea Limon scored a 8/24 on the AM-PAC short mobility form. Current research shows that an AM-PAC score of 17 or less is typically not associated with a discharge to the patient's home setting. Based on the patient's AM-PAC score and their current functional mobility deficits, it is recommended that the patient have 3-5 sessions per week of Physical Therapy at d/c to increase the patient's independence. Please see assessment section for further patient specific details. If patient discharges prior to next session this note will serve as a discharge summary. Please see below for the latest assessment towards goals. Assessment   Body structures, Functions, Activity limitations: Decreased functional mobility ; Decreased ADL status; Decreased balance;Decreased strength;Decreased endurance  Assessment: Pt is a 80 y.o. female who presented to the ED on 21 with SOB. Pt found to be COVID+. Prior to admission, pt living with dtr and granddtr in two level home with 4 CHRISTOS - pt independent with all ADLs and ambulation without device - helps take care of dtr with help of granddtr. Pt currently functioning below baseline - significantly limited by activity tolerance and generalized weakness - pt now at 3L high flow - O2 sats range from 82-99% throughout session. Anticipate she will need continued skilled therapy 3-5x/week to maximize independence and safety with functional mobility.  If going home, will need strict 24hr supv, will need hospital bed, walker, w/c, and BSC.  Treatment Diagnosis: impaired activity tolerance  Prognosis: Fair  Decision Making: Medium Complexity  History: see below  Exam: see below  Clinical Presentation: evolving  PT Education: Goals;PT Role;Plan of Care;General Safety;Transfer Training;Gait Training;Functional Mobility Training  REQUIRES PT FOLLOW UP: Yes  Activity Tolerance  Activity Tolerance: Patient limited by fatigue;Patient limited by endurance     Patient Diagnosis(es): The primary encounter diagnosis was Hypoxia. A diagnosis of Pneumonia due to infectious organism, unspecified laterality, unspecified part of lung was also pertinent to this visit. has a past medical history of Aortic stenosis, Arthritis, COVID-19, and PAF (paroxysmal atrial fibrillation) ([facility] [address] has a past surgical history that includes Varicose vein surgery (Bilateral, 1980's); Cardiac catheterization (04/17/2018); Hysterectomy, total abdominal; and Aortic valve replacement (N/A, 5/21/2019). Restrictions  Restrictions/Precautions  Restrictions/Precautions: Contact Precautions, Isolation, Fall Risk  Position Activity Restriction  Other position/activity restrictions: COVID+; droplet precautions; 3L high flow     Subjective   General  Chart Reviewed: Yes  Additional Pertinent Hx: Pt is a 80 y.o. female who presented to the ED on 9/13/21 with SOB. Pt found to be COVID+. Response To Previous Treatment: Patient reporting fatigue but able to participate. Family / Caregiver Present: No  Referring Practitioner: Madeline Pandya MD  Subjective  Subjective: Pt is agreeable to PT - wants to eat then get back in bed to nap. General Comment  Comments: respiratory in room - weaned to 3L O2. Orientation  Orientation  Overall Orientation Status: Within Functional Limits     Cognition   Cognition  Overall Cognitive Status: Exceptions  Following Commands:  Follows one step commands with increased time  Attention Span: Attends with cues to redirect  Memory: Decreased short term memory;Decreased recall of recent events;Decreased long term memory  Safety Judgement: Decreased awareness of need for safety;Decreased awareness of need for assistance  Problem Solving: Decreased awareness of errors;Assistance required to identify errors made;Assistance required to generate solutions  Insights: Decreased awareness of deficits  Initiation: Requires cues for all  Sequencing: Requires cues for all     Objective   Bed mobility  Supine to Sit: Unable to assess  Sit to Supine: Moderate assistance  Scooting: Maximal assistance  Transfers  Sit to Stand: Minimal Assistance; Moderate Assistance (within stedy)  Stand to sit: Minimal Assistance; Moderate Assistance (within stedy)  Bed to Chair: Dependent/Total (with stedy)  Comment: Very difficult to obtain accurate O2 readings - used warm blanket wrapped around hand to assist - O2 sats as low as 82% and as high as 99%.   Ambulation  Ambulation?: No     Balance  Posture: Fair  Sitting - Static: Good  Sitting - Dynamic: Fair;+  Standing - Static: Poor;+ (within stedy)                AM-PAC Score  AM-PAC Inpatient Mobility Raw Score : 8 (10/01/21 1222)  AM-PAC Inpatient T-Scale Score : 28.52 (10/01/21 1222)  Mobility Inpatient CMS 0-100% Score: 86.62 (10/01/21 1222)  Mobility Inpatient CMS G-Code Modifier : CM (10/01/21 1222)          Goals  Short term goals  Time Frame for Short term goals: by acute discharge - all goals ongoing as of 10/1/21  Short term goal 1: bed mobility with SBA  Short term goal 2: sit<>stand with SBA  Short term goal 3: ambulate >30' with LRAD and SBA  Patient Goals   Patient goals : none stated    Plan    Plan  Times per week: 3-5x/week  Current Treatment Recommendations: Strengthening, Functional Mobility Training, Transfer Training, Balance Training, Endurance Training, Patient/Caregiver Education & Training, Safety Education & Training, Gait Training, Neuromuscular Re-education, Equipment Evaluation, Education, & procurement  Safety Devices  Type of devices:  All fall risk precautions in place, Call light within reach, Patient at risk for falls, Nurse notified, Gait belt, Telesitter in use, Left in bed, Bed alarm in place     Therapy Time   Individual Concurrent Group Co-treatment   Time In 1143         Time Out 1221         Minutes 38         Timed Code Treatment Minutes: 238 Dori Tsang., PT

## 2021-10-01 NOTE — CARE COORDINATION
[facility]    Received referral for homecare services at discharge pending for Saturday. DC planner states patient can be seen on Monday for [facility]. Will follow patient for homecare services at discharge. Should Pt DC over weekend, fax face sheet, order, SHANTA, and H&P to [facility].  Electronically signed by Hai Hendrickson LPN on 52/2/78 at 78:16 AM T            [facility] Transition Nurse  [phone]

## 2021-10-01 NOTE — PROGRESS NOTES
Occupational Therapy  Facility/Department: [address] MED SURG  Daily Treatment Note  NAME: Anel Rivers  : 1931  MRN: [id number]    Date of Service: 10/1/2021    Discharge Recommendations:  3-5 sessions per week, Patient would benefit from continued therapy after discharge  OT Equipment Recommendations  Equipment Needed: Yes  Mobility Devices: ADL Assistive Devices; Hospital Bed  ADL Assistive Devices: Toileting - Standard Commode  Aenl Rivers scored a  on the AM-PAC ADL Inpatient form. Current research shows that an AM-PAC score of 17 or less is typically not associated with a discharge to the patient's home setting. Based on the patient's AM-PAC score and their current ADL deficits, it is recommended that the patient have 3-5 sessions per week of Occupational Therapy at d/c to increase the patient's independence. Please see assessment section for further patient specific details. If patient discharges prior to next session this note will serve as a discharge summary. Please see below for the latest assessment towards goals. Assessment   Performance deficits / Impairments: Decreased functional mobility ; Decreased ADL status; Decreased strength;Decreased cognition;Decreased endurance;Decreased balance;Decreased high-level IADLs  Assessment: Pt continues to be limited by decreased activity tolerance/endurance and only able to tolerate light activity. She required setup + vc's for feeding and then wanted to return to bed to rest. Pt required min/mod A for sit<>stand to margareth patel, dependent to transfer back to bed, and required mod/max A for bed mobility. Pt would most benefit from continued skilled therapy 3-5 times/week at d/c to increase her safety and independence. If pt were to return home, she would need 24/7 hands-on lifting assistance and home OT. Would recommend BSC, hospital bed for safety as well as pt will be room-confined. Prognosis: Fair  OT Education: OT Role;Plan of Care; ADL Adaptive Strategies;Transfer Training  REQUIRES OT FOLLOW UP: Yes  Activity Tolerance  Activity Tolerance: Patient limited by fatigue;Treatment limited secondary to decreased cognition  Activity Tolerance: Desat to mid 80s w/ mobility on 3L O2 via high flow  Safety Devices  Safety Devices in place: Yes  Type of devices: Call light within reach; Bed alarm in place;Gait belt;Left in bed;Nurse notified         Patient Diagnosis(es): The primary encounter diagnosis was Hypoxia. A diagnosis of Pneumonia due to infectious organism, unspecified laterality, unspecified part of lung was also pertinent to this visit. has a past medical history of Aortic stenosis, Arthritis, COVID-19, and PAF (paroxysmal atrial fibrillation) ([facility] [address] has a past surgical history that includes Varicose vein surgery (Bilateral, 1980's); Cardiac catheterization (04/17/2018); Hysterectomy, total abdominal; and Aortic valve replacement (N/A, 5/21/2019). Restrictions  Restrictions/Precautions  Restrictions/Precautions: Contact Precautions, Isolation, Fall Risk  Position Activity Restriction  Other position/activity restrictions: COVID+; droplet precautions; 3L high flow  Subjective   General  Chart Reviewed: Yes, Progress Notes  Patient assessed for rehabilitation services?: Yes  Additional Pertinent Hx: Per H&P: \"Patient is a 27-year-old female with past medical history of CAD, aortic stenosis status post AVR who presented to hospital for shortness of breath. Patient is a poor historian, reportedly patient was brought to the hospital for shortness of breath. Patient was found hypoxic in upper 80s on room air, was transitioned to nasal cannula. No reports of fevers chills chest pain nausea vomiting or diarrhea. \" Pt found to be COVID+  Family / Caregiver Present: No  Referring Practitioner: Ligia Petty MD  Diagnosis: Acute hypoxic respiratory failure, Multifocal pneumonia, COVID +  Subjective  Subjective: Pt met b/s for OT cotx w/ PT.  Pt in recliner, pleasant and agreeable to therapy. Pt denied pain  General Comment  Comments: RN okayed for in bed activity      Orientation     Objective    ADL  Feeding: Setup;Pureed diet; Thickened liquids;Verbal cueing (Pt took several bites of food and drank ensure seated in recliner; positioned pt sitting upright w/ pillows behind to promote safe feeding)        Balance  Sitting Balance: Stand by assistance  Standing Balance: Minimal assistance (in margareth stedy)  Functional Mobility  Functional - Mobility Device:  (margareth stedy)  Assist Level: Dependent/Total  Functional Mobility Comments: Pt required use of margareth stedy for safe mobility from chair > bed  Bed mobility  Supine to Sit: Unable to assess  Sit to Supine: Moderate assistance  Scooting: Maximal assistance  Transfers  Sit to stand: Minimal assistance; Moderate assistance  Stand to sit: Minimal assistance; Moderate assistance  Transfer Comments: <> margareth stedy                       Cognition  Overall Cognitive Status: Exceptions  Following Commands:  Follows one step commands with increased time  Attention Span: Attends with cues to redirect  Memory: Decreased short term memory;Decreased recall of recent events;Decreased long term memory  Safety Judgement: Decreased awareness of need for safety;Decreased awareness of need for assistance  Problem Solving: Decreased awareness of errors;Assistance required to identify errors made;Assistance required to generate solutions  Insights: Decreased awareness of deficits  Initiation: Requires cues for all  Sequencing: Requires cues for all           Plan   Plan  Times per week: 3-5  Current Treatment Recommendations: Functional Mobility Training, Balance Training, Strengthening, Endurance Training, Safety Education & Training, Self-Care / ADL, Equipment Evaluation, Education, & procurement    AM-PAC Score  AM-[facility] Inpatient Daily Activity Raw Score: 12 (10/01/21 1240)  AM-PAC Inpatient ADL T-Scale Score : 30.6 (10/01/21 1240)  ADL Inpatient CMS 0-100% Score: 66.57 (10/01/21 1240)  ADL Inpatient CMS G-Code Modifier : CL (10/01/21 1240)    Goals  Short term goals  Time Frame for Short term goals: Prior to d/c:  Short term goal 1: Pt will bathe with SBA. Short term goal 2: Pt will dress with SBA. Short term goal 3: Pt will toilet with SBA. Short term goal 4: Pt will complete fxl mobility and fxl transfers to/from ADL surfaces with SBA. Short term goal 5: Pt will tolerate standing >5 minutes for functional task with SBA while maintaining O2 sats >90%  Short term goal 6: Pt will tolerate 10-15 minutes of therex to improve strength/endurance for ADLs. Long term goals  Time Frame for Long term goals : STGs=LTGs  Patient Goals   Patient goals : to return home.        Therapy Time   Individual Concurrent Group Co-treatment   Time In [address] [place] [address]         Time Out 1221         Minutes [address] [facility], [address] [place] [address]

## 2021-10-01 NOTE — PROGRESS NOTES
Hospitalist  Progress Note       Danielle Courser is a 80 y.o. female   4/14/1931     SUBJECTIVE: no acute evnts. Pt is down to 6L o2 nc. She is more awake today, got a bath with rn. She wants to go home tomorrow    OBJECTIVE:    Vitals:   Blood pressure 123/77, pulse 71, temperature 97.8 °F (36.6 °C), temperature source Oral, resp. rate 20, height 5' (1.524 m), weight 105 lb 6.1 oz (47.8 kg), SpO2 94 %, not currently breastfeeding.     General appearance: alert, appears more comfortable today  Skin: Skin color, texture, normal. No rashes or lesions  HEENT: AT, NC, PERRLA  Neck: No JVD, supple  Heart: S1 S2, rrr, no murmur   Lungs: diminished but improving air movement, no wheezing    Abdomen: Nontender, normals  Limbs: No edema   CNS: no focal deficits, cn intact      Past Medical History:   Diagnosis Date    Aortic stenosis     Arthritis     COVID-19     [facility]) [facility]         Patient Active Problem List   Diagnosis    Aortic calcification ([facility])    Hypercholesteremia    Nonrheumatic aortic valve stenosis    Right carotid artery occlusion    Arthritis    Essential hypertension    Carotid stenosis, asymptomatic, right    History of transcatheter aortic valve implantation ([place])    PAF (paroxysmal atrial fibrillation) ([facility]    Hypoxia    Acute respiratory failure with hypoxia ([address]    Pneumonia due to COVID-19 virus    Coronary artery disease involving native coronary artery of native heart without angina pectoris    Mild malnutrition ([facility])        No Known Allergies     Current Inpatient Medications:    Current Facility-Administered Medications   Medication Dose Route Frequency Provider Last Rate Last Admin    potassium bicarb-citric acid (EFFER-K) effervescent tablet 40 mEq  40 mEq Oral BID  Tyree Chwodhury MD   40 mEq at 10/01/21 0912    fluconazole (DIFLUCAN) tablet 100 mg  100 mg Oral Daily Karla Arita MD   100 mg at 10/01/21 0913    metoprolol tartrate (LOPRESSOR) tablet 50 mg  50 mg Oral BID Huang Rangel MD   50 mg at 10/01/21 0912    dexamethasone (DECADRON) tablet 4 mg  4 mg Oral Daily Huang Rangel MD   4 mg at 10/01/21 0913    dilTIAZem (CARDIZEM) tablet 60 mg  60 mg Oral 4 times per day Huang Rangel MD   60 mg at 10/01/21 1251    apixaban (ELIQUIS) tablet 2.5 mg  2.5 mg Oral BID Huang Rangel MD   2.5 mg at 10/01/21 0913    albuterol sulfate  (90 Base) MCG/ACT inhaler 2 puff  2 puff Inhalation Q4H PRN Huang Rangel MD   2 puff at 09/26/21 1238    0.9 % sodium chloride bolus  30 mL IntraVENous PRN Sis Zhang MD   Stopped at 09/14/21 1706    sodium chloride flush 0.9 % injection 10 mL  10 mL IntraVENous 2 times per day Gauri Cosme MD   10 mL at 10/01/21 0914    sodium chloride flush 0.9 % injection 10 mL  10 mL IntraVENous PRN Gauri Cosme MD   10 mL at 09/17/21 2028    0.9 % sodium chloride infusion  25 mL IntraVENous PRN Gauri Cosme  mL/hr at 09/29/21 1739 Rate Change at 09/29/21 1739    ondansetron (ZOFRAN-ODT) disintegrating tablet 4 mg  4 mg Oral Q8H PRN Gauri Cosme MD        Or    ondansetron (ZOFRAN) injection 4 mg  4 mg IntraVENous Q6H PRN Gauri Cosme MD        acetaminophen (TYLENOL) tablet 650 mg  650 mg Oral Q6H PRN Gauri Cosme MD   650 mg at 09/25/21 2029    Or    acetaminophen (TYLENOL) suppository 650 mg  650 mg Rectal Q6H PRN Gauri Cosme MD        aspirin EC tablet 81 mg  81 mg Oral Daily Jana Solorzano MD   81 mg at 10/01/21 0912    ipratropium (ATROVENT) 0.06 % nasal spray 2 spray  2 spray Each Nostril 4x Daily Jana Solorzano MD   2 spray at 10/01/21 1251    ipratropium (ATROVENT HFA) 17 MCG/ACT inhaler 2 puff  2 puff Inhalation BID Gauri Cosme MD   2 puff at 10/01/21 0853    And    albuterol sulfate  (90 Base) MCG/ACT inhaler 2 puff  2 puff Inhalation BID Gauri Cosme MD   2 puff at 10/01/21 0853           Labs:  CBC with Differential: Lab Results   Component Value Date    WBC 15.8 09/28/2021    RBC 4.33 09/28/2021    HGB 11.9 09/28/2021    HCT 35.8 09/28/2021     09/28/2021    MCV 82.6 09/28/2021    MCH 27.5 09/28/2021    MCHC 33.3 09/28/2021    RDW 14.6 09/28/2021    BANDSPCT 1 09/28/2021    LYMPHOPCT 1.0 09/28/2021    MONOPCT 2.0 09/28/2021    BASOPCT 0.0 09/28/2021    MONOSABS 0.3 09/28/2021    LYMPHSABS 0.2 09/28/2021    EOSABS 0.0 09/28/2021    BASOSABS 0.0 09/28/2021     CMP:    Lab Results   Component Value Date     10/01/2021    K 4.9 10/01/2021     10/01/2021    CO2 24 10/01/2021    BUN 15 10/01/2021    CREATININE <0.5 10/01/2021    GFRAA >60 10/01/2021    AGRATIO 1.1 09/21/2021    LABGLOM >60 10/01/2021    GLUCOSE 109 10/01/2021    PROT 5.3 09/30/2021    LABALBU 2.6 09/30/2021    CALCIUM 8.9 10/01/2021    BILITOT 0.5 09/30/2021    ALKPHOS 119 09/30/2021    AST 18 09/30/2021    ALT 27 09/30/2021     Hepatic Function Panel:    Lab Results   Component Value Date    ALKPHOS 119 09/30/2021    ALT 27 09/30/2021    AST 18 09/30/2021    PROT 5.3 09/30/2021    BILITOT 0.5 09/30/2021    BILIDIR <0.2 09/30/2021    IBILI see below 09/30/2021    LABALBU 2.6 09/30/2021     Magnesium:    Lab Results   Component Value Date    MG 2.20 09/29/2021     PT/INR:    Lab Results   Component Value Date    PROTIME 10.6 05/16/2019    INR 0.93 05/16/2019     Last 3 Troponin:    Lab Results   Component Value Date    TROPONINI <0.01 09/13/2021    TROPONINI <0.01 05/21/2019     U/A:    Lab Results   Component Value Date    COLORU YELLOW 09/26/2021    PHUR 5.5 09/26/2021    WBCUA 0-2 09/26/2021    RBCUA 0-2 09/26/2021    MUCUS Rare 09/26/2021    YEAST Present 09/26/2021    CLARITYU Clear 09/26/2021    SPECGRAV 1.015 09/26/2021    LEUKOCYTESUR Negative 09/26/2021    UROBILINOGEN 0.2 09/26/2021    BILIRUBINUR Negative 09/26/2021    BLOODU Negative 09/26/2021    GLUCOSEU 100 09/26/2021     ABG:    Lab Results   Component Value Date    PHART 7.333

## 2021-10-01 NOTE — PROGRESS NOTES
Patient on room air at rest 83%  Patient on 2 lpm at rest 88%  Patient on 3 lpm at rest 92%    Attempted to stand to ambulate and patient unable to stand and stated her knees were too weak. Patient qualifies for 3 lpm.       Notified [employer] for home oxygen needs.

## 2021-10-01 NOTE — PROGRESS NOTES
Lynnette Lay was evaluated today and a DME order was entered for variable height hospital bed because she requires assistance for positioning needs not possible in an ordinary bed. Patient needs variability of bed height to perform patient transfers and for personal cares. Current body Weight: 105 lb 6.1 oz (47.8 kg). The need for this equipment was discussed with the patient and she understands and is in agreement. Lynnette Lay requires a bedside commode due to being confined to one level of the home, and is physically incapable of utilizing regular toilet facilities. Current body weight is Weight: 105 lb 6.1 oz (47.8 kg). Lynnette Lay was evaluated today and a DME order was entered for a standard wheelchair because she requires this to successfully complete daily living tasks of ambulating. A standard manual wheelchair is necessary due to patient's impaired ambulation and mobility restrictions and would be unable to resolve these daily living tasks using a cane or walker. The patient is capable of using a standard wheelchair safely in their home and can maneuver within their home with adequate access. There is a caregiver available to provide necessary assistance. The need for this equipment was discussed with the patient and she understands, is in agreement, and has not expressed an unwillingness to use the wheelchair.

## 2021-10-01 NOTE — PROGRESS NOTES
Patient alert to self and place, disoriented to time and situation, VSS, sitting up in bed. Patient denies n/v, diarrhea, pain, states some SOB. Currently on Vapotherm 10 L 70%. Bed in lowest and locked position, bed alarm in place. Non-slip socks on, call light within reach. Will continue to monitor pt needs.

## 2021-10-01 NOTE — CARE COORDINATION
Received message from Dr. Neto Webster that pt is ready for dc to [facility] today. Called grand-daughter Katie Mi for [facility] decision, they would like [facility] [employer] location. Called Tanya with [facility] & gave update; pt qualified for [facility] & needs pre-cert but they can not initiate pre-cert until pt is at 36% (currently 70%). Called bedside nurse & updated her on above & asked that we try to get her to 50% & leave liter flow alone. She will see what she can do. Updated Dr. Neto Webster on above. Brennan Tabares RN, BSN,   [phone]  Electronically signed by Brennan Tabares RN on 10/1/2021 at 9:46 AM     Pt down to 6L high flow, updated Dr. Neto Webster & order for Home O2 study placed. Will update grand-daughters. Brennan Tabares RN, BSN,   [phone]  Electronically signed by Brennan Tabares RN on 10/1/2021 at 11:38 AM     Updated Ander Mitchell with [facility] of potential dc tomorrow. Brennan Tabares RN, BSN,   [phone]  Electronically signed by Brennan Tabares RN on 10/1/2021 at 11:43 AM     DME orders for wheelchair, [facility] & hospital bed placed. Dr. Neto Webster aware. Natha Litter with [employer] notified. Brennan Tabares RN, BSN,   [phone]  Electronically signed by Brennan Tabares RN on 10/1/2021 at 12:48 PM     [facility] can accept pt. Brennan Tabares RN, BSN,   [phone]  Electronically signed by Brennan Tabares RN on 10/1/2021 at 1:22 PM     Pt qualified for O2 @ 3L, DME order placed. Updated Katie Mi of plans, she said she will come get her grandma tomorrow & drive her home herself, she will have her  here to assist her.     Brennan Tabares RN, BSN,   [phone]  Electronically signed by Brennan Tabares RN on 10/1/2021 at 3:35 PM

## 2021-10-01 NOTE — PLAN OF CARE
Delivery Summary    Patient: Sarahy Gramajo MRN: [id number]  SSN: [id number]    YOB: 1986  Age: 29 y.o. Sex: female       Information for the patient's :  Eloina Boss [[id number]]       Labor Events:    Labor: No    Steroids: None   Cervical Ripening Date/Time:       Cervical Ripening Type: None   Antibiotics During Labor: No   Rupture Identifier:      Rupture Date/Time: 2021 5:55 PM   Rupture Type: AROM   Amniotic Fluid Volume: Moderate    Amniotic Fluid Description:      Amniotic Fluid Odor:      Induction: None       Induction Date/Time:        Indications for Induction:      Augmentation: Oxytocin   Augmentation Date/Time: 16:25 PM   Indications for Augmentation: Ineffective Contraction Pattern   Labor complications: None       Additional complications:        Delivery Events:  Indications For Episiotomy:     Episiotomy: None   Perineal Laceration(s): 2nd   Repaired: Yes   Periurethral Laceration Location:      Repaired:     Labial Laceration Location:     Repaired:     Sulcal Laceration Location:     Repaired:     Vaginal Laceration Location:     Repaired:     Cervical Laceration Location:     Repaired:     Repair Suture: Rapide 2-0   Number of Repair Packets:     Estimated Blood Loss (ml):  ml   Quantitative Blood Loss (ml)                Delivery Date: 2021    Delivery Time: 10:28 PM  Delivery Type: Vaginal, Spontaneous  Sex:  Male    Gestational Age: 38w3d   Delivery Clinician:  Alexys Salmeron  Living Status: Living   Delivery Location: [facility]          APGARS  One minute Five minutes Ten minutes   Skin color:            Heart rate:            Grimace:            Muscle tone:            Breathing: Totals:                Presentation: Vertex    Position: Right Occiput Anterior  Resuscitation Method:  Suctioning-bulb; Tactile Stimulation; Oxygen     Meconium Stained: None      Cord Information: 3 Vessels  Complications: None  Cord around: Problem: Falls - Risk of:  Goal: Will remain free from falls  Description: Will remain free from falls  9/30/2021 2246 by Prasanth Bain RN  Outcome: Ongoing     Problem: Falls - Risk of:  Goal: Absence of physical injury  Description: Absence of physical injury  9/30/2021 2246 by Prasanth Bain RN  Outcome: Ongoing     Problem: Skin Integrity:  Goal: Will show no infection signs and symptoms  Description: Will show no infection signs and symptoms  9/30/2021 2246 by Prasanth Bain RN  Outcome: Ongoing     Problem: Skin Integrity:  Goal: Absence of new skin breakdown  Description: Absence of new skin breakdown  9/30/2021 2246 by Prasanth Bain RN  Outcome: Ongoing     Problem: Safety:  Goal: Free from accidental physical injury  Description: Free from accidental physical injury  9/30/2021 2246 by Prasanth Bain RN  Outcome: Ongoing     Problem: Safety:  Goal: Free from intentional harm  Description: Free from intentional harm  9/30/2021 2246 by Prasanth Bain RN  Outcome: Ongoing     Problem: Daily Care:  Goal: Daily care needs are met  Description: Daily care needs are met  9/30/2021 2246 by Prasanth Bain RN  Outcome: Ongoing     Problem: Airway Clearance - Ineffective  Goal: Achieve or maintain patent airway  9/30/2021 2246 by Prasanth Bain RN  Outcome: Ongoing     Problem: Gas Exchange - Impaired  Goal: Absence of hypoxia  9/30/2021 2246 by Prasanth Bain RN  Outcome: Ongoing     Problem: Gas Exchange - Impaired  Goal: Promote optimal lung function  9/30/2021 2246 by Prasanth Bain RN  Outcome: Ongoing     Problem: Breathing Pattern - Ineffective  Goal: Ability to achieve and maintain a regular respiratory rate  9/30/2021 2246 by Prasanth Bain RN  Outcome: Ongoing     Problem: Body Temperature -  Risk of, Imbalanced  Goal: Ability to maintain a body temperature within defined limits  9/30/2021 2246 by Prasanth Bain RN  Outcome: Ongoing     Problem:  Body Temperature -  Risk of, Imbalanced  Goal: Will regain Delayed cord clamping? Yes  Cord clamped date/time:2021 10:30 PM  Disposition of Cord Blood: Lab    Blood Gases Sent?: No    Placenta:  Date/Time: 2021 10:32 PM  Removal: Spontaneous      Appearance: Normal      Measurements:  Birth Weight: 4.25 kg      Birth Length: 56 cm      Head Circumference: 37 cm      Chest Circumference: 37 cm     Abdominal Girth: Other Providers:   MILDRED COSTELLO;DAMON AMATO;ADDY SANCHEZ;ZORA JUDGE;ELDER ALVES;FILIPPO PA, Obstetrician;Primary Nurse;Scrub Tech;Charge Nurse;Primary  Nurse;Neonatologist;Respiratory Therapist           Group B Strep: No results found for: Seymour Colon  Information for the patient's :  Radha Evans [[id number]]   No results found for: ABORH, PCTABR, PCTDIG, BILI, ABORHEXT, ABORH     No results for input(s): PCO2CB, PO2CB, HCO3I, SO2I, IBD, PTEMPI, SPECTI, PHICB, ISITE, IDEV, IALLEN in the last 72 hours.  of LVMI, 9#6oz over 2nd degree lac. Repaired with 2-0 Vicryl rapide. or maintain usual level of consciousness  9/30/2021 2246 by Claude Crafts, RN  Outcome: Ongoing     Problem:  Body Temperature -  Risk of, Imbalanced  Goal: Complications related to the disease process, condition or treatment will be avoided or minimized  9/30/2021 2246 by Claude Crafts, RN  Outcome: Ongoing     Problem: Isolation Precautions - Risk of Spread of Infection  Goal: Prevent transmission of infection  9/30/2021 2246 by Claude Crafts, RN  Outcome: Ongoing     Problem: Nutrition Deficits  Goal: Optimize nutritional status  9/30/2021 2246 by Claude Crafts, RN  Outcome: Ongoing     Problem: Risk for Fluid Volume Deficit  Goal: Maintain normal heart rhythm  9/30/2021 2246 by Claude Crafts, RN  Outcome: Ongoing     Problem: Risk for Fluid Volume Deficit  Goal: Maintain absence of muscle cramping  9/30/2021 2246 by Claude Crafts, RN  Outcome: Ongoing     Problem: Risk for Fluid Volume Deficit  Goal: Maintain normal serum potassium, sodium, calcium, phosphorus, and pH  9/30/2021 2246 by Claude Crafts, RN  Outcome: Ongoing     Problem: Loneliness or Risk for Loneliness  Goal: Demonstrate positive use of time alone when socialization is not possible  9/30/2021 2246 by Claude Crafts, RN  Outcome: Ongoing     Problem: Fatigue  Goal: Verbalize increase energy and improved vitality  9/30/2021 2246 by Claude Crafts, RN  Outcome: Ongoing     Problem: Patient Education: Go to Patient Education Activity  Goal: Patient/Family Education  9/30/2021 2246 by Claude Crafts, RN  Outcome: Ongoing     Problem: Nutrition  Goal: Optimal nutrition therapy  9/30/2021 2246 by Claude Crafts, RN  Outcome: Ongoing     Problem: Confusion - Acute:  Goal: Absence of continued neurological deterioration signs and symptoms  Description: Absence of continued neurological deterioration signs and symptoms  9/30/2021 2246 by Claude Crafts, RN  Outcome: Ongoing     Problem: Confusion - Acute:  Goal: Mental status will be restored to baseline  Description: Mental status will be restored to baseline  9/30/2021 2246 by Jacquelyn Villanueva RN  Outcome: Ongoing     Problem: Discharge Planning:  Goal: Ability to perform activities of daily living will improve  Description: Ability to perform activities of daily living will improve  9/30/2021 2246 by Jacquelyn Villanueva RN  Outcome: Ongoing     Problem: Discharge Planning:  Goal: Participates in care planning  Description: Participates in care planning  9/30/2021 2246 by Jacquelyn Villanueva RN  Outcome: Ongoing     Problem: Injury - Risk of, Physical Injury:  Goal: Will remain free from falls  Description: Will remain free from falls  9/30/2021 2246 by Jacquelyn Villanueva RN  Outcome: Ongoing     Problem: Injury - Risk of, Physical Injury:  Goal: Absence of physical injury  Description: Absence of physical injury  9/30/2021 2246 by Jacquelyn Villanueva RN  Outcome: Ongoing     Problem: Mood - Altered:  Goal: Mood stable  Description: Mood stable  9/30/2021 2246 by Jacquelyn Villanueva RN  Outcome: Ongoing     Problem: Mood - Altered:  Goal: Absence of abusive behavior  Description: Absence of abusive behavior  9/30/2021 2246 by Jacquelyn Villanueva RN  Outcome: Ongoing     Problem: Mood - Altered:  Goal: Verbalizations of feeling emotionally comfortable while being cared for will increase  Description: Verbalizations of feeling emotionally comfortable while being cared for will increase  9/30/2021 2246 by Jacquelyn Villanueva RN  Outcome: Ongoing     Problem: Psychomotor Activity - Altered:  Goal: Absence of psychomotor disturbance signs and symptoms  Description: Absence of psychomotor disturbance signs and symptoms  9/30/2021 2246 by Jacquelyn Villanueva RN  Outcome: Ongoing     Problem: Sensory Perception - Impaired:  Goal: Demonstrations of improved sensory functioning will increase  Description: Demonstrations of improved sensory functioning will increase  9/30/2021 2246 by Jacquelyn Villanueva RN  Outcome: Ongoing     Problem: Sensory Perception - Impaired:  Goal: Decrease in sensory misperception frequency  Description: Decrease in sensory misperception frequency  9/30/2021 2246 by Mildred Grant RN  Outcome: Ongoing     Problem: Sensory Perception - Impaired:  Goal: Able to refrain from responding to false sensory perceptions  Description: Able to refrain from responding to false sensory perceptions  9/30/2021 2246 by Mildred Grant RN  Outcome: Ongoing     Problem: Sensory Perception - Impaired:  Goal: Demonstrates accurate environmental perceptions  Description: Demonstrates accurate environmental perceptions  9/30/2021 2246 by Mildred Grant RN  Outcome: Ongoing     Problem: Sensory Perception - Impaired:  Goal: Able to distinguish between reality-based and nonreality-based thinking  Description: Able to distinguish between reality-based and nonreality-based thinking  9/30/2021 2246 by Mildred Grant RN  Outcome: Ongoing     Problem: Sensory Perception - Impaired:  Goal: Able to interrupt nonreality-based thinking  Description: Able to interrupt nonreality-based thinking  9/30/2021 2246 by Mildred Grant RN  Outcome: Ongoing     Problem: Sleep Pattern Disturbance:  Goal: Appears well-rested  Description: Appears well-rested  9/30/2021 2246 by Mildred Grant RN  Outcome: Ongoing

## 2021-10-01 NOTE — PROGRESS NOTES
Speech Language Pathology  [facility]   Speech Therapy  Daily Dysphagia Treatment Note        Tr Bean  AGE: 80 y.o. GENDER: female  : 1931  [id number]  EPISODE DATE:  2021   ADMISSION DATE: 2021  ADMITTING DIAGNOSIS: The primary encounter diagnosis was Hypoxia. A diagnosis of Pneumonia due to infectious organism, unspecified laterality, unspecified part of lung was also pertinent to this visit. · covid 19 droplet plus contact precautions  ·  has Aortic calcification ([address] Hypercholesteremia; Nonrheumatic aortic valve stenosis; Right carotid artery occlusion; Arthritis; Essential hypertension; Carotid stenosis, asymptomatic, right; History of transcatheter aortic valve implantation ([place]); PAF (paroxysmal atrial fibrillation) ([address] Hypoxia; Acute respiratory failure with hypoxia ([facility] [address] Pneumonia due to COVID-19 virus; Coronary artery disease involving native coronary artery of native heart without angina pectoris; and Mild malnutrition (HCC) on their problem list.  ·  has a past medical history of Aortic stenosis, Arthritis, COVID-19, and PAF (paroxysmal atrial fibrillation) ([facility] [address] · Pt;s family member also recently hospitalized at time of pt's admit  · No known allergies  · Baseline: per SW documentation: lives with dtr and granddaughter. Reporting IND with ADLs. No other information available. Pt is a poor historian. Pt's primary Decision maker is documented at [facility] (grand child; [phone])per orders documentation goal is for return home with family assist for care.    ONSET DATE: 2021     Treatment Diagnosis: Dysphagia       Chart review:   Chart Review  · MD History and Physical Documentation revealed:  History Of Present Illness:  Patient is a 40-year-old female with past medical history of CAD, aortic stenosis status post AVR who presented to hospital for shortness of breath.  Patient is a poor historian, reportedly patient was brought to the hospital for shortness of breath.  Patient was found hypoxic in upper 80s on room air, was transitioned to nasal cannula.  No reports of fevers chills chest pain nausea vomiting or diarrhea.     · 9/15/2021 : Palliative Care consult  · Pt with respiratory issues and increasing need for O2 per documentation since admit  · PO intake per documentation has been poor since admit. Has been on oral supplement  · Pt active with PT/OT since early admit  · 9/21/2021 Cardiology consult for A-Fib onset  · 9/24/2021 Psych consult with diagnosis: Axis 1: Delirium  · 9/29/2021: SLP referral for Swallowing Assessment. Attempt this date; pt too lethargic and unable to sustain arousal for active participation in assessment  · 9/30/2021 : Clinical Evaluation of Swallowing completed. Pt refused all textured foods; s/s with thin liquids. Diet downgraded to puree with thin liquids  · 10/1/2021: pt has been weaned to 3L/min O2 via nasal canula! !!     Subjective:     Current diet   dysphagia puree   Mildly thick liquids (nectar thick)  Meds; crush and give with puree     Comments regarding tolerating Current Diet:   · RN reporting pt is doing better overall. Potential d/c in the next day or so if continues to do well. · Pt verbally responsive and receptive to some po trials. Objective:     Pain   Patient Currently in Pain: Denies    Cognitive/Behavior   Behavior/Cognition: Pt awake in bed. Pt verbally responsive. Oriented to self only. Poor historian. Pt is [place] and did not follow commands for positioning. Pt did follow simple commands with visual / tactile cues. Pt identifying likes/dislikes. Pt refused/declined to put in dentures. Pt has been weaned to 3L/min O2 via nasal canula. Positioning    ~70 degrees in bed. Pt kept scooting down. PO Trials:  · Thin Liquids: via cup. Persistent inconsistent s/s with concern for aspiration post swallow (wet voice quality) .  Pt with inconsistent spontaneous throat clearing to clear wet voice post swallow. Concern for reduced liquid bolus control/delayed swallow  · Nectar thick liquids: Better tolerated without overt clinical s/s post swallow; no voice quality changes post swallow; no pt complaints post swallow  · Puree: Tolerated without overt clinical s/s post swallow; no voice quality changes post swallow; no pt complaints post swallow. However pt would only take <7 presentations total in session  · Soft food: pt spit out both presentations      Dysphagia Tx:   Direct Dysphagia tx: Limited to po presentations. As on 9/30/2021; pt refused or spit out all textured foods. Pt did take thin and thick liquids and puree. Pt with inconsistent s/s with concern for aspiration thin liquids (wet voice quality without sensation)  Dysphagia ex: pt unable to complete due to mental status and due to hearing deficits making it difficult to condition   Training in compensatory strategies: Max cues/repetition  Pt response to ex/training: Max cues/repetition    Goals:   Dysphagia Goals: The patient will tolerate dysphagia puree with mildly thick liquids diet without observed clinical signs of aspiration, :continue  The patient/caregiver will demonstrate understanding of compensatory strategies for improved swallowing safety. continue    Assessment:   Impressions:   Per 9/30/2021 Initial Clinical Evaluation of Swallowing Dysphagia Diagnosis:    Oropharyngeal dysphagia features. Assessment limited as pt refused all textured foods and mastication was not assessed due to refusal.   Dysphagia characterized by episodic holding of material in mouth; delayed initiation of A-P oral transit and swallow; reduced laryngeal elevation. Pt with immediate s/s with concern for aspiration with thin liquids and straw presentations of nectar thick. Pt appeared to better tolerate tsp and cup presentations of mildly thick and moderately thick (nectar and homey thick)  liquids and puree.  Recommend downgrade to mildly thick liquids via tsp or cup and puree. Ongoing assessment, as mental status and acceptance of textured foods improve, for diet upgrade readiness. Discussed with RN    SLP Dysphagia therapy impressions:   · Pt awake in bed. Pt verbally responsive. Oriented to self only. Poor historian. Pt is [place] and did not follow commands for positioning. Pt did follow simple commands with visual / tactile cues. Pt identifying likes/dislikes. Pt refused/declined to put in dentures. · Pt has been weaned to 3L/min O2 via nasal canula. · Pt with persistent inconsistent s/s with concern for aspiration of thin liquids  · Pt appeared to better tolerate the mildly thick liquids and dysphagia puree without overt clinical s/s of aspiration. · Pt refused or spit out all textured food consistencies. Pt actually did attempt but spit out. · Pt refusing/declining to put in dentures  · Pt unable to participate in dysphagia ex due mental status/[place] making it difficulty to condition pt to ex  · Pt is cue dependent on compensatory strategies /positioning. Residual Oropharyngeal Dysphagia with concern for aspiration of thin liquids. Pt appears to tolerate dysphagia puree and mildly thick (nectar thick ) liquids. Pt attempted textured food today but spit out. Pt confusion/[place] impacts ability for SLP to provide ed ; training; trial of dysphagia ex.  Plan to continue dysphagia puree with mildly thick liquids  · Discussed with RN    Diet Recommendations:    Continue puree with mildly thick liquids  meds with puree  Isolated sips fo thin H20 with staff between meals after good oral care    Strategies:   Compensatory Swallowing Strategies: No straws, Upright as possible for all oral intake, Small bites/sips, Swallow 2 times per bite/sip, Remain upright for 30-45 minutes after meals, Assist feed    Education:  Consulted and agree with results and recommendations: Patient, RN   Patient Education Response: No evidence of learning    Prognosis:   Good guarded; medical Dx; mental status    Plan:     Continue Dysphagia Therapy:   Interventions: Therapeutic Interventions: Diet tolerance monitoring, Therapeutic PO trials with SLP, Patient/Family education  Duration/Frequency of therapy while on unit: Duration/Frequency of Treatment  Duration/Frequency of Treatment: 3-5 times a week while on acute medcal floor  Discharge Instructions:   Anticipate Yes__X__No__ for further skilled Speech Therapy for Dysphagia at discharge    This note serves as a D/C Summary in the event that this patient is discharged prior to the next therapy session. Coded treatment time: 10  Total treatment time: 25    Electronically signed dipak WaltonMS,CCC,SLP [id number]  Speech and Language Pathologist  on 10/1/2021 at 1:28 PM

## 2021-10-02 VITALS
BODY MASS INDEX: 20.69 KG/M2 | WEIGHT: 105.38 LBS | RESPIRATION RATE: 18 BRPM | OXYGEN SATURATION: 93 % | DIASTOLIC BLOOD PRESSURE: 72 MMHG | HEART RATE: 78 BPM | HEIGHT: 60 IN | TEMPERATURE: 97.6 F | SYSTOLIC BLOOD PRESSURE: 119 MMHG

## 2021-10-02 PROBLEM — R09.02 HYPOXIA: Status: RESOLVED | Noted: 2021-09-13 | Resolved: 2021-10-02

## 2021-10-02 LAB
ALBUMIN SERPL-MCNC: 2.8 G/DL (ref 3.4–5)
ALP BLD-CCNC: 112 U/L (ref 40–129)
ALT SERPL-CCNC: 26 U/L (ref 10–40)
ANION GAP SERPL CALCULATED.3IONS-SCNC: 10 MMOL/L (ref 3–16)
AST SERPL-CCNC: 18 U/L (ref 15–37)
BILIRUB SERPL-MCNC: 0.4 MG/DL (ref 0–1)
BILIRUBIN DIRECT: <0.2 MG/DL (ref 0–0.3)
BILIRUBIN, INDIRECT: ABNORMAL MG/DL (ref 0–1)
BUN BLDV-MCNC: 16 MG/DL (ref 7–20)
CALCIUM SERPL-MCNC: 8.8 MG/DL (ref 8.3–10.6)
CHLORIDE BLD-SCNC: 101 MMOL/L (ref 99–110)
CO2: 25 MMOL/L (ref 21–32)
CREAT SERPL-MCNC: <0.5 MG/DL (ref 0.6–1.2)
GFR AFRICAN AMERICAN: >60
GFR NON-AFRICAN AMERICAN: >60
GLUCOSE BLD-MCNC: 97 MG/DL (ref 70–99)
POTASSIUM REFLEX MAGNESIUM: 4.5 MMOL/L (ref 3.5–5.1)
SODIUM BLD-SCNC: 136 MMOL/L (ref 136–145)
TOTAL PROTEIN: 5.6 G/DL (ref 6.4–8.2)

## 2021-10-02 PROCEDURE — 6370000000 HC RX 637 (ALT 250 FOR IP): Performed by: INTERNAL MEDICINE

## 2021-10-02 PROCEDURE — 94640 AIRWAY INHALATION TREATMENT: CPT

## 2021-10-02 PROCEDURE — 2700000000 HC OXYGEN THERAPY PER DAY

## 2021-10-02 PROCEDURE — 94761 N-INVAS EAR/PLS OXIMETRY MLT: CPT

## 2021-10-02 PROCEDURE — 36415 COLL VENOUS BLD VENIPUNCTURE: CPT

## 2021-10-02 PROCEDURE — 2580000003 HC RX 258: Performed by: INTERNAL MEDICINE

## 2021-10-02 PROCEDURE — 6360000002 HC RX W HCPCS: Performed by: INTERNAL MEDICINE

## 2021-10-02 PROCEDURE — 80048 BASIC METABOLIC PNL TOTAL CA: CPT

## 2021-10-02 PROCEDURE — 80076 HEPATIC FUNCTION PANEL: CPT

## 2021-10-02 RX ORDER — FLUCONAZOLE 100 MG/1
100 TABLET ORAL DAILY
Qty: 4 TABLET | Refills: 0 | Status: SHIPPED | OUTPATIENT
Start: 2021-10-03 | End: 2021-10-07

## 2021-10-02 RX ORDER — METOPROLOL TARTRATE 50 MG/1
50 TABLET, FILM COATED ORAL 2 TIMES DAILY
Qty: 60 TABLET | Refills: 3 | Status: SHIPPED | OUTPATIENT
Start: 2021-10-02 | End: 2022-04-05 | Stop reason: SDUPTHER

## 2021-10-02 RX ORDER — DILTIAZEM HYDROCHLORIDE 120 MG/1
120 CAPSULE, COATED, EXTENDED RELEASE ORAL DAILY
Qty: 30 CAPSULE | Refills: 3 | Status: SHIPPED | OUTPATIENT
Start: 2021-10-02 | End: 2022-04-05 | Stop reason: SDUPTHER

## 2021-10-02 RX ORDER — ALBUTEROL SULFATE 90 UG/1
2 AEROSOL, METERED RESPIRATORY (INHALATION) 2 TIMES DAILY
Qty: 18 G | Refills: 3 | Status: CANCELLED | OUTPATIENT
Start: 2021-10-02

## 2021-10-02 RX ADMIN — DILTIAZEM HYDROCHLORIDE 60 MG: 60 TABLET, FILM COATED ORAL at 10:33

## 2021-10-02 RX ADMIN — DEXAMETHASONE 4 MG: 4 TABLET ORAL at 10:33

## 2021-10-02 RX ADMIN — DILTIAZEM HYDROCHLORIDE 60 MG: 60 TABLET, FILM COATED ORAL at 05:34

## 2021-10-02 RX ADMIN — Medication 2 PUFF: at 08:36

## 2021-10-02 RX ADMIN — FLUCONAZOLE 100 MG: 100 TABLET ORAL at 10:34

## 2021-10-02 RX ADMIN — DILTIAZEM HYDROCHLORIDE 60 MG: 60 TABLET, FILM COATED ORAL at 00:58

## 2021-10-02 RX ADMIN — IPRATROPIUM BROMIDE 2 SPRAY: 42 SPRAY NASAL at 10:33

## 2021-10-02 RX ADMIN — APIXABAN 2.5 MG: 2.5 TABLET, FILM COATED ORAL at 10:33

## 2021-10-02 RX ADMIN — SODIUM CHLORIDE, PRESERVATIVE FREE 10 ML: 5 INJECTION INTRAVENOUS at 10:00

## 2021-10-02 RX ADMIN — POTASSIUM BICARBONATE 40 MEQ: 782 TABLET, EFFERVESCENT ORAL at 10:32

## 2021-10-02 RX ADMIN — ASPIRIN 81 MG: 81 TABLET, COATED ORAL at 10:33

## 2021-10-02 RX ADMIN — METOPROLOL TARTRATE 50 MG: 50 TABLET, FILM COATED ORAL at 10:33

## 2021-10-02 RX ADMIN — Medication 2 PUFF: at 08:35

## 2021-10-02 NOTE — DISCHARGE SUMMARY
Hospital Medicine Discharge Summary    Patient: Tresa Womack     Gender: female  : 1931   Age: 80 y.o. MRN: [id number]    Code Status: DNR-CCA     Primary Care Provider: Antwan Lindo MD    Admit Date: 2021   Discharge Date: 10/2/2021      Admitting Physician: Janine Urbina MD  Discharge Physician: John Phillips DO     Discharge Diagnoses: Active Hospital Problems    Diagnosis Date Noted    Mild malnutrition ([facility] [address] [E44.1] 2021    Acute respiratory failure with hypoxia (HCC) [J96.01]     Pneumonia due to 2019 novel coronavirus [U07.1, J12.82]     Coronary artery disease involving native coronary artery of native heart without angina pectoris [I25.10]     History of transcatheter aortic valve implantation ([facility]) [Z95.2] 10/02/2019       Hospital Course:  80-year-old female with past medical history of CAD, aortic stenosis status post AVR who presented to hospital for shortness of breath. Patient was found hypoxic in upper 80s on room air, was transitioned to nasal cannula. She was covid +. Work up completed, and improved with treatment as below. was discharged today in stable condition. Covid pna -tested + 21  Acute hypoxic respiratory failure, POA - improving, now on 2L nc  - with criteria: < 90% on RA, accessory muscle use, RR> 18  - supplemental oxygen as necessary to keep SaO2 > 90%  - closely monitor for thromboemboli - lovenox bid  - on decadron 4 daily, steroid day 14, now stopped  - remdesivir/baricitinib completed  - she has been afebrile for > 4 days, improving O2 needs,  and test was 17 days ago.  She is stable for dc home today     afib with rvr  - now rate controlled  - discharge on eliquis 2.5 mg bid, lopressor 50 mg bid, and cardizem 120 mg daily    Acute encephalopathy - improving  - metabolic   - due to covid infection  - head CT negative     UTI   Yeast      Diflucan x 7 days      Chronic medical conditions - cont home meds unless otherwise indicated   AS, S/p TAVR    Recent  Echo  7/21  Valve  Functioning  Well    Disposition:  Home    Exam:     /72   Pulse 78   Temp 97.6 °F (36.4 °C) (Axillary)   Resp 18   Ht 5' (1.524 m)   Wt 105 lb 6.1 oz (47.8 kg)   SpO2 93%   BMI 20.58 kg/m²     General appearance:  No apparent distress, appears comfortable   HEENT:  Normal cephalic, atraumatic without obvious deformity. Pupils equal, round, and reactive to light. Extra ocular muscles intact. Conjunctivae/corneas clear. Neck: Supple, with full range of motion. No jugular venous distention. Trachea midline. Respiratory:  improving air movement. Clear to auscultation, bilaterally without Rales/Wheezes/Rhonchi. Cardiovascular:  Regular rate and rhythm with normal S1/S2 without murmurs, rubs or gallops. Abdomen: Soft, non-tender, non-distended with normal bowel sounds. Musculoskeletal:  No clubbing, cyanosis or edema bilaterally. Full range of motion without deformity. Skin: Skin color, texture, turgor normal.  No rashes or lesions. Neurologic:  Neurovascularly intact without any focal sensory/motor deficits. Cranial nerves: II-XII intact, grossly non-focal.  Psychiatric:  Alert and oriented, thought content appropriate, normal insight    Consults:     IP CONSULT TO PALLIATIVE CARE  IP CONSULT TO CARDIOLOGY  IP CONSULT TO PSYCHIATRY    Labs:  For convenience and continuity at follow-up the following most recent labs are provided:    Lab Results   Component Value Date    WBC 15.8 09/28/2021    HGB 11.9 09/28/2021    HCT 35.8 09/28/2021    MCV 82.6 09/28/2021     09/28/2021     10/02/2021    K 4.5 10/02/2021     10/02/2021    CO2 25 10/02/2021    BUN 16 10/02/2021    CREATININE <0.5 10/02/2021    CALCIUM 8.8 10/02/2021    ALKPHOS 112 10/02/2021    ALT 26 10/02/2021    AST 18 10/02/2021    BILITOT 0.4 10/02/2021    BILIDIR <0.2 10/02/2021    LABALBU 2.8 10/02/2021    LDLCALC 120 03/28/2019    TRIG 90 03/28/2019     Lab Results Component Value Date    INR 0.93 05/16/2019    INR 0.84 (L) 04/08/2019       Radiology:  XR CHEST PORTABLE   Final Result   1. Study limited by patient rotation. 2. Interval progression of multifocal pneumonia. 3. Small bilateral pleural effusions. XR CHEST 1 VIEW   Final Result   Slight increase in bilateral pulmonary opacities         CT CHEST PULMONARY EMBOLISM W CONTRAST   Final Result   No evidence of significant pulmonary embolism. Extensive multifocal bilateral airspace disease compatible with multifocal   pneumonia including viral pneumonia. Dilation of the main and proximal pulmonary arteries could represent   pulmonary arterial hypertension. Large hiatal hernia. XR CHEST PORTABLE   Final Result   Findings most consistent with multifocal pneumonia.              Discharge Medications:   Discharge Medication List as of 10/2/2021  2:52 PM      START taking these medications    Details   metoprolol tartrate (LOPRESSOR) 50 MG tablet Take 1 tablet by mouth 2 times daily, Disp-60 tablet, R-3Normal      fluconazole (DIFLUCAN) 100 MG tablet Take 1 tablet by mouth daily for 4 days, Disp-4 tablet, R-0Normal      dilTIAZem (CARDIZEM CD) 120 MG extended release capsule Take 1 capsule by mouth daily, Disp-30 capsule, R-3Normal      apixaban (ELIQUIS) 2.5 MG TABS tablet Take 1 tablet by mouth 2 times daily, Disp-60 tablet, R-1Normal      albuterol-ipratropium (COMBIVENT RESPIMAT)  MCG/ACT AERS inhaler Inhale 1 puff into the lungs every 6 hours, Disp-1 each, R-3Normal           Discharge Medication List as of 10/2/2021  2:52 PM        Discharge Medication List as of 10/2/2021  2:52 PM      CONTINUE these medications which have NOT CHANGED    Details   ipratropium (ATROVENT) 0.06 % nasal spray 2 sprays by Each Nostril route 4 times daily, Disp-1 Bottle, R-3Normal           Discharge Medication List as of 10/2/2021  2:52 PM      STOP taking these medications       losartan (COZAAR) 50 MG tablet Comments:   Reason for Stopping:         meloxicam (MOBIC) 7.5 MG tablet Comments:   Reason for Stopping:         EQ ASPIRIN ADULT LOW DOSE 81 MG EC tablet Comments:   Reason for Stopping: Follow-up appointments:  one week    Provider Follow-up:    pcp    Condition at Discharge:  Stable    The patient was seen and examined on day of discharge and this discharge summary is in conjunction with any daily progress note from day of discharge. Time Spent on discharge is 45 minutes  in the examination, evaluation, counseling and review of medications and discharge plan. Signed:    Sintia Daniels DO   10/2/2021      Thank you Laila Noguera MD for the opportunity to be involved in this patient's care. If you have any questions or concerns please feel free to contact me at [phone].

## 2021-10-02 NOTE — DISCHARGE INSTR - DIET
 Good nutrition is important when healing from an illness, injury, or surgery. Follow any nutrition recommendations given to you during your hospital stay.  If you were given an oral nutrition supplement while in the hospital, continue to take this supplement at home. You can take it with meals, in-between meals, and/or before bedtime. These supplements can be purchased at most local grocery stores, pharmacies, and chain super-stores.  If you have any questions about your diet or nutrition, call the hospital and ask for the dietitian. Dysphagia pureed, Mild nectar thick liquids       Learning About the Diet for Swallowing Problems  What are swallowing problems? Difficulty swallowing is also called dysphagia (say \"xey-QEU-lif-uh\"). It is most often a sign of a problem with your throat or esophagus. This is the tube that moves food and liquids from the back of your mouth to your stomach. Trouble swallowing can occur when the muscles and nerves that move food through the throat and esophagus are not working right. To help you swallow food, your doctor or speech therapist may advise a special dysphagia diet for you. Why is a special diet important? A dysphagia diet can help you handle some problems that can occur when it's hard to swallow food and liquids easily. These problems can include:  · Malnutrition. This means you aren't getting enough healthy foods to keep your body working well. · Dehydration. This means you aren't getting enough liquids to keep your body healthy. · Aspiration. This means that food, liquid, or saliva goes down your windpipe (trachea) into your lungs, instead of down your esophagus to your stomach. This can lead to aspiration pneumonia, which is an inflammation of the lungs. What is the dysphagia diet? In the dysphagia diet, you change the foods you eat and the liquids you drink to make it easier to swallow them. You may:  · Change the texture of the foods you eat.  Your doctor or speech therapist may advise you to eat one of these types of foods:  ? Easy-to-chew foods. These are foods that are soft or tender. ? Soft and bite-sized foods. These are soft foods that have been cut into small pieces. ? Minced and moist foods. These are very soft, small, and moist lumps of food that need very little chewing. ? Pureed foods. These are foods that have been blended smooth. The puree must be thick enough to hold its shape on a spoon. These foods don't need to be chewed. ? Liquidized foods. These are foods that have been blended smooth but are not as thick as pureed foods. You can drink them from a cup. · Thicken the liquids you drink. Your doctor or speech therapist will tell you what kind of thickener to use and how thick to make the liquids. ? Slightly thick liquids. These are thicker than water but can flow through a straw. ? Mildly thick liquids. These can be sipped from a cup but take some effort to drink with a straw. ? Moderately thick liquids. These liquids are thick enough to drink from a cup or from a spoon. But they are hard to drink through a wide straw. ? Extremely thick liquids. These are thick enough to hold their shape on a spoon. They are too thick to drink from a cup or suck through a straw. Your speech therapist will help you learn exercises to train your muscles to work together so you can swallow. You may also need to learn how to position your body or how to put food in your mouth to be able to swallow better. Follow-up care is a key part of your treatment and safety. Be sure to make and go to all appointments, and call your doctor if you are having problems. It's also a good idea to know your test results and keep a list of the medicines you take. Where can you learn more? Go to [email or web address].[email or web address] org and sign in to your [employer] account.  Enter N185 in the [facility] box to learn more about \"Learning About the Diet for Swallowing

## 2021-10-02 NOTE — PLAN OF CARE
Problem: Falls - Risk of:  Goal: Will remain free from falls  Description: Will remain free from falls  10/2/2021 1109 by Filemon Leung RN  Outcome: Ongoing  Wheels of bed locked x 4. Room free from clutter. Non-skid footwear intact. Call light in reach of patient at all times. Bed alarm active.     Problem: Falls - Risk of:  Goal: Absence of physical injury  Description: Absence of physical injury  10/2/2021 1109 by Filemon Leung RN  Outcome: Ongoing     Problem: Skin Integrity:  Goal: Will show no infection signs and symptoms  Description: Will show no infection signs and symptoms  10/2/2021 1109 by Filemon Leung RN  Outcome: Ongoing     Problem: Skin Integrity:  Goal: Absence of new skin breakdown  Description: Absence of new skin breakdown  10/2/2021 1109 by Filemon Leung RN  Outcome: Ongoing  10/2/2021 0137 by Mango Lynn RN  Outcome: Ongoing     Problem: Safety:  Goal: Free from accidental physical injury  Description: Free from accidental physical injury  10/2/2021 1109 by Filemon Leung RN  Outcome: Ongoing     Problem: Safety:  Goal: Free from intentional harm  Description: Free from intentional harm  10/2/2021 1109 by Filemon Leung RN  Outcome: Ongoing  10/2/2021 0137 by Mango Lynn RN  Outcome: Ongoing     Problem: Daily Care:  Goal: Daily care needs are met  Description: Daily care needs are met  10/2/2021 1109 by Filemon Leung RN  Outcome: Ongoing     Problem: Airway Clearance - Ineffective  Goal: Achieve or maintain patent airway  10/2/2021 1109 by Filemon Leung RN  Outcome: Ongoing  10/2/2021 0137 by Mango Lynn RN  Outcome: Ongoing     Problem: Gas Exchange - Impaired  Goal: Absence of hypoxia  10/2/2021 1109 by Filemon Leung RN  Outcome: Ongoing     Problem: Gas Exchange - Impaired  Goal: Promote optimal lung function  10/2/2021 1109 by Filemon Leung RN  Outcome: Ongoing  10/2/2021 0137 by Mango Lynn RN  Outcome: Ongoing     Problem: Breathing Pattern - Ineffective  Goal: Ability to achieve and maintain a regular respiratory rate  10/2/2021 1109 by Immanuel Olivas RN  Outcome: Ongoing  10/2/2021 0137 by Pham Arnold RN  Outcome: Ongoing     Problem: Body Temperature -  Risk of, Imbalanced  Goal: Ability to maintain a body temperature within defined limits  10/2/2021 1109 by Immanuel Olivas RN  Outcome: Ongoing  10/2/2021 0137 by Pham Arnold RN  Outcome: Ongoing     Problem: Isolation Precautions - Risk of Spread of Infection  Goal: Prevent transmission of infection  10/2/2021 1109 by Immanuel Olivas RN  Outcome: Ongoing     Problem: Nutrition Deficits  Goal: Optimize nutritional status  10/2/2021 1109 by Immanuel Olivas RN  Outcome: Ongoing  10/2/2021 0137 by Pham Arnold RN  Outcome: Ongoing     Problem: Risk for Fluid Volume Deficit  Goal: Maintain absence of muscle cramping  10/2/2021 1109 by Immanuel Olivas RN  Outcome: Ongoing     Problem: Loneliness or Risk for Loneliness  Goal: Demonstrate positive use of time alone when socialization is not possible  10/2/2021 1109 by Immanuel Olivas RN  Outcome: Ongoing     Problem: Fatigue  Goal: Verbalize increase energy and improved vitality  10/2/2021 1109 by Immanuel Olivas RN  Outcome: Ongoing     Problem: Patient Education: Go to Patient Education Activity  Goal: Patient/Family Education  10/2/2021 1109 by Immanuel Olivas RN  Outcome: Ongoing     Problem: Nutrition  Goal: Optimal nutrition therapy  10/2/2021 1109 by Immanuel Olivas RN  Outcome: Ongoing   Offer food patient likes.     Problem: Confusion - Acute:  Goal: Mental status will be restored to baseline  Description: Mental status will be restored to baseline  10/2/2021 1109 by Immaunel Olivas RN  Outcome: Ongoing     Problem: Discharge Planning:  Goal: Ability to perform activities of daily living will improve  Description: Ability to perform activities of daily living will improve  10/2/2021 1109 by Goldie Bowling RN  Outcome: Ongoing     Problem: Discharge Planning:  Goal: Participates in care planning  Description: Participates in care planning  10/2/2021 1109 by Goldie Bowling RN  Outcome: Ongoing  10/2/2021 0137 by Janes Cordon RN  Outcome: Ongoing

## 2021-10-02 NOTE — PROGRESS NOTES
Patient on room air at rest 83%  Patient on 2 lpm at rest 88%  Patient on 3 lpm at rest 92%     Attempted to stand to ambulate and patient unable to stand and stated her knees were too weak.      Patient qualifies for 3 lpm    Patient was evaluated today for the diagnosis of covid pna, pulmonary fibrosis. I entered a DME order for home oxygen because the diagnosis and testing requires the patient to have supplemental oxygen. Condition will improve or be benefited by oxygen use. The patient is  able to perform good mobility in a home setting and therefore does require the use of a portable oxygen system. The need for this equipment was discussed with the patient and she understands and is in agreement.

## 2021-10-02 NOTE — PLAN OF CARE
Problem: Falls - Risk of:  Goal: Will remain free from falls  Description: Will remain free from falls  10/2/2021 0137 by Real Armstrong RN  Outcome: Ongoing     Problem: Falls - Risk of:  Goal: Absence of physical injury  Description: Absence of physical injury  10/2/2021 0137 by Rael Armstrong RN  Outcome: Ongoing     Problem: Skin Integrity:  Goal: Will show no infection signs and symptoms  Description: Will show no infection signs and symptoms  10/2/2021 0137 by Real Armstrong RN  Outcome: Ongoing     Problem: Skin Integrity:  Goal: Absence of new skin breakdown  Description: Absence of new skin breakdown  10/2/2021 0137 by Real Armstrong RN  Outcome: Ongoing     Problem: Safety:  Goal: Free from accidental physical injury  Description: Free from accidental physical injury  10/2/2021 0137 by Real Armstrong RN  Outcome: Ongoing     Problem: Safety:  Goal: Free from intentional harm  Description: Free from intentional harm  10/2/2021 0137 by Real Armstrong RN  Outcome: Ongoing     Problem: Daily Care:  Goal: Daily care needs are met  Description: Daily care needs are met  10/2/2021 0137 by Real Armstrong RN  Outcome: Ongoing     Problem: Airway Clearance - Ineffective  Goal: Achieve or maintain patent airway  10/2/2021 0137 by Real Armstrong RN  Outcome: Ongoing     Problem: Gas Exchange - Impaired  Goal: Absence of hypoxia  10/2/2021 0137 by Real Armstrong RN  Outcome: Ongoing     Problem: Gas Exchange - Impaired  Goal: Promote optimal lung function  10/2/2021 0137 by Real Armstrong RN  Outcome: Ongoing     Problem: Breathing Pattern - Ineffective  Goal: Ability to achieve and maintain a regular respiratory rate  10/2/2021 0137 by Real Armstrong RN  Outcome: Ongoing     Problem: Body Temperature -  Risk of, Imbalanced  Goal: Ability to maintain a body temperature within defined limits  10/2/2021 0137 by Real Armstrong RN  Outcome: Ongoing     Problem:  Body Temperature -  Risk of, Imbalanced  Goal: Will regain or maintain usual level of consciousness  10/2/2021 0137 by Lety Mcgee RN  Outcome: Ongoing     Problem:  Body Temperature -  Risk of, Imbalanced  Goal: Complications related to the disease process, condition or treatment will be avoided or minimized  10/2/2021 0137 by Lety Mcgee RN  Outcome: Ongoing     Problem: Isolation Precautions - Risk of Spread of Infection  Goal: Prevent transmission of infection  10/2/2021 0137 by Lety Mcgee RN  Outcome: Ongoing     Problem: Nutrition Deficits  Goal: Optimize nutritional status  10/2/2021 0137 by Lety Mcgee RN  Outcome: Ongoing     Problem: Risk for Fluid Volume Deficit  Goal: Maintain normal heart rhythm  10/2/2021 0137 by Lety Mcgee RN  Outcome: Ongoing     Problem: Risk for Fluid Volume Deficit  Goal: Maintain absence of muscle cramping  10/2/2021 0137 by Lety Mcgee RN  Outcome: Ongoing     Problem: Risk for Fluid Volume Deficit  Goal: Maintain normal serum potassium, sodium, calcium, phosphorus, and pH  10/2/2021 0137 by Lety Mcgee RN  Outcome: Ongoing     Problem: Loneliness or Risk for Loneliness  Goal: Demonstrate positive use of time alone when socialization is not possible  10/2/2021 0137 by Lety Mcgee RN  Outcome: Ongoing     Problem: Fatigue  Goal: Verbalize increase energy and improved vitality  10/2/2021 0137 by Lety Mcgee RN  Outcome: Ongoing     Problem: Patient Education: Go to Patient Education Activity  Goal: Patient/Family Education  10/2/2021 0137 by Lety Mcgee RN  Outcome: Ongoing     Problem: Nutrition  Goal: Optimal nutrition therapy  10/2/2021 0137 by Lety Mcgee RN  Outcome: Ongoing     Problem: Confusion - Acute:  Goal: Absence of continued neurological deterioration signs and symptoms  Description: Absence of continued neurological deterioration signs and symptoms  10/2/2021 0137 by Lety Mcgee RN  Outcome: Ongoing     Problem: Confusion - Acute:  Goal: Mental status will be restored to baseline  Description: Mental status will be restored to baseline  10/2/2021 0137 by Bere Fall RN  Outcome: Ongoing     Problem: Discharge Planning:  Goal: Ability to perform activities of daily living will improve  Description: Ability to perform activities of daily living will improve  10/2/2021 0137 by Bere Fall RN  Outcome: Ongoing     Problem: Discharge Planning:  Goal: Participates in care planning  Description: Participates in care planning  10/2/2021 0137 by Bere Fall RN  Outcome: Ongoing     Problem: Injury - Risk of, Physical Injury:  Goal: Will remain free from falls  Description: Will remain free from falls  10/2/2021 0137 by Bere Fall RN  Outcome: Ongoing     Problem: Injury - Risk of, Physical Injury:  Goal: Absence of physical injury  Description: Absence of physical injury  10/2/2021 0137 by Bere Fall RN  Outcome: Ongoing     Problem: Mood - Altered:  Goal: Mood stable  Description: Mood stable  10/2/2021 0137 by Bere Fall RN  Outcome: Ongoing     Problem: Mood - Altered:  Goal: Absence of abusive behavior  Description: Absence of abusive behavior  10/2/2021 0137 by Bere Fall RN  Outcome: Ongoing     Problem: Mood - Altered:  Goal: Verbalizations of feeling emotionally comfortable while being cared for will increase  Description: Verbalizations of feeling emotionally comfortable while being cared for will increase  10/2/2021 0137 by Bere Fall RN  Outcome: Ongoing     Problem: Psychomotor Activity - Altered:  Goal: Absence of psychomotor disturbance signs and symptoms  Description: Absence of psychomotor disturbance signs and symptoms  10/2/2021 0137 by Bere Fall RN  Outcome: Ongoing     Problem: Sensory Perception - Impaired:  Goal: Demonstrations of improved sensory functioning will increase  Description: Demonstrations of improved sensory functioning will increase  10/2/2021 0137 by Bere Fall RN  Outcome: Ongoing     Problem: Sensory Perception - Impaired:  Goal: Decrease in sensory misperception frequency  Description: Decrease in sensory misperception frequency  10/2/2021 0137 by Tomeka Underwood RN  Outcome: Ongoing     Problem: Sensory Perception - Impaired:  Goal: Able to refrain from responding to false sensory perceptions  Description: Able to refrain from responding to false sensory perceptions  10/2/2021 0137 by Tomeka Underwood RN  Outcome: Ongoing     Problem: Sensory Perception - Impaired:  Goal: Demonstrates accurate environmental perceptions  Description: Demonstrates accurate environmental perceptions  10/2/2021 0137 by Tomeka Underwood RN  Outcome: Ongoing     Problem: Sensory Perception - Impaired:  Goal: Able to distinguish between reality-based and nonreality-based thinking  Description: Able to distinguish between reality-based and nonreality-based thinking  10/2/2021 0137 by Tomeka Underwood RN  Outcome: Ongoing     Problem: Sensory Perception - Impaired:  Goal: Able to interrupt nonreality-based thinking  Description: Able to interrupt nonreality-based thinking  10/2/2021 0137 by Tomeka Underwood RN  Outcome: Ongoing     Problem: Sleep Pattern Disturbance:  Goal: Appears well-rested  Description: Appears well-rested  10/2/2021 0137 by Tomeka Underwood RN  Outcome: Ongoing

## 2021-10-02 NOTE — PROGRESS NOTES
Patient's granddaughter here on unit to review discharge instructions. Verbal and written discharged instructions given to Filomena Wolfe. Questions answered. No concerns. Waiting for granddaughter's spouse to get here for . Call light in reach.

## 2021-10-02 NOTE — CARE COORDINATION
Spoke with Naga Vivas of [address] [place] [address] weekend coverage  at [phone] and he has made arrangements with family to deliver equipment to home around 400. Which is home o2. Bedside commode, wheelchair and hospital bed. Portable o2 tank to be delivered to pts room per Case Management for the ride home. Daughter is to transport home per car around 400pm  [facility]    Electronically signed by DORCAS Duval on 10/2/2021 at 1:14 PM

## 2021-10-04 ENCOUNTER — CARE COORDINATION (OUTPATIENT)
Dept: CASE MANAGEMENT | Age: 86
End: 2021-10-04

## 2021-10-04 ENCOUNTER — TELEPHONE (OUTPATIENT)
Dept: FAMILY MEDICINE CLINIC | Age: 86
End: 2021-10-04

## 2021-10-04 NOTE — CARE COORDINATION
[facility] Transitions Initial Follow Up Call    Call within 2 business days of discharge: Yes    Patient: Roselyn Park Patient : 1931   MRN: <[id number]>  Reason for Admission: Pneumonia due to 2019 novel coronavirus  Discharge Date: 10/2/21 RARS: Readmission Risk Score: 14      Last Discharge [facility]       Complaint Diagnosis Description Type Department Provider    21 Concern For COVID-19; Shortness of Breath Hypoxia . .. ED to Hosp-Admission (Discharged) (ADMITTED) OLIVIA 4N Marie Vargas DO; Terrie Henao. .. Spoke with: Jesus CamposP.OAntonio [address] daughter- HIPAA verified) who states that patient is doing ok. She states that patient has ongoing symptoms of cough, fatigue, and weakness. GD states that patient could walk up and down stairs with laundry and now unable to stand. GD reports that [facility] is due in for a visit today about 3pm. Patient is taking all medications as ordered. GD unable to review medications with writer because she was current at the hospital with her mother in ICU. Patient continue on O2 at 4 liters via nc continuous. O2 sat running about 95% consistently. Family will be scheduling HFU appointment. GD states patient has no c/o cp, sob, dizziness, headache, n/v, diarrhea, abdominal pains, fever, or chills. She report that patient's appetite and fluid intake is good and denies any problems with bladder but she has not had a bowel movement since being home. GD will have home care nurse assess at today's visit. Denies any  needs at present time. Instructed to continue to monitor for any of the above s/s, reporting any that may present to MD immediately. Reminded of COVID 19 precautions. Agreeable to f/u calls. Educated on the use of [facility], urgent care or physicians 24 hr access line if assistance is needed after hours. [facility] provided contact information for future needs. Transitions of Care Initial Call    Was this an external facility discharge? given an opportunity to verbalize any questions and concerns and agrees to contact LPN CC or health care provider for questions related to their healthcare. Reviewed and educated family on any new and changed medications related to discharge diagnosis. Was patient discharged with a pulse oximeter? Yes Discussed and confirmed pulse oximeter discharge instructions and when to notify provider or seek emergency care. LPN CC provided contact information. based on severity of symptoms and risk factors. Plan for next call: follow up appointment-scheduled and medication management-do medication review      Care Transitions 24 Hour Call    Care Transitions Interventions         Follow Up  No future appointments.     Jose Luis Delgado LPN

## 2021-10-05 ENCOUNTER — TELEPHONE (OUTPATIENT)
Dept: FAMILY MEDICINE CLINIC | Age: 86
End: 2021-10-05

## 2021-10-05 NOTE — TELEPHONE ENCOUNTER
CALLED [facility] [phone] AND GAVE ZACHARY THE VERBAL OKAY FOR HOME PT AND ADDED ON SKILLED NURSING     FYI DR Lev Jorgensen

## 2021-10-05 NOTE — TELEPHONE ENCOUNTER
[facility] called and would like a verbal order for OT.      Please call adam back at   [phone]

## 2021-10-08 ENCOUNTER — NURSE ONLY (OUTPATIENT)
Dept: FAMILY MEDICINE CLINIC | Age: 86
End: 2021-10-08
Payer: MEDICARE

## 2021-10-08 DIAGNOSIS — R39.9 UTI SYMPTOMS: Primary | ICD-10-CM

## 2021-10-08 LAB
BILIRUBIN, POC: NORMAL
BLOOD URINE, POC: NORMAL
CLARITY, POC: CLEAR
COLOR, POC: YELLOW
GLUCOSE URINE, POC: NORMAL
KETONES, POC: NORMAL
LEUKOCYTE EST, POC: NORMAL
NITRITE, POC: NORMAL
PH, POC: 7
PROTEIN, POC: NORMAL
SPECIFIC GRAVITY, POC: 1.02
UROBILINOGEN, POC: 0.2

## 2021-10-08 PROCEDURE — 81002 URINALYSIS NONAUTO W/O SCOPE: CPT | Performed by: FAMILY MEDICINE

## 2021-10-08 NOTE — ADT AUTH CERT
Utilization Reviews       Viral Illness, Acute - Care Day 19 (10/1/2021) by Vicky Dance, RN       Review Status Review Entered   Completed 10/7/2021 11:41      Criteria Review      Care Day: 19 Care Date: 10/1/2021 Level of Care: Inpatient Floor    Guideline Day 2    Level Of Care    (X) Floor    Clinical Status    (X) * Hypotension absent    (X) * No requirement for mechanical ventilation    (X) * Oxygenation at baseline or improved    10/7/2021 11:41 AM EDT by Niranjan Conteh Pt is down to 6L o2 nc    (X) * Mental status at baseline    10/7/2021 11:41 AM EDT by Niranjan Conteh She is more awake today    Activity    (X) Advance activity as tolerated    10/7/2021 11:41 AM EDT by Goldie Todd      Attempted to stand to ambulate and patient unable to stand and stated her knees were too weak. Routes    (X) * Oral hydration    (X) Oral or IV medications    10/7/2021 11:41 AM EDT by Goldie Todd      dilTIAZem (CARDIZEM) tablet 60 mgOral4 times per day  metoprolol tartrate (LOPRESSOR) tablet 50 mg OralBID    fluconazole (DIFLUCAN) tablet 100 mgOralDaily    EFFER-K) 40 meq po BID  dexamethasone (DECADRON) tablet 4 mg 4 mgOralDaily    (X) Usual diet    Interventions    (X) Pulse oximetry    10/7/2021 11:41 AM EDT by Niranjan Conteh Patient on room air at rest 83%  Patient on 2 lpm at rest 88%  Patient on 3 lpm at rest 92%    Patient qualifies for 3 lpm.    (X) Possible oxygen    10/7/2021 11:41 AM EDT by Goldie Todd      6L>3L    Medications    (X) Possible DVT prophylaxis    10/7/2021 11:41 AM EDT by Goldie Todd      apixaban (ELIQUIS) tablet 2.5 mg 2.5 mgOralBID    * Milestone   Additional Notes   10/1/21   Blood pressure 123/77, pulse 71, temperature 97.8 °F (36.6 °C), temperature source Oral, resp.  rate 20, height 5' (1.524 m), weight 105 lb 6.1 oz (47.8 kg), SpO2 94 %       Lab      Sodium: 138   Potassium: 4.9   Chloride: 102   CO2: 24   BUN: 15   Creatinine: <0.5 (L) Anion Gap: 12   GFR Non-: >60   GFR African American: >60   Glucose: 109 (H)   Calcium: 8.9      Per IM      ASSESSMENT:    Covid pna -tested + 9/13/21   Acute hypoxic respiratory failure, POA - improving, now on 6L o2 nc   - with criteria: < 90% on RA, accessory muscle use, RR> 18   - supplemental oxygen as necessary to keep SaO2 > 90%   - closely monitor for thromboemboli - lovenox bid   - on decadron 4 daily, steroid day 14, stop after today   - remdesivir/baricitinib completed   - she has been afebrile for > 36 hrs, improving O2 needs, although still high, and test was 17 days ago.  She can come out of isolation so her family can visit her.        Acute encephalopathy - improving   - metabolic    - due to covid infection   - head CT negative   - treat associated conditions   - avoid sedating meds as able   - supportive care       UTI   Yeast       diflucan       Chronic medical conditions - cont home meds unless otherwise indicated    AS, S/p TAVR    Recent  Echo  7/21   Valve  Functioning  Well   htn       Code status - dnr-cca       Dispo: possible dc tomorrow home with family             Viral Illness, Acute - Care Day 18 (9/30/2021) by Emiliano Grace RN       Review Status Review Entered   Completed 10/7/2021 11:33      Criteria Review      Care Day: 18 Care Date: 9/30/2021 Level of Care: Inpatient Floor    Guideline Day 2    Level Of Care    (X) Floor    Clinical Status    (X) * Hypotension absent    (X) * No requirement for mechanical ventilation    ( ) * Oxygenation at baseline or improved    10/7/2021 11:33 AM EDT by Yvette Nichols      HFNC 20L    ( ) * Mental status at baseline    Routes    ( ) * Oral hydration    (X) Oral or IV medications    10/7/2021 11:33 AM EDT by Yvette Nichols      DECADRON) tablet 4 mg po qd  dilTIAZem (CARDIZEM) tablet 60 mg po 4x/day  DIFLUCAN) tablet 100 mg po qd  LOPRESSOR) tablet 50 mg po BID  potassium bicarb-citric acid (EFFER-K) effervescent tablet 40 mEq po x2    (X) Usual diet    Interventions    ( ) Possible isolation    10/7/2021 11:33 AM EDT by Leticia Jolly She can come out of isolation so her family can visit her. (X) Pulse oximetry    (X) Possible oxygen    10/7/2021 11:33 AM EDT by Galen Washburn      20 L vapotherm, fio2 70%. Medications    (X) Possible DVT prophylaxis    10/7/2021 11:33 AM EDT by Galen Washburn      apixaban (ELIQUIS) tablet 2.5 mg   Dose: 2.5 mg  Freq: 2 TIMES DAILY Route: PO    * Milestone   Additional Notes   9/30/21      Blood pressure 135/68, pulse 79, temperature 97.9 °F (36.6 °C), temperature source Axillary, resp. rate 18, SpO2 93 %       Lab      Sodium: 139   Potassium: 4.1   Chloride: 102   CO2: 22   BUN: 15   Creatinine: <0.5 (L)   Anion Gap: 15   GFR Non-: >60   GFR African American: >60   Glucose: 109 (H)   Calcium: 8.8   Total Protein: 5.3 (L)   Albumin: 2.6 (L)   Alk Phos: 119   ALT: 27   AST: 18   Bilirubin: 0.5   Bilirubin, Direct: <0.2   Bilirubin, Indirect: see below         Per IM      ASSESSMENT:    Covid pna -tested + 9/13/21   Acute hypoxic respiratory failure, POA - ongoing, currently on 20 lpm, 70% fio2   - with criteria: < 90% on RA, accessory muscle use, RR> 18   - supplemental oxygen as necessary to keep SaO2 > 90%   - closely monitor for thromboemboli - lovenox bid   - on decadron 4 daily, steroid day 14, stop after today   - remdesivir/baricitinib completed   - she has been afebrile for > 36 hrs, improving O2 needs, although still high, and test was 17 days ago.  She can come out of isolation so her family can visit her.        Acute encephalopathy - slowly improving   - metabolic    - due to covid infection   - head CT negative   - treat associated conditions   - avoid sedating meds as able   - supportive care       UTI   Yeast       diflucan       Chronic medical conditions - cont home meds unless otherwise indicated    AS, S/p TAVR    Recent  Echo  7/21 Valve  Functioning  Well   htn       Code status - dnr-cca       Dispo: possible ltac when ready for dc      Per speech eval   Recommended Diet and Intervention   Diet Solids Recommendation: Dysphagia Pureed (Dysphagia I)   Liquid Consistency Recommendation: Mildly Thick (Nectar)    Meds: crush if able and give with thick liquid or puree   Therapeutic Interventions: Diet tolerance monitoring; Therapeutic PO trials with SLP with ongoing assessment of mastication and diet upgrade readiness; Patient/Family education          Viral Illness, Acute - Care Day 17 (9/29/2021) by Emiliano Grace RN       Review Status Review Entered   Completed 10/7/2021 11:26      Criteria Review      Care Day: 17 Care Date: 9/29/2021 Level of Care: Inpatient Floor    Guideline Day 2    Level Of Care    (X) Floor    Clinical Status    (X) * Hypotension absent    (X) * No requirement for mechanical ventilation    ( ) * Oxygenation at baseline or improved    10/7/2021 11:26 AM EDT by Yvette Nichols      HFNC 20L    ( ) * Mental status at baseline    10/7/2021 11:26 AM EDT by Yvette Nichols      letehrgic  And  Confused    Routes    ( ) * Oral hydration    10/7/2021 11:26 AM EDT by Yvette Nichols      IV  ml/h    (X) Oral or IV medications    10/7/2021 11:26 AM EDT by Yvette Nichols      DECADRON) tablet 4 mg po qd  fluconazole (DIFLUCAN) tablet 100 mg po qd  LOPRESSOR) tablet 50 mg po BID  KLOR-CON M) extended release tablet 40 mEq   Dose: 40 po once  potassium chloride 10 mEq/100 mL IVPB x5    (X) Usual diet    10/7/2021 11:26 AM EDT by Yvette Nichols      Pt having trouble swallowing her lunch, coughing when drinking.  Speech eval ordered    Interventions    (X) Possible isolation    10/7/2021 11:26 AM EDT by Yvette Nichols      droplet plus    (X) Pulse oximetry    10/7/2021 11:26 AM EDT by Yvette Nichols      92%    (X) Possible oxygen    10/7/2021 11:26 AM EDT by Yvette Nichols      20L HFNC    Medications    (X) Possible DVT prophylaxis    10/7/2021 11:26 AM EDT by Negra Ferrer      apixaban (ELIQUIS) tablet 2.5 mg   Dose: 2.5 mg  Freq: 2 TIMES DAILY Route: PO    * Milestone   Additional Notes   9/29/21      Blood pressure (!) 153/79, pulse 71, temperature 98 °F (36.7 °C), temperature source Axillary, resp. rate 16 SpO2 92 % HHFNC 20L       Pt remains on 20L of vapotherm and is confused.        Lab      Sodium: 140   Potassium: 3.0 (L)   Chloride: 104   CO2: 23   BUN: 11   Creatinine: <0.5 (L)   Anion Gap: 13   GFR Non-: >60   GFR African American: >60   Magnesium: 2.20   Glucose: 84   Calcium: 8.5         Per IM      ASSESSMENT:    1  Covid  19  Pneumonia   2  Acute  Hypoxic  Respiratory  fauilure   No  Change in  Condition    3  Acute  Metabolic  Encephalopathy   4  TAVR    Recent  Echo  7/21   Valve  Functioning  Well   5  UTI   Yeast       6  htn  Relatively  Controlled   PLAN     continue  Current  RX   LTAC   refferal  Per  Social  Service

## 2021-10-11 DIAGNOSIS — J12.82 PNEUMONIA DUE TO COVID-19 VIRUS: ICD-10-CM

## 2021-10-11 DIAGNOSIS — R53.1 GENERALIZED WEAKNESS: Primary | ICD-10-CM

## 2021-10-11 DIAGNOSIS — U07.1 PNEUMONIA DUE TO COVID-19 VIRUS: ICD-10-CM

## 2021-10-14 ENCOUNTER — CARE COORDINATION (OUTPATIENT)
Dept: CASE MANAGEMENT | Age: 86
End: 2021-10-14

## 2021-10-14 NOTE — CARE COORDINATION
[facility] Transitions Follow Up Call    10/14/2021    Patient: Renea Limon  Patient : 1931   MRN: <[id number]>  Reason for Admission: Pneumonia due to 2019 novel coronavirus  Discharge Date: 10/2/21 RARS: Readmission Risk Score: 14    Spoke with: Rachel Galeana [facility] daughter) who reports that patient is doing a lot better. Patient still not walking but PT is getting her stronger. GD states that patient continue with a little cough but no other ongoing Covid symptoms. GD reports no c/o cp, sob, cough, dizziness, headache, n/v, diarrhea, abdominal pains, fever, or chills. Patient report that appetite and fluid intake is good and denies any problems with bowel or bladder. She stated that patient is taking all medications as ordered. GD deny any needs at this time. Reminded of COVID 19 precautions. Agreeable to f/u calls. Educated on the use of urgent care or physicians 24 hr access line if assistance is needed after hours. Care Transitions Subsequent and Final Call    Subsequent and Final Calls  Care Transitions Interventions  Other Interventions: Follow Up  No future appointments.     Yashira Gallardo LPN

## 2021-10-18 ENCOUNTER — TELEPHONE (OUTPATIENT)
Dept: FAMILY MEDICINE CLINIC | Age: 86
End: 2021-10-18

## 2021-10-18 RX ORDER — AMOXICILLIN AND CLAVULANATE POTASSIUM 875; 125 MG/1; MG/1
1 TABLET, FILM COATED ORAL 2 TIMES DAILY
Qty: 14 TABLET | Refills: 0 | Status: SHIPPED | OUTPATIENT
Start: 2021-10-18 | End: 2021-10-25

## 2021-10-18 NOTE — TELEPHONE ENCOUNTER
Sarah Royal from [address] is calling because patient has SOB, increase in cough- brownish/yellowish, crackling in lower left lobe. Fay ON 10/2/2021. Please give her a call back.

## 2021-10-21 ENCOUNTER — VIRTUAL VISIT (OUTPATIENT)
Dept: FAMILY MEDICINE CLINIC | Age: 86
End: 2021-10-21
Payer: MEDICARE

## 2021-10-21 DIAGNOSIS — I48.91 ATRIAL FIBRILLATION, UNSPECIFIED TYPE (HCC): ICD-10-CM

## 2021-10-21 DIAGNOSIS — J96.01 ACUTE RESPIRATORY FAILURE WITH HYPOXEMIA (HCC): Primary | ICD-10-CM

## 2021-10-21 DIAGNOSIS — G93.41 ACUTE METABOLIC ENCEPHALOPATHY: ICD-10-CM

## 2021-10-21 DIAGNOSIS — U07.1 PNEUMONIA DUE TO COVID-19 VIRUS: ICD-10-CM

## 2021-10-21 DIAGNOSIS — J12.82 PNEUMONIA DUE TO COVID-19 VIRUS: ICD-10-CM

## 2021-10-21 PROCEDURE — 99214 OFFICE O/P EST MOD 30 MIN: CPT | Performed by: FAMILY MEDICINE

## 2021-10-21 RX ORDER — ONDANSETRON 4 MG/1
4 TABLET, ORALLY DISINTEGRATING ORAL EVERY 8 HOURS PRN
Qty: 20 TABLET | Refills: 0 | Status: SHIPPED | OUTPATIENT
Start: 2021-10-21 | End: 2022-01-07

## 2021-10-21 NOTE — ADDENDUM NOTE
[facility] on: 10/21/2021 05:49 PM     Modules accepted: Orders

## 2021-10-21 NOTE — PROGRESS NOTES
10/21/2021    TELEHEALTH EVALUATION -- Audio/Visual (During RPTLK-52 public health emergency)    HPI:    Ladan Triana (:  1931) has requested an audio/video evaluation for the following concern(s):    Chief Complaint   Patient presents with    Follow-Up from [address]    started on augmentin couple days ago for left lower lung crackles - increased cough/ confusion after being home. Admitted w/ covid pneumonia  - d/c 10/2 for resp failure w/hypoxia, covid PNA - ? Mild malnutrition  Sent home from hospital    Hospital Course:  80year-old female with past medical history of CAD, aortic stenosis status post AVR who presented to hospital for shortness of breath.   Patient was found hypoxic in upper 80s on room air, was transitioned to nasal cannula. She was covid +.      Work up completed, and improved with treatment as below. was discharged today in stable condition.      Covid pna -tested + 21  Acute hypoxic respiratory failure, POA - improving, now on 2L nc  - with criteria: < 90% on RA, accessory muscle use, RR> 18  - supplemental oxygen as necessary to keep SaO2 > 90%  - closely monitor for thromboemboli - lovenox bid  - on decadron 4 daily, steroid day 14, now stopped  - remdesivir/baricitinib completed  - she has been afebrile for > 4 days, improving O2 needs,  and test was 17 days ago.  She is stable for dc home today     afib with rvr  - now rate controlled  - discharge on eliquis 2.5 mg bid, lopressor 50 mg bid, and cardizem 120 mg daily     Acute encephalopathy - improving  - metabolic   - due to covid infection  - head CT negative     UTI   Yeast      Diflucan x 7 days      Chronic medical conditions - cont home meds unless otherwise indicated   AS, S/p TAVR    Recent  Echo    Valve  Functioning  Well    4 liters of oxygen - on 3 L when she came home  97% when lying in hospital bed/ chair at home  Sitting in chair more often  Getting PT/ OT bid - doing home exercises  Trying to walk on walker - went 5-6 steps yesterday before stopping. Daughter 24/7 caretaker - started w/ cough few days ago and more productive today - using albuterol tx and coughing up a lot of yellow mucus. Daughter primary historian  Ate 1/2 chicken sandwich for lunch today - blending some food but eating regular food more now  Some concern w/ swallowing. Crushing meds and putting in applesauce -to hard to swallow pills whole  bp good on diltiazem/ metoprolol  120 sbp typically      Review of Systems    Prior to Visit Medications    Medication Sig Taking?  Authorizing Provider   OXYGEN 3 Liter Yes Historical Provider, MD   amoxicillin-clavulanate (AUGMENTIN) 875-125 MG per tablet Take 1 tablet by mouth 2 times daily for 7 days Yes Ratna Mejia MD   metoprolol tartrate (LOPRESSOR) 50 MG tablet Take 1 tablet by mouth 2 times daily Yes Marie Vargas, DO   dilTIAZem (CARDIZEM CD) 120 MG extended release capsule Take 1 capsule by mouth daily Yes Marie Vargas, DO   apixaban (ELIQUIS) 2.5 MG TABS tablet Take 1 tablet by mouth 2 times daily Yes Marie Vargas, DO   albuterol-ipratropium (COMBIVENT RESPIMAT)  MCG/ACT AERS inhaler Inhale 1 puff into the lungs every 6 hours Yes Marie Vargas, DO   ipratropium (ATROVENT) 0.06 % nasal spray 2 sprays by Each Nostril route 4 times daily  Robert Fried MD       Social History     Tobacco Use    Smoking status: Never Smoker    Smokeless tobacco: Never Used   Vaping Use    Vaping Use: Never used   Substance Use Topics    Alcohol use: Never    Drug use: No        PHYSICAL EXAMINATION:  [ INSTRUCTIONS:  \"[x]\" Indicates a positive item  \"[]\" Indicates a negative item  -- DELETE ALL ITEMS NOT EXAMINED]  Vital Signs: (As obtained by patient/caregiver or practitioner observation)    Blood pressure-  Heart rate-    Respiratory rate-    Temperature-  Pulse oximetry-     Constitutional: [x] Appears well-developed and well-nourished [x] No apparent distress [] Abnormal-   Mental status  [x] Alert and awake  [] Oriented to person/place/time []Able to follow commands      Eyes:  EOM    []  Normal  [] Abnormal-  Sclera  []  Normal  [] Abnormal -         Discharge []  None visible  [] Abnormal -    HENT:   [x] Normocephalic, atraumatic. [] Abnormal   [] Mouth/Throat: Mucous membranes are moist.     External Ears [] Normal  [] Abnormal-     Neck: [] No visualized mass     Pulmonary/Chest: [x] Respiratory effort normal.  [] No visualized signs of difficulty breathing or respiratory distress        [] Abnormal-      Musculoskeletal:   [] Normal gait with no signs of ataxia         [] Normal range of motion of neck        [] Abnormal-       Neurological:        [x] No Facial Asymmetry (Cranial nerve 7 motor function) (limited exam to video visit)          [] No gaze palsy        [] Abnormal-         Skin:        [x] No significant exanthematous lesions or discoloration noted on facial skin         [] Abnormal-            Psychiatric:       [x] Normal Affect [] No Hallucinations        [] Abnormal-     Other pertinent observable physical exam findings-     ASSESSMENT/PLAN:   Diagnosis Orders   1. Acute respiratory failure with hypoxemia (HCC)     2. Pneumonia due to COVID-19 virus     3. Acute metabolic encephalopathy     4. Atrial fibrillation, unspecified type ([address]       Cont pt/ ot - hydration  Swallowing precautions w/ eating/ meds  Cont PT/ OT - slowly improving  Wean on oxygen when able - 4L oxygen for now  Finish augmentin  Generally doing fairly well  Mental status seems better overall  mucinex prn  Cont meds for afib per cardiology  May need video visit w/ cardiology  Turning frequently - had bedsore at hospital - using dry pads    Nannie Side, was evaluated through a synchronous (real-time) audio-video encounter. The patient (or guardian if applicable) is aware that this is a billable service.  Verbal consent to proceed has been obtained within the past 12 months. The visit was conducted pursuant to the emergency declaration under the [facility], [address] [place] [address] and the [employer] and [employer] General Act. Patient identification was verified, and a caregiver was present when appropriate. The patient was located in a state where the provider was credentialed to provide care. Total time spent on this encounter: 21 or more minutes were spent on the digital evaluation and management of this patient. --Rebeca Vizcarra MD on 10/21/2021 at 1:43 PM    An electronic signature was used to authenticate this note.

## 2021-10-22 ENCOUNTER — CARE COORDINATION (OUTPATIENT)
Dept: CASE MANAGEMENT | Age: 86
End: 2021-10-22

## 2021-10-22 ENCOUNTER — TELEPHONE (OUTPATIENT)
Dept: FAMILY MEDICINE CLINIC | Age: 86
End: 2021-10-22

## 2021-10-22 NOTE — TELEPHONE ENCOUNTER
ANNETTA  On [address] pt had a near fall they did get her to the chair safely.

## 2021-10-22 NOTE — CARE COORDINATION
[facility] Transitions Follow Up Call    10/22/2021    Patient: Gagandeep Sanchez  Patient : 1931   MRN: [id number]  Reason for Admission: Pneumonia due to 2019 novel coronavirus  Discharge Date: 10/2/21 RARS: Readmission Risk Score: 15         Spoke with: Tara green, HIPAA verified    Care Transitions Follow Up Call    Needs to be reviewed by the provider   Additional needs identified to be addressed with provider: No  none             Method of communication with provider : phone      Care Transition Nurse (CTN) contacted the family by telephone to follow up after admission. Verified name and  with family as identifiers. Addressed changes since last contact: none  Discussed follow-up appointments. If no appointment was previously scheduled, appointment scheduling offered: Yes. Is follow up appointment scheduled within 7 days of discharge? Yes. Advance Care Planning:   Does patient have an Advance Directive: reviewed and current. Advance Care Planning   Healthcare Decision Maker:    Primary Decision Maker: Cristo Thomas - [phone]    Secondary Decision Maker: Bonita Haskins - [phone]    CTN reviewed discharge instructions, medical action plan and red flags with family and discussed any barriers to care and/or understanding of plan of care after discharge. Discussed appropriate site of care based on symptoms and resources available to patient including: PCP, Specialist and Home health. The family agrees to contact the PCP office for questions related to their healthcare. Patients top risk factors for readmission: medical condition-Pneumonia due to 2019 novel coronavirus    Spoke with Tara green, HIPAA verified. Granddaughter verified patient  and was pleasant and agreeable to transition calls. States patient is doing well. Patient beginning to ambulate more with walker. Active with HC. Strength and MS improving.  Reports some improving SOB and cough. Taking new atbx r/t possible PNA infection. Denies fevers. Eating yogurt for probiotics r/t new atbx use. Patient still having some swallowing difficulty, to be evaluated soon by SLP. VV with PCP went fine. Using O2, titrated to 3L today from 4L. Patient to be scheduled soon for cardiology visit, family hoping to do VV d/t patient weakness. Denies any acute needs at present time. Agreeable to f/u calls. Educated on the use of urgent care or physicians 24 hr access line if assistance is needed after hours. CTN provided contact information for future needs. Plan for follow-up call in 7-10 days based on severity of symptoms and risk factors. Carie Ruffin LPN [address] [place] [address]  Care Transitions  [phone]    Care Transitions Subsequent and Final Call    Subsequent and Final Calls  Do you have any ongoing symptoms?: No  Have your medications changed?: Yes  Do you have any questions related to your medications?: No  Do you currently have any active services?: Yes  Are you currently active with any services?: Home Health  Do you have any needs or concerns that I can assist you with?: No  Identified Barriers: None  Care Transitions Interventions   Home Care Waiver: Completed     Other Interventions: Follow Up  No future appointments.     Sourav Okeefe LPN

## 2021-10-25 ENCOUNTER — TELEPHONE (OUTPATIENT)
Dept: FAMILY MEDICINE CLINIC | Age: 86
End: 2021-10-25

## 2021-10-25 NOTE — TELEPHONE ENCOUNTER
[address] @  [phone]    NEEDS VERBAL ORDER TO EXTEND NURSING WEEKLY    COMPLETING ANTIBIOTICS TODAY --  STILL HAS CRACKLES IN HER LOBES   STILL COUGHING UP YELLOW SPUTUM  SOB IS BETTER

## 2021-10-27 ENCOUNTER — TELEPHONE (OUTPATIENT)
Dept: FAMILY MEDICINE CLINIC | Age: 86
End: 2021-10-27

## 2021-10-29 ENCOUNTER — CARE COORDINATION (OUTPATIENT)
Dept: CASE MANAGEMENT | Age: 86
End: 2021-10-29

## 2021-11-01 ENCOUNTER — TELEPHONE (OUTPATIENT)
Dept: FAMILY MEDICINE CLINIC | Age: 86
End: 2021-11-01

## 2021-11-01 DIAGNOSIS — R53.83 FATIGUE, UNSPECIFIED TYPE: Primary | ICD-10-CM

## 2021-11-01 DIAGNOSIS — R63.4 WEIGHT LOSS: ICD-10-CM

## 2021-11-01 NOTE — TELEPHONE ENCOUNTER
PT STILL EXTREMELY FATIGUED. SLIGHT IMPROVEMENT AFTER IV FLUID ON FR.  CAN WE ORDER SOME LABS TO BE DRAWN AT HOME?

## 2021-11-02 LAB
A/G RATIO: 1.3 (ref 1.1–2.2)
ALBUMIN SERPL-MCNC: 2.4 G/DL (ref 3.4–5)
ALP BLD-CCNC: 92 U/L (ref 40–129)
ALT SERPL-CCNC: <5 U/L (ref 10–40)
ANION GAP SERPL CALCULATED.3IONS-SCNC: 9 MMOL/L (ref 3–16)
AST SERPL-CCNC: 13 U/L (ref 15–37)
BASOPHILS ABSOLUTE: 0 K/UL (ref 0–0.2)
BASOPHILS RELATIVE PERCENT: 0.3 %
BILIRUB SERPL-MCNC: 0.4 MG/DL (ref 0–1)
BUN BLDV-MCNC: 6 MG/DL (ref 7–20)
CALCIUM SERPL-MCNC: 8.4 MG/DL (ref 8.3–10.6)
CHLORIDE BLD-SCNC: 97 MMOL/L (ref 99–110)
CO2: 33 MMOL/L (ref 21–32)
CREAT SERPL-MCNC: <0.5 MG/DL (ref 0.6–1.2)
EOSINOPHILS ABSOLUTE: 0 K/UL (ref 0–0.6)
EOSINOPHILS RELATIVE PERCENT: 0.4 %
GFR AFRICAN AMERICAN: >60
GFR NON-AFRICAN AMERICAN: >60
GLUCOSE BLD-MCNC: 73 MG/DL (ref 70–99)
HCT VFR BLD CALC: 30 % (ref 36–48)
HEMOGLOBIN: 9.8 G/DL (ref 12–16)
LYMPHOCYTES ABSOLUTE: 1 K/UL (ref 1–5.1)
LYMPHOCYTES RELATIVE PERCENT: 19.4 %
MCH RBC QN AUTO: 27.6 PG (ref 26–34)
MCHC RBC AUTO-ENTMCNC: 32.6 G/DL (ref 31–36)
MCV RBC AUTO: 84.7 FL (ref 80–100)
MONOCYTES ABSOLUTE: 0.5 K/UL (ref 0–1.3)
MONOCYTES RELATIVE PERCENT: 9.5 %
NEUTROPHILS ABSOLUTE: 3.8 K/UL (ref 1.7–7.7)
NEUTROPHILS RELATIVE PERCENT: 70.4 %
PDW BLD-RTO: 18.3 % (ref 12.4–15.4)
PLATELET # BLD: 227 K/UL (ref 135–450)
PMV BLD AUTO: 8.8 FL (ref 5–10.5)
POTASSIUM SERPL-SCNC: 3.4 MMOL/L (ref 3.5–5.1)
RBC # BLD: 3.54 M/UL (ref 4–5.2)
SODIUM BLD-SCNC: 139 MMOL/L (ref 136–145)
TOTAL PROTEIN: 4.3 G/DL (ref 6.4–8.2)
TSH REFLEX: 1.9 UIU/ML (ref 0.27–4.2)
WBC # BLD: 5.3 K/UL (ref 4–11)

## 2021-11-04 ENCOUNTER — TELEPHONE (OUTPATIENT)
Dept: FAMILY MEDICINE CLINIC | Age: 86
End: 2021-11-04

## 2021-11-04 NOTE — TELEPHONE ENCOUNTER
[facility] 3 liters to 2 liters.   [phone]

## 2021-11-08 ENCOUNTER — APPOINTMENT (OUTPATIENT)
Dept: CT IMAGING | Age: 86
End: 2021-11-08
Payer: MEDICARE

## 2021-11-08 ENCOUNTER — HOSPITAL ENCOUNTER (EMERGENCY)
Age: 86
Discharge: HOME | End: 2021-11-08
Attending: EMERGENCY MEDICINE
Payer: MEDICARE

## 2021-11-08 ENCOUNTER — APPOINTMENT (OUTPATIENT)
Dept: GENERAL RADIOLOGY | Age: 86
End: 2021-11-08
Payer: MEDICARE

## 2021-11-08 VITALS
DIASTOLIC BLOOD PRESSURE: 57 MMHG | BODY MASS INDEX: 18.13 KG/M2 | WEIGHT: 92.81 LBS | OXYGEN SATURATION: 100 % | TEMPERATURE: 98.5 F | HEART RATE: 63 BPM | SYSTOLIC BLOOD PRESSURE: 133 MMHG | RESPIRATION RATE: 19 BRPM

## 2021-11-08 DIAGNOSIS — R13.10 DYSPHAGIA, UNSPECIFIED TYPE: Primary | ICD-10-CM

## 2021-11-08 DIAGNOSIS — N39.0 ACUTE UTI: ICD-10-CM

## 2021-11-08 LAB
ANION GAP SERPL CALCULATED.3IONS-SCNC: 10 MMOL/L (ref 3–16)
BASOPHILS ABSOLUTE: 0 K/UL (ref 0–0.2)
BASOPHILS RELATIVE PERCENT: 0.6 %
BILIRUBIN URINE: ABNORMAL
BLOOD, URINE: NEGATIVE
BUN BLDV-MCNC: 7 MG/DL (ref 7–20)
CALCIUM SERPL-MCNC: 8.8 MG/DL (ref 8.3–10.6)
CHLORIDE BLD-SCNC: 95 MMOL/L (ref 99–110)
CLARITY: ABNORMAL
CO2: 30 MMOL/L (ref 21–32)
COLOR: ABNORMAL
CREAT SERPL-MCNC: <0.5 MG/DL (ref 0.6–1.2)
EOSINOPHILS ABSOLUTE: 0 K/UL (ref 0–0.6)
EOSINOPHILS RELATIVE PERCENT: 0.7 %
EPITHELIAL CELLS, UA: 10 /HPF (ref 0–5)
GFR AFRICAN AMERICAN: >60
GFR NON-AFRICAN AMERICAN: >60
GLUCOSE BLD-MCNC: 119 MG/DL (ref 70–99)
GLUCOSE URINE: NEGATIVE MG/DL
HCT VFR BLD CALC: 33.5 % (ref 36–48)
HEMOGLOBIN: 11 G/DL (ref 12–16)
HYALINE CASTS: 6 /LPF (ref 0–8)
KETONES, URINE: NEGATIVE MG/DL
LEUKOCYTE ESTERASE, URINE: ABNORMAL
LYMPHOCYTES ABSOLUTE: 1.7 K/UL (ref 1–5.1)
LYMPHOCYTES RELATIVE PERCENT: 29.1 %
MAGNESIUM: 2.3 MG/DL (ref 1.8–2.4)
MCH RBC QN AUTO: 27.7 PG (ref 26–34)
MCHC RBC AUTO-ENTMCNC: 32.8 G/DL (ref 31–36)
MCV RBC AUTO: 84.7 FL (ref 80–100)
MICROSCOPIC EXAMINATION: YES
MONOCYTES ABSOLUTE: 0.7 K/UL (ref 0–1.3)
MONOCYTES RELATIVE PERCENT: 12.7 %
NEUTROPHILS ABSOLUTE: 3.3 K/UL (ref 1.7–7.7)
NEUTROPHILS RELATIVE PERCENT: 56.9 %
NITRITE, URINE: NEGATIVE
PDW BLD-RTO: 18.3 % (ref 12.4–15.4)
PH UA: 6.5 (ref 5–8)
PLATELET # BLD: 298 K/UL (ref 135–450)
PMV BLD AUTO: 8.5 FL (ref 5–10.5)
POTASSIUM REFLEX MAGNESIUM: 3.1 MMOL/L (ref 3.5–5.1)
PROTEIN UA: ABNORMAL MG/DL
RBC # BLD: 3.95 M/UL (ref 4–5.2)
RBC UA: 5 /HPF (ref 0–4)
SODIUM BLD-SCNC: 135 MMOL/L (ref 136–145)
SPECIFIC GRAVITY UA: 1.01 (ref 1–1.03)
URINE REFLEX TO CULTURE: YES
URINE TYPE: ABNORMAL
UROBILINOGEN, URINE: 1 E.U./DL
WBC # BLD: 5.8 K/UL (ref 4–11)
WBC UA: 17 /HPF (ref 0–5)

## 2021-11-08 PROCEDURE — 99285 EMERGENCY DEPT VISIT HI MDM: CPT

## 2021-11-08 PROCEDURE — 96374 THER/PROPH/DIAG INJ IV PUSH: CPT

## 2021-11-08 PROCEDURE — 80048 BASIC METABOLIC PNL TOTAL CA: CPT

## 2021-11-08 PROCEDURE — 71045 X-RAY EXAM CHEST 1 VIEW: CPT

## 2021-11-08 PROCEDURE — 81001 URINALYSIS AUTO W/SCOPE: CPT

## 2021-11-08 PROCEDURE — 2500000003 HC RX 250 WO HCPCS: Performed by: PHYSICIAN ASSISTANT

## 2021-11-08 PROCEDURE — 71250 CT THORAX DX C-: CPT

## 2021-11-08 PROCEDURE — 85025 COMPLETE CBC W/AUTO DIFF WBC: CPT

## 2021-11-08 PROCEDURE — 36415 COLL VENOUS BLD VENIPUNCTURE: CPT

## 2021-11-08 PROCEDURE — 2580000003 HC RX 258: Performed by: PHYSICIAN ASSISTANT

## 2021-11-08 PROCEDURE — 83735 ASSAY OF MAGNESIUM: CPT

## 2021-11-08 PROCEDURE — 87086 URINE CULTURE/COLONY COUNT: CPT

## 2021-11-08 RX ORDER — CEFUROXIME AXETIL 250 MG/1
250 TABLET ORAL 2 TIMES DAILY
Qty: 14 TABLET | Refills: 0 | Status: SHIPPED | OUTPATIENT
Start: 2021-11-08 | End: 2021-11-15

## 2021-11-08 RX ORDER — 0.9 % SODIUM CHLORIDE 0.9 %
500 INTRAVENOUS SOLUTION INTRAVENOUS ONCE
Status: COMPLETED | OUTPATIENT
Start: 2021-11-08 | End: 2021-11-08

## 2021-11-08 RX ADMIN — Medication 20 MG: at 16:01

## 2021-11-08 RX ADMIN — SODIUM CHLORIDE 500 ML: 9 INJECTION, SOLUTION INTRAVENOUS at 15:58

## 2021-11-08 NOTE — ED PROVIDER NOTES
[address] [facility]        Pt Name: Haydee Trinidad  MRN: [id number]  [place] 4/14/1931  Date of evaluation: 11/8/2021  Provider: Srinivas Weber PA-C  PCP: Mallorie Eaton MD  Note Started: 4:23 PM EST       I have seen and evaluated this patient with my supervising physician Mitchell Das DO. Triage CHIEF COMPLAINT       Chief Complaint   Patient presents with    Dysphagia     pt can swallow but \" it doesn't go down to my stomach it comes back upo, it's blocked\"         HISTORY OF PRESENT ILLNESS   (Location/Symptom, Timing/Onset, Context/Setting, Quality, Duration, Modifying Factors, Severity)  Note limiting factors. Chief Complaint: Swallowing difficulties and weight loss    Haydee Trinidad is a 80 y.o. female who presents stating that she is having hard time getting enough calories and when she does eat frequently it feels like it sticks and then she regurgitates it later on. She states this happened a couple of times today. She is tolerating some fluids by mouth. No shortness of breath or chest pain. This issue has been going on for a couple of weeks or so worse than it was before. No acute or changed difficulty breathing or any pain or chest pressure at this time. No abdominal pain or nausea. No difficulty passing urine or stool or extremity changes or rash. Nursing Notes were all reviewed and agreed with or any disagreements were addressed in the HPI. REVIEW OF SYSTEMS    (2-9 systems for level 4, 10 or more for level 5)     Review of Systems   Positive history as above with no acute palpitation or twisting injury fevers or chills. No headache vision change neck pain or stiffness. No dizziness confusion syncope or near syncope. No acute change in breathing or pain in the chest.  No abdominal pain or distention or acute urine or stool change per patient.   No extremity acute loss range of motion or strength or Vaping Use    Vaping Use: Never used   Substance and Sexual Activity    Alcohol use: Never    Drug use: No    Sexual activity: Not on file   Other Topics Concern    Not on file   Social History Narrative    Not on file     Social Determinants of Health     Financial Resource Strain: Low Risk     Difficulty of Paying Living Expenses: Not hard at all   Food Insecurity: No Food Insecurity    Worried About Running Out of Food in the Last Year: Never true    [place]ari of Food in the Last Year: Never true   Transportation Needs: No Transportation Needs    Lack of Transportation (Medical): No    Lack of Transportation (Non-Medical): No   Physical Activity:     Days of Exercise per Week: Not on file    Minutes of Exercise per Session: Not on file   Stress:     Feeling of Stress : Not on file   Social Connections:     Frequency of Communication with Friends and Family: Not on file    Frequency of Social Gatherings with Friends and Family: Not on file    Attends Christianity Services: Not on file    Active Member of [address] [place] [address] or Organizations: Not on file    Attends Club or Organization Meetings: Not on file    Marital Status: Not on file   Intimate Partner Violence:     Fear of Current or Ex-Partner: Not on file    Emotionally Abused: Not on file    Physically Abused: Not on file    Sexually Abused: Not on file   Housing Stability:     Unable to Pay for Housing in the Last Year: Not on file    Number of [place] in the Last Year: Not on file    Unstable Housing in the Last Year: Not on file       SCREENINGS             PHYSICAL EXAM    (up to 7 for level 4, 8 or more for level 5)     ED Triage Vitals   BP Temp Temp Source Pulse Resp SpO2 Height Weight   11/08/21 1529 11/08/21 1529 11/08/21 1529 11/08/21 1529 11/08/21 1545 11/08/21 1545 -- --   130/78 98.5 °F (36.9 °C) Oral 70 23 100 %         Physical Exam  Vitals and nursing note reviewed. Constitutional:       Appearance: Normal appearance.  She is not diaphoretic. Comments: Thin, pleasant, cooperative   HENT:      Head: Normocephalic and atraumatic. Right Ear: External ear normal.      Left Ear: External ear normal.      Nose: Nose normal.      Mouth/Throat:      Mouth: Mucous membranes are moist.      Comments: Gag flex intact  Eyes:      General:         Right eye: No discharge. Left eye: No discharge. Conjunctiva/sclera: Conjunctivae normal.   Cardiovascular:      Rate and Rhythm: Normal rate and regular rhythm. Pulses: Normal pulses. Heart sounds: Normal heart sounds. No murmur heard. No gallop. Pulmonary:      Effort: Pulmonary effort is normal. No respiratory distress. Breath sounds: Normal breath sounds. No wheezing, rhonchi or rales. Abdominal:      General: There is no distension. Tenderness: There is no abdominal tenderness. There is no right CVA tenderness, left CVA tenderness or guarding. Musculoskeletal:         General: No swelling, tenderness, deformity or signs of injury. Normal range of motion. Cervical back: Normal range of motion and neck supple. Right lower leg: No edema. Left lower leg: No edema. Skin:     General: Skin is warm and dry. Capillary Refill: Capillary refill takes less than 2 seconds. Neurological:      Mental Status: She is alert and oriented to person, place, and time. Mental status is at baseline.    Psychiatric:         Mood and Affect: Mood normal.         Behavior: Behavior normal.         DIAGNOSTIC RESULTS   LABS:    Labs Reviewed   CBC WITH AUTO DIFFERENTIAL - Abnormal; Notable for the following components:       Result Value    RBC 3.95 (*)     Hemoglobin 11.0 (*)     Hematocrit 33.5 (*)     RDW 18.3 (*)     All other components within normal limits    Narrative:     Performed at:  [address] [address]   Phone [phone]   BASIC METABOLIC PANEL W/ REFLEX TO MG FOR LOW K - bilateral pleural effusions. 2. Significantly improved bilateral ground-glass with patchy bilateral   bandlike opacities which may reflect post-COVID fibrotic-like change. XR CHEST PORTABLE   Final Result   Right mid lung and bibasilar opacities. Correlate with presentation as   aspiration is in the differential.           No results found. PROCEDURES   Unless otherwise noted below, none     Procedures    CRITICAL CARE TIME   N/A    CONSULTS:  IP CONSULT TO GI      EMERGENCY DEPARTMENT COURSE and DIFFERENTIAL DIAGNOSIS/MDM:   Vitals:    Vitals:    11/08/21 1529 11/08/21 1545 11/08/21 1600 11/08/21 1615   BP: 130/78 (!) 107/58 (!) 116/59 112/71   Pulse: 70 69 66 63   Resp:  23 20 19   Temp: 98.5 °F (36.9 °C)      TempSrc: Oral      SpO2:  100% 100% 100%   Weight:   92 lb 13 oz (42.1 kg)        Patient was given thefollowing medications:  Medications   0.9 % sodium chloride bolus (0 mLs IntraVENous Stopped 11/8/21 1631)   famotidine (PEPCID) injection 20 mg (20 mg IntraVENous Given 11/8/21 1601)         This patient presents as above and evaluation and treatment is begun here. Some labs are obtained as well as chest x-ray and a bedside swallowing evaluation performed by nursing. Reportedly patient does tolerate a full cup of liquids by mouth at this time. Chest x-ray returns as above prompting CT chest to help further evaluate lungs. No indication of any lung worsening including aspiration pneumonia at this time. Patient does have labs showing potential lower UTI. Gastroenterology is consulted and they recommend discharge home with close outpatient follow-up after discontinuing blood thinner. We will recommend that she or family call tomorrow morning to arrange further care and treatment with GI. Otherwise patient is in good condition and will be discharged as follows.   Home in stable condition to call gastroenterology tomorrow to see when they want you to stop taking the blood thinner so that you can have an outpatient upper endoscopy. In the meantime emphasize fluids intake including a calorie boosters such as Ensure, use the antibiotic for bladder infection, and monitor for improvement. Return to the emergency department for any emergency worsening or concern. FINAL IMPRESSION      1. Dysphagia, unspecified type    2.  Acute UTI          DISPOSITION/PLAN   DISPOSITION Decision To Discharge 11/08/2021 07:12:44 PM      PATIENT REFERREDTO:  Arin Lockett, [address] [facility]  [phone]    Call in 1 day  for further care and treatment      DISCHARGE MEDICATIONS:  New Prescriptions    CEFUROXIME (CEFTIN) 250 MG TABLET    Take 1 tablet by mouth 2 times daily for 7 days       DISCONTINUED MEDICATIONS:  Discontinued Medications    No medications on file              (Please note that portions ofthis note were completed with a voice recognition program.  Efforts were made to edit the dictations but occasionally words are mis-transcribed.)    Kristina Rausch PA-C (electronically signed)              Kristina Rausch PA-C  11/08/21 6160

## 2021-11-08 NOTE — ED NOTES
Bed: A-17  Expected date:   Expected time:   Means of arrival:   Comments:  89F dysphagia/losing weight     Elva Bajwa RN  11/08/21 4762

## 2021-11-08 NOTE — ED NOTES
Pt arrived to the ED via [email or web address], pt was on EMS stretcher in a supine position with head of bed elevated, pt is alert and orient, hard of hearing, pt was transferred to ed stretcher in room 17, pt complaints of \"not being able to keep food down, it get stuck half way and an hour later\" Pt resting in bed at this time, laying in a supine position with head of bed elevated . Call light remains in reach instructed pt how to use, and encouraged pt to call if needed assistance, no distress noted. RR even and unlabored, skin warm and dry. No needs at this time. Will continue to monitor closely.          Easton Syed RN  11/08/21 2982

## 2021-11-08 NOTE — ED PROVIDER NOTES
Attending Note:    The patient was seen and examined by the mid-level provider. I also completed a face-to-face examination. HPI: Delmy Hughes is a 80 y.o. female who presents to the emergency department with a complaint of dysphagia. She states that she was hospitalized for Covid at the end of September and eventually discharged home. She states that since leaving the hospital she is really not been able to walk except for short distances with a walker. She has been off oxygen 2 L per nasal cannula since leaving the hospital.  Overall she has been doing well but has been having increasing difficulty with eating. She states that no matter what she eats or drinks, it tends to come back up. She has been nauseated at times but denies any associated abdominal pain, throat pain, chest pain, or shortness of breath. She denies any moments of choking or aspiration. She denies any current cough or sputum production. She denies any fever, chills, nausea vomiting or diarrhea. She denies any abdominal pain back pain or flank pain. She denies any history of esophagitis or esophageal stricture. She denies any history of GERD. She does not take any PPIs. She is anticoagulated on Eliquis. She has had a prior Hermann procedure. She reports that she is currently receiving home health care. They noted that she has lost 2 pounds over the last 2 days. She states \"I just cannot eat anything\". She denies any sensation of anything stuck in the esophagus. She is able to drink some liquids. Physical Exam:     Constitutional: No apparent distress. Very pleasant. Appears comfortable. Thin and frail. Head: No visible evidence of trauma. Normocephalic. Eyes: Pupils equal and reactive. No photophobia. Conjunctiva normal.    HENT: Oral mucosa moist.  Airway patent. Pharynx is normal in appearance. Uvula midline. No stridor or drooling. Neck:  Soft and supple. Nontender.   Heart:  Regular rate and [id number]   Phone [phone]   BASIC METABOLIC PANEL W/ REFLEX TO MG FOR LOW K - Abnormal; Notable for the following components:    Sodium 135 (*)     Potassium reflex Magnesium 3.1 (*)     Chloride 95 (*)     Glucose 119 (*)     CREATININE <0.5 (*)     All other components within normal limits    Narrative:     Performed at:  [place] [address] [address] [facility] [address]   Phone [phone]   URINE RT REFLEX TO CULTURE - Abnormal; Notable for the following components:    Clarity, UA CLOUDY (*)     Bilirubin Urine SMALL (*)     Protein, UA TRACE (*)     Leukocyte Esterase, Urine SMALL (*)     All other components within normal limits    Narrative:     Performed at:  [address] [place] [address]   Phone [phone]   MICROSCOPIC URINALYSIS - Abnormal; Notable for the following components:    WBC, UA 17 (*)     RBC, UA 5 (*)     Epithelial Cells, UA 10 (*)     All other components within normal limits    Narrative:     Performed at:  [address] [place] [address]   Phone [phone]   CULTURE, URINE   MAGNESIUM    Narrative:     Performed at:  [address] [place] [address]   Phone [phone]       All other labs were within normal range or not returned as of this dictation. EMERGENCY DEPARTMENT COURSE and DIFFERENTIAL DIAGNOSIS/MDM:   Vitals:    Vitals:    11/08/21 1545 11/08/21 1600 11/08/21 1615 11/08/21 2036   BP: (!) 107/58 (!) 116/59 112/71 (!) 133/57   Pulse: 69 66 63    Resp: 23 20 19    Temp:       TempSrc:       SpO2: 100% 100% 100%    Weight:  92 lb 13 oz (42.1 kg)         Patient presents with reports of dysphagia as noted above. She states that she is able to swallow.   She gives the example of eating fries and chicken nuts, and everything coming back up immediately after eating. Differential diagnosis would include esophagitis, esophageal stricture, gastritis. She was given a trial of liquids in the emergency department and was able to drink an entire cup of water without any regurgitation or vomiting. There is no evidence of acute esophageal obstruction. Chest x-ray was obtained which reveals evidence of some patchy infiltrates in the right base. This chest x-ray was compared to a prior chest x-ray from September 26 at which time the patient had multifocal pneumonia with Covid. Chest x-ray is markedly improved. No suspicion for acute aspiration. She is afebrile. White blood cell count is 5.8. Oxygen saturation is 100% on 2 L. There is no evidence of respiratory distress. CT chest without contrast was obtained for further clarification. Mild pulmonary edema with moderate bilateral pleural effusions. Significantly improved from prior imaging. No clinical evidence of CHF. She denies any current shortness of breath. No peripheral edema. No JVD. No associated chest pain. She is stable for discharge and outpatient management. The physician assistant did speak with gastroenterology on-call. They feel that outpatient management is appropriate and they can do outpatient endoscopy. Patient was advised to call tomorrow to schedule an outpatient appointment to be seen tomorrow or the next day. If she has any worsening symptoms, chest pain, shortness of breath, vomiting, unable to eat or drink liquids, she was advised to return immediately to the emergency department. She will be placed on a PPI. There is some mild evidence of urinary tract infection on urinalysis. No history of dysuria. She will be given antibiotics to cover this possibility. MDM      CRITICAL CARE TIME   Total Critical Care time was 0 minutes, excluding separately reportable procedures.   There was a high probability of clinically significant/life threatening deterioration in the patient's condition which required my urgent intervention. CONSULTS:  IP CONSULT TO GI    PROCEDURES:  Unless otherwise noted below, none     Procedures        FINAL IMPRESSION      1. Dysphagia, unspecified type    2. Acute UTI          DISPOSITION/PLAN   DISPOSITION        PATIENT REFERRED TO:  Kiki Rico, [address] [facility] [address] [place] [address] [place] [address]  [phone]    Call in 1 day  for further care and treatment      DISCHARGE MEDICATIONS:  Discharge Medication List as of 11/8/2021  8:50 PM      START taking these medications    Details   cefUROXime (CEFTIN) 250 MG tablet Take 1 tablet by mouth 2 times daily for 7 days, Disp-14 tablet, R-0Normal           Controlled Substances Monitoring:     No flowsheet data found. (Please note that portions of this note were completed with a voice recognition program.  Efforts were made to edit the dictations but occasionally words are mis-transcribed. )    1859 Rashard Manuel DO (electronically signed)  Attending Emergency Physician-      Mai Shukla DO  11/08/21 2296

## 2021-11-08 NOTE — CARE COORDINATION
[facility]        Patient is ACTIVE with [facility] for home care services. I will follow for needs, and send docs on DC when appropriate. [facility] team aware of admission. Please notify CTN when patient is ready for DC.     Danni Matta RN, BSN CTN  [facility] [phone]

## 2021-11-09 LAB — URINE CULTURE, ROUTINE: NORMAL

## 2021-12-17 ENCOUNTER — HOSPITAL ENCOUNTER (OUTPATIENT)
Dept: GENERAL RADIOLOGY | Age: 86
Discharge: HOME | End: 2021-12-17
Payer: MEDICARE

## 2021-12-17 DIAGNOSIS — R13.12 OROPHARYNGEAL DYSPHAGIA: ICD-10-CM

## 2021-12-17 PROCEDURE — 74230 X-RAY XM SWLNG FUNCJ C+: CPT

## 2021-12-23 ENCOUNTER — HOSPITAL ENCOUNTER (OUTPATIENT)
Dept: CT IMAGING | Age: 86
Discharge: HOME | End: 2021-12-23
Payer: MEDICARE

## 2021-12-23 DIAGNOSIS — J18.9 UNRESOLVED PNEUMONIA: ICD-10-CM

## 2021-12-23 PROCEDURE — 71250 CT THORAX DX C-: CPT

## 2021-12-29 ENCOUNTER — TELEPHONE (OUTPATIENT)
Dept: FAMILY MEDICINE CLINIC | Age: 86
End: 2021-12-29

## 2021-12-29 RX ORDER — NITROFURANTOIN 25; 75 MG/1; MG/1
100 CAPSULE ORAL 2 TIMES DAILY
Qty: 14 CAPSULE | Refills: 0 | Status: SHIPPED | OUTPATIENT
Start: 2021-12-29 | End: 2022-01-05

## 2022-01-07 ENCOUNTER — VIRTUAL VISIT (OUTPATIENT)
Dept: FAMILY MEDICINE CLINIC | Age: 87
End: 2022-01-07
Payer: MEDICARE

## 2022-01-07 DIAGNOSIS — Z09 HOSPITAL DISCHARGE FOLLOW-UP: Primary | ICD-10-CM

## 2022-01-07 PROCEDURE — 99214 OFFICE O/P EST MOD 30 MIN: CPT | Performed by: NURSE PRACTITIONER

## 2022-01-07 ASSESSMENT — ENCOUNTER SYMPTOMS
DIARRHEA: 0
BACK PAIN: 0
COUGH: 0
SHORTNESS OF BREATH: 0
SINUS PAIN: 0
EYE DISCHARGE: 0
COLOR CHANGE: 0
CHEST TIGHTNESS: 0
CONSTIPATION: 0
ABDOMINAL PAIN: 0
NAUSEA: 0
ABDOMINAL DISTENTION: 0
SINUS PRESSURE: 0

## 2022-01-07 NOTE — PROGRESS NOTES
Vannesa Jerry (:  1931) is a 80 y.o. female,Established patient, here for evaluation of the following chief complaint(s): Follow-Up from Hospital ([address], [facility] [place] 2022)      Patient identification was verified at the start of the visit: Yes    Patient located for today's visit in [place]: Yes    ASSESSMENT/PLAN:  1. Hospital discharge follow-up   Patient had COVID 3-4 months ago   Patient was in hospital and then home and then rehab and now home about 2 weeks   Pt overall feeling well, still following with home care- nurse, PT, OT   Not on oxygen   Working on PO intake and weight gain and building strength   Still refuses COVID vaccine- discussed this as a recommendation to receive for protection- pt adamantly refuses the vaccine or any vaccine and laughed at our discussion about the vaccine, says she is fine right now and will take her chances, g-dgtr Graciela Fulton laughed as well saying pt is stubborn and set in her ways   Recent UTI- symptoms resolving    Plan for cardiology and GI follow up    Return in about 3 months (around 2022) for Annual Wellness Visit, Fasting Labs. SUBJECTIVE/OBJECTIVE:  HPI     Chief Complaint   Patient presents with    Follow-Up from [facility], [address] [place] [address] ON 2022       Patient and grand-daughter present today on virtual call for hospital discharge follow up. Pt was admitted to hospital in 2021 with COVID PNA. Pt was in hospital on oxygen for about 4 weeks. Pt was then discharged home but was too weak at home for 7 weeks with rehab therapy at home and so she went to rehab [facility] 4 weeks through the month of December and was discharged home on 2021. Lives with her dgtr Graciela Fulton. Still has home care help- nurse weekly and physical and occupational therapist. Insurance calls to check in as well. Pt just recently finished antibiotics for UTI.  Overall pt feels very angry about what happened to her but is thankful that someone has been able to help get her through it. Pt had issues with afib while she had COVID and is scheduled to follow up with cardiology the end of this month. Sees GI for swallow test next week for follow up visit. Plan to ask cardiology to manage medication. Would like to get off eliquis because it costs $500 per month. Review of Systems   Constitutional: Negative for activity change, appetite change, fatigue, fever and unexpected weight change. HENT: Negative for congestion, ear pain, sinus pressure and sinus pain. Eyes: Negative for discharge and visual disturbance. Respiratory: Negative for cough, chest tightness and shortness of breath. Cardiovascular: Negative for chest pain, palpitations and leg swelling. Gastrointestinal: Negative for abdominal distention, abdominal pain, constipation, diarrhea and nausea. Endocrine: Negative for cold intolerance, heat intolerance, polydipsia, polyphagia and polyuria. Genitourinary: Negative for decreased urine volume, difficulty urinating, dysuria, flank pain, frequency and urgency. Musculoskeletal: Negative for arthralgias, back pain, gait problem, joint swelling, myalgias and neck pain. Skin: Negative for color change, rash and wound. Allergic/Immunologic: Negative for food allergies and immunocompromised state. Neurological: Negative for dizziness, tremors, speech difficulty, weakness, light-headedness, numbness and headaches. Hematological: Negative for adenopathy. Does not bruise/bleed easily. Psychiatric/Behavioral: Negative for confusion, decreased concentration, self-injury, sleep disturbance and suicidal ideas. The patient is not nervous/anxious.         Patient-Reported Vitals 1/7/2022   Patient-Reported Weight 83 lbs   Patient-Reported Height -   Patient-Reported Systolic 165   Patient-Reported Diastolic 70   Patient-Reported Pulse 69   Patient-Reported Temperature 97   Patient-Reported SpO2 98        Physical Exam    [INSTRUCTIONS:  \"[x]\" Indicates a positive item  \"[]\" Indicates a negative item  -- DELETE ALL ITEMS NOT EXAMINED]    Constitutional: [x] Appears well-developed and well-nourished [x] No apparent distress      [] Abnormal -     Mental status: [x] Alert and awake  [x] Oriented to person/place/time [x] Able to follow commands    [] Abnormal -     Eyes:   EOM    [x]  Normal    [] Abnormal -   Sclera  [x]  Normal    [] Abnormal -          Discharge [x]  None visible   [] Abnormal -     HENT: [x] Normocephalic, atraumatic  [x] Abnormal - hard of hearing   [x] Mouth/Throat: Mucous membranes are moist    External Ears [x] Normal  [] Abnormal -    Neck: [x] No visualized mass [] Abnormal -     Pulmonary/Chest: [x] Respiratory effort normal   [x] No visualized signs of difficulty breathing or respiratory distress        [] Abnormal -      Musculoskeletal:   [x] Normal gait with no signs of ataxia         [x] Normal range of motion of neck        [] Abnormal -     Neurological:        [x] No Facial Asymmetry (Cranial nerve 7 motor function) (limited exam due to video visit)          [x] No gaze palsy        [] Abnormal -          Skin:        [x] No significant exanthematous lesions or discoloration noted on facial skin         [] Abnormal -            Psychiatric:       [x] Normal Affect [] Abnormal -        [x] No Hallucinations    Other pertinent observable physical exam findings:-          On this date 1/7/2022 I have spent 30 minutes reviewing previous notes, test results and face to face (virtual) with the patient discussing the diagnosis and importance of compliance with the treatment plan as well as documenting on the day of the visit.     Care Gaps Addressed  COVID vaccine recommended= pt still declines  TDAP vaccine recommended- call insurance to discuss coverage  Call insurance company to discuss coverage for shingles vaccine (Shingrix) 2 dose series   PNA vaccine recommended  AWV recommended  Flu vaccine recommended         I have reviewed patient's pertinent medical history, relevant laboratory and imaging studies, and past/future health maintenance. Discussed with the patient the importance of adhering to their current medication regimen as directed. Advised the patient that they should continue to work on eating a healthy balanced diet and staying active by exercising within their personal limits. Orders as listed above. Patient was advised to keep future appointments with their respective specialty care team(s). Patient had the opportunity to ask questions, all of which were answered to the best of my ability and with patient satisfaction. Patient understands and is agreeable with the care plan following today's visit. Patient is to schedule an appointment for any new or worsening symptoms. Go to ER for significant shortness of breath, chest pain, or uncontrolled pain or fever. I discussed with patient the risk and benefits of any medications that were prescribed today. I verified that the patient understands their medications, labs, and/or procedures. The patient is doing well with current medication regimen and does not have any barriers to adherence. The patient's self-management abilities are good. Follow Up in 3 Months for AWV and fasting labs    Kirill Dean is a 80 y.o. female being evaluated by a Virtual Visit (video visit) encounter to address concerns as mentioned above. A caregiver was present when appropriate. Due to this being a TeleHealth encounter (During VVSOB-64 public health emergency), evaluation of the following organ systems was limited: Vitals/Constitutional/EENT/Resp/CV/GI//MS/Neuro/Skin/Heme-Lymph-Imm.   Pursuant to the emergency declaration under the [facility] waiver authority and the [employer] Act, this Virtual Visit was conducted with patient's (and/or legal guardian's) consent, to reduce the patient's risk of exposure to COVID-19 and provide necessary medical care. The patient (and/or legal guardian) has also been advised to contact this office for worsening conditions or problems, and seek emergency medical treatment and/or call 911 if deemed necessary. Services were provided through a video synchronous discussion virtually to substitute for in-person clinic visit. Patient was located at home and provider was located in office or at home. An electronic signature was used to authenticate this note.     --Ruib Devlin, CAROLYN - CNP

## 2022-01-07 NOTE — PATIENT INSTRUCTIONS

## 2022-01-12 ENCOUNTER — TELEPHONE (OUTPATIENT)
Dept: FAMILY MEDICINE CLINIC | Age: 87
End: 2022-01-12

## 2022-01-12 DIAGNOSIS — R41.82 ALTERED MENTAL STATUS, UNSPECIFIED ALTERED MENTAL STATUS TYPE: Primary | ICD-10-CM

## 2022-01-12 NOTE — TELEPHONE ENCOUNTER
----- Message from Florence Royal sent at 1/12/2022  1:51 PM EST -----  Subject: Referral Request    QUESTIONS   Reason for referral request? Stefania Soliz from [facility] would like an   order for a UA with CNS for pt - pt dghtr concerned for pt due to lack of   appetite & increased confusion; please call Stefania Soliz back when order written   for pt   Has the physician seen you for this condition before? No   Preferred Specialist (if applicable)? Do you already have an appointment scheduled? No  Additional Information for Provider?   ---------------------------------------------------------------------------  --------------  CALL BACK INFO  What is the best way for the office to contact you? OK to leave message on   voicemail  Preferred Call Back Phone Number?  [phone]

## 2022-01-13 ENCOUNTER — TELEPHONE (OUTPATIENT)
Dept: FAMILY MEDICINE CLINIC | Age: 87
End: 2022-01-13

## 2022-01-13 DIAGNOSIS — Z20.822 EXPOSURE TO COVID-19 VIRUS: Primary | ICD-10-CM

## 2022-01-13 DIAGNOSIS — Z20.822 EXPOSURE TO COVID-19 VIRUS: ICD-10-CM

## 2022-01-13 NOTE — TELEPHONE ENCOUNTER
Pt's Granddaughter called and states that pt has been exposed/close contact with [id number].   Per [facility] ok to order Covid19 test./mv

## 2022-01-14 LAB — SARS-COV-2: NOT DETECTED

## 2022-01-18 ENCOUNTER — HOSPITAL ENCOUNTER (OUTPATIENT)
Age: 87
Discharge: HOME | End: 2022-01-18
Payer: MEDICARE

## 2022-01-18 ENCOUNTER — HOSPITAL ENCOUNTER (OUTPATIENT)
Dept: GENERAL RADIOLOGY | Age: 87
Discharge: HOME | End: 2022-01-18
Payer: MEDICARE

## 2022-01-18 ENCOUNTER — TELEPHONE (OUTPATIENT)
Dept: FAMILY MEDICINE CLINIC | Age: 87
End: 2022-01-18

## 2022-01-18 DIAGNOSIS — R05.9 COUGH: ICD-10-CM

## 2022-01-18 DIAGNOSIS — R05.9 COUGH: Primary | ICD-10-CM

## 2022-01-18 DIAGNOSIS — Z20.822 EXPOSURE TO COVID-19 VIRUS: ICD-10-CM

## 2022-01-18 DIAGNOSIS — J18.9 PNEUMONIA DUE TO INFECTIOUS ORGANISM, UNSPECIFIED LATERALITY, UNSPECIFIED PART OF LUNG: Primary | ICD-10-CM

## 2022-01-18 PROCEDURE — 71046 X-RAY EXAM CHEST 2 VIEWS: CPT

## 2022-01-18 RX ORDER — AZITHROMYCIN 250 MG/1
250 TABLET, FILM COATED ORAL SEE ADMIN INSTRUCTIONS
Qty: 6 TABLET | Refills: 0 | Status: SHIPPED | OUTPATIENT
Start: 2022-01-18 | End: 2022-01-23

## 2022-01-19 ENCOUNTER — TELEPHONE (OUTPATIENT)
Dept: FAMILY MEDICINE CLINIC | Age: 87
End: 2022-01-19

## 2022-01-19 LAB — SARS-COV-2: DETECTED

## 2022-01-19 NOTE — TELEPHONE ENCOUNTER
PATIENT TESTED POSITIVE FOR COVID 19. SHE IS INTERESTED IN GETTING TREATMENT FOR THIS. MOLNUPIRAVIR PLEASE SEND SCRIPT TO [facility]. ALREADY SENT PER DR. BLUNT AT 11:12 AM. SC

## 2022-01-20 ENCOUNTER — NURSE ONLY (OUTPATIENT)
Dept: FAMILY MEDICINE CLINIC | Age: 87
End: 2022-01-20
Payer: MEDICARE

## 2022-01-20 DIAGNOSIS — R39.9 UTI SYMPTOMS: Primary | ICD-10-CM

## 2022-01-20 LAB
BILIRUBIN, POC: ABNORMAL
BLOOD URINE, POC: ABNORMAL
CLARITY, POC: ABNORMAL
COLOR, POC: ABNORMAL
GLUCOSE URINE, POC: 100
KETONES, POC: ABNORMAL
LEUKOCYTE EST, POC: ABNORMAL
NITRITE, POC: ABNORMAL
PH, POC: 5.5
PROTEIN, POC: 30
SPECIFIC GRAVITY, POC: >1.03
UROBILINOGEN, POC: 0.2

## 2022-01-20 PROCEDURE — 81002 URINALYSIS NONAUTO W/O SCOPE: CPT | Performed by: FAMILY MEDICINE

## 2022-01-21 ENCOUNTER — NURSE ONLY (OUTPATIENT)
Dept: FAMILY MEDICINE CLINIC | Age: 87
End: 2022-01-21
Payer: MEDICARE

## 2022-01-21 DIAGNOSIS — R41.0 CONFUSION: Primary | ICD-10-CM

## 2022-01-21 LAB
ANION GAP SERPL CALCULATED.3IONS-SCNC: 11 MMOL/L (ref 3–16)
BASOPHILS ABSOLUTE: 0.1 K/UL (ref 0–0.2)
BASOPHILS RELATIVE PERCENT: 1.3 %
BUN BLDV-MCNC: 11 MG/DL (ref 7–20)
CALCIUM SERPL-MCNC: 9.7 MG/DL (ref 8.3–10.6)
CHLORIDE BLD-SCNC: 97 MMOL/L (ref 99–110)
CO2: 25 MMOL/L (ref 21–32)
CREAT SERPL-MCNC: 0.6 MG/DL (ref 0.6–1.2)
EOSINOPHILS ABSOLUTE: 0 K/UL (ref 0–0.6)
EOSINOPHILS RELATIVE PERCENT: 1 %
GFR AFRICAN AMERICAN: >60
GFR NON-AFRICAN AMERICAN: >60
GLUCOSE BLD-MCNC: 104 MG/DL (ref 70–99)
HCT VFR BLD CALC: 38.4 % (ref 36–48)
HEMOGLOBIN: 12.6 G/DL (ref 12–16)
LYMPHOCYTES ABSOLUTE: 1.6 K/UL (ref 1–5.1)
LYMPHOCYTES RELATIVE PERCENT: 36.1 %
MCH RBC QN AUTO: 26.8 PG (ref 26–34)
MCHC RBC AUTO-ENTMCNC: 32.6 G/DL (ref 31–36)
MCV RBC AUTO: 82.2 FL (ref 80–100)
MONOCYTES ABSOLUTE: 0.7 K/UL (ref 0–1.3)
MONOCYTES RELATIVE PERCENT: 14.6 %
NEUTROPHILS ABSOLUTE: 2.1 K/UL (ref 1.7–7.7)
NEUTROPHILS RELATIVE PERCENT: 47 %
PDW BLD-RTO: 13.5 % (ref 12.4–15.4)
PLATELET # BLD: 243 K/UL (ref 135–450)
PMV BLD AUTO: 9.1 FL (ref 5–10.5)
POTASSIUM SERPL-SCNC: 3.9 MMOL/L (ref 3.5–5.1)
RBC # BLD: 4.68 M/UL (ref 4–5.2)
SODIUM BLD-SCNC: 133 MMOL/L (ref 136–145)
URINE CULTURE, ROUTINE: NORMAL
WBC # BLD: 4.6 K/UL (ref 4–11)

## 2022-01-21 PROCEDURE — 36415 COLL VENOUS BLD VENIPUNCTURE: CPT | Performed by: FAMILY MEDICINE

## 2022-01-21 NOTE — PROGRESS NOTES
Talked to granddaughter POA, Patient in Nursing home. Patient tested positive for covid on 1/14/22. Granddaughter going to call to MD to reschedule procedure.

## 2022-01-24 ENCOUNTER — TELEPHONE (OUTPATIENT)
Dept: CARDIOLOGY CLINIC | Age: 87
End: 2022-01-24

## 2022-01-24 NOTE — TELEPHONE ENCOUNTER
Patient tested positive for COVID.  Please reschedule appt with [facility] on tavr Thursday in a few weeks or when patient feels better

## 2022-01-24 NOTE — TELEPHONE ENCOUNTER
Pt was scheduled for first availability 3/3/22 with [facility]. Granddaughter states pt will run out of [employer] before appt. States eliquis cost $500. Asking if samples are available or an alternative medication. Please call to advise.

## 2022-01-24 NOTE — TELEPHONE ENCOUNTER
Last OV: 7-1-22  Last labs: 1-21-22  Appt scheduled : 3-3-22  Last refill:10-2-21  Called patient dtg and informed her that I placed 3 boxes lot #[id number] exp 4-23 at  for  along with papers to fill out for PA assistance.

## 2022-02-02 ENCOUNTER — NURSE ONLY (OUTPATIENT)
Dept: FAMILY MEDICINE CLINIC | Age: 87
End: 2022-02-02
Payer: MEDICARE

## 2022-02-02 DIAGNOSIS — Z95.2 S/P TAVR (TRANSCATHETER AORTIC VALVE REPLACEMENT): ICD-10-CM

## 2022-02-02 DIAGNOSIS — R39.9 UTI SYMPTOMS: Primary | ICD-10-CM

## 2022-02-02 DIAGNOSIS — R41.0 CONFUSION: ICD-10-CM

## 2022-02-02 LAB
BILIRUBIN, POC: ABNORMAL
BLOOD URINE, POC: ABNORMAL
CLARITY, POC: CLEAR
COLOR, POC: YELLOW
GLUCOSE URINE, POC: ABNORMAL
KETONES, POC: ABNORMAL
LEUKOCYTE EST, POC: ABNORMAL
NITRITE, POC: ABNORMAL
PH, POC: 7
PROTEIN, POC: ABNORMAL
SPECIFIC GRAVITY, POC: 1.03
UROBILINOGEN, POC: 0.2

## 2022-02-02 PROCEDURE — 81002 URINALYSIS NONAUTO W/O SCOPE: CPT | Performed by: FAMILY MEDICINE

## 2022-02-03 LAB — URINE CULTURE, ROUTINE: NORMAL

## 2022-02-04 NOTE — PROGRESS NOTES
Preoperative Screening for Elective Surgery/Invasive Procedures While COVID-19 present in the community     1. Have you tested positive or have been told to self-isolate for COVID-19 like symptoms within the past 28 days?no  2. Do you currently have any of the following symptoms?no  ? Fever >100.0 F or 99.9 F in immunocompromised patients? ? New onset cough, shortness of breath or difficulty breathing? ? New onset sore throat, myalgia (muscle aches and pains), headache, loss of taste/smell or diarrhea? 3. Have you had a potential exposure to COVID-19 within the past 14 days by:no  ? Close contact with a confirmed case? ? Close contact with a healthcare worker,  or essential infrastructure worker (grocery store, [employer], gas station, public utilities or transportation)?no  ? Do you reside in a congregate setting such as; skilled nursing facility, adult home, correctional facility, homeless shelter or other institutional setting? ? Have you had recent travel to a known COVID-19 hotspot? Indicate if the patient has a positive screen by answering yes to one or more of the above questions. [address] time_____0800_______        Surgery time___0930_________    Take the following medications with a sip of water:    Do not eat or drink anything after 12:00 midnight prior to your surgery. This includes water chewing gum, mints and ice chips. You may brush your teeth and gargle the morning of your surgery, but do not swallow the water     Please see your family doctor/pediatrician for a history and physical and/or concerning medications. Bring any test results/reports from your physicians office.    If you are under the care of a heart doctor or specialist doctor, please be aware that you may be asked to them for clearance    You may be asked to stop blood thinners such as Coumadin, Plavix, Fragmin, Lovenox, etc., or any anti-inflammatories such as:  Aspirin, Ibuprofen, Advil, Naproxen prior to your surgery. We also ask that you stop any OTC medications such as fish oil, vitamin E, glucosamine, garlic, Multivitamins, COQ 10, etc.    We ask that you do not smoke 24 hours prior to surgery  We ask that you do not  drink any alcoholic beverages 24 hours prior to surgery     You must make arrangements for a responsible adult to take you home after your surgery. For your safety you will not be allowed to leave alone or drive yourself home. Your surgery will be cancelled if you do not have a ride home. Also for your safety, it is strongly suggested that someone stay with you the first 24 hours after your surgery. A parent or legal guardian must accompany a child scheduled for surgery and plan to stay at the hospital until the child is discharged. Please do not bring other children with you. For your comfort, please wear simple loose fitting clothing to the hospital.  Please do not bring valuables. Do not wear any make-up or nail polish on your fingers or toes      For your safety, please do not wear any jewelry or body piercing's on the day of surgery. All jewelry must be removed. If you have dentures, they will be removed before going to operating room. For your convenience, we will provide you with a container. If you wear contact lenses or glasses, they will be removed, please bring a case for them. If you have a living will and a durable power of  for healthcare, please bring in a copy. As part of our patient safety program to minimize surgical site infections, we ask you to do the following:    · Please notify your surgeon if you develop any illness between         now and the  day of your surgery.     · This includes a cough, cold, fever, sore throat, nausea,         or vomiting, and diarrhea, etc.  ·  Please notify your surgeon if you experience dizziness, shortness         of breath or blurred vision between now and the time of your surgery. Do not shave your operative site 96 hours prior to surgery. For face and neck surgery, men may use an electric razor 48 hours   prior to surgery. You may shower the night before surgery or the morning of   your surgery with an antibacterial soap. You will need to bring a photo ID and insurance card    [facility] has an onsite pharmacy, would you like to utilize our pharmacy     If you will be staying overnight and use a C-pap machine, please bring   your C-pap to hospital     Our goal is to provide you with excellent care, therefore, visitors will be limited to two(2) in the room at a time so that we may focus on providing this care for you. Please contact pre-admission testing if you have any further questions. [facility] phone number:  [phone] PAT fax number:  [phone]  Please note these are generalized instructions for all surgical cases, you may be provided with more specific instructions according to your surgery.

## 2022-02-07 ENCOUNTER — TELEPHONE (OUTPATIENT)
Dept: FAMILY MEDICINE CLINIC | Age: 87
End: 2022-02-07

## 2022-02-07 RX ORDER — POTASSIUM CHLORIDE 20 MEQ/1
20 TABLET, EXTENDED RELEASE ORAL DAILY
Qty: 30 TABLET | Refills: 0 | Status: SHIPPED | OUTPATIENT
Start: 2022-02-07 | End: 2022-04-01

## 2022-02-11 ENCOUNTER — VIRTUAL VISIT (OUTPATIENT)
Dept: FAMILY MEDICINE CLINIC | Age: 87
End: 2022-02-11
Payer: MEDICARE

## 2022-02-11 ENCOUNTER — ANESTHESIA EVENT (OUTPATIENT)
Dept: ENDOSCOPY | Age: 87
End: 2022-02-11
Payer: MEDICARE

## 2022-02-11 DIAGNOSIS — L98.9 SKIN LESION OF LEFT ARM: Primary | ICD-10-CM

## 2022-02-11 PROCEDURE — 99213 OFFICE O/P EST LOW 20 MIN: CPT | Performed by: FAMILY MEDICINE

## 2022-02-11 NOTE — PROGRESS NOTES
2022    TELEHEALTH EVALUATION -- Audio/Visual (During SWQAT-81 public health emergency)    HPI:    Stephane Jc (:  1931) has requested an audio/video evaluation for the following concern(s):    Chief Complaint   Patient presents with    Other     [address]      Started during rehab from covid hospitalization -   Won't leave it alone and getting bigger  Using antibiotic ointment  Has endoscopy planned soon for swelling issues  Using antibiotic cream - not painful/ itchy but picking at it. Noted for about 4 weeks -more raised. Lungs sound good  On eliquis for afib  bp good on recent check up - off oxygen  Lingering cough but   Very picky but not picking weight up - using ensure between meals. Had afib w/ covid in  -on eliquis    Hx of CRISTIANA    Review of Systems    Prior to Visit Medications    Medication Sig Taking? Authorizing Provider   potassium chloride (KLOR-CON M) 20 MEQ extended release tablet Take 1 tablet by mouth daily Yes Ricardo Farrell MD   metoprolol tartrate (LOPRESSOR) 50 MG tablet Take 1 tablet by mouth 2 times daily Yes Marie Vargas DO   dilTIAZem (CARDIZEM CD) 120 MG extended release capsule Take 1 capsule by mouth daily Yes Marie Vargas DO   apixaban (ELIQUIS) 2.5 MG TABS tablet Take 1 tablet by mouth 2 times daily  Patient taking differently: Take 2.5 mg by mouth 2 times daily Instructed to stop on  per dr neri Yes Marie Vargas DO   albuterol-ipratropium (COMBIVENT RESPIMAT)  MCG/ACT AERS inhaler Inhale 1 puff into the lungs every 6 hours Yes Marie Vargas DO       Social History     Tobacco Use    Smoking status: Never Smoker    Smokeless tobacco: Never Used   Vaping Use    Vaping Use: Never used   Substance Use Topics    Alcohol use: Never    Drug use:  No            PHYSICAL EXAMINATION:  [ INSTRUCTIONS:  \"[x]\" Indicates a positive item  \"[]\" Indicates a negative item  -- DELETE ALL ITEMS NOT EXAMINED]  Vital Signs: (As obtained by patient/caregiver or practitioner observation)    Blood pressure-  Heart rate-    Respiratory rate-    Temperature-  Pulse oximetry-     Constitutional: [x] Appears well-developed and well-nourished [x] No apparent distress      [] Abnormal-   Mental status  [x] Alert and awake  [] Oriented to person/place/time []Able to follow commands      Eyes:  EOM    []  Normal  [] Abnormal-  Sclera  []  Normal  [] Abnormal -         Discharge []  None visible  [] Abnormal -    HENT:   [x] Normocephalic, atraumatic. [] Abnormal   [] Mouth/Throat: Mucous membranes are moist.     External Ears [] Normal  [] Abnormal-     Neck: [] No visualized mass     Pulmonary/Chest: [x] Respiratory effort normal.  [] No visualized signs of difficulty breathing or respiratory distress        [] Abnormal-      Musculoskeletal:   [] Normal gait with no signs of ataxia         [] Normal range of motion of neck        [] Abnormal-       Neurological:        [x] No Facial Asymmetry (Cranial nerve 7 motor function) (limited exam to video visit)          [] No gaze palsy        [] Abnormal-         Skin:        [x] No significant exanthematous lesions or discoloration noted on facial skin         [] Abnormal-            Psychiatric:       [x] Normal Affect [] No Hallucinations        [] Abnormal-     Other pertinent observable physical exam findings-     ASSESSMENT/PLAN:   Diagnosis Orders   1. 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 The patient (or guardian if applicable) is aware that this is a billable service, which includes applicable co-pays. This Virtual Visit was conducted with patient's (and/or legal guardian's) consent.  The visit was conducted pursuant to the emergency declaration under the [address] [place] [address], [address] [place] [address] authority and the [employer] Act. Patient identification was verified, and a caregiver was present when appropriate. The patient was located at home in a state where the provider was licensed to provide care. Total time spent on this encounter:11-20 minutes were spent on the digital evaluation and management of this patient. --Malvin Michaud MD on 2/11/2022 at 1:09 PM    An electronic signature was used to authenticate this note.

## 2022-02-14 ENCOUNTER — ANESTHESIA (OUTPATIENT)
Dept: ENDOSCOPY | Age: 87
End: 2022-02-14
Payer: MEDICARE

## 2022-02-14 ENCOUNTER — PROCEDURE VISIT (OUTPATIENT)
Dept: FAMILY MEDICINE CLINIC | Age: 87
End: 2022-02-14
Payer: MEDICARE

## 2022-02-14 ENCOUNTER — HOSPITAL ENCOUNTER (OUTPATIENT)
Age: 87
Setting detail: OUTPATIENT SURGERY
Discharge: HOME | End: 2022-02-14
Attending: INTERNAL MEDICINE | Admitting: INTERNAL MEDICINE
Payer: MEDICARE

## 2022-02-14 VITALS
OXYGEN SATURATION: 100 % | DIASTOLIC BLOOD PRESSURE: 54 MMHG | RESPIRATION RATE: 19 BRPM | SYSTOLIC BLOOD PRESSURE: 108 MMHG

## 2022-02-14 VITALS
SYSTOLIC BLOOD PRESSURE: 145 MMHG | BODY MASS INDEX: 16.84 KG/M2 | TEMPERATURE: 97.9 F | HEART RATE: 82 BPM | DIASTOLIC BLOOD PRESSURE: 75 MMHG | OXYGEN SATURATION: 96 % | HEIGHT: 60 IN | RESPIRATION RATE: 18 BRPM | WEIGHT: 85.76 LBS

## 2022-02-14 DIAGNOSIS — L98.9 SKIN LESION OF LEFT ARM: Primary | ICD-10-CM

## 2022-02-14 PROCEDURE — 6360000002 HC RX W HCPCS: Performed by: NURSE ANESTHETIST, CERTIFIED REGISTERED

## 2022-02-14 PROCEDURE — 3700000000 HC ANESTHESIA ATTENDED CARE: Performed by: INTERNAL MEDICINE

## 2022-02-14 PROCEDURE — 7100000011 HC PHASE II RECOVERY - ADDTL 15 MIN: Performed by: INTERNAL MEDICINE

## 2022-02-14 PROCEDURE — 3609017700 HC EGD DILATION GASTRIC/DUODENAL STRICTURE: Performed by: INTERNAL MEDICINE

## 2022-02-14 PROCEDURE — 11602 EXC TR-EXT MAL+MARG 1.1-2 CM: CPT | Performed by: FAMILY MEDICINE

## 2022-02-14 PROCEDURE — C1726 CATH, BAL DIL, NON-VASCULAR: HCPCS | Performed by: INTERNAL MEDICINE

## 2022-02-14 PROCEDURE — 2709999900 HC NON-CHARGEABLE SUPPLY: Performed by: INTERNAL MEDICINE

## 2022-02-14 PROCEDURE — 2500000003 HC RX 250 WO HCPCS: Performed by: NURSE ANESTHETIST, CERTIFIED REGISTERED

## 2022-02-14 PROCEDURE — 7100000000 HC PACU RECOVERY - FIRST 15 MIN: Performed by: INTERNAL MEDICINE

## 2022-02-14 PROCEDURE — 2580000003 HC RX 258: Performed by: ANESTHESIOLOGY

## 2022-02-14 PROCEDURE — 3700000001 HC ADD 15 MINUTES (ANESTHESIA): Performed by: INTERNAL MEDICINE

## 2022-02-14 PROCEDURE — 7100000010 HC PHASE II RECOVERY - FIRST 15 MIN: Performed by: INTERNAL MEDICINE

## 2022-02-14 PROCEDURE — 7100000001 HC PACU RECOVERY - ADDTL 15 MIN: Performed by: INTERNAL MEDICINE

## 2022-02-14 RX ORDER — SODIUM CHLORIDE 0.9 % (FLUSH) 0.9 %
5-40 SYRINGE (ML) INJECTION PRN
Status: DISCONTINUED | OUTPATIENT
Start: 2022-02-14 | End: 2022-02-14 | Stop reason: HOSPADM

## 2022-02-14 RX ORDER — ONDANSETRON 2 MG/ML
4 INJECTION INTRAMUSCULAR; INTRAVENOUS
Status: DISCONTINUED | OUTPATIENT
Start: 2022-02-14 | End: 2022-02-14 | Stop reason: HOSPADM

## 2022-02-14 RX ORDER — SODIUM CHLORIDE 9 MG/ML
INJECTION, SOLUTION INTRAVENOUS CONTINUOUS
Status: DISCONTINUED | OUTPATIENT
Start: 2022-02-14 | End: 2022-02-14 | Stop reason: HOSPADM

## 2022-02-14 RX ORDER — SODIUM CHLORIDE 0.9 % (FLUSH) 0.9 %
5-40 SYRINGE (ML) INJECTION EVERY 12 HOURS SCHEDULED
Status: DISCONTINUED | OUTPATIENT
Start: 2022-02-14 | End: 2022-02-14 | Stop reason: HOSPADM

## 2022-02-14 RX ORDER — PROPOFOL 10 MG/ML
INJECTION, EMULSION INTRAVENOUS CONTINUOUS PRN
Status: DISCONTINUED | OUTPATIENT
Start: 2022-02-14 | End: 2022-02-14 | Stop reason: SDUPTHER

## 2022-02-14 RX ORDER — SODIUM CHLORIDE 9 MG/ML
25 INJECTION, SOLUTION INTRAVENOUS PRN
Status: DISCONTINUED | OUTPATIENT
Start: 2022-02-14 | End: 2022-02-14 | Stop reason: HOSPADM

## 2022-02-14 RX ORDER — LIDOCAINE HYDROCHLORIDE 20 MG/ML
INJECTION, SOLUTION EPIDURAL; INFILTRATION; INTRACAUDAL; PERINEURAL PRN
Status: DISCONTINUED | OUTPATIENT
Start: 2022-02-14 | End: 2022-02-14 | Stop reason: SDUPTHER

## 2022-02-14 RX ADMIN — SODIUM CHLORIDE: 9 INJECTION, SOLUTION INTRAVENOUS at 09:00

## 2022-02-14 RX ADMIN — PROPOFOL 300 MCG/KG/MIN: 10 INJECTION, EMULSION INTRAVENOUS at 09:28

## 2022-02-14 RX ADMIN — LIDOCAINE HYDROCHLORIDE 60 MG: 20 INJECTION, SOLUTION EPIDURAL; INFILTRATION; INTRACAUDAL; PERINEURAL at 09:28

## 2022-02-14 ASSESSMENT — PULMONARY FUNCTION TESTS
PIF_VALUE: 1
PIF_VALUE: 0
PIF_VALUE: 1
PIF_VALUE: 0
PIF_VALUE: 1
PIF_VALUE: 1

## 2022-02-14 ASSESSMENT — PAIN DESCRIPTION - PAIN TYPE: TYPE: ACUTE PAIN

## 2022-02-14 ASSESSMENT — ENCOUNTER SYMPTOMS: SHORTNESS OF BREATH: 0

## 2022-02-14 ASSESSMENT — PAIN SCALES - GENERAL
PAINLEVEL_OUTOF10: 0

## 2022-02-14 ASSESSMENT — PAIN - FUNCTIONAL ASSESSMENT: PAIN_FUNCTIONAL_ASSESSMENT: 0-10

## 2022-02-14 NOTE — PROGRESS NOTES
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 Patient is doing better - now walking w/ minimal assistance after long rehab from [address]  Weight starting to come up a little - never a big eater. Risks of procedure including bleeding, infection, scar, numbness, allergic reaction to lidocaine, dehiscence of wound, etc.  Pt voices understanding. Local anesthesia obtained with 4 cc of 2% lidocaine and epinephrine. Elliptical incision made and specimen removed to full skin thickness and sent to pathology. Six prolene 4-0 Interrupted sutures placed with good hemostasis and edge approximation and dressing placed. Wound care d/w pt and f/u in 10-12 days for suture removal, sooner prn problems.

## 2022-02-14 NOTE — OP NOTE
Endoscopy Note    Patient: Olivia Swenson  : 1931  Acct#:     Procedure: Esophagogastroduodenoscopy with esophageal dilation                         Date:  2022     Surgeon:   Jesenia Queen MD    Referring Physician:  Nayla Granger MD    Indications: This is a 80y.o. year old female who presents today with Dysphagia     Postoperative Diagnosis: 1. Esophageal Stricture s/p balloon dilation 2. Moderate Hiatal Hernia     Anesthesia: The patient was administered IV propofol per anesthesiology team.  Please see their operative records for full details. Consent:  The patient or their legal guardian has signed an informed consent, and is aware of the potential risks, benefits, alternatives, and potential complications of this procedure. These include, but are not limited to hemorrhage, bleeding, post procedural pain, perforation, phlebitis, aspiration, hypotension, hypoxia, cardiovascular events such as arryhthmia, and possibly death. Description of Procedure: The patient was then taken to the endoscopy suite, placed in the left lateral decubitus position and the above IV sedation was administrered. The Olympus video endoscope was placed through the patient's oropharynx without difficulty to the extent of the 2nd portion of the duodenum. Both forward and retroflexed views of the stomach were obtained. Findings:    Esophagus: The esophagus had evidence of a stricture at the GE junction. A 12-13. 5-15 mm balloon was inserted through the scope. The balloon was inflated to a maximum diameter of 15 mm. Heme and a mucosal tear were present s/p dilation. The balloon was deflated and removed. The post dilation appearance was satisfactory without deep tearing or excessive bleeding. appeared normal without evidence of Beavers's esophagus or reflux esophagitis. Stomach: The stomach had evidence of a moderate hiatal hernia on retroflex view.  The stomach otherwise appeared normal on forward and retroflexed views. Duodenum: The first and 2nd portions of the duodenum appeared normal with normal villous pattern    Estimated Blood Loss (mL): < 5 CC     Complications: None    Specimens:  None     The scope was then withdrawn back into the stomach, it was decompressed, and the scope was completely withdrawn. The patient tolerated the procedure well and was taken to the post anesthesia care unit in good condition. Impression:   1) See post procedure diagnoses    Recommendations:   1) Monitor response to dilation and repeat as needed in the future based on symptoms. 2) Recommend restarting on Eliquis is 24 hours. 3) Outpatient follow-up as needed.      Courtney Perez MD  [address] and [address] [facility]

## 2022-02-14 NOTE — ANESTHESIA PRE PROCEDURE
Department of Anesthesiology  Preprocedure Note       Name:  Amanda Wellington   Age:  80 y.o.  :  1931                                          MRN:  [id number]         Date:  2022      Surgeon: Madhav Caicedo):  Kwame Rebollar MD    Procedure: Procedure(s):  ESOPHAGOGASTRODUODENOSCOPY    Medications prior to admission:   Prior to Admission medications    Medication Sig Start Date End Date Taking?  Authorizing Provider   potassium chloride (KLOR-CON M) 20 MEQ extended release tablet Take 1 tablet by mouth daily 22  Yes Jeromy Kenny MD   metoprolol tartrate (LOPRESSOR) 50 MG tablet Take 1 tablet by mouth 2 times daily 10/2/21  Yes Marie Vargas DO   dilTIAZem (CARDIZEM CD) 120 MG extended release capsule Take 1 capsule by mouth daily 10/2/21  Yes Marie Vargas DO   apixaban (ELIQUIS) 2.5 MG TABS tablet Take 1 tablet by mouth 2 times daily  Patient taking differently: Take 2.5 mg by mouth 2 times daily Instructed to stop on  per  ofc 10/2/21  Yes Marie Vargas DO   albuterol-ipratropium (COMBIVENT RESPIMAT)  MCG/ACT AERS inhaler Inhale 1 puff into the lungs every 6 hours 10/2/21  Yes Marie Vargas DO       Current medications:    Current Facility-Administered Medications   Medication Dose Route Frequency Provider Last Rate Last Admin    0.9 % sodium chloride infusion   IntraVENous Continuous Shana Boyer MD        sodium chloride flush 0.9 % injection 5-40 mL  5-40 mL IntraVENous 2 times per day Shana Boyer MD        sodium chloride flush 0.9 % injection 5-40 mL  5-40 mL IntraVENous PRN Shana Boyer MD        0.9 % sodium chloride infusion  25 mL IntraVENous PRN Shana Boyer MD           Allergies:  No Known Allergies    Problem List:    Patient Active Problem List   Diagnosis Code    Aortic calcification (HCC) I70.0    Hypercholesteremia E78.00    Nonrheumatic aortic valve stenosis I35.0    Right carotid artery occlusion I65.21    Arthritis M19.90  Essential hypertension I10    Carotid stenosis, asymptomatic, right I65.21    History of transcatheter aortic valve implantation ([facility]) Z95.2    PAF (paroxysmal atrial fibrillation) ([facility]) I48.0    Acute respiratory failure with hypoxia ([facility]) J96.01    Pneumonia due to 2019 novel coronavirus U07.1, J12.82    Coronary artery disease involving native coronary artery of native heart without angina pectoris I25.10    Mild malnutrition ([facility]) E44.1       Past Medical History:        Diagnosis Date    Aortic stenosis     Arthritis     COVID-19     PAF (paroxysmal atrial fibrillation) ([facility] [address]        Past Surgical History:        Procedure Laterality Date    AORTIC VALVE REPLACEMENT N/A 5/21/2019    TRANSCATHETER AORTIC VALVE REPLACEMENT FEMORAL APPROACH performed by Valeriy Phillips MD at [address]  04/17/2018    HYSTERECTOMY, TOTAL ABDOMINAL      VARICOSE VEIN SURGERY Bilateral 1980's       Social History:    Social History     Tobacco Use    Smoking status: Never Smoker    Smokeless tobacco: Never Used   Substance Use Topics    Alcohol use: Never                                Counseling given: Not Answered      Vital Signs (Current):   Vitals:    02/04/22 1230 02/14/22 0811 02/14/22 0825   BP:   (!) 151/75   Pulse:   82   Resp:   14   Temp:   97.1 °F (36.2 °C)   TempSrc:   Temporal   SpO2:   99%   Weight: 92 lb (41.7 kg) 85 lb 12.1 oz (38.9 kg)    Height: 5' (1.524 m) 5' (1.524 m)                                               BP Readings from Last 3 Encounters:   02/14/22 (!) 151/75   11/08/21 (!) 133/57   10/02/21 119/72       NPO Status: Time of last liquid consumption: 1900                        Time of last solid consumption: 1900                        Date of last liquid consumption: 02/13/22                        Date of last solid food consumption: 02/13/22    BMI:   Wt Readings from Last 3 Encounters:   02/14/22 85 lb 12.1 oz (38.9 kg)   11/08/21 92 lb 13 oz (42.1 kg)   10/01/21 105 lb 6.1 oz (47.8 kg)     Body mass index is 16.75 kg/m². CBC:   Lab Results   Component Value Date    WBC 4.6 01/21/2022    RBC 4.68 01/21/2022    HGB 12.6 01/21/2022    HCT 38.4 01/21/2022    MCV 82.2 01/21/2022    RDW 13.5 01/21/2022     01/21/2022       CMP:   Lab Results   Component Value Date     01/21/2022    K 3.9 01/21/2022    K 3.1 11/08/2021    CL 97 01/21/2022    CO2 25 01/21/2022    BUN 11 01/21/2022    CREATININE 0.6 01/21/2022    GFRAA >60 01/21/2022    AGRATIO 1.3 11/02/2021    LABGLOM >60 01/21/2022    GLUCOSE 104 01/21/2022    PROT 4.3 11/02/2021    CALCIUM 9.7 01/21/2022    BILITOT 0.4 11/02/2021    ALKPHOS 92 11/02/2021    AST 13 11/02/2021    ALT <5 11/02/2021       POC Tests: No results for input(s): POCGLU, POCNA, POCK, POCCL, POCBUN, POCHEMO, POCHCT in the last 72 hours.     Coags:   Lab Results   Component Value Date    PROTIME 10.6 05/16/2019    INR 0.93 05/16/2019       HCG (If Applicable): No results found for: PREGTESTUR, PREGSERUM, HCG, HCGQUANT     ABGs:   Lab Results   Component Value Date    PHART 7.333 05/21/2019    PO2ART 140.7 05/21/2019    DKH5YZE 45.7 05/21/2019    VTC3SOG 24.3 05/21/2019    BEART -2 05/21/2019    Y1HFCGPK 99 05/21/2019        Type & Screen (If Applicable):  No results found for: LABABO, LABRH    Drug/Infectious Status (If Applicable):  No results found for: HIV, HEPCAB    COVID-19 Screening (If Applicable):   Lab Results   Component Value Date    COVID19 Detected 01/18/2022           Anesthesia Evaluation  Patient summary reviewed no history of anesthetic complications:   Airway: Mallampati: II  TM distance: >3 FB   Neck ROM: full  Mouth opening: > = 3 FB Dental:    (+) edentulous      Pulmonary: breath sounds clear to auscultation      (-) COPD, asthma, shortness of breath and recent URI                          ROS comment: covid 19 x 2    Residual cough     Cardiovascular:    (+) hypertension:, valvular problems/murmurs (TAVR): AS, CAD:, dysrhythmias: atrial fibrillation,         Rhythm: regular  Rate: normal  Echocardiogram reviewed               ROS comment: Summary   *Left ventricle - normal size, thickness and function with EF of 65%   *Aortic valve - well seated TAVR valve, MG 7mmHg, no regurgitation   *Mitral valve - mild calcification and thickening, annular calcification,   trivial regurgitation   *Tricuspid valve - mild regurgitation with RVSP of 33mmHg      Signature      ------------------------------------------------------------------   Electronically signed by Federico Perez MD (Interpreting   physician) on 07/01/2021 at 11:26 AM   ------------------------------------------------------------------     Neuro/Psych:      (-) seizures, neuromuscular disease, TIA, CVA and headaches           GI/Hepatic/Renal:        (-) GERD, PUD, hepatitis, liver disease and no renal disease      ROS comment: Dysphagia . Endo/Other:    (+) blood dyscrasia: arthritis:., .    (-) diabetes mellitus, hypothyroidism, hyperthyroidism                ROS comment: [place] Abdominal:             Vascular:   + PVD, aortic or cerebral, . Other Findings:           Anesthesia Plan      MAC     ASA 3       Induction: intravenous. Anesthetic plan and risks discussed with patient (grandchild ). Plan discussed with CRNA. This pre-anesthesia assessment may be used as a history and physical.    DOS STAFF ADDENDUM:    Pt seen and examined, chart reviewed (including anesthesia, drug and allergy history). No interval changes to history and physical examination. Anesthetic plan, risks, benefits, alternatives, and personnel involved discussed with patient. Patient verbalized an understanding and agrees to proceed.       Neelam Her MD  February 14, 2022  8:53 AM        Neelam Her MD   2/14/2022

## 2022-02-14 NOTE — PROGRESS NOTES
Admitted to PACU 13 from ENDO, pt very drowsy with hypotension, HOB lowered and IVF wide open. Report recd from anesthesia.

## 2022-02-14 NOTE — PROGRESS NOTES
Patient up to chair with no complications. Patient has no complaints of dizziness at this time. Patient tolerating food and drink well. Patient does not have any complaints of nausea.

## 2022-02-14 NOTE — ANESTHESIA POSTPROCEDURE EVALUATION
Department of Anesthesiology  Postprocedure Note    Patient: Mireya Noyola  MRN: [id number]  YOB: 1931  Date of evaluation: 2/14/2022  Time:  10:25 AM     Procedure Summary     Date: 02/14/22 Room / Location: [address] [place] [address]    Anesthesia Start: 0920 Anesthesia Stop: 9816    Procedure: EGD DILATION BALLOON (N/A ) Diagnosis:       Dysphagia, unspecified type      (DYSPHAGIA)    Surgeons: Yves Harry MD Responsible Provider: Yves Owens MD    Anesthesia Type: MAC ASA Status: 3          Anesthesia Type: MAC    Chalino Phase I: Chalino Score: 10    Chalino Phase II: Chalino Score: 10    Last vitals: Reviewed and per EMR flowsheets.        Anesthesia Post Evaluation    Level of consciousness: awake  Airway patency: patent  Nausea & Vomiting: no nausea and no vomiting  Complications: no  Cardiovascular status: hemodynamically stable  Respiratory status: acceptable  Hydration status: stable

## 2022-02-14 NOTE — H&P
wheezes. Clear to auscultation  Cardiac: Irreg Irreg without loud murmurs  Abdomen:soft, nontender,  Bowel sounds present    Pre-Procedure Assessment / Plan:  1) EGD    ASA Grade:  ASA 3 - Patient with moderate systemic disease with functional limitations  Mallampati Classification:  Class II    Level of Sedation Plan:Deep sedation    Post Procedure plan: Return to same level of care    I assessed the patient and find that the patient is in satisfactory condition to proceed with the planned procedure and sedation plan. I have explained the risk, benefits, and alternatives to the procedure; the patient understands and agrees to proceed.        Yves Harry MD  2/14/2022

## 2022-02-14 NOTE — PROGRESS NOTES
Patient's visitor Merle Fox verbalized understanding of discharge instructions. They have no questions at this time.

## 2022-02-16 DIAGNOSIS — C44.629 SCC (SQUAMOUS CELL CARCINOMA), ARM, LEFT: Primary | ICD-10-CM

## 2022-02-28 DIAGNOSIS — J18.9 PNEUMONIA DUE TO INFECTIOUS ORGANISM, UNSPECIFIED LATERALITY, UNSPECIFIED PART OF LUNG: ICD-10-CM

## 2022-03-01 RX ORDER — AZITHROMYCIN 250 MG/1
TABLET, FILM COATED ORAL
Qty: 6 TABLET | Refills: 0 | OUTPATIENT
Start: 2022-03-01

## 2022-03-02 NOTE — PROGRESS NOTES
[address]  H+P // Cindy Portal // OP VISIT // Korey Hummel MD  ENC TYPE FU    CC HX HPI   GEN  Doing well. No new concerns. AS TAVR Denies cp, sob, dizzy, syncope, palps. HTN  Amb bp in good range, no ha or dizzy. AFIB  Paroxysmal.  On eliquis, $500 per month. MED  Compliant with CV meds listed below w/out reported sa. HISTORY/ALLLERGY/ROS   MedHx  has a past medical history of Aortic stenosis, Arthritis, COVID-19, and PAF (paroxysmal atrial fibrillation) ([facility] [address] SurgHx  has a past surgical history that includes Varicose vein surgery (Bilateral, 1980's); Cardiac catheterization (04/17/2018); Hysterectomy, total abdominal; Aortic valve replacement (N/A, 5/21/2019); and Upper gastrointestinal endoscopy (N/A, 2/14/2022). SocHx  reports that she has never smoked. She has never used smokeless tobacco. She reports that she does not drink alcohol and does not use drugs. FamHx family history includes Asthma in her daughter; Cancer in her brother and sister; Diabetes in her mother; Heart Disease in her brother, father, mother, nephew, and sister. Allerg Patient has no known allergies.    ROS [x]Full ROS obtained and negative except as mentioned in HPI      MEDICATIONS      Current Outpatient Medications   Medication Sig Dispense Refill    potassium chloride (KLOR-CON M) 20 MEQ extended release tablet Take 1 tablet by mouth daily 30 tablet 0    metoprolol tartrate (LOPRESSOR) 50 MG tablet Take 1 tablet by mouth 2 times daily 60 tablet 3    dilTIAZem (CARDIZEM CD) 120 MG extended release capsule Take 1 capsule by mouth daily 30 capsule 3    apixaban (ELIQUIS) 2.5 MG TABS tablet Take 1 tablet by mouth 2 times daily (Patient taking differently: Take 2.5 mg by mouth 2 times daily Instructed to stop on 2/11 per dr neri) 60 tablet 1    albuterol-ipratropium (COMBIVENT RESPIMAT)  MCG/ACT AERS inhaler Inhale 1 puff into the lungs every 6 hours 1 each 3     No current facility-administered medications for this visit. [x]Reviewed with patient and will remain unchanged except as mentioned in A/P    PHYSICAL EXAM   Vitals:    03/03/22 0858   BP: 110/60   Pulse: 70   SpO2: 98%        Gen Alert, coop, no distress Heart  Rrr, 1/6   Head NC, AT, no abnorm Abd  Soft, NT, +BS, no mass, no OM   Eyes PER, conj/corn clear Ext  Ext nl, AT, no C/C/E   Nose Nares nl, no drain, NT Pulse 2+ and symmetric   Throat Lips, mucosa, tongue nl Skin Col/text/turg nl, no vis rash/les   Neck S/S, TM, NT, no bruit/JVD Psych Nl mood and affect   Lung CTA-B, unlabored, no DTP Lymph   No cervical or axillary LA   Ch wall NT, no deform Neuro  Nl gross M/S exam     ASSESSMENT AND PLAN     *AS    Date EF Detail   Sx   No concerning   Hx 5/19  TAVR with ES3 23mm   NYHA   I   TTE 11/16  3/19  5/19  7/19  7/20  7/21 60%  65%  65%  65%  65%  65% Mod AS MG 28  Severe AS MG 51  TAVR MG 8, no AI  TAVR MG 9  TAVR MG 9, no AI  TAVR MG 7, no AI   EKG   Sinus arrhythmia   Plan   Continued observation  Echo yearly   *HTN  Status Controlled, vitals and available ambulatory monitoring logs reviewed personally  Plan Counseled on diet/salt/exercise/weight, continue meds at doses above  *AFIB   Status paroxysmal  Plan Eliquis 2.5, change to coumadin due to cost and patient request  *COMPLIANCE  Status Compliant  Plan Discussed importance of compliance with meds/diet/salt/exercise; avoid tob/alc/drugs; patient verbalized understanding  *FOLLOWUP  12 months    [address] Micha Bond, am scribing for and in the presence of Jj Fuentes MD.   Micha Lerner 03/02/22 8:25 AM   Provider Macy Hurt is working as a scribe for and in the presence of me (Jj Fuentes MD). Working as a scribe, Micha Sanabria may have prepopulated components of this note with my historical  intellectual property under my direct supervision.   Any additions to this intellectual property were performed in my presence and at my direction. Furthermore, the content and accuracy of this note have been reviewed by genet Ordonez MD).  3/3/2022 9:12 AM  CODING   Category Diagnosis   Stable chronic illness  (35627/56160 - 2 or more) AS, HTN, AF   Chronic illness w/: Exac, progr or SA of Tx  (44170/79847 - 1 or more)    Time 30-39 minutes spent preparing to see patient including reviewing patient history/prior tests/prior consults, performing a medical exam, counseling and educating patient/family/caregiver, ordering medications/tests/procedures, referring and communicating with PCPs and other pertinent consultants, documenting information in the EMR, independently interpreting results and communicating to family and coordination of patient care.

## 2022-03-02 NOTE — PATIENT INSTRUCTIONS
We are referring you to the coumadin (blood thinner) clinic at [facility] for monitoring of PT/INR (bloodwork) for coumadin (warfarin)    If Dr. Mani Farrell does blood thinner monitoring at his office, feel free to have him monitor her bloodwork.

## 2022-03-03 ENCOUNTER — TELEPHONE (OUTPATIENT)
Dept: PHARMACY | Age: 87
End: 2022-03-03

## 2022-03-03 ENCOUNTER — OFFICE VISIT (OUTPATIENT)
Dept: CARDIOLOGY CLINIC | Age: 87
End: 2022-03-03
Payer: MEDICARE

## 2022-03-03 ENCOUNTER — TELEPHONE (OUTPATIENT)
Dept: CARDIOLOGY CLINIC | Age: 87
End: 2022-03-03

## 2022-03-03 VITALS
HEART RATE: 70 BPM | WEIGHT: 86.8 LBS | BODY MASS INDEX: 17.04 KG/M2 | OXYGEN SATURATION: 98 % | HEIGHT: 60 IN | SYSTOLIC BLOOD PRESSURE: 110 MMHG | DIASTOLIC BLOOD PRESSURE: 60 MMHG

## 2022-03-03 DIAGNOSIS — Z95.2 S/P TAVR (TRANSCATHETER AORTIC VALVE REPLACEMENT): ICD-10-CM

## 2022-03-03 DIAGNOSIS — I48.0 PAF (PAROXYSMAL ATRIAL FIBRILLATION) (HCC): ICD-10-CM

## 2022-03-03 DIAGNOSIS — I35.0 NONRHEUMATIC AORTIC VALVE STENOSIS: Primary | ICD-10-CM

## 2022-03-03 DIAGNOSIS — I10 ESSENTIAL HYPERTENSION: ICD-10-CM

## 2022-03-03 PROCEDURE — 99214 OFFICE O/P EST MOD 30 MIN: CPT | Performed by: INTERNAL MEDICINE

## 2022-03-03 RX ORDER — WARFARIN SODIUM 2.5 MG/1
2.5 TABLET ORAL DAILY
Qty: 30 TABLET | Refills: 3 | Status: SHIPPED | OUTPATIENT
Start: 2022-03-03 | End: 2022-04-01

## 2022-03-03 NOTE — TELEPHONE ENCOUNTER
Pt called back stating they were just here to see DCE forgot to mention the pt is having skin cancer removal 3/16 daughter was asking if they can get samples of Eliquis because not start the warfarin until after her appt 3/16? Does the pt need to hold the Eliquis ?     Pls advise thank you   Stephnaie Rodríguez  [phone]

## 2022-03-03 NOTE — TELEPHONE ENCOUNTER
Ok per [employer] RN    LOV : 03/03/2021 DCE  NOV : 09/08/2022    Provided patient with 1 bottles/boxes of Eliquis 2.5 mg  LOT : [id number]     EXP : 04/2023    Called and spoke with the patient ; advised them that samples have been placed up front and ready for pickup. Pt voiced understanding .  Call complete

## 2022-03-14 ENCOUNTER — TELEPHONE (OUTPATIENT)
Dept: CARDIOLOGY CLINIC | Age: 87
End: 2022-03-14

## 2022-03-14 NOTE — TELEPHONE ENCOUNTER
Faxed [facility] referral form to [facility] coumadin clinic for pt to begin coumadin 2.5mg after her procedure on 3/16/22.  Brandi ESPITIA

## 2022-03-24 ENCOUNTER — ANTI-COAG VISIT (OUTPATIENT)
Dept: FAMILY MEDICINE CLINIC | Age: 87
End: 2022-03-24
Payer: MEDICARE

## 2022-03-24 DIAGNOSIS — I35.0 NONRHEUMATIC AORTIC VALVE STENOSIS: ICD-10-CM

## 2022-03-24 DIAGNOSIS — I48.0 PAF (PAROXYSMAL ATRIAL FIBRILLATION) (HCC): Primary | ICD-10-CM

## 2022-03-24 DIAGNOSIS — Z79.01 ENCOUNTER FOR CURRENT LONG-TERM USE OF ANTICOAGULANTS: ICD-10-CM

## 2022-03-24 LAB
INTERNATIONAL NORMALIZATION RATIO, POC: 1
PROTHROMBIN TIME, POC: 12.5

## 2022-03-24 PROCEDURE — 85610 PROTHROMBIN TIME: CPT | Performed by: FAMILY MEDICINE

## 2022-03-25 PROBLEM — Z79.01 ENCOUNTER FOR CURRENT LONG-TERM USE OF ANTICOAGULANTS: Status: ACTIVE | Noted: 2022-03-24

## 2022-03-25 NOTE — PROGRESS NOTES
POCT PT/INR PERFORMED, DECREASED LEVEL. PT TAKES 2.5 MG DAILY. PER DRH, CHANGE TO 2 TABS DAILY AND RECHECK ON Tuesday. PT AND GRANDDGTR INFORMED.   SC/WDH

## 2022-03-29 ENCOUNTER — ANTI-COAG VISIT (OUTPATIENT)
Dept: FAMILY MEDICINE CLINIC | Age: 87
End: 2022-03-29
Payer: MEDICARE

## 2022-03-29 DIAGNOSIS — I35.0 NONRHEUMATIC AORTIC VALVE STENOSIS: ICD-10-CM

## 2022-03-29 DIAGNOSIS — Z79.01 ENCOUNTER FOR CURRENT LONG-TERM USE OF ANTICOAGULANTS: ICD-10-CM

## 2022-03-29 DIAGNOSIS — I48.0 PAF (PAROXYSMAL ATRIAL FIBRILLATION) (HCC): Primary | ICD-10-CM

## 2022-03-29 LAB
INTERNATIONAL NORMALIZATION RATIO, POC: 1.4
PROTHROMBIN TIME, POC: 16.2

## 2022-03-29 PROCEDURE — 85610 PROTHROMBIN TIME: CPT | Performed by: FAMILY MEDICINE

## 2022-04-01 ENCOUNTER — PATIENT MESSAGE (OUTPATIENT)
Dept: FAMILY MEDICINE CLINIC | Age: 87
End: 2022-04-01

## 2022-04-01 RX ORDER — POTASSIUM CHLORIDE 1500 MG/1
TABLET, EXTENDED RELEASE ORAL
Qty: 30 TABLET | Refills: 0 | Status: SHIPPED | OUTPATIENT
Start: 2022-04-01 | End: 2022-10-04 | Stop reason: ALTCHOICE

## 2022-04-01 RX ORDER — WARFARIN SODIUM 7.5 MG/1
7.5 TABLET ORAL DAILY
Qty: 30 TABLET | Refills: 2 | Status: SHIPPED | OUTPATIENT
Start: 2022-04-01 | End: 2022-04-28 | Stop reason: SDUPTHER

## 2022-04-01 NOTE — TELEPHONE ENCOUNTER
From: Jn Hay  To: Dr. Shanae Devine: 2022 12:36 PM EDT  Subject: Warfarin refill    Dr. Bard Sanchez is almost out of Warfarin 2.5 mg since she was increased to 3 tabs daily this week. Can we change her to 5 mg tablets at 1.5 tabs daily? She needs a new script to go to [email or web address].  Thanks Rachana Banks

## 2022-04-01 NOTE — TELEPHONE ENCOUNTER
LV 1/7/22 WITH CB NV NONE    Component Ref Range & Units 1/21/22 0920 11/8/21 1545 11/2/21 1139 10/2/21 0847 10/1/21 0718 9/30/21 0744 9/29/21 0729   Sodium 136 - 145 mmol/L 133 Low   135 Low   139  136  138  139  140    Potassium 3.5 - 5.1 mmol/L 3.9  3.1 Low   3.4 Low   4.5  4.9  4.1  3.0 Low     Chloride 99 - 110 mmol/L 97 Low   95 Low   97 Low   101  102  102  104    CO2 21 - 32 mmol/L 25  30  33 High   25  24  22  23    Anion Gap 3 - 16 11  10  9  10  12  15  13    Glucose 70 - 99 mg/dL 104 High   119 High   73  97  109 High   109 High   84    BUN 7 - 20 mg/dL 11  7  6 Low   16  15  15  11    CREATININE 0.6 - 1.2 mg/dL 0.6  <0.5 Low   <0.5 Low   <0.5 Low   <0.5 Low   <0.5 Low   <0.5 Low     GFR Non- >60 >60  >60 CM  >60 CM  >60 CM  >60 CM  >60 CM  >60 CM    Comment: >60 mL/min/1.73m2 EGFR, calc. for ages 25 and older using the   MDRD formula (not corrected for weight), is valid for stable   renal function. GFR  >60 >60  >60 CM  >60 CM  >60 CM  >60 CM  >60 CM  >60 CM    Comment: Chronic Kidney Disease: less than 60 ml/min/1.73 sq. m.         Kidney Failure: less than 15 ml/min/1.73 sq.m. Results valid for patients 18 years and older.     Calcium 8.3 - 10.6 mg/dL 9.7  8.8  8.4  8.8  8.9  8.8  8.5    Total Protein    4.3 Low  R        Albumin    2.4 Low  R        Albumin/Globulin Ratio    1.3 R        Total Bilirubin    0.4 R        Alkaline Phosphatase    92 R        ALT    <5 Low  R        AST    13 Low  R

## 2022-04-05 ENCOUNTER — ANTI-COAG VISIT (OUTPATIENT)
Dept: FAMILY MEDICINE CLINIC | Age: 87
End: 2022-04-05
Payer: MEDICARE

## 2022-04-05 DIAGNOSIS — Z79.01 ENCOUNTER FOR CURRENT LONG-TERM USE OF ANTICOAGULANTS: ICD-10-CM

## 2022-04-05 DIAGNOSIS — I35.0 NONRHEUMATIC AORTIC VALVE STENOSIS: ICD-10-CM

## 2022-04-05 DIAGNOSIS — I48.0 PAF (PAROXYSMAL ATRIAL FIBRILLATION) (HCC): Primary | ICD-10-CM

## 2022-04-05 LAB
INTERNATIONAL NORMALIZATION RATIO, POC: 2.3
PROTHROMBIN TIME, POC: 27.1

## 2022-04-05 PROCEDURE — 85610 PROTHROMBIN TIME: CPT | Performed by: FAMILY MEDICINE

## 2022-04-05 RX ORDER — METOPROLOL TARTRATE 50 MG/1
50 TABLET, FILM COATED ORAL 2 TIMES DAILY
Qty: 60 TABLET | Refills: 0 | Status: SHIPPED | OUTPATIENT
Start: 2022-04-05 | End: 2022-04-07 | Stop reason: SDUPTHER

## 2022-04-05 RX ORDER — DILTIAZEM HYDROCHLORIDE 120 MG/1
120 CAPSULE, COATED, EXTENDED RELEASE ORAL DAILY
Qty: 30 CAPSULE | Refills: 0 | Status: SHIPPED | OUTPATIENT
Start: 2022-04-05 | End: 2022-04-07 | Stop reason: SDUPTHER

## 2022-04-06 NOTE — PROGRESS NOTES
POCT PT/INR PERFORMED, DECREASED LEVEL. PER DRH CHANGE TO 7.5 MG DAILY AND REPEAT IN 1 WK. PT AND GRANDDAUGHTER INFORMED. [address]    I AM COVERING FOR THE CURRENT PHLEBOTOMIST, YUDELKA RAINEY WHILE OUT OF THE OFFICE.  [address]

## 2022-04-06 NOTE — PROGRESS NOTES
POCT PT/INR PERFORMED, WITHIN NORMAL RANGE. PER DRH, CONTINUE DOSE OF 7.5 MG DAILY, CHANGE CURRENT TABS FROM 2.5 MG TO 7.5 MG INSTEAD. REPEAT IN 10 DAYS. PT AND GRANDDAUGHTER INFORMED. [address]    I AM COVERING FOR THE CURRENT PHLEBOTOMIST, YUDELKA RAINEY WHILE OUT OF THE OFFICE.  [address]

## 2022-04-07 ENCOUNTER — TELEPHONE (OUTPATIENT)
Dept: FAMILY MEDICINE CLINIC | Age: 87
End: 2022-04-07

## 2022-04-07 RX ORDER — METOPROLOL TARTRATE 50 MG/1
50 TABLET, FILM COATED ORAL 2 TIMES DAILY
Qty: 120 TABLET | Refills: 0 | Status: SHIPPED | OUTPATIENT
Start: 2022-04-07 | End: 2022-04-20 | Stop reason: SDUPTHER

## 2022-04-07 RX ORDER — DILTIAZEM HYDROCHLORIDE 120 MG/1
120 CAPSULE, COATED, EXTENDED RELEASE ORAL DAILY
Qty: 90 CAPSULE | Refills: 0 | Status: SHIPPED | OUTPATIENT
Start: 2022-04-07 | End: 2022-09-06

## 2022-04-12 ENCOUNTER — NURSE ONLY (OUTPATIENT)
Dept: FAMILY MEDICINE CLINIC | Age: 87
End: 2022-04-12
Payer: MEDICARE

## 2022-04-12 DIAGNOSIS — R39.9 UTI SYMPTOMS: Primary | ICD-10-CM

## 2022-04-12 LAB
BILIRUBIN, POC: NORMAL
BLOOD URINE, POC: NORMAL
CLARITY, POC: CLEAR
COLOR, POC: YELLOW
GLUCOSE URINE, POC: NORMAL
KETONES, POC: NORMAL
LEUKOCYTE EST, POC: NORMAL
NITRITE, POC: NORMAL
PH, POC: 6
PROTEIN, POC: NORMAL
SPECIFIC GRAVITY, POC: >1.03
UROBILINOGEN, POC: 0.2

## 2022-04-12 PROCEDURE — 81002 URINALYSIS NONAUTO W/O SCOPE: CPT | Performed by: FAMILY MEDICINE

## 2022-04-13 LAB — URINE CULTURE, ROUTINE: NORMAL

## 2022-04-14 ENCOUNTER — PROCEDURE VISIT (OUTPATIENT)
Dept: SURGERY | Age: 87
End: 2022-04-14
Payer: MEDICARE

## 2022-04-14 VITALS
BODY MASS INDEX: 16.6 KG/M2 | WEIGHT: 85 LBS | HEART RATE: 74 BPM | SYSTOLIC BLOOD PRESSURE: 134 MMHG | TEMPERATURE: 97.2 F | DIASTOLIC BLOOD PRESSURE: 58 MMHG

## 2022-04-14 DIAGNOSIS — C44.629 SQUAMOUS CELL CARCINOMA OF SKIN OF LEFT UPPER ARM: Primary | ICD-10-CM

## 2022-04-14 PROCEDURE — 12032 INTMD RPR S/A/T/EXT 2.6-7.5: CPT | Performed by: DERMATOLOGY

## 2022-04-14 PROCEDURE — 17313 MOHS 1 STAGE T/A/L: CPT | Performed by: DERMATOLOGY

## 2022-04-14 PROCEDURE — 17314 MOHS ADDL STAGE T/A/L: CPT | Performed by: DERMATOLOGY

## 2022-04-14 NOTE — PATIENT INSTRUCTIONS
[facility] Office Hours:    Monday-Thursday  7:30 AM-4:30 PM    Friday  9:00 AM-1:00 PM      POST-OPERATIVE CARE FOR STICHES  Bandage change after 48 hours    CARING FOR YOUR SURGICAL SITE  The bandage should remain on and completely dry for 48 hours. Do NOT get the bandage wet. 1. After the first 48 hours, gently remove the remaining part of the bandage. It can be helpful to moisten the bandage edges in the shower. Steri strips may still be on the wound. It is ok, they will fall off slowly with the daily bandage changes. 2. Gently clean the wound daily with mild soap and water. Try to clean off crust and debris. 3. Dry (pat) the area with a clean Q-tip or gauze. 4. Apply a layer of Vaseline/ Aquaphor (or Bacitracin if your doctor recommends) to the wound area only. 5. Cut a piece of Telfa (or any non-stick dressing) to fit just over the wound and secure it with paper tape. If the wound is small you may use a Band- Aid. Keep area covered for a total of 1 week(s). If the dressing comes off or if you have questions, or concerns about the dressing, please call the office for instructions! POST OPERATIVE INSTRUCTIONS    1. Activity: Do not lift anything heavier than a gallon of milk for 1 week. Also, avoid strenuous activity such as running, power walking or contact sports. 2. Eating and drinking: Do not drink alcohol for 48 hours after your procedure. Alcohol increases the chances of bleeding. 3. Medicines   -If you have discomfort, take Acetaminophen (Tylenol or Extra Strength Tylenol). Follow the instructions and warning on the bottle. -If your doctor has prescribed you an Aspirin daily, please keep taking it. Do not take extra Aspirin or medicines containing Aspirin (such as [place] and Excedrin) for 48 hours after your procedure. Bleeding: If bleeding occurs, DO NOT remove the bandage. Put firm pressure on the area with gauze for 20 minutes without peeking.  If the bleeding continues, apply pressure for another 20 minutes. If the bleeding does not stop after you apply pressure, call us right away. If you can not call, go to the nearest emergency room or urgent care facility. What to expect:  You may have these symptoms. They are normal and should get better with time:  1. Swelling. Swelling usually increases for the first 48 hours after your procedure and then begins to improve. Some soreness and redness around your wound. If we worked close to your eyes (forehead, nose, temple, or upper cheeks) your eyes may become swollen and/ or black and blue. 2. Bruising, which could last 1 week or more. 3. Pink and bumpy appearance to the scar. This may happen a few weeks after your procedure. After 4 weeks, you may gently massage the area each day with facial moisturizer or petroleum jelly (Vaseline or Aquaphor). This will help to smooth the skin and improve the appearance of the scar. The color of your scar will fade over time, but it may be pink for several months after the procedure. The scar may take 6 months to 1 year to reach its final color and appearance. 4. \"Spitting\" suture. Occasionally, an inside suture (stitch) does not completely dissolve. When this happens, (generally 4-8 weeks after surgery), it causes a bump or \"pimple\" to form on the scar. This is easily removed and is not at all serious. It does not mean the skin cancer has returned. Contact us if it happens, but do not be alarmed. Vitamin E oil is NOT necessary. A good moisturizer is just as effective. Sunscreen IS necessary. Use at least and SPF 30 sunscreen daily- even in winter    Call us at [phone] right away if you have any of the following symptoms:  -Bleeding that you can not stop (see highlighted area above). -Pain that lasts longer than 48 hours.  -Your wound becomes more painful, red or hot.  -Swelling that does not begin to improve within the 48 hours or gets worse suddenly.

## 2022-04-14 NOTE — PROGRESS NOTES
MOHS PROCEDURE NOTE    PHYSICIAN:  Arliss Phalen. Hema Salinas MD, Who operated in two distinct and integrated capacities as the surgeon removing the tissue and as the pathologist examining the tissue. ASSISTANT: Jaxson Wells RN, Ashwini Rizvi RN     REFERRING PROVIDER:  Elisabeth Montesinos MD    PREOPERATIVE DIAGNOSIS: Invasive well-differentiated Squamous Cell Carcinoma     SPECIFIC MOHS INDICATIONS:  size, location and need for tissue conservation    AUC SCORIN/9    POSTOPERATIVE DIAGNOSIS: SAME    LOCATION: Left arm    OPERATIVE PROCEDURE:  MOHS MICROGRAPHIC SURGERY    RECONSTRUCTION OF DEFECT: Intermediate layered closure    PREOPERATIVE SIZE: 22x12 MM    DEFECT SIZE: 31x20 MM    LENGTH OF REPAIRED WOUND/SIZE OF FLAP/SIZE OF GRAFT:  50 MM    ANESTHESIA:  8mL 1% lidocaine with epinephrine 1:100,000 buffered. EBL:  MINIMAL    DURATION OF PROCEDURE:  1 HOUR 40 MINUTES    POSTOPERATIVE OBSERVATION: 1 HOUR    SPECIMENS:  SEE MOHS MAP    COMPLICATIONS:  NONE    DESCRIPTION OF PROCEDURE:  The patient was given a mirror, as appropriate, and the biopsy site was identified, marked with a surgical marking pen, and verified by the patient. Options for treatment were discussed and the patient was informed that Mohs surgery was the selected treatment based on its lower recurrence rate, given the features listed above, as compared to other treatment modalities such as excision, radiation, or curettage, and agreed with this treatment plan. Risks and benefits including bruising, swelling, bleeding, infection, nerve injury, recurrence, and scarring were discussed with the patient prior to the procedure and a written consent detailing these and other risks was reviewed with the patient and signed. There was a time out for person and procedure verification. The surgical site was prepped with an antiseptic solution. Application of an antiseptic solution was repeated before each surgical stage.       Stage I:  The clinically-apparent tumor was carefully defined and debulked, determining the edge of the surgical excision. A thin layer of tumor-laden tissue was excised with a narrow margin of normal-appearing skin, using the technique of Mohs. A map was prepared to correspond to the area of skin from which it was excised. Hemostasis was achieved using electrosurgery. The wound was bandaged. The tissue was prepared for the cryostat and sectioned. 1 section(s) prepared. Each section was coded, cut, and stained for microscopic examination. The entire base and margins of the excised piece of tissue were examined by the surgeon. The tissue was examined to the level of subcutaneous fat. Stage I:  .SCC  In situ: full thickness dysplasia with parakeratosis, acanthosis and complete disorganization of the epidermal architecture and loss of maturation. The cells are large with prominent irregular hyperchromatic nuclei. The remaining tumor was noted and the next stage was performed. Stage II:  A thin layer of tissue was removed at the histologically-identified sites of remaining tumor. The entire procedure as described in stage I was repeated to process the tissue according to Mohs technique. 1 section(s) prepared for stage II. No tumor was identified at the peripheral margins of stage II of microscopically controlled surgery. DEFECT MANAGEMENT:    REPAIR DESCRIPTION:  Various closure modalities were discussed with the patient, and it was decided that an intermediate layered repair would best preserve normal anatomic and functional relationships. Additional risk of wound dehiscence was discussed. The area was anesthetized with 1% lidocaine with epinephrine 1:100,000 buffered, was given a sterile prep using Chlorhexidine gluconate 4% solution and draped in the usual sterile fashion.  Recreation and enlargement of the wound was performed by excising cones of tissue via the triangulation technique. The final incision lines were placed with respect for the patient's natural skin tension lines in a linear configuration to avoid functional and aesthetic distortion of adjacent free margins. Following minimal undermining, meticulous hemostasis was obtained with spot monopolar electrocoagulation. Subcutaneous dead space and dermis were closed using 4-0 Vicryl buried subcutaneous interrupted suture and the epidermis was approximated with 5-0 Ethilon running epidermal sutures. WOUND COVERAGE:  The wound was cleaned with normal saline solution, dried off, Aquaphor ointment was applied, and the wound was covered. A dressing was applied for stabilization and light pressure. The patient was given detailed oral and written instructions on postoperative care. There were no complications. The patient left the Unit in good medical condition. FOLLOW-UP:  The patient will return for suture removal in 14 days.

## 2022-04-14 NOTE — PROGRESS NOTES
PRE-PROCEDURE SCREENING    Pacemaker/ICD: No  Difficulty with numbing in the past: No  Local Anesthesia Reaction/passing out: No  Latex or adhesive allergy:  No  Bleeding/Clotting Disorders: No  Anticoagulant Therapy: Yes  Joint prosthesis: No  Artificial Heart Valve: Yes, 2019  Stroke or Seizures: No  Organ Transplant or Lymphoma: No  Immunosuppression: No  Respiratory Problems: No

## 2022-04-20 ENCOUNTER — TELEPHONE (OUTPATIENT)
Dept: FAMILY MEDICINE CLINIC | Age: 87
End: 2022-04-20

## 2022-04-20 RX ORDER — METOPROLOL TARTRATE 50 MG/1
50 TABLET, FILM COATED ORAL 2 TIMES DAILY
Qty: 180 TABLET | Refills: 0 | Status: SHIPPED | OUTPATIENT
Start: 2022-04-20 | End: 2022-08-09 | Stop reason: SDUPTHER

## 2022-04-21 ENCOUNTER — ANTI-COAG VISIT (OUTPATIENT)
Dept: FAMILY MEDICINE CLINIC | Age: 87
End: 2022-04-21
Payer: MEDICARE

## 2022-04-21 DIAGNOSIS — I35.0 NONRHEUMATIC AORTIC VALVE STENOSIS: ICD-10-CM

## 2022-04-21 DIAGNOSIS — Z79.01 ENCOUNTER FOR CURRENT LONG-TERM USE OF ANTICOAGULANTS: ICD-10-CM

## 2022-04-21 DIAGNOSIS — I48.0 PAF (PAROXYSMAL ATRIAL FIBRILLATION) (HCC): Primary | ICD-10-CM

## 2022-04-21 LAB
INTERNATIONAL NORMALIZATION RATIO, POC: 7.1
PROTHROMBIN TIME, POC: 85.3

## 2022-04-21 PROCEDURE — 85610 PROTHROMBIN TIME: CPT | Performed by: FAMILY MEDICINE

## 2022-04-22 NOTE — PROGRESS NOTES
POCT PT/INR PERFORMED, ELEVATED LEVEL. I SPOKE TO DR BLUNT', PER [facility], HOLD DOSE Thursday AND Friday AND REPEAT TEST ON Saturday, IF NORMAL RESTART MEDICATION AT 5 MG DAILY. PT AND GRANDDAUGHTER INFORMED.   401 [facility]

## 2022-04-28 ENCOUNTER — ANTI-COAG VISIT (OUTPATIENT)
Dept: FAMILY MEDICINE CLINIC | Age: 87
End: 2022-04-28
Payer: MEDICARE

## 2022-04-28 DIAGNOSIS — Z79.01 ENCOUNTER FOR CURRENT LONG-TERM USE OF ANTICOAGULANTS: ICD-10-CM

## 2022-04-28 DIAGNOSIS — I48.0 PAF (PAROXYSMAL ATRIAL FIBRILLATION) (HCC): Primary | ICD-10-CM

## 2022-04-28 DIAGNOSIS — I35.0 NONRHEUMATIC AORTIC VALVE STENOSIS: ICD-10-CM

## 2022-04-28 LAB
INTERNATIONAL NORMALIZATION RATIO, POC: 2
PROTHROMBIN TIME, POC: 24.6

## 2022-04-28 PROCEDURE — 85610 PROTHROMBIN TIME: CPT | Performed by: FAMILY MEDICINE

## 2022-04-28 RX ORDER — WARFARIN SODIUM 5 MG/1
5 TABLET ORAL DAILY
Qty: 30 TABLET | Refills: 1 | Status: SHIPPED | OUTPATIENT
Start: 2022-04-28 | End: 2022-06-29 | Stop reason: SDUPTHER

## 2022-04-28 NOTE — PROGRESS NOTES
POCT PT/INR PERFORMED, WITHIN NORMAL RANGE. PER [facility], CONTINUE SAME DOSE OF 5 MG DAILY. REPEAT IN 1 WEEK. PT INFORMED.  401 [facility]

## 2022-05-06 ENCOUNTER — TELEMEDICINE (OUTPATIENT)
Dept: FAMILY MEDICINE CLINIC | Age: 87
End: 2022-05-06
Payer: MEDICARE

## 2022-05-06 DIAGNOSIS — R09.89 CHEST CONGESTION: Primary | ICD-10-CM

## 2022-05-06 DIAGNOSIS — R09.81 NASAL CONGESTION: ICD-10-CM

## 2022-05-06 PROCEDURE — 99213 OFFICE O/P EST LOW 20 MIN: CPT | Performed by: NURSE PRACTITIONER

## 2022-05-06 RX ORDER — AMOXICILLIN AND CLAVULANATE POTASSIUM 875; 125 MG/1; MG/1
1 TABLET, FILM COATED ORAL 2 TIMES DAILY
Qty: 14 TABLET | Refills: 0 | Status: SHIPPED | OUTPATIENT
Start: 2022-05-06 | End: 2022-05-13

## 2022-05-06 ASSESSMENT — PATIENT HEALTH QUESTIONNAIRE - PHQ9
1. LITTLE INTEREST OR PLEASURE IN DOING THINGS: 0
SUM OF ALL RESPONSES TO PHQ QUESTIONS 1-9: 0
SUM OF ALL RESPONSES TO PHQ QUESTIONS 1-9: 0
SUM OF ALL RESPONSES TO PHQ9 QUESTIONS 1 & 2: 0
SUM OF ALL RESPONSES TO PHQ QUESTIONS 1-9: 0
SUM OF ALL RESPONSES TO PHQ QUESTIONS 1-9: 0
2. FEELING DOWN, DEPRESSED OR HOPELESS: 0

## 2022-05-06 ASSESSMENT — ENCOUNTER SYMPTOMS
SINUS PRESSURE: 0
ABDOMINAL PAIN: 0
BACK PAIN: 0
SINUS PAIN: 0
RHINORRHEA: 1
SHORTNESS OF BREATH: 0
COUGH: 1
COLOR CHANGE: 0
DIARRHEA: 0
SORE THROAT: 0
NAUSEA: 0
ABDOMINAL DISTENTION: 0
VOICE CHANGE: 0
CHEST TIGHTNESS: 0
EYE DISCHARGE: 0
CONSTIPATION: 0

## 2022-05-06 NOTE — PATIENT INSTRUCTIONS
Patient Education        Sinusitis: Care Instructions  Your Care Instructions     Sinusitis is an infection of the lining of the sinus cavities in your head. Sinusitis often follows a cold. It causes pain and pressure in your head andface. In most cases, sinusitis gets better on its own in 1 to 2 weeks. But some mildsymptoms may last for several weeks. Sometimes antibiotics are needed. Follow-up care is a key part of your treatment and safety. Be sure to make and go to all appointments, and call your doctor if you are having problems. It's also a good idea to know your test results and keep alist of the medicines you take. How can you care for yourself at home?  Take an over-the-counter pain medicine, such as acetaminophen (Tylenol), ibuprofen (Advil, Motrin), or naproxen (Aleve). Read and follow all instructions on the label.  If the doctor prescribed antibiotics, take them as directed. Do not stop taking them just because you feel better. You need to take the full course of antibiotics.  Be careful when taking over-the-counter cold or flu medicines and Tylenol at the same time. Many of these medicines have acetaminophen, which is Tylenol. Read the labels to make sure that you are not taking more than the recommended dose. Too much acetaminophen (Tylenol) can be harmful.  Breathe warm, moist air from a steamy shower, a hot bath, or a sink filled with hot water. Avoid cold, dry air. Using a humidifier in your home may help. Follow the directions for cleaning the machine.  Use saline (saltwater) nasal washes. This can help keep your nasal passages open and wash out mucus and bacteria. You can buy saline nose drops at a grocery store or drugstore. Or you can make your own at home by adding 1 teaspoon of salt and 1 teaspoon of baking soda to 2 cups of distilled water. If you make your own, fill a bulb syringe with the solution, insert the tip into your nostril, and squeeze gently. Flor Roes your nose.    Put a hot, wet towel or a warm gel pack on your face 3 or 4 times a day for 5 to 10 minutes each time.  Try a decongestant nasal spray like oxymetazoline (Afrin). Do not use it for more than 3 days in a row. Using it for more than 3 days can make your congestion worse. When should you call for help? Call your doctor now or seek immediate medical care if:     You have new or worse swelling or redness in your face or around your eyes.      You have a new or higher fever. Watch closely for changes in your health, and be sure to contact your doctor if:     You have new or worse facial pain.      The mucus from your nose becomes thicker (like pus) or has new blood in it.      You are not getting better as expected. Where can you learn more? Go to https://BBL EnterprisespeAdScaleeb.Boardwalktech. org and sign in to your Fidzup account. Enter C859 in the [employer] box to learn more about \"Sinusitis: Care Instructions. \"     If you do not have an account, please click on the \"Sign Up Now\" link. Current as of: September 8, 2021               Content Version: 13.2  © 2006-2022 [employer]. Care instructions adapted under license by [facility] ([facility]). If you have questions about a medical condition or this instruction, always ask your healthcare professional. [address] any warranty or liability for your use of this information. Patient Education        Saline Nasal Washes: Care Instructions  Overview     Saline nasal washes help keep the nasal passages open by washing out thick or dried mucus. This simple remedy can help relieve symptoms of allergies, sinusitis, and colds. It also can make the nose feel more comfortable by keeping the mucous membranes moist. You may notice a little burning sensation in your nose the first few times you use the solution, but this usually getsbetter in a few days. Follow-up care is a key part of your treatment and safety.  Be sure to make and go to all appointments, and call your doctor if you are having problems. It's also a good idea to know your test results and keep alist of the medicines you take. How can you care for yourself at home?  You can buy premixed saline solution in a squeeze bottle or other sinus rinse products at a drugstore. Read and follow the instructions on the label.  You also can make your own saline solution by adding 1 teaspoon of non-iodized salt and 1 teaspoon of baking soda to 2 cups of distilled or boiled and cooled water.  If you use a homemade solution, use a squeeze bottle or neti pot to get the solution into your nose. Room temperature or slightly warmed water may be more comfortable. Make sure it isn't hot.  Stand over the sink with your head tilted forward and slightly to one side. Put only the tip of the syringe or squeeze bottle into the nostril that is farther away from the sink. (The nostril closest to the sink will drain the fluid.) Gently squirt the solution into the nostril and toward the back of your head with your mouth open. The solution should flow out the other nostril. Repeat on the other side. Some sneezing and gagging are normal at first.   Gently blow your nose.  Clean the syringe or bottle after each use.  Repeat this 2 or 3 times a day.  Use nasal washes gently if you have nosebleeds often. When should you call for help? Watch closely for changes in your health, and be sure to contact your doctor if:     Your symptoms do not get better.      You have problems doing the nasal washes. Where can you learn more? Go to https://Real Time Tomographymary anneeweb.EcoLogicLiving. org and sign in to your Stima Systemst account. Enter [phone] in the [facility] box to learn more about \"Saline Nasal Washes: Care Instructions. \"     If you do not have an account, please click on the \"Sign Up Now\" link.   Current as of: September 8, 2021               Content Version: 13.2  © 7709-6137 Healthwise, Incorporated. Care instructions adapted under license by [facility] ([facility]). If you have questions about a medical condition or this instruction, always ask your healthcare professional. [address] any warranty or liability for your use of this information.

## 2022-05-06 NOTE — PROGRESS NOTES
Bernardo Ramos (:  1931) is a 80 y.o. female,Established patient, here for evaluation of the following chief complaint(s): Sinus Problem (PT C/O COUGH, CONGESTION, AND DIZZINESS X 4 DAYS)      Bernardo Ramos, was evaluated through a synchronous (real-time) audio-video encounter. The patient (or guardian if applicable) is aware that this is a billable service, which includes applicable co-pays. This Virtual Visit was conducted with patient's (and/or legal guardian's) consent. The visit was conducted pursuant to the emergency declaration under the [facility], [address] [place] [address] authority and the [employer] General Act. Patient identification was verified, and a caregiver was present when appropriate. The patient was located at home in a state where the provider was licensed to provide care. Patient identification was verified at the start of the visit: Yes    ASSESSMENT/PLAN:  1. Chest congestion  -     amoxicillin-clavulanate (AUGMENTIN) 875-125 MG per tablet; Take 1 tablet by mouth 2 times daily for 7 days WATCH AND WAIT SCRIPT, START ALLERGY MEDS FIRST X 2 DAYS, IF NO IMPROVEMENT, START ANTIBIOTIC, Disp-14 tablet, R-0Normal   Discussed continuing use of albuterol nebulizer as needed for SOB or congestion, pt says this works well   Has needed PRN since having COVID so dgtr restarted this week with her congestion and pt reports this has helped   Mucinex BID   Zyrtec daily   Start abx  if no improvement on zyrtec but recommend since great grandson also had asthma flare up recently with weather change, to try allergy medication first, dizziness is only with position change   Take medication in its entirety once started even if feeling better to avoid a resistance to antibiotics   2. Nasal congestion  -     amoxicillin-clavulanate (AUGMENTIN) 875-125 MG per tablet;  Take 1 tablet by mouth 2 times daily for 7 days [facility] AND WAIT SCRIPT, START ALLERGY MEDS FIRST X 2 DAYS, IF NO IMPROVEMENT, START ANTIBIOTIC, Disp-14 tablet, R-0Normal   Flonase daily PRN   Sinus rinse, hot showers, netti pot   Mucinex BID   Push fluids, especially water    Patient and hammad Junior agreeable with plan    Return in about 1 month (around 6/6/2022) for Annual Wellness Visit. SUBJECTIVE/OBJECTIVE:  HPI    Chief Complaint   Patient presents with    Sinus Problem     PT C/O COUGH, CONGESTION, AND DIZZINESS X 4 DAYS     Upper Respiratory Infection  Patient complains of symptoms of a URI. Symptoms include cough, congestion, dizziness. Onset of symptoms was 4 days ago, unchanged since that time. She also c/o no  fever for the past 4 days . She is drinking plenty of fluids. Evaluation to date: home COVID test negative. Ge Smith with asthma also tested negative. Treatment to date: nebulized albuterol, mucinex. Pt has been using the albuterol nebulizer- [facility]'s home nurse said she had some crackles when they were there to see her hammad. Oxygen level around 95%. Mucinex at night. White sputum following albuterol treatment. Last abx Jan 2022 when she had COVID. Started after following opening the windows like 4 days ago. Not currently on allergy medication. Almost fell over today due to feeling dizzy or off balance. Zyrtec used in past for allergies. Others in household sick- great grandson has asthma, saw MD about a week ago. Crackles improved over past feel days, hammad Barajas Coco doing virtual visit with her, and says she could hear audible crackles when her mom would laugh and that has resolved with albuterol nebulizer. Feeling more tired lately, currently sleeping in chair during visit. Dizziness occurs with position change. Pt woke up for end of virtual visit to complete discussion. Review of Systems   Constitutional: Positive for fatigue. Negative for activity change, appetite change, fever and unexpected weight change.    HENT: Positive for congestion, postnasal drip and rhinorrhea. Negative for ear pain, sinus pressure, sinus pain, sore throat and voice change. Eyes: Negative for discharge and visual disturbance. Respiratory: Positive for cough. Negative for chest tightness and shortness of breath. Cardiovascular: Negative for chest pain, palpitations and leg swelling. Gastrointestinal: Negative for abdominal distention, abdominal pain, constipation, diarrhea and nausea. Endocrine: Negative for cold intolerance, heat intolerance, polydipsia, polyphagia and polyuria. Genitourinary: Negative for decreased urine volume, difficulty urinating, dysuria, flank pain, frequency and urgency. Musculoskeletal: Negative for arthralgias, back pain, gait problem, joint swelling, myalgias and neck pain. Skin: Negative for color change, rash and wound. Allergic/Immunologic: Negative for food allergies and immunocompromised state. Neurological: Positive for dizziness. Negative for tremors, speech difficulty, weakness, light-headedness, numbness and headaches. Hematological: Negative for adenopathy. Does not bruise/bleed easily. Psychiatric/Behavioral: Negative for confusion, decreased concentration, self-injury, sleep disturbance and suicidal ideas. The patient is not nervous/anxious.         Patient-Reported Vitals 5/6/2022   Patient-Reported Weight 84LBS   Patient-Reported Height 5'   Patient-Reported Systolic 649   Patient-Reported Diastolic 80   Patient-Reported Pulse -   Patient-Reported Temperature -   Patient-Reported SpO2 95            [INSTRUCTIONS:  \"[x]\" Indicates a positive item  \"[]\" Indicates a negative item  -- DELETE ALL ITEMS NOT EXAMINED]    Constitutional: [x] Appears well-developed and well-nourished [x] No apparent distress      [] Abnormal -     Mental status: [x] Alert and awake  [x] Oriented to person/place/time [x] Able to follow commands    [] Abnormal -     Eyes:   EOM    [x]  Normal    [] Abnormal -   Sclera  [x] Normal    [] Abnormal -          Discharge [x]  None visible   [] Abnormal -     HENT: [x] Normocephalic, atraumatic  [] Abnormal -   [x] Mouth/Throat: Mucous membranes are moist    External Ears [x] Normal  [] Abnormal -    Neck: [x] No visualized mass [] Abnormal -     Pulmonary/Chest: [x] Respiratory effort normal   [x] No visualized signs of difficulty breathing or respiratory distress        [] Abnormal -      Musculoskeletal:   [x] Normal gait with no signs of ataxia         [x] Normal range of motion of neck        [] Abnormal -     Neurological:        [x] No Facial Asymmetry (Cranial nerve 7 motor function) (limited exam due to video visit)          [x] No gaze palsy        [] Abnormal -          Skin:        [x] No significant exanthematous lesions or discoloration noted on facial skin         [] Abnormal -            Psychiatric:       [x] Normal Affect [] Abnormal - sleepy       [x] No Hallucinations    Other pertinent observable physical exam findings:-          On this date 5/6/2022 I have spent 22 minutes reviewing previous notes, test results and face to face (virtual) with the patient discussing the diagnosis and importance of compliance with the treatment plan as well as documenting on the day of the visit. Care Gaps Addressed  COVID vaccine recommended  TDAP vaccine recommended- call insurance to discuss coverage  Call insurance company to discuss coverage for shingles vaccine (Shingrix) 2 dose series   PNA vaccine recommended  AWV due 2022  PHQ updated    I have reviewed patient's pertinent medical history, relevant laboratory and imaging studies, and past/future health maintenance. Discussed with the patient the importance of adhering to their current medication regimen as directed. Advised the patient that they should continue to work on eating a healthy balanced diet and staying active by exercising within their personal limits. Orders as listed above.  Patient was advised to keep future appointments with their respective specialty care team(s). Patient had the opportunity to ask questions, all of which were answered to the best of my ability and with patient satisfaction. Patient understands and is agreeable with the care plan following today's visit. Patient is to schedule an appointment for any new or worsening symptoms. Go to ER for significant shortness of breath, chest pain, or uncontrolled pain or fever. I discussed with patient the risk and benefits of any medications that were prescribed today. I verified that the patient understands their medications, labs, and/or procedures. The patient is doing well with current medication regimen and does not have any barriers to adherence. The patient's self-management abilities are good. Follow Up in 1 Months for CRISTINO Ortiz is a 80 y.o. female being evaluated by a Virtual Visit (video visit) encounter to address concerns as mentioned above. A caregiver was present when appropriate. Due to this being a TeleHealth encounter ([facility] emergency), evaluation of the following organ systems was limited: Vitals/Constitutional/EENT/Resp/CV/GI//MS/Neuro/Skin/Heme-Lymph-Imm. Pursuant to the emergency declaration under the [facility], [address] [place] [address] authority and the [employer] Act, this Virtual Visit was conducted with patient's (and/or legal guardian's) consent, to reduce the patient's risk of exposure to COVID-19 and provide necessary medical care. The patient (and/or legal guardian) has also been advised to contact this office for worsening conditions or problems, and seek emergency medical treatment and/or call 911 if deemed necessary. Services were provided through a video synchronous discussion virtually to substitute for in-person clinic visit. Patient was located at home and provider was located in office or at home. An electronic signature was used to authenticate this note.     --Donna Mcgarry, APRN - CNP

## 2022-05-12 ENCOUNTER — ANTI-COAG VISIT (OUTPATIENT)
Dept: FAMILY MEDICINE CLINIC | Age: 87
End: 2022-05-12
Payer: MEDICARE

## 2022-05-12 DIAGNOSIS — R09.89 CHEST CONGESTION: ICD-10-CM

## 2022-05-12 DIAGNOSIS — I48.0 PAF (PAROXYSMAL ATRIAL FIBRILLATION) (HCC): Primary | ICD-10-CM

## 2022-05-12 DIAGNOSIS — I35.0 NONRHEUMATIC AORTIC VALVE STENOSIS: ICD-10-CM

## 2022-05-12 DIAGNOSIS — E44.1 MILD MALNUTRITION (HCC): ICD-10-CM

## 2022-05-12 DIAGNOSIS — R53.83 FATIGUE, UNSPECIFIED TYPE: ICD-10-CM

## 2022-05-12 DIAGNOSIS — Z79.01 ENCOUNTER FOR CURRENT LONG-TERM USE OF ANTICOAGULANTS: ICD-10-CM

## 2022-05-12 DIAGNOSIS — R53.1 GENERALIZED WEAKNESS: ICD-10-CM

## 2022-05-12 LAB
ANION GAP SERPL CALCULATED.3IONS-SCNC: 12 MMOL/L (ref 3–16)
BASOPHILS ABSOLUTE: 0.1 K/UL (ref 0–0.2)
BASOPHILS RELATIVE PERCENT: 1.2 %
BUN BLDV-MCNC: 14 MG/DL (ref 7–20)
CALCIUM SERPL-MCNC: 9.3 MG/DL (ref 8.3–10.6)
CHLORIDE BLD-SCNC: 103 MMOL/L (ref 99–110)
CO2: 23 MMOL/L (ref 21–32)
CREAT SERPL-MCNC: 0.9 MG/DL (ref 0.6–1.2)
EOSINOPHILS ABSOLUTE: 0.2 K/UL (ref 0–0.6)
EOSINOPHILS RELATIVE PERCENT: 3.2 %
GFR AFRICAN AMERICAN: >60
GFR NON-AFRICAN AMERICAN: 59
GLUCOSE BLD-MCNC: 97 MG/DL (ref 70–99)
HCT VFR BLD CALC: 35.2 % (ref 36–48)
HEMOGLOBIN: 11.5 G/DL (ref 12–16)
INTERNATIONAL NORMALIZATION RATIO, POC: 2.2
LYMPHOCYTES ABSOLUTE: 1.7 K/UL (ref 1–5.1)
LYMPHOCYTES RELATIVE PERCENT: 29.1 %
MCH RBC QN AUTO: 26.1 PG (ref 26–34)
MCHC RBC AUTO-ENTMCNC: 32.8 G/DL (ref 31–36)
MCV RBC AUTO: 79.8 FL (ref 80–100)
MONOCYTES ABSOLUTE: 0.7 K/UL (ref 0–1.3)
MONOCYTES RELATIVE PERCENT: 11.2 %
NEUTROPHILS ABSOLUTE: 3.3 K/UL (ref 1.7–7.7)
NEUTROPHILS RELATIVE PERCENT: 55.3 %
PDW BLD-RTO: 16.8 % (ref 12.4–15.4)
PLATELET # BLD: 242 K/UL (ref 135–450)
PMV BLD AUTO: 9.1 FL (ref 5–10.5)
POTASSIUM SERPL-SCNC: 4.5 MMOL/L (ref 3.5–5.1)
PROTHROMBIN TIME, POC: 26
RBC # BLD: 4.41 M/UL (ref 4–5.2)
SODIUM BLD-SCNC: 138 MMOL/L (ref 136–145)
WBC # BLD: 6 K/UL (ref 4–11)

## 2022-05-12 PROCEDURE — 85610 PROTHROMBIN TIME: CPT | Performed by: FAMILY MEDICINE

## 2022-05-12 PROCEDURE — 36415 COLL VENOUS BLD VENIPUNCTURE: CPT | Performed by: FAMILY MEDICINE

## 2022-05-12 RX ORDER — MECLIZINE HCL 12.5 MG/1
12.5 TABLET ORAL
Qty: 30 TABLET | Refills: 0 | Status: SHIPPED | OUTPATIENT
Start: 2022-05-12 | End: 2022-05-22

## 2022-05-12 NOTE — PROGRESS NOTES
POCT PT/INR PERFORMED, WITHIN NORMAL RANGE. PER [facility], CONTINUE SAME DOSE OF 5 MG EVERY DAY. REPEAT IN 4 WKS. PT AND GRANDDAUGHTER INFORMED. [facility]    NONFASTING LABS DRAWN RA DUE TO WEAKNESS, FATIGUE, CHEST CONGESTION AND MILD MALNUTRITION PER DR. [facility]    I AM THE COVERING PHLEBOTOMIST DUE TO YUDELKA OFF ON DISABILITY.   [facility]

## 2022-06-01 ENCOUNTER — NURSE ONLY (OUTPATIENT)
Dept: FAMILY MEDICINE CLINIC | Age: 87
End: 2022-06-01
Payer: MEDICARE

## 2022-06-01 DIAGNOSIS — R41.0 CONFUSION: Primary | ICD-10-CM

## 2022-06-01 LAB
BILIRUBIN, POC: NEGATIVE
BLOOD URINE, POC: ABNORMAL
CLARITY, POC: ABNORMAL
COLOR, POC: ABNORMAL
GLUCOSE URINE, POC: NEGATIVE
KETONES, POC: NEGATIVE
LEUKOCYTE EST, POC: ABNORMAL
NITRITE, POC: NEGATIVE
PH, POC: 6
PROTEIN, POC: NEGATIVE
SPECIFIC GRAVITY, POC: >=1.03
UROBILINOGEN, POC: 0.2

## 2022-06-01 PROCEDURE — 81002 URINALYSIS NONAUTO W/O SCOPE: CPT | Performed by: FAMILY MEDICINE

## 2022-06-02 LAB — URINE CULTURE, ROUTINE: NORMAL

## 2022-06-09 ENCOUNTER — ANTI-COAG VISIT (OUTPATIENT)
Dept: FAMILY MEDICINE CLINIC | Age: 87
End: 2022-06-09
Payer: MEDICARE

## 2022-06-09 DIAGNOSIS — Z79.01 ENCOUNTER FOR CURRENT LONG-TERM USE OF ANTICOAGULANTS: ICD-10-CM

## 2022-06-09 DIAGNOSIS — I35.0 NONRHEUMATIC AORTIC VALVE STENOSIS: ICD-10-CM

## 2022-06-09 DIAGNOSIS — I48.0 PAF (PAROXYSMAL ATRIAL FIBRILLATION) (HCC): Primary | ICD-10-CM

## 2022-06-09 LAB
INTERNATIONAL NORMALIZATION RATIO, POC: 2.7
PROTHROMBIN TIME, POC: 32.1

## 2022-06-09 PROCEDURE — 85610 PROTHROMBIN TIME: CPT | Performed by: FAMILY MEDICINE

## 2022-06-09 NOTE — PROGRESS NOTES
POCT PT/INR Performed today, within nml range, Per [facility], Continue same dose of 5 mg daily, repeat in 4 weeks.   Pt informed./mv

## 2022-06-29 ENCOUNTER — ANTI-COAG VISIT (OUTPATIENT)
Dept: FAMILY MEDICINE CLINIC | Age: 87
End: 2022-06-29
Payer: MEDICARE

## 2022-06-29 DIAGNOSIS — R53.1 GENERALIZED WEAKNESS: ICD-10-CM

## 2022-06-29 DIAGNOSIS — Z79.01 ENCOUNTER FOR CURRENT LONG-TERM USE OF ANTICOAGULANTS: ICD-10-CM

## 2022-06-29 DIAGNOSIS — I48.0 PAF (PAROXYSMAL ATRIAL FIBRILLATION) (HCC): Primary | ICD-10-CM

## 2022-06-29 DIAGNOSIS — I35.0 NONRHEUMATIC AORTIC VALVE STENOSIS: ICD-10-CM

## 2022-06-29 DIAGNOSIS — E44.1 MILD MALNUTRITION (HCC): ICD-10-CM

## 2022-06-29 DIAGNOSIS — R53.83 FATIGUE, UNSPECIFIED TYPE: ICD-10-CM

## 2022-06-29 LAB
ANION GAP SERPL CALCULATED.3IONS-SCNC: 11 MMOL/L (ref 3–16)
BUN BLDV-MCNC: 18 MG/DL (ref 7–20)
CALCIUM SERPL-MCNC: 10.2 MG/DL (ref 8.3–10.6)
CHLORIDE BLD-SCNC: 100 MMOL/L (ref 99–110)
CO2: 27 MMOL/L (ref 21–32)
CREAT SERPL-MCNC: 1 MG/DL (ref 0.6–1.2)
GFR AFRICAN AMERICAN: >60
GFR NON-AFRICAN AMERICAN: 52
GLUCOSE BLD-MCNC: 105 MG/DL (ref 70–99)
INTERNATIONAL NORMALIZATION RATIO, POC: 1.8
POTASSIUM SERPL-SCNC: 4.6 MMOL/L (ref 3.5–5.1)
PROTHROMBIN TIME, POC: 27.7
SODIUM BLD-SCNC: 138 MMOL/L (ref 136–145)

## 2022-06-29 PROCEDURE — 85610 PROTHROMBIN TIME: CPT | Performed by: FAMILY MEDICINE

## 2022-06-29 PROCEDURE — 36415 COLL VENOUS BLD VENIPUNCTURE: CPT | Performed by: FAMILY MEDICINE

## 2022-06-29 RX ORDER — WARFARIN SODIUM 5 MG/1
5 TABLET ORAL DAILY
Qty: 30 TABLET | Refills: 2 | Status: SHIPPED | OUTPATIENT
Start: 2022-06-29 | End: 2022-10-04

## 2022-06-29 NOTE — PROGRESS NOTES
BMP Drawn RA./mv  1 sst    POCT PT/INR Performed today, slightly low, Per [facility], increase regimen to 10 mg on Wed and and 5 mg on all other days, repeat in 2 weeks.   Pt informed./mv

## 2022-06-30 RX ORDER — WARFARIN SODIUM 5 MG/1
TABLET ORAL
Qty: 30 TABLET | Refills: 2 | OUTPATIENT
Start: 2022-06-30

## 2022-07-13 ENCOUNTER — OFFICE VISIT (OUTPATIENT)
Dept: FAMILY MEDICINE CLINIC | Age: 87
End: 2022-07-13
Payer: MEDICARE

## 2022-07-13 VITALS
SYSTOLIC BLOOD PRESSURE: 130 MMHG | HEART RATE: 68 BPM | WEIGHT: 89.2 LBS | OXYGEN SATURATION: 98 % | HEIGHT: 60 IN | DIASTOLIC BLOOD PRESSURE: 64 MMHG | BODY MASS INDEX: 17.51 KG/M2

## 2022-07-13 DIAGNOSIS — I48.0 PAF (PAROXYSMAL ATRIAL FIBRILLATION) (HCC): Primary | ICD-10-CM

## 2022-07-13 DIAGNOSIS — M65.331 TRIGGER FINGER, RIGHT MIDDLE FINGER: ICD-10-CM

## 2022-07-13 DIAGNOSIS — N28.9 MILD RENAL INSUFFICIENCY: ICD-10-CM

## 2022-07-13 DIAGNOSIS — Z95.2 HISTORY OF TRANSCATHETER AORTIC VALVE IMPLANTATION (TAVI): ICD-10-CM

## 2022-07-13 DIAGNOSIS — R09.82 POST-NASAL DRAINAGE: ICD-10-CM

## 2022-07-13 DIAGNOSIS — I35.0 NONRHEUMATIC AORTIC VALVE STENOSIS: ICD-10-CM

## 2022-07-13 DIAGNOSIS — Z79.01 ENCOUNTER FOR CURRENT LONG-TERM USE OF ANTICOAGULANTS: ICD-10-CM

## 2022-07-13 DIAGNOSIS — I10 ESSENTIAL HYPERTENSION: ICD-10-CM

## 2022-07-13 DIAGNOSIS — Z91.81 AT HIGH RISK FOR FALLS: ICD-10-CM

## 2022-07-13 DIAGNOSIS — I25.10 CORONARY ARTERY DISEASE INVOLVING NATIVE CORONARY ARTERY OF NATIVE HEART WITHOUT ANGINA PECTORIS: ICD-10-CM

## 2022-07-13 LAB
INTERNATIONAL NORMALIZATION RATIO, POC: 2.8
PROTHROMBIN TIME, POC: 33.1

## 2022-07-13 PROCEDURE — 1123F ACP DISCUSS/DSCN MKR DOCD: CPT | Performed by: FAMILY MEDICINE

## 2022-07-13 PROCEDURE — 99214 OFFICE O/P EST MOD 30 MIN: CPT | Performed by: FAMILY MEDICINE

## 2022-07-13 PROCEDURE — 85610 PROTHROMBIN TIME: CPT | Performed by: FAMILY MEDICINE

## 2022-07-13 SDOH — ECONOMIC STABILITY: TRANSPORTATION INSECURITY
IN THE PAST 12 MONTHS, HAS THE LACK OF TRANSPORTATION KEPT YOU FROM MEDICAL APPOINTMENTS OR FROM GETTING MEDICATIONS?: NO

## 2022-07-13 SDOH — ECONOMIC STABILITY: FOOD INSECURITY: WITHIN THE PAST 12 MONTHS, YOU WORRIED THAT YOUR FOOD WOULD RUN OUT BEFORE YOU GOT MONEY TO BUY MORE.: NEVER TRUE

## 2022-07-13 SDOH — ECONOMIC STABILITY: FOOD INSECURITY: WITHIN THE PAST 12 MONTHS, THE FOOD YOU BOUGHT JUST DIDN'T LAST AND YOU DIDN'T HAVE MONEY TO GET MORE.: NEVER TRUE

## 2022-07-13 ASSESSMENT — SOCIAL DETERMINANTS OF HEALTH (SDOH): HOW HARD IS IT FOR YOU TO PAY FOR THE VERY BASICS LIKE FOOD, HOUSING, MEDICAL CARE, AND HEATING?: NOT HARD AT ALL

## 2022-07-13 NOTE — PROGRESS NOTES
[facility] Medicine  Clinic Note    Date: 7/13/2022                                               Subjective:     Chief Complaint   Patient presents with    Follow-up     AFIB FOLLOW UP      HPI  Pnd, sneezing occasionally - clearing of throat a lot  occ itchy eyes -watery  Non-sedating antihistamine - claritin not helpful. Not using nasal spray regularly - daughter needs to help administer  No concerns  Needs INR as high last time and renal recheck at some point as slightly down on last lab test  Generally doing well  Walking on treadmill at home for limited time  Daughter working w/ her on steps - doing PT at home  Navigating steps better. Home PT didn't help much  Drinks mostly coffee - encouraged more water  2.8 INR today  On warfarin 5mg daily.   Eating better - has gained weight  Likes sugars/ cookies - family trying to encourage better new nutrition  No swelling  Mood seems good  More active  bp high when eating a lot of salty chips - better off chips  Pt is [place]  Daughter primary historian  bm's good overall  BP Readings from Last 3 Encounters:   07/13/22 130/64   04/14/22 (!) 134/58   03/03/22 110/60     Pulse Readings from Last 3 Encounters:   07/13/22 68   04/14/22 74   03/03/22 70     Wt Readings from Last 3 Encounters:   07/13/22 89 lb 3.2 oz (40.5 kg)   04/14/22 85 lb (38.6 kg)   03/03/22 86 lb 12.8 oz (39.4 kg)            Patient Active Problem List    Diagnosis Date Noted    Squamous cell carcinoma of skin of left upper arm 04/14/2022    Encounter for current long-term use of anticoagulants 03/24/2022    Mild malnutrition ([address] 09/28/2021    Acute respiratory failure with hypoxia (HCC)     Pneumonia due to 2019 novel coronavirus     Coronary artery disease involving native coronary artery of native heart without angina pectoris     History of transcatheter aortic valve implantation ([place]) 10/02/2019    PAF (paroxysmal atrial fibrillation) ([address] 10/02/2019    Essential hypertension 04/03/2019    Carotid stenosis, asymptomatic, right 04/03/2019    Arthritis 02/05/2018    Hypercholesteremia 11/23/2016    Nonrheumatic aortic valve stenosis 11/23/2016    Right carotid artery occlusion 11/23/2016    Aortic calcification ([facility] 11/09/2016     Past Medical History:   Diagnosis Date    Aortic stenosis     Arthritis     COVID-19     PAF (paroxysmal atrial fibrillation) ([address]      Past Surgical History:   Procedure Laterality Date    AORTIC VALVE REPLACEMENT N/A 5/21/2019    TRANSCATHETER AORTIC VALVE REPLACEMENT FEMORAL APPROACH performed by Darwin Coburn MD at [address]  04/17/2018    HYSTERECTOMY, TOTAL ABDOMINAL (CERVIX REMOVED)      [facility]  04/14/2022    Left arm    UPPER GASTROINTESTINAL ENDOSCOPY N/A 2/14/2022    EGD DILATION BALLOON performed by Shahriar House MD at [address] Bilateral 1980's     Anti-coag visit on 06/29/2022   Component Date Value Ref Range Status    INR,(POC) 06/29/2022 1.8   Final    Prothrombin time,(POC) 06/29/2022 27.7   Final    Sodium 06/29/2022 138  136 - 145 mmol/L Final    Potassium 06/29/2022 4.6  3.5 - 5.1 mmol/L Final    Chloride 06/29/2022 100  99 - 110 mmol/L Final    CO2 06/29/2022 27  21 - 32 mmol/L Final    Anion Gap 06/29/2022 11  3 - 16 Final    Glucose 06/29/2022 105* 70 - 99 mg/dL Final    BUN 06/29/2022 18  7 - 20 mg/dL Final    CREATININE 06/29/2022 1.0  0.6 - 1.2 mg/dL Final    GFR Non- 06/29/2022 52* >60 Final    Comment: >60 mL/min/1.73m2 EGFR, calc. for ages 25 and older using the  MDRD formula (not corrected for weight), is valid for stable  renal function.  GFR  06/29/2022 >60  >60 Final    Comment: Chronic Kidney Disease: less than 60 ml/min/1.73 sq.m. Kidney Failure: less than 15 ml/min/1.73 sq.m. Results valid for patients 18 years and older.       Calcium 06/29/2022 10.2  8.3 - 10.6 mg/dL Final Family History   Problem Relation Age of Onset    Diabetes Mother     Heart Disease Mother     Heart Disease Father     Cancer Brother     Asthma Daughter     Cancer Sister         Breast, Bone    Heart Disease Sister         Valve     Heart Disease Nephew         Valve    Heart Disease Brother      Current Outpatient Medications   Medication Sig Dispense Refill    warfarin (COUMADIN) 5 MG tablet Take 1 tablet by mouth daily 30 tablet 2    dilTIAZem (CARDIZEM CD) 120 MG extended release capsule Take 1 capsule by mouth daily 90 capsule 0    KLOR-CON M20 20 MEQ extended release tablet TAKE ONE TABLET BY MOUTH DAILY 30 tablet 0    metoprolol tartrate (LOPRESSOR) 50 MG tablet Take 1 tablet by mouth 2 times daily (Patient not taking: Reported on 7/13/2022) 180 tablet 0    albuterol-ipratropium (COMBIVENT RESPIMAT)  MCG/ACT AERS inhaler Inhale 1 puff into the lungs every 6 hours (Patient not taking: Reported on 7/13/2022) 1 each 3     No current facility-administered medications for this visit. No Known Allergies    Review of Systems    Objective:  /64 (Site: Left Upper Arm, Position: Sitting, Cuff Size: Medium Adult)   Pulse 68   Ht 5' (1.524 m)   Wt 89 lb 3.2 oz (40.5 kg) Comment: SHOES ON  SpO2 98%   BMI 17.42 kg/m²     BP Readings from Last 3 Encounters:   07/13/22 130/64   04/14/22 (!) 134/58   03/03/22 110/60       Pulse Readings from Last 3 Encounters:   07/13/22 68   04/14/22 74   03/03/22 70       Wt Readings from Last 3 Encounters:   07/13/22 89 lb 3.2 oz (40.5 kg)   04/14/22 85 lb (38.6 kg)   03/03/22 86 lb 12.8 oz (39.4 kg)       Physical Exam  Constitutional:       General: She is not in acute distress. Appearance: She is well-developed. HENT:      Head: Normocephalic and atraumatic. Mouth/Throat:      Pharynx: No oropharyngeal exudate. Eyes:      General: No scleral icterus. Conjunctiva/sclera: Conjunctivae normal.   Neck:      Thyroid: No thyromegaly. Cardiovascular:      Rate and Rhythm: Normal rate. Rhythm irregular. Heart sounds: No murmur heard. Pulmonary:      Effort: Pulmonary effort is normal. No respiratory distress. Breath sounds: Normal breath sounds. No wheezing or rales. Abdominal:      General: Bowel sounds are normal. There is no distension. Palpations: Abdomen is soft. Tenderness: There is no abdominal tenderness. Musculoskeletal:         General: No swelling. Lymphadenopathy:      Cervical: No cervical adenopathy. Skin:     General: Skin is warm and dry. Neurological:      Mental Status: She is alert and oriented to person, place, and time. Assessment/Plan:      Diagnosis Orders   1. PAF (paroxysmal atrial fibrillation) ([facility]     2. At high risk for falls     3. Mild renal insufficiency     4. History of transcatheter aortic valve implantation ([facility]ISTIANA)     5. Essential hypertension     6. Coronary artery disease involving native coronary artery of native heart without angina pectoris     7. Post-nasal drainage       Gradual increase in exercise on steps/ treadmill  Weight up -eating well - nutrition d/w pt  bp stable and hr stable  No concerns  INR 2.8 - cont warfarin 5mg daily - recheck 2 weeks  Hydration w/ water encouraged  Recheck bmp in next 3 months  Fall precautions  Start nasal steroid spray daily  Trigger finger right middle finger d/ wpt  Mulu Villanueva MD, MD  7/13/2022  12:00 PM                        On the basis of positive falls risk screening, assessment and plan is as follows: did PT - doing HEP at home - helping.

## 2022-07-20 ENCOUNTER — NURSE ONLY (OUTPATIENT)
Dept: FAMILY MEDICINE CLINIC | Age: 87
End: 2022-07-20
Payer: MEDICARE

## 2022-07-20 DIAGNOSIS — R39.9 UTI SYMPTOMS: Primary | ICD-10-CM

## 2022-07-20 LAB
BILIRUBIN, POC: ABNORMAL
BLOOD URINE, POC: ABNORMAL
CLARITY, POC: CLEAR
COLOR, POC: YELLOW
GLUCOSE URINE, POC: ABNORMAL
KETONES, POC: ABNORMAL
LEUKOCYTE EST, POC: ABNORMAL
NITRITE, POC: ABNORMAL
PH, POC: 6
PROTEIN, POC: ABNORMAL
SPECIFIC GRAVITY, POC: 1.03
UROBILINOGEN, POC: 0.2

## 2022-07-20 PROCEDURE — 81002 URINALYSIS NONAUTO W/O SCOPE: CPT | Performed by: FAMILY MEDICINE

## 2022-07-22 LAB — URINE CULTURE, ROUTINE: NORMAL

## 2022-08-09 RX ORDER — METOPROLOL TARTRATE 50 MG/1
50 TABLET, FILM COATED ORAL 2 TIMES DAILY
Qty: 180 TABLET | Refills: 0 | Status: SHIPPED | OUTPATIENT
Start: 2022-08-09

## 2022-08-25 ENCOUNTER — NURSE ONLY (OUTPATIENT)
Dept: FAMILY MEDICINE CLINIC | Age: 87
End: 2022-08-25
Payer: MEDICARE

## 2022-08-25 DIAGNOSIS — I35.0 NONRHEUMATIC AORTIC VALVE STENOSIS: ICD-10-CM

## 2022-08-25 DIAGNOSIS — I48.0 PAF (PAROXYSMAL ATRIAL FIBRILLATION) (HCC): ICD-10-CM

## 2022-08-25 DIAGNOSIS — R63.4 WEIGHT LOSS: ICD-10-CM

## 2022-08-25 DIAGNOSIS — R53.83 FATIGUE, UNSPECIFIED TYPE: ICD-10-CM

## 2022-08-25 DIAGNOSIS — Z79.01 ENCOUNTER FOR CURRENT LONG-TERM USE OF ANTICOAGULANTS: ICD-10-CM

## 2022-08-25 LAB
A/G RATIO: 1.5 (ref 1.1–2.2)
ALBUMIN SERPL-MCNC: 4 G/DL (ref 3.4–5)
ALP BLD-CCNC: 70 U/L (ref 40–129)
ALT SERPL-CCNC: 8 U/L (ref 10–40)
ANION GAP SERPL CALCULATED.3IONS-SCNC: 8 MMOL/L (ref 3–16)
AST SERPL-CCNC: 15 U/L (ref 15–37)
BILIRUB SERPL-MCNC: <0.2 MG/DL (ref 0–1)
BUN BLDV-MCNC: 13 MG/DL (ref 7–20)
CALCIUM SERPL-MCNC: 9.9 MG/DL (ref 8.3–10.6)
CHLORIDE BLD-SCNC: 101 MMOL/L (ref 99–110)
CO2: 27 MMOL/L (ref 21–32)
CREAT SERPL-MCNC: 0.9 MG/DL (ref 0.6–1.2)
GFR AFRICAN AMERICAN: >60
GFR NON-AFRICAN AMERICAN: 59
GLUCOSE BLD-MCNC: 90 MG/DL (ref 70–99)
INTERNATIONAL NORMALIZATION RATIO, POC: 2.3
POTASSIUM SERPL-SCNC: 4.8 MMOL/L (ref 3.5–5.1)
PROTHROMBIN TIME, POC: 28
SODIUM BLD-SCNC: 136 MMOL/L (ref 136–145)
TOTAL PROTEIN: 6.6 G/DL (ref 6.4–8.2)
TSH REFLEX: 3.04 UIU/ML (ref 0.27–4.2)

## 2022-08-25 PROCEDURE — 85610 PROTHROMBIN TIME: CPT | Performed by: FAMILY MEDICINE

## 2022-08-25 NOTE — PROGRESS NOTES
CMP and TSH Drawn RA./mv  1 sst    POCT PT/INR Performed today, within nml range, Per [facility], Continue same dose of 5 mg daily, repeat in 4 weeks.   Pt informed./mv

## 2022-09-06 RX ORDER — DILTIAZEM HYDROCHLORIDE 120 MG/1
CAPSULE, COATED, EXTENDED RELEASE ORAL
Qty: 90 CAPSULE | Refills: 0 | Status: SHIPPED | OUTPATIENT
Start: 2022-09-06

## 2022-09-21 ENCOUNTER — NURSE ONLY (OUTPATIENT)
Dept: FAMILY MEDICINE CLINIC | Age: 87
End: 2022-09-21
Payer: MEDICARE

## 2022-09-21 DIAGNOSIS — R35.0 FREQUENT URINATION: Primary | ICD-10-CM

## 2022-09-21 DIAGNOSIS — R41.0 CONFUSION: ICD-10-CM

## 2022-09-21 LAB
BILIRUBIN, POC: NEGATIVE
BLOOD URINE, POC: ABNORMAL
CLARITY, POC: CLEAR
COLOR, POC: YELLOW
GLUCOSE URINE, POC: NEGATIVE
KETONES, POC: NEGATIVE
LEUKOCYTE EST, POC: NEGATIVE
NITRITE, POC: NEGATIVE
PH, POC: 6
PROTEIN, POC: NEGATIVE
SPECIFIC GRAVITY, POC: >=1.03
UROBILINOGEN, POC: 0.2

## 2022-09-21 PROCEDURE — 81002 URINALYSIS NONAUTO W/O SCOPE: CPT | Performed by: FAMILY MEDICINE

## 2022-09-22 ENCOUNTER — NURSE ONLY (OUTPATIENT)
Dept: FAMILY MEDICINE CLINIC | Age: 87
End: 2022-09-22
Payer: MEDICARE

## 2022-09-22 VITALS — BODY MASS INDEX: 17.58 KG/M2 | WEIGHT: 90 LBS

## 2022-09-22 DIAGNOSIS — I35.0 NONRHEUMATIC AORTIC VALVE STENOSIS: ICD-10-CM

## 2022-09-22 DIAGNOSIS — Z79.01 ENCOUNTER FOR CURRENT LONG-TERM USE OF ANTICOAGULANTS: ICD-10-CM

## 2022-09-22 DIAGNOSIS — I10 ESSENTIAL HYPERTENSION: Primary | ICD-10-CM

## 2022-09-22 DIAGNOSIS — E44.1 MILD MALNUTRITION (HCC): ICD-10-CM

## 2022-09-22 DIAGNOSIS — R41.0 CONFUSION: ICD-10-CM

## 2022-09-22 DIAGNOSIS — I48.0 PAF (PAROXYSMAL ATRIAL FIBRILLATION) (HCC): ICD-10-CM

## 2022-09-22 LAB
ANION GAP SERPL CALCULATED.3IONS-SCNC: 14 MMOL/L (ref 3–16)
BASOPHILS ABSOLUTE: 0.1 K/UL (ref 0–0.2)
BASOPHILS RELATIVE PERCENT: 1.2 %
BUN BLDV-MCNC: 16 MG/DL (ref 7–20)
CALCIUM SERPL-MCNC: 9.6 MG/DL (ref 8.3–10.6)
CHLORIDE BLD-SCNC: 105 MMOL/L (ref 99–110)
CO2: 23 MMOL/L (ref 21–32)
CREAT SERPL-MCNC: 0.9 MG/DL (ref 0.6–1.2)
EOSINOPHILS ABSOLUTE: 0.2 K/UL (ref 0–0.6)
EOSINOPHILS RELATIVE PERCENT: 3.3 %
GFR AFRICAN AMERICAN: >60
GFR NON-AFRICAN AMERICAN: 59
GLUCOSE BLD-MCNC: 114 MG/DL (ref 70–99)
HCT VFR BLD CALC: 39.8 % (ref 36–48)
HEMOGLOBIN: 12.9 G/DL (ref 12–16)
INTERNATIONAL NORMALIZATION RATIO, POC: 3
LYMPHOCYTES ABSOLUTE: 1.5 K/UL (ref 1–5.1)
LYMPHOCYTES RELATIVE PERCENT: 31.5 %
MCH RBC QN AUTO: 26.5 PG (ref 26–34)
MCHC RBC AUTO-ENTMCNC: 32.4 G/DL (ref 31–36)
MCV RBC AUTO: 81.6 FL (ref 80–100)
MONOCYTES ABSOLUTE: 0.5 K/UL (ref 0–1.3)
MONOCYTES RELATIVE PERCENT: 10.9 %
NEUTROPHILS ABSOLUTE: 2.6 K/UL (ref 1.7–7.7)
NEUTROPHILS RELATIVE PERCENT: 53.1 %
ORGANISM: ABNORMAL
PDW BLD-RTO: 17.7 % (ref 12.4–15.4)
PLATELET # BLD: 193 K/UL (ref 135–450)
PMV BLD AUTO: 8.9 FL (ref 5–10.5)
POTASSIUM SERPL-SCNC: 4.1 MMOL/L (ref 3.5–5.1)
PROTHROMBIN TIME, POC: 35.5
RBC # BLD: 4.88 M/UL (ref 4–5.2)
SODIUM BLD-SCNC: 142 MMOL/L (ref 136–145)
URINE CULTURE, ROUTINE: ABNORMAL
URINE CULTURE, ROUTINE: ABNORMAL
WBC # BLD: 4.9 K/UL (ref 4–11)

## 2022-09-22 PROCEDURE — 36415 COLL VENOUS BLD VENIPUNCTURE: CPT | Performed by: FAMILY MEDICINE

## 2022-09-22 PROCEDURE — 85610 PROTHROMBIN TIME: CPT | Performed by: FAMILY MEDICINE

## 2022-09-22 NOTE — PROGRESS NOTES
NON FASTING LABS DRAWN RA AND POCT PT/INR PERFORMED. [facility]    POCT PT/INR PERFORMED, WITHIN NORMAL RANGE. PER [facility], CONTINUE SAME DOSE OF 5 MG DAILY AND REPEAT IN 4 WKS. PT INFORMED.   [facility]

## 2022-10-04 RX ORDER — WARFARIN SODIUM 5 MG/1
TABLET ORAL
Qty: 90 TABLET | Refills: 0 | Status: SHIPPED | OUTPATIENT
Start: 2022-10-04

## 2022-10-19 ENCOUNTER — NURSE ONLY (OUTPATIENT)
Dept: FAMILY MEDICINE CLINIC | Age: 87
End: 2022-10-19
Payer: MEDICARE

## 2022-10-19 DIAGNOSIS — I35.0 NONRHEUMATIC AORTIC VALVE STENOSIS: ICD-10-CM

## 2022-10-19 DIAGNOSIS — I48.0 PAF (PAROXYSMAL ATRIAL FIBRILLATION) (HCC): ICD-10-CM

## 2022-10-19 DIAGNOSIS — Z79.01 ENCOUNTER FOR CURRENT LONG-TERM USE OF ANTICOAGULANTS: ICD-10-CM

## 2022-10-19 DIAGNOSIS — I10 ESSENTIAL HYPERTENSION: Primary | ICD-10-CM

## 2022-10-19 LAB
ANION GAP SERPL CALCULATED.3IONS-SCNC: 14 MMOL/L (ref 3–16)
BUN BLDV-MCNC: 13 MG/DL (ref 7–20)
CALCIUM SERPL-MCNC: 10.3 MG/DL (ref 8.3–10.6)
CHLORIDE BLD-SCNC: 99 MMOL/L (ref 99–110)
CO2: 24 MMOL/L (ref 21–32)
CREAT SERPL-MCNC: 0.9 MG/DL (ref 0.6–1.2)
GFR SERPL CREATININE-BSD FRML MDRD: >60 ML/MIN/{1.73_M2}
GLUCOSE BLD-MCNC: 84 MG/DL (ref 70–99)
INTERNATIONAL NORMALIZATION RATIO, POC: 1.9
POTASSIUM SERPL-SCNC: 4.7 MMOL/L (ref 3.5–5.1)
PROTHROMBIN TIME, POC: 22.5
SODIUM BLD-SCNC: 137 MMOL/L (ref 136–145)

## 2022-10-19 PROCEDURE — 36415 COLL VENOUS BLD VENIPUNCTURE: CPT | Performed by: FAMILY MEDICINE

## 2022-10-19 PROCEDURE — 85610 PROTHROMBIN TIME: CPT | Performed by: FAMILY MEDICINE

## 2022-10-19 NOTE — PROGRESS NOTES
POCT PT/INR PERFORMED AND NONFASTING LAB DRAWN RA. [facility]    POCT PT/INR PERFORMED, SLIGHTLY DECREASED. PER DR[facility], TAKE 1 1/2 TABS TONIGHT AND RESUME 1 TAB DAILY. RECHECK IN 2 WKS. PT AND GRANDDAUGHTER INFORMED.  [facility]

## 2022-11-02 ENCOUNTER — ANTI-COAG VISIT (OUTPATIENT)
Dept: FAMILY MEDICINE CLINIC | Age: 87
End: 2022-11-02
Payer: MEDICARE

## 2022-11-02 VITALS — OXYGEN SATURATION: 96 % | HEART RATE: 110 BPM

## 2022-11-02 DIAGNOSIS — I35.0 NONRHEUMATIC AORTIC VALVE STENOSIS: ICD-10-CM

## 2022-11-02 DIAGNOSIS — I48.0 PAF (PAROXYSMAL ATRIAL FIBRILLATION) (HCC): Primary | ICD-10-CM

## 2022-11-02 DIAGNOSIS — Z79.01 ENCOUNTER FOR CURRENT LONG-TERM USE OF ANTICOAGULANTS: ICD-10-CM

## 2022-11-02 LAB
INTERNATIONAL NORMALIZATION RATIO, POC: 2.1
PROTHROMBIN TIME, POC: 25.2

## 2022-11-02 PROCEDURE — 85610 PROTHROMBIN TIME: CPT | Performed by: FAMILY MEDICINE

## 2022-11-02 NOTE — PROGRESS NOTES
POCT PT/INR PERFORMED, WITHIN NORMAL RANGE. PER DR[facility], CONTINUE SAME DOSE OF 1 TAB EVERY DAY. REPEAT IN 2 WKS. PT INFORMED. [address]    PT HAS A COUGH IN THE EVENING, SHE IS USING AN ALLERGY PILL WITH NOT MUCH RELIEF. SHE HAS A HISTORY OF HIATAL HERNIA AND ESOPHAGEAL STRETCH. PER JON SWEENEY, OK TO TRY OTC PEPCID AC TO SEE IF IT HELPS. PT AND GRANDDAUGHTER AGREED WITH THE PLAN.   [address]

## 2022-11-16 ENCOUNTER — ANTI-COAG VISIT (OUTPATIENT)
Dept: FAMILY MEDICINE CLINIC | Age: 87
End: 2022-11-16
Payer: MEDICARE

## 2022-11-16 ENCOUNTER — PATIENT MESSAGE (OUTPATIENT)
Dept: FAMILY MEDICINE CLINIC | Age: 87
End: 2022-11-16

## 2022-11-16 DIAGNOSIS — Z79.01 ENCOUNTER FOR CURRENT LONG-TERM USE OF ANTICOAGULANTS: ICD-10-CM

## 2022-11-16 DIAGNOSIS — I48.0 PAF (PAROXYSMAL ATRIAL FIBRILLATION) (HCC): Primary | ICD-10-CM

## 2022-11-16 DIAGNOSIS — I35.0 NONRHEUMATIC AORTIC VALVE STENOSIS: ICD-10-CM

## 2022-11-16 LAB
INTERNATIONAL NORMALIZATION RATIO, POC: 1.8
PROTHROMBIN TIME, POC: 21.4

## 2022-11-16 PROCEDURE — 85610 PROTHROMBIN TIME: CPT | Performed by: FAMILY MEDICINE

## 2022-11-16 RX ORDER — CEPHALEXIN 250 MG/1
250 CAPSULE ORAL 3 TIMES DAILY
Qty: 15 CAPSULE | Refills: 0 | Status: SHIPPED | OUTPATIENT
Start: 2022-11-16

## 2022-11-16 NOTE — TELEPHONE ENCOUNTER
From: Tony Mills  To: Dr. Edie Solis: 11/16/2022 12:53 PM EST  Subject: Leg injury    Hey Dr Camille Motta' grandma hit her leg on her hospital bed last week. She has been covering it and using neosporin. I have attached pics so you can let us know what you think.     Thanks

## 2022-11-16 NOTE — PROGRESS NOTES
POCT PT/INR PERFORMED, SLIGHTLY DECREASED. PER [facility], TAKE 1 1/2 TABS ON Wednesday AND 1 TAB ALL REMAINING DAYS. REPEAT IN 4 WKS. PT AND DGTR INFORMED.  [address] [facility]

## 2022-11-22 ENCOUNTER — HOSPITAL ENCOUNTER (OUTPATIENT)
Dept: GENERAL RADIOLOGY | Age: 87
Discharge: HOME | End: 2022-11-22
Payer: MEDICARE

## 2022-11-22 ENCOUNTER — HOSPITAL ENCOUNTER (OUTPATIENT)
Age: 87
Discharge: HOME | End: 2022-11-22
Payer: MEDICARE

## 2022-11-22 ENCOUNTER — OFFICE VISIT (OUTPATIENT)
Dept: FAMILY MEDICINE CLINIC | Age: 87
End: 2022-11-22
Payer: MEDICARE

## 2022-11-22 VITALS
HEIGHT: 60 IN | HEART RATE: 84 BPM | DIASTOLIC BLOOD PRESSURE: 70 MMHG | WEIGHT: 92 LBS | TEMPERATURE: 97.7 F | RESPIRATION RATE: 20 BRPM | BODY MASS INDEX: 18.06 KG/M2 | SYSTOLIC BLOOD PRESSURE: 148 MMHG | OXYGEN SATURATION: 91 %

## 2022-11-22 DIAGNOSIS — R05.3 PERSISTENT COUGH FOR 3 WEEKS OR LONGER: ICD-10-CM

## 2022-11-22 DIAGNOSIS — R05.3 PERSISTENT COUGH FOR 3 WEEKS OR LONGER: Primary | ICD-10-CM

## 2022-11-22 PROCEDURE — 71046 X-RAY EXAM CHEST 2 VIEWS: CPT

## 2022-11-22 PROCEDURE — 99213 OFFICE O/P EST LOW 20 MIN: CPT | Performed by: NURSE PRACTITIONER

## 2022-11-22 PROCEDURE — 1123F ACP DISCUSS/DSCN MKR DOCD: CPT | Performed by: NURSE PRACTITIONER

## 2022-11-22 ASSESSMENT — ENCOUNTER SYMPTOMS
WHEEZING: 0
COUGH: 1

## 2022-11-22 NOTE — PROGRESS NOTES
Isha Zhou (:  1931) is a 80 y.o. female,Established patient, here for evaluation of the following chief complaint(s):    Cough [place] c/o cough for the last  few days)      SUBJECTIVE/OBJECTIVE:  HPI  Diomedes Michelle c/o cough productive at times of white stuff - she is  using  her nebulizer when she feels like she  needs used once yesterday- this does help with  the cough just a bit fatigued- she denies wheezing has a had a bit of a runny  nose  Review of Systems   Constitutional:  Negative for fever. Respiratory:  Positive for cough. Negative for wheezing. All other systems reviewed and are negative. Physical Exam  Vitals reviewed. Constitutional:       General: She is awake. She is not in acute distress. Appearance: Normal appearance. She is well-developed, well-groomed and normal weight. She is not ill-appearing, toxic-appearing or diaphoretic. Cardiovascular:      Rate and Rhythm: Normal rate and regular rhythm. Heart sounds: Normal heart sounds, S1 normal and S2 normal. Heart sounds not distant. No murmur heard. No systolic murmur is present. No diastolic murmur is present. Pulmonary:      Effort: Pulmonary effort is normal.      Breath sounds: Normal breath sounds and air entry. Neurological:      Mental Status: She is alert and oriented to person, place, and time. Psychiatric:         Behavior: Behavior is cooperative. Diomedes Michelle was seen today for cough. Diagnoses and all orders for this visit:    Persistent cough for 3 weeks or longer  -     XR CHEST (2 VW); Future  OTC CLARITIN AS DIRECTED  CONTINUE COMBIVENT AS NEEDED         An electronic signature was used to authenticate this note.     --Marj Maguire, CAROLYN - CNP

## 2022-11-23 ENCOUNTER — TELEPHONE (OUTPATIENT)
Dept: CARDIOLOGY CLINIC | Age: 87
End: 2022-11-23

## 2022-11-28 NOTE — TELEPHONE ENCOUNTER
Please see and review  Thank you
Pt has been scheduled for both on 12/22 @ [place] echo @100pm appt @200pm
[Follow-Up Visit] : a follow-up pain management visit

## 2022-12-06 RX ORDER — DILTIAZEM HYDROCHLORIDE 120 MG/1
CAPSULE, COATED, EXTENDED RELEASE ORAL
Qty: 90 CAPSULE | Refills: 0 | Status: SHIPPED | OUTPATIENT
Start: 2022-12-06

## 2022-12-14 ENCOUNTER — ANTI-COAG VISIT (OUTPATIENT)
Dept: FAMILY MEDICINE CLINIC | Age: 87
End: 2022-12-14
Payer: MEDICARE

## 2022-12-14 DIAGNOSIS — Z79.01 ENCOUNTER FOR CURRENT LONG-TERM USE OF ANTICOAGULANTS: ICD-10-CM

## 2022-12-14 DIAGNOSIS — R06.02 SHORTNESS OF BREATH: ICD-10-CM

## 2022-12-14 DIAGNOSIS — I35.0 NONRHEUMATIC AORTIC VALVE STENOSIS: Primary | ICD-10-CM

## 2022-12-14 DIAGNOSIS — I51.89 DIASTOLIC DYSFUNCTION: ICD-10-CM

## 2022-12-14 DIAGNOSIS — Z95.2 S/P TAVR (TRANSCATHETER AORTIC VALVE REPLACEMENT): ICD-10-CM

## 2022-12-14 DIAGNOSIS — I10 ESSENTIAL HYPERTENSION: ICD-10-CM

## 2022-12-14 DIAGNOSIS — I48.0 PAF (PAROXYSMAL ATRIAL FIBRILLATION) (HCC): Primary | ICD-10-CM

## 2022-12-14 DIAGNOSIS — I35.0 NONRHEUMATIC AORTIC VALVE STENOSIS: ICD-10-CM

## 2022-12-14 LAB
ANION GAP SERPL CALCULATED.3IONS-SCNC: 11 MMOL/L (ref 3–16)
BUN BLDV-MCNC: 13 MG/DL (ref 7–20)
CALCIUM SERPL-MCNC: 10.1 MG/DL (ref 8.3–10.6)
CHLORIDE BLD-SCNC: 100 MMOL/L (ref 99–110)
CO2: 27 MMOL/L (ref 21–32)
CREAT SERPL-MCNC: 0.8 MG/DL (ref 0.6–1.2)
GFR SERPL CREATININE-BSD FRML MDRD: >60 ML/MIN/{1.73_M2}
GLUCOSE BLD-MCNC: 83 MG/DL (ref 70–99)
INTERNATIONAL NORMALIZATION RATIO, POC: 1.7
POTASSIUM SERPL-SCNC: 4.4 MMOL/L (ref 3.5–5.1)
PRO-BNP: 1077 PG/ML (ref 0–449)
PROTHROMBIN TIME, POC: 20.5
SODIUM BLD-SCNC: 138 MMOL/L (ref 136–145)

## 2022-12-14 PROCEDURE — 85610 PROTHROMBIN TIME: CPT | Performed by: FAMILY MEDICINE

## 2022-12-14 PROCEDURE — 36415 COLL VENOUS BLD VENIPUNCTURE: CPT | Performed by: FAMILY MEDICINE

## 2022-12-14 NOTE — PROGRESS NOTES
POCT PT/INR PERFORMED, SLIGHTLY DECREASED AGAIN. PER [facility], CHANGE TO 1 1/2 TABS ON WED AND SAT AND 1 TAB ON ALL REMAINING DAYS. REPEAT IN 2 WKS. PT AND GRANDDAUGHTER INFORMED. [address]    PER DR[facility], ADD BMP AND BNP DUE TO INCREASED SOB AND HX OF DEHYDRATION. PT AND GRANDDAUGHTER AGREE WITH THE PLAN.   [address]

## 2022-12-15 RX ORDER — FUROSEMIDE 20 MG/1
20 TABLET ORAL DAILY
Qty: 10 TABLET | Refills: 0 | Status: SHIPPED | OUTPATIENT
Start: 2022-12-15

## 2022-12-19 ENCOUNTER — TELEPHONE (OUTPATIENT)
Dept: FAMILY MEDICINE CLINIC | Age: 87
End: 2022-12-19

## 2022-12-19 NOTE — TELEPHONE ENCOUNTER
Granddaughter Rm Poag) requesting handicap placard to use when she has to take patient to doctor visits

## 2022-12-19 NOTE — LETTER
[address] [address]  Phone: [phone]  Fax: [phone]    Todd Wilson MD         December 19, 2022     Patient: Dario Christianson   YOB: 1931   Date of Visit: 12/19/2022       To Whom It May Concern: It is my medical opinion that Keesha Serrano requires a disability parking placard for the following reasons:  She cannot walk 200 feet without stopping to rest.  Duration of need: 5 years      If you have any questions or concerns, please don't hesitate to call.       Sincerely,          Todd Wilson MD

## 2022-12-21 NOTE — PROGRESS NOTES
[facility]  H+P  Consult  OP Visit  FU Visit   CC HX HPI   GEN  Doing fair. Notes increase in pond and sob lately. COVID X2 over summer and sinus infection. AS TAVR As above   HTN  Ambulatory BP in good range. Ø HA/dizziness. AFIB  Briefly after TAVR, Resolved    MED  Compliant with CV meds. Ø reported SA. HISTORY/ALLERGY/ROS   MEDHx  has a past medical history of Aortic stenosis, Arthritis, COVID-19, and PAF (paroxysmal atrial fibrillation) ([facility] SURGHx  has a past surgical history that includes Varicose vein surgery (Bilateral, 1980's); Cardiac catheterization (04/17/2018); Hysterectomy, total abdominal; Aortic valve replacement (N/A, 5/21/2019); Upper gastrointestinal endoscopy (N/A, 2/14/2022); and Mohs surgery (04/14/2022). SOCHx  reports that she has never smoked. She has never used smokeless tobacco. She reports that she does not drink alcohol and does not use drugs. FAMHx family history includes Asthma in her daughter; Cancer in her brother and sister; Diabetes in her mother; Heart Disease in her brother, father, mother, nephew, and sister. ALLERG Patient has no known allergies. ROS Full ROS obtained and negative except as mentioned in HPI   MEDICATIONS   Current Outpatient Medications   Medication Sig Dispense Refill    furosemide (LASIX) 20 MG tablet Take 1 tablet by mouth daily 10 tablet 0    dilTIAZem (CARDIZEM CD) 120 MG extended release capsule TAKE ONE CAPSULE BY MOUTH DAILY 90 capsule 0    cephALEXin (KEFLEX) 250 MG capsule Take 1 capsule by mouth 3 times daily 15 capsule 0    warfarin (COUMADIN) 5 MG tablet TAKE ONE TABLET BY MOUTH DAILY 90 tablet 0    metoprolol tartrate (LOPRESSOR) 50 MG tablet Take 1 tablet by mouth in the morning and 1 tablet before bedtime. 180 tablet 0    albuterol-ipratropium (COMBIVENT RESPIMAT)  MCG/ACT AERS inhaler Inhale 1 puff into the lungs every 6 hours 1 each 3     No current facility-administered medications for this visit. PHYSICAL EXAM   Vitals /70 (Site: Right Upper Arm, Position: Sitting, Cuff Size: Medium Adult)   Pulse 66   Ht 5' (1.524 m)   Wt 89 lb (40.4 kg)   SpO2 96%   BMI 17.38 kg/m²     Gen Alert, coop, no distress Heart  Rrr, 2/4   Head NC, AT, no abnorm Abd  Soft, NT, +BS, no mass, no OM   Eyes PER, conj/corn clear Ext  Ext nl, AT, no C/C/E   Nose Nares nl, no drain, NT Pulse 2+ and symmetric   Throat Lips, mucosa, tongue nl Skin Col/text/turg nl, no vis rash/les   Neck S/S, TM, NT, no bruit/JVD Psych Nl mood and affect   Lung CTA-B, unlabored, no DTP Lymph   No cervical or axillary LA   Ch wall NT, no deform Neuro  Nl gross M/S exam      CODING   SCI (99678) - AS, HTN, AFIB,   30-39 minutes preparing to see pt including review hx, tests, consults, perf exam, , educating pt, fam, caregiver, ordering meds/tests/procedures, referring and comm with pcps and other consultants, documenting info in EMR, interpreting results and communicating to fam and coordination of pt care. SCRIBE   Nurse - Scribe Attestation  I, [employer], am scribing for and in the presence of Champ Masters MD.   Signed, [employer], RN. Doctor - Provider Ace Jean is working as a scribe for and in the presence of genet Masters MD). Working as a scribe, [employer] may have prepopulated components of this note with my historical  intellectual property under my direct supervision. Any additions to this intellectual property were performed in my presence and at my direction.   Furthermore, the content and accuracy of this note have been reviewed by me Champ Masters MD).  12/22/2022 1:39 PM   ASSESSMENT AND PLAN   *AS    Date EF Detail   Sx   SOB, fatigue   Hx 5/19  TAVR with ES3 23mm   NYHA   I   TTE 11/16  3/19  5/19  7/19  7/20  7/21  12/22 60%  65%  65%  65%  65%  65% Mod AS MG 28  Severe AS MG 51  TAVR MG 8, no AI  TAVR MG 9  TAVR MG 9, no AI  TAVR MG 7, no AI  Echo today   EKG   Sinus arrhythmia Plan   Central moderate AI, new for patient  With multiple infections over summer concern for valvular involvement  ESR, CRP, CBC, blood cultures, PATRICK   *HTN  Status Controlled   Plan Counseled on diet/salt/exercise/weight, continue meds at doses above   *AFIB  Status Brief post TAVR, resolved with amio   Plan Continued observation   *COMPLIANCE  Status Compliant   Plan Discussed compliance with meds/diet/salt/exercise; avoid tob/alc/drugs   *FOLLOWUP  3 months with echo, sooner if labs/PATRICK with problems

## 2022-12-22 ENCOUNTER — TELEPHONE (OUTPATIENT)
Dept: FAMILY MEDICINE CLINIC | Age: 87
End: 2022-12-22

## 2022-12-22 ENCOUNTER — PROCEDURE VISIT (OUTPATIENT)
Dept: CARDIOLOGY CLINIC | Age: 87
End: 2022-12-22
Payer: MEDICARE

## 2022-12-22 ENCOUNTER — OFFICE VISIT (OUTPATIENT)
Dept: CARDIOLOGY CLINIC | Age: 87
End: 2022-12-22
Payer: MEDICARE

## 2022-12-22 VITALS
HEART RATE: 66 BPM | WEIGHT: 89 LBS | SYSTOLIC BLOOD PRESSURE: 112 MMHG | BODY MASS INDEX: 17.47 KG/M2 | HEIGHT: 60 IN | OXYGEN SATURATION: 96 % | DIASTOLIC BLOOD PRESSURE: 70 MMHG

## 2022-12-22 DIAGNOSIS — Z95.2 S/P TAVR (TRANSCATHETER AORTIC VALVE REPLACEMENT): ICD-10-CM

## 2022-12-22 DIAGNOSIS — I35.0 AORTIC VALVE STENOSIS, NONRHEUMATIC: ICD-10-CM

## 2022-12-22 DIAGNOSIS — I10 ESSENTIAL HYPERTENSION: ICD-10-CM

## 2022-12-22 DIAGNOSIS — I48.0 PAF (PAROXYSMAL ATRIAL FIBRILLATION) (HCC): ICD-10-CM

## 2022-12-22 DIAGNOSIS — I35.0 NONRHEUMATIC AORTIC VALVE STENOSIS: ICD-10-CM

## 2022-12-22 DIAGNOSIS — I35.0 AORTIC VALVE STENOSIS, NONRHEUMATIC: Primary | ICD-10-CM

## 2022-12-22 LAB
A/G RATIO: 1.3 (ref 1.1–2.2)
ALBUMIN SERPL-MCNC: 3.9 G/DL (ref 3.4–5)
ALP BLD-CCNC: 76 U/L (ref 40–129)
ALT SERPL-CCNC: 8 U/L (ref 10–40)
ANION GAP SERPL CALCULATED.3IONS-SCNC: 12 MMOL/L (ref 3–16)
AST SERPL-CCNC: 18 U/L (ref 15–37)
BILIRUB SERPL-MCNC: 0.3 MG/DL (ref 0–1)
BUN BLDV-MCNC: 18 MG/DL (ref 7–20)
C-REACTIVE PROTEIN: <3 MG/L (ref 0–5.1)
CALCIUM SERPL-MCNC: 9.8 MG/DL (ref 8.3–10.6)
CHLORIDE BLD-SCNC: 93 MMOL/L (ref 99–110)
CO2: 28 MMOL/L (ref 21–32)
CREAT SERPL-MCNC: 0.9 MG/DL (ref 0.6–1.2)
GFR SERPL CREATININE-BSD FRML MDRD: >60 ML/MIN/{1.73_M2}
GLUCOSE BLD-MCNC: 101 MG/DL (ref 70–99)
HCT VFR BLD CALC: 42.5 % (ref 36–48)
HEMOGLOBIN: 13.8 G/DL (ref 12–16)
MCH RBC QN AUTO: 26 PG (ref 26–34)
MCHC RBC AUTO-ENTMCNC: 32.6 G/DL (ref 31–36)
MCV RBC AUTO: 79.9 FL (ref 80–100)
PDW BLD-RTO: 15 % (ref 12.4–15.4)
PLATELET # BLD: 220 K/UL (ref 135–450)
PMV BLD AUTO: 9.7 FL (ref 5–10.5)
POTASSIUM SERPL-SCNC: 4 MMOL/L (ref 3.5–5.1)
RBC # BLD: 5.32 M/UL (ref 4–5.2)
SEDIMENTATION RATE, ERYTHROCYTE: 42 MM/HR (ref 0–30)
SODIUM BLD-SCNC: 133 MMOL/L (ref 136–145)
TOTAL PROTEIN: 6.8 G/DL (ref 6.4–8.2)
WBC # BLD: 6.6 K/UL (ref 4–11)

## 2022-12-22 PROCEDURE — 1123F ACP DISCUSS/DSCN MKR DOCD: CPT | Performed by: INTERNAL MEDICINE

## 2022-12-22 PROCEDURE — 99214 OFFICE O/P EST MOD 30 MIN: CPT | Performed by: INTERNAL MEDICINE

## 2022-12-22 PROCEDURE — 93306 TTE W/DOPPLER COMPLETE: CPT | Performed by: INTERNAL MEDICINE

## 2022-12-22 RX ORDER — FUROSEMIDE 20 MG/1
20 TABLET ORAL DAILY
Qty: 30 TABLET | Refills: 0 | Status: SHIPPED | OUTPATIENT
Start: 2022-12-22 | End: 2023-01-21

## 2022-12-28 ENCOUNTER — ANTI-COAG VISIT (OUTPATIENT)
Dept: FAMILY MEDICINE CLINIC | Age: 87
End: 2022-12-28
Payer: MEDICARE

## 2022-12-28 DIAGNOSIS — I48.0 PAF (PAROXYSMAL ATRIAL FIBRILLATION) (HCC): Primary | ICD-10-CM

## 2022-12-28 DIAGNOSIS — I35.0 AORTIC VALVE STENOSIS, NONRHEUMATIC: ICD-10-CM

## 2022-12-28 DIAGNOSIS — I35.0 NONRHEUMATIC AORTIC VALVE STENOSIS: ICD-10-CM

## 2022-12-28 DIAGNOSIS — Z95.2 S/P TAVR (TRANSCATHETER AORTIC VALVE REPLACEMENT): ICD-10-CM

## 2022-12-28 DIAGNOSIS — Z79.01 ENCOUNTER FOR CURRENT LONG-TERM USE OF ANTICOAGULANTS: ICD-10-CM

## 2022-12-28 LAB
INTERNATIONAL NORMALIZATION RATIO, POC: 4.1
PROTHROMBIN TIME, POC: 48.8

## 2022-12-28 PROCEDURE — 85610 PROTHROMBIN TIME: CPT | Performed by: FAMILY MEDICINE

## 2022-12-28 NOTE — PROGRESS NOTES
Blood culture #1 drawn LA, 10 ml each bottle at 1225. Blood culture #2 drawn RA, 10 ml each bottle at 1300.

## 2022-12-28 NOTE — PROGRESS NOTES
POCT PT/INR PERFORMED, ELEVATED LEVEL. I DISCUSSED WITH DR BLUNT'. PT STARTED FUROSEMIDE ABOUT 12 DAYS AGO. AT THAT TIME WE INCREASED HER WARFARIN TO 1 1/2 TABS TWICE A WEEK BECAUSE SHE HAD BEEN RUNNING LOW. PT CURRENTLY BEING WORKED UP BY CARDIOLOGY DUE TO AN ARTIFICIAL VALVE LEAKING. PER PING, TAKE 1/2 TAB TODAY THEN 1 TAB DAILY. REPEAT IN 1 WK. PT AND GRANDDAUGHTER INFORMED.   [facility]

## 2022-12-29 ENCOUNTER — TELEPHONE (OUTPATIENT)
Dept: CARDIOLOGY CLINIC | Age: 87
End: 2022-12-29

## 2022-12-29 NOTE — TELEPHONE ENCOUNTER
Pt granddaughter twila is calling to get pt scheduled for her transesophageal echo. [address] daughter would like to receive the call because she is bringing patient. Please advise.

## 2023-01-01 LAB
BLOOD CULTURE, ROUTINE: NORMAL
CULTURE, BLOOD 2: NORMAL

## 2023-01-04 ENCOUNTER — ANTI-COAG VISIT (OUTPATIENT)
Dept: FAMILY MEDICINE CLINIC | Age: 88
End: 2023-01-04
Payer: MEDICARE

## 2023-01-04 DIAGNOSIS — Z79.01 ENCOUNTER FOR CURRENT LONG-TERM USE OF ANTICOAGULANTS: ICD-10-CM

## 2023-01-04 DIAGNOSIS — I35.0 NONRHEUMATIC AORTIC VALVE STENOSIS: ICD-10-CM

## 2023-01-04 DIAGNOSIS — I48.0 PAF (PAROXYSMAL ATRIAL FIBRILLATION) (HCC): Primary | ICD-10-CM

## 2023-01-04 LAB
INTERNATIONAL NORMALIZATION RATIO, POC: 2.7
PROTHROMBIN TIME, POC: 32.1

## 2023-01-04 PROCEDURE — 85610 PROTHROMBIN TIME: CPT | Performed by: FAMILY MEDICINE

## 2023-01-04 NOTE — PROGRESS NOTES
POCT PT/INR Performed today, within nml range, Per [facility], Continue same dose of 1 tab daily, repeat in 4 weeks.   Pt informed./mv

## 2023-01-06 ENCOUNTER — TELEPHONE (OUTPATIENT)
Dept: FAMILY MEDICINE CLINIC | Age: 88
End: 2023-01-06

## 2023-01-06 RX ORDER — WARFARIN SODIUM 5 MG/1
TABLET ORAL
Qty: 90 TABLET | Refills: 0 | Status: SHIPPED | OUTPATIENT
Start: 2023-01-06

## 2023-01-13 ENCOUNTER — HOSPITAL ENCOUNTER (OUTPATIENT)
Dept: CARDIAC CATH/INVASIVE PROCEDURES | Age: 88
Discharge: HOME | End: 2023-01-13
Attending: INTERNAL MEDICINE | Admitting: INTERNAL MEDICINE
Payer: MEDICARE

## 2023-01-13 VITALS
OXYGEN SATURATION: 99 % | BODY MASS INDEX: 17.47 KG/M2 | WEIGHT: 89 LBS | DIASTOLIC BLOOD PRESSURE: 76 MMHG | SYSTOLIC BLOOD PRESSURE: 185 MMHG | TEMPERATURE: 98 F | HEIGHT: 60 IN

## 2023-01-13 DIAGNOSIS — Z95.2 S/P TAVR (TRANSCATHETER AORTIC VALVE REPLACEMENT): ICD-10-CM

## 2023-01-13 DIAGNOSIS — I35.0 AORTIC VALVE STENOSIS, NONRHEUMATIC: ICD-10-CM

## 2023-01-13 LAB
LV EF: 55 %
LVEF MODALITY: NORMAL

## 2023-01-13 PROCEDURE — 93320 DOPPLER ECHO COMPLETE: CPT

## 2023-01-13 PROCEDURE — 99153 MOD SED SAME PHYS/QHP EA: CPT

## 2023-01-13 PROCEDURE — 93312 ECHO TRANSESOPHAGEAL: CPT

## 2023-01-13 PROCEDURE — 93325 DOPPLER ECHO COLOR FLOW MAPG: CPT

## 2023-01-13 PROCEDURE — 99152 MOD SED SAME PHYS/QHP 5/>YRS: CPT

## 2023-01-13 PROCEDURE — 85610 PROTHROMBIN TIME: CPT

## 2023-01-13 PROCEDURE — 7100000010 HC PHASE II RECOVERY - FIRST 15 MIN

## 2023-01-13 RX ORDER — SODIUM CHLORIDE 0.9 % (FLUSH) 0.9 %
5-40 SYRINGE (ML) INJECTION PRN
Status: DISCONTINUED | OUTPATIENT
Start: 2023-01-13 | End: 2023-01-13 | Stop reason: HOSPADM

## 2023-01-13 NOTE — H&P
[address]  H+P  Consult  OP Visit  FU Visit   CC HX HPI   GEN  Doing fair. Notes increase in pond and sob lately. COVID X2 over summer and sinus infection. AS TAVR As above   HTN  Ambulatory BP in good range. Ø HA/dizziness. AFIB  Briefly after TAVR, Resolved    MED  Compliant with CV meds. Ø reported SA. HISTORY/ALLERGY/ROS   MEDHx  has a past medical history of Aortic stenosis, Arthritis, COVID-19, and PAF (paroxysmal atrial fibrillation) ([facility] SURGHx  has a past surgical history that includes Varicose vein surgery (Bilateral, 1980's); Cardiac catheterization (04/17/2018); Hysterectomy, total abdominal; Aortic valve replacement (N/A, 5/21/2019); Upper gastrointestinal endoscopy (N/A, 2/14/2022); and Mohs surgery (04/14/2022). SOCHx  reports that she has never smoked. She has never used smokeless tobacco. She reports that she does not drink alcohol and does not use drugs. FAMHx family history includes Asthma in her daughter; Cancer in her brother and sister; Diabetes in her mother; Heart Disease in her brother, father, mother, nephew, and sister. ALLERG Patient has no known allergies.    ROS Full ROS obtained and negative except as mentioned in HPI   MEDICATIONS   Current Facility-Administered Medications   Medication Dose Route Frequency Provider Last Rate Last Admin    sodium chloride flush 0.9 % injection 5-40 mL  5-40 mL IntraVENous PRN Soren White MD          PHYSICAL EXAM   Vitals BP (!) 185/76   Temp 98 °F (36.7 °C) (Temporal)   Ht 5' (1.524 m)   Wt 89 lb (40.4 kg)   SpO2 99%   BMI 17.38 kg/m²     Gen Alert, coop, no distress Heart  Rrr, 2/4   Head NC, AT, no abnorm Abd  Soft, NT, +BS, no mass, no OM   Eyes PER, conj/corn clear Ext  Ext nl, AT, no C/C/E   Nose Nares nl, no drain, NT Pulse 2+ and symmetric   Throat Lips, mucosa, tongue nl Skin Col/text/turg nl, no vis rash/les   Neck S/S, TM, NT, no bruit/JVD Psych Nl mood and affect   Lung CTA-B, unlabored, no DTP Lymph   No cervical or axillary LA   Ch wall NT, no deform Neuro  Nl gross M/S exam      ASSESSMENT AND PLAN   *AS    Date EF Detail   Sx   SOB, fatigue   Hx 5/19  TAVR with ES3 23mm   NYHA   I   TTE 11/16  3/19  5/19  7/19  7/20  7/21  12/22 60%  65%  65%  65%  65%  65% Mod AS MG 28  Severe AS MG 51  TAVR MG 8, no AI  TAVR MG 9  TAVR MG 9, no AI  TAVR MG 7, no AI  Echo today   EKG   Sinus arrhythmia   Plan   Central moderate AI, new for patient  With multiple infections over summer concern for valvular involvement  ESR, CRP, CBC, blood cultures, PATRICK   *HTN  Status Controlled   Plan Counseled on diet/salt/exercise/weight, continue meds at doses above   *AFIB  Status Brief post TAVR, resolved with amio   Plan Continued observation

## 2023-01-13 NOTE — DISCHARGE INSTRUCTIONS
PATRICK DISCHARGE INSTRUCTIONS    No driving for 24 hours. We strongly recommend that a responsible adult stay with you for the next 24 hours. Continue Medications.     Advance diet as tolerated    Contact physician office  for following symptoms:  Fever  Difficulty swallowing  Chest pain  Difficulty breathing  Bleeding    Any questions or concerns, call Dr Ava Frey office at [phone]

## 2023-01-16 LAB — INR BLD: 2.3 (ref 0.88–1.12)

## 2023-01-27 ENCOUNTER — ANTI-COAG VISIT (OUTPATIENT)
Dept: FAMILY MEDICINE CLINIC | Age: 88
End: 2023-01-27
Payer: MEDICARE

## 2023-01-27 DIAGNOSIS — I48.0 PAF (PAROXYSMAL ATRIAL FIBRILLATION) (HCC): ICD-10-CM

## 2023-01-27 DIAGNOSIS — I35.0 NONRHEUMATIC AORTIC VALVE STENOSIS: ICD-10-CM

## 2023-01-27 DIAGNOSIS — H65.91 FLUID LEVEL BEHIND TYMPANIC MEMBRANE OF RIGHT EAR: Primary | ICD-10-CM

## 2023-01-27 DIAGNOSIS — R42 DIZZINESS: ICD-10-CM

## 2023-01-27 DIAGNOSIS — Z79.01 ENCOUNTER FOR CURRENT LONG-TERM USE OF ANTICOAGULANTS: ICD-10-CM

## 2023-01-27 LAB
CHP ED QC CHECK: YES
GLUCOSE BLD-MCNC: 78 MG/DL
INTERNATIONAL NORMALIZATION RATIO, POC: 1.7
PROTHROMBIN TIME, POC: 19.8

## 2023-01-27 PROCEDURE — 85610 PROTHROMBIN TIME: CPT | Performed by: FAMILY MEDICINE

## 2023-01-27 PROCEDURE — 82962 GLUCOSE BLOOD TEST: CPT | Performed by: FAMILY MEDICINE

## 2023-01-27 RX ORDER — PREDNISONE 1 MG/1
TABLET ORAL
Qty: 10 TABLET | Refills: 0 | Status: SHIPPED | OUTPATIENT
Start: 2023-01-27

## 2023-01-27 NOTE — PROGRESS NOTES
POCT PT/INR Performed today, LOW, Per [facility], Increase dose to 2 tabs tonight and 1 tab on all other days, repeat in 2 weeks.   Pt informed./mv    POCT glucose today was 78./mv

## 2023-01-30 RX ORDER — METOPROLOL TARTRATE 50 MG/1
TABLET, FILM COATED ORAL
Qty: 180 TABLET | Refills: 0 | Status: SHIPPED | OUTPATIENT
Start: 2023-01-30

## 2023-01-31 ENCOUNTER — ANTI-COAG VISIT (OUTPATIENT)
Dept: FAMILY MEDICINE CLINIC | Age: 88
End: 2023-01-31
Payer: MEDICARE

## 2023-01-31 DIAGNOSIS — Z79.01 ENCOUNTER FOR CURRENT LONG-TERM USE OF ANTICOAGULANTS: ICD-10-CM

## 2023-01-31 DIAGNOSIS — I35.0 NONRHEUMATIC AORTIC VALVE STENOSIS: ICD-10-CM

## 2023-01-31 DIAGNOSIS — I48.0 PAF (PAROXYSMAL ATRIAL FIBRILLATION) (HCC): Primary | ICD-10-CM

## 2023-01-31 LAB
INTERNATIONAL NORMALIZATION RATIO, POC: 2.2
PROTHROMBIN TIME, POC: 26.9

## 2023-01-31 PROCEDURE — 85610 PROTHROMBIN TIME: CPT | Performed by: FAMILY MEDICINE

## 2023-01-31 NOTE — PROGRESS NOTES
POCT PT/INR Performed today, within nml range, Per [facility], Continue same dose of 1 tab daily, repeat in 1 week.   Pt informed./mv

## 2023-02-07 ENCOUNTER — ANTI-COAG VISIT (OUTPATIENT)
Dept: FAMILY MEDICINE CLINIC | Age: 88
End: 2023-02-07
Payer: MEDICARE

## 2023-02-07 ENCOUNTER — TELEPHONE (OUTPATIENT)
Dept: FAMILY MEDICINE CLINIC | Age: 88
End: 2023-02-07

## 2023-02-07 DIAGNOSIS — I35.0 NONRHEUMATIC AORTIC VALVE STENOSIS: ICD-10-CM

## 2023-02-07 DIAGNOSIS — Z79.01 ENCOUNTER FOR CURRENT LONG-TERM USE OF ANTICOAGULANTS: ICD-10-CM

## 2023-02-07 DIAGNOSIS — I48.0 PAF (PAROXYSMAL ATRIAL FIBRILLATION) (HCC): Primary | ICD-10-CM

## 2023-02-07 LAB
INTERNATIONAL NORMALIZATION RATIO, POC: 2
PROTHROMBIN TIME, POC: 23.5

## 2023-02-07 PROCEDURE — 85610 PROTHROMBIN TIME: CPT | Performed by: FAMILY MEDICINE

## 2023-02-07 RX ORDER — FUROSEMIDE 20 MG/1
20 TABLET ORAL DAILY
Qty: 30 TABLET | Refills: 0 | Status: SHIPPED | OUTPATIENT
Start: 2023-02-07 | End: 2023-03-09

## 2023-02-07 NOTE — PROGRESS NOTES
POCT PT/INR Performed today, within nml range, Per [facility], increase dose to 1 1/2 tab on Tue and Fri and 1 tab on all other days, repeat in 2 weeks.   Pt informed./mv

## 2023-02-22 ENCOUNTER — ANTI-COAG VISIT (OUTPATIENT)
Dept: FAMILY MEDICINE CLINIC | Age: 88
End: 2023-02-22

## 2023-02-22 DIAGNOSIS — I35.0 NONRHEUMATIC AORTIC VALVE STENOSIS: ICD-10-CM

## 2023-02-22 DIAGNOSIS — Z79.01 ENCOUNTER FOR CURRENT LONG-TERM USE OF ANTICOAGULANTS: ICD-10-CM

## 2023-02-22 DIAGNOSIS — I48.0 PAF (PAROXYSMAL ATRIAL FIBRILLATION) (HCC): Primary | ICD-10-CM

## 2023-02-22 LAB
INTERNATIONAL NORMALIZATION RATIO, POC: 2.7
PROTHROMBIN TIME, POC: 32.4

## 2023-02-22 NOTE — PROGRESS NOTES
POCT PT/INR PERFORMED, WITHIN NORMAL RANGE. PER DR[facility], CONTINUE SAME DOSE OF 1 1/2 TABS ON TUES/FRIDAY AND 1 TAB ALL REMAINING DAYS. REPEAT IN 2 WKS. PT AND GRANDDAUGHTER INFORMED.  [place]

## 2023-03-06 RX ORDER — FUROSEMIDE 20 MG/1
TABLET ORAL
Qty: 30 TABLET | Refills: 0 | Status: SHIPPED | OUTPATIENT
Start: 2023-03-06

## 2023-03-08 ENCOUNTER — ANTI-COAG VISIT (OUTPATIENT)
Dept: FAMILY MEDICINE CLINIC | Age: 88
End: 2023-03-08
Payer: MEDICARE

## 2023-03-08 DIAGNOSIS — I35.0 NONRHEUMATIC AORTIC VALVE STENOSIS: ICD-10-CM

## 2023-03-08 DIAGNOSIS — Z79.01 ENCOUNTER FOR CURRENT LONG-TERM USE OF ANTICOAGULANTS: ICD-10-CM

## 2023-03-08 DIAGNOSIS — I48.0 PAF (PAROXYSMAL ATRIAL FIBRILLATION) (HCC): Primary | ICD-10-CM

## 2023-03-08 LAB
INTERNATIONAL NORMALIZATION RATIO, POC: 2.8
PROTHROMBIN TIME, POC: 34.1

## 2023-03-08 PROCEDURE — 85610 PROTHROMBIN TIME: CPT | Performed by: FAMILY MEDICINE

## 2023-03-08 NOTE — PROGRESS NOTES
POCT PT/INR Performed today, within nml range, Per [facility] , continue same dose of 1 1/2 tabs on Tuesday and Friday and 1 tab on all other days, repeat in 2 weeks.   Pt informed./mv

## 2023-03-13 RX ORDER — DILTIAZEM HYDROCHLORIDE 120 MG/1
CAPSULE, COATED, EXTENDED RELEASE ORAL
Qty: 90 CAPSULE | Refills: 0 | Status: SHIPPED | OUTPATIENT
Start: 2023-03-13

## 2023-03-22 ENCOUNTER — ANTI-COAG VISIT (OUTPATIENT)
Dept: FAMILY MEDICINE CLINIC | Age: 88
End: 2023-03-22
Payer: MEDICARE

## 2023-03-22 DIAGNOSIS — Z79.01 ENCOUNTER FOR CURRENT LONG-TERM USE OF ANTICOAGULANTS: ICD-10-CM

## 2023-03-22 DIAGNOSIS — I35.0 NONRHEUMATIC AORTIC VALVE STENOSIS: ICD-10-CM

## 2023-03-22 DIAGNOSIS — I48.0 PAF (PAROXYSMAL ATRIAL FIBRILLATION) (HCC): ICD-10-CM

## 2023-03-22 LAB
INTERNATIONAL NORMALIZATION RATIO, POC: 2.5
PROTHROMBIN TIME, POC: 30.4

## 2023-03-22 PROCEDURE — 85610 PROTHROMBIN TIME: CPT | Performed by: FAMILY MEDICINE

## 2023-03-22 NOTE — PROGRESS NOTES
POCT PT/INR Performed today, within nml range, Per [facility], Continue same dose of 1 1/2 tabs on Tuesday and Friday and 1 tab on all other days, repeat in 4 weeks.   Pt informed./mv

## 2023-03-24 ENCOUNTER — HOSPITAL ENCOUNTER (OUTPATIENT)
Dept: NON INVASIVE DIAGNOSTICS | Age: 88
Discharge: HOME | End: 2023-03-24
Payer: MEDICARE

## 2023-03-24 ENCOUNTER — OFFICE VISIT (OUTPATIENT)
Dept: CARDIOLOGY CLINIC | Age: 88
End: 2023-03-24
Payer: MEDICARE

## 2023-03-24 VITALS
OXYGEN SATURATION: 99 % | DIASTOLIC BLOOD PRESSURE: 66 MMHG | HEART RATE: 61 BPM | HEIGHT: 60 IN | WEIGHT: 88.7 LBS | BODY MASS INDEX: 17.41 KG/M2 | SYSTOLIC BLOOD PRESSURE: 110 MMHG

## 2023-03-24 DIAGNOSIS — I35.0 AORTIC VALVE STENOSIS, NONRHEUMATIC: Primary | ICD-10-CM

## 2023-03-24 DIAGNOSIS — I48.0 PAF (PAROXYSMAL ATRIAL FIBRILLATION) (HCC): ICD-10-CM

## 2023-03-24 DIAGNOSIS — Z95.2 S/P TAVR (TRANSCATHETER AORTIC VALVE REPLACEMENT): ICD-10-CM

## 2023-03-24 DIAGNOSIS — I10 ESSENTIAL HYPERTENSION: ICD-10-CM

## 2023-03-24 DIAGNOSIS — I35.0 AORTIC VALVE STENOSIS, NONRHEUMATIC: ICD-10-CM

## 2023-03-24 DIAGNOSIS — I35.1 NONRHEUMATIC AORTIC VALVE INSUFFICIENCY: ICD-10-CM

## 2023-03-24 LAB
LV EF: 70 %
LVEF MODALITY: NORMAL

## 2023-03-24 PROCEDURE — 1123F ACP DISCUSS/DSCN MKR DOCD: CPT | Performed by: INTERNAL MEDICINE

## 2023-03-24 PROCEDURE — 93306 TTE W/DOPPLER COMPLETE: CPT

## 2023-03-24 PROCEDURE — 99214 OFFICE O/P EST MOD 30 MIN: CPT | Performed by: INTERNAL MEDICINE

## 2023-03-28 ENCOUNTER — TELEPHONE (OUTPATIENT)
Dept: FAMILY MEDICINE CLINIC | Age: 88
End: 2023-03-28

## 2023-03-28 RX ORDER — FUROSEMIDE 20 MG/1
20 TABLET ORAL DAILY
Qty: 30 TABLET | Refills: 0 | Status: SHIPPED | OUTPATIENT
Start: 2023-03-28

## 2023-03-28 RX ORDER — WARFARIN SODIUM 5 MG/1
TABLET ORAL
Qty: 90 TABLET | Refills: 0 | Status: SHIPPED | OUTPATIENT
Start: 2023-03-28

## 2023-03-30 ENCOUNTER — NURSE ONLY (OUTPATIENT)
Dept: FAMILY MEDICINE CLINIC | Age: 88
End: 2023-03-30
Payer: MEDICARE

## 2023-03-30 DIAGNOSIS — I35.0 AORTIC VALVE STENOSIS, NONRHEUMATIC: ICD-10-CM

## 2023-03-30 DIAGNOSIS — R31.9 URINARY TRACT INFECTION WITH HEMATURIA, SITE UNSPECIFIED: ICD-10-CM

## 2023-03-30 DIAGNOSIS — Z95.2 S/P TAVR (TRANSCATHETER AORTIC VALVE REPLACEMENT): ICD-10-CM

## 2023-03-30 DIAGNOSIS — N39.0 URINARY TRACT INFECTION WITH HEMATURIA, SITE UNSPECIFIED: ICD-10-CM

## 2023-03-30 DIAGNOSIS — I35.1 NONRHEUMATIC AORTIC VALVE INSUFFICIENCY: ICD-10-CM

## 2023-03-30 DIAGNOSIS — R41.0 CONFUSION: Primary | ICD-10-CM

## 2023-03-30 LAB
BILIRUBIN, POC: ABNORMAL
BLOOD URINE, POC: ABNORMAL
CLARITY, POC: ABNORMAL
COLOR, POC: ABNORMAL
GLUCOSE URINE, POC: ABNORMAL
KETONES, POC: ABNORMAL
LEUKOCYTE EST, POC: ABNORMAL
NITRITE, POC: ABNORMAL
PH, POC: 6
PROTEIN, POC: 30
SPECIFIC GRAVITY, POC: >1.03
UROBILINOGEN, POC: 0.2

## 2023-03-30 PROCEDURE — 81002 URINALYSIS NONAUTO W/O SCOPE: CPT | Performed by: FAMILY MEDICINE

## 2023-03-30 RX ORDER — NITROFURANTOIN 25; 75 MG/1; MG/1
100 CAPSULE ORAL 2 TIMES DAILY
Qty: 10 CAPSULE | Refills: 0 | Status: SHIPPED
Start: 2023-03-30 | End: 2023-03-31 | Stop reason: SINTOL

## 2023-03-31 ENCOUNTER — TELEPHONE (OUTPATIENT)
Dept: FAMILY MEDICINE CLINIC | Age: 88
End: 2023-03-31

## 2023-03-31 DIAGNOSIS — R30.0 DYSURIA: Primary | ICD-10-CM

## 2023-03-31 RX ORDER — CEPHALEXIN 250 MG/1
250 CAPSULE ORAL 4 TIMES DAILY
Qty: 20 CAPSULE | Refills: 0 | Status: SHIPPED | OUTPATIENT
Start: 2023-03-31 | End: 2023-04-05

## 2023-03-31 NOTE — TELEPHONE ENCOUNTER
She tolerated macrobid in December 2021. The dizziness, hallucinations could be from the UTI itself. Are we sure we want to switch antibiotics?

## 2023-03-31 NOTE — TELEPHONE ENCOUNTER
Spoke to patient granddaughter and think in the past when she took it they thought her hallucinations then were from the [facility] med and could have been from this antibiotic. Did not sleep last night, was up talking to people that weren't there. So dizzy she went back to bed this AM.     Reviewed history of UTIs, group B strep in past. Will send Keflex low dose x 5 days.

## 2023-03-31 NOTE — TELEPHONE ENCOUNTER
Patient was prescribed an antibiotic for a UTI and after one dose she had increased confusion, seeing things, and dizzyness      Can you please cancel this and prescribe her something else     [facility] [id number] - [place], [address]

## 2023-04-01 LAB — BACTERIA UR CULT: NORMAL

## 2023-04-03 RX ORDER — WARFARIN SODIUM 5 MG/1
TABLET ORAL
Qty: 90 TABLET | Refills: 0 | Status: SHIPPED | OUTPATIENT
Start: 2023-04-03

## 2023-04-06 ENCOUNTER — ANTI-COAG VISIT (OUTPATIENT)
Dept: FAMILY MEDICINE CLINIC | Age: 88
End: 2023-04-06
Payer: MEDICARE

## 2023-04-06 DIAGNOSIS — I48.0 PAF (PAROXYSMAL ATRIAL FIBRILLATION) (HCC): Primary | ICD-10-CM

## 2023-04-06 DIAGNOSIS — R31.9 URINARY TRACT INFECTION WITH HEMATURIA, SITE UNSPECIFIED: ICD-10-CM

## 2023-04-06 DIAGNOSIS — I35.0 NONRHEUMATIC AORTIC VALVE STENOSIS: ICD-10-CM

## 2023-04-06 DIAGNOSIS — N39.0 URINARY TRACT INFECTION WITH HEMATURIA, SITE UNSPECIFIED: ICD-10-CM

## 2023-04-06 DIAGNOSIS — Z79.01 ENCOUNTER FOR CURRENT LONG-TERM USE OF ANTICOAGULANTS: ICD-10-CM

## 2023-04-06 LAB
BILIRUBIN, POC: ABNORMAL
BLOOD URINE, POC: ABNORMAL
CLARITY, POC: ABNORMAL
COLOR, POC: ABNORMAL
GLUCOSE URINE, POC: ABNORMAL
INTERNATIONAL NORMALIZATION RATIO, POC: 3.3
KETONES, POC: ABNORMAL
LEUKOCYTE EST, POC: ABNORMAL
NITRITE, POC: ABNORMAL
PH, POC: 6.5
PROTEIN, POC: ABNORMAL
PROTHROMBIN TIME, POC: 39.6
SPECIFIC GRAVITY, POC: >1.03
UROBILINOGEN, POC: 0.2

## 2023-04-06 PROCEDURE — 81002 URINALYSIS NONAUTO W/O SCOPE: CPT | Performed by: FAMILY MEDICINE

## 2023-04-06 PROCEDURE — 85610 PROTHROMBIN TIME: CPT | Performed by: FAMILY MEDICINE

## 2023-04-06 NOTE — PROGRESS NOTES
POCT PT/INR PERFORMED, SLIGHTLY ELEVATED. PT TOOK LAST DOSE OF ANTIBIOTICS YESTERDAY. PER DR[facility], CONTINUE SAME DOSE OF 1 1/2 TABS ON TUES/FRIDAY AND 1 TAB ALL REMAINING DAYS. REPEAT IN 2 WKS. GRANDDAUGHTER INFORMED.   MV

## 2023-04-08 LAB
BACTERIA UR CULT: ABNORMAL
ORGANISM: ABNORMAL

## 2023-04-08 RX ORDER — CIPROFLOXACIN 250 MG/1
250 TABLET, FILM COATED ORAL 2 TIMES DAILY
Qty: 10 TABLET | Refills: 0 | Status: SHIPPED | OUTPATIENT
Start: 2023-04-08 | End: 2023-04-13

## 2023-04-18 ENCOUNTER — TELEPHONE (OUTPATIENT)
Dept: FAMILY MEDICINE CLINIC | Age: 88
End: 2023-04-18

## 2023-04-18 RX ORDER — FUROSEMIDE 20 MG/1
20 TABLET ORAL DAILY
Qty: 90 TABLET | Refills: 0 | Status: SHIPPED | OUTPATIENT
Start: 2023-04-18

## 2023-04-20 ENCOUNTER — ANTI-COAG VISIT (OUTPATIENT)
Dept: FAMILY MEDICINE CLINIC | Age: 88
End: 2023-04-20
Payer: MEDICARE

## 2023-04-20 DIAGNOSIS — N39.0 URINARY TRACT INFECTION WITH HEMATURIA, SITE UNSPECIFIED: Primary | ICD-10-CM

## 2023-04-20 DIAGNOSIS — I35.0 NONRHEUMATIC AORTIC VALVE STENOSIS: ICD-10-CM

## 2023-04-20 DIAGNOSIS — Z79.01 ENCOUNTER FOR CURRENT LONG-TERM USE OF ANTICOAGULANTS: ICD-10-CM

## 2023-04-20 DIAGNOSIS — R31.9 URINARY TRACT INFECTION WITH HEMATURIA, SITE UNSPECIFIED: Primary | ICD-10-CM

## 2023-04-20 DIAGNOSIS — I48.0 PAF (PAROXYSMAL ATRIAL FIBRILLATION) (HCC): ICD-10-CM

## 2023-04-20 LAB
BILIRUBIN, POC: ABNORMAL
BLOOD URINE, POC: ABNORMAL
CLARITY, POC: CLEAR
COLOR, POC: YELLOW
GLUCOSE URINE, POC: ABNORMAL
INTERNATIONAL NORMALIZATION RATIO, POC: 2.7
KETONES, POC: ABNORMAL
LEUKOCYTE EST, POC: ABNORMAL
NITRITE, POC: ABNORMAL
PH, POC: 5.5
PROTEIN, POC: ABNORMAL
PROTHROMBIN TIME, POC: 32.7
SPECIFIC GRAVITY, POC: 1.02
UROBILINOGEN, POC: 0.2

## 2023-04-20 PROCEDURE — 81002 URINALYSIS NONAUTO W/O SCOPE: CPT | Performed by: FAMILY MEDICINE

## 2023-04-20 PROCEDURE — 85610 PROTHROMBIN TIME: CPT | Performed by: FAMILY MEDICINE

## 2023-04-20 NOTE — PROGRESS NOTES
POCT PT/INR Performed today, within nml range, Per [facility], Continue same dose of 1 1/2 on Tues and Fri and 1 tab on all other days, repeat in 4 weeks.   Pt informed./mv    POCT UA DONE, UC SENT./MV

## 2023-04-21 LAB — BACTERIA UR CULT: NORMAL

## 2023-04-28 RX ORDER — METOPROLOL TARTRATE 50 MG/1
TABLET, FILM COATED ORAL
Qty: 180 TABLET | Refills: 0 | Status: SHIPPED | OUTPATIENT
Start: 2023-04-28

## 2023-06-18 ENCOUNTER — E-VISIT (OUTPATIENT)
Dept: PRIMARY CARE CLINIC | Age: 88
End: 2023-06-18
Payer: MEDICARE

## 2023-06-18 DIAGNOSIS — R30.0 DYSURIA: Primary | ICD-10-CM

## 2023-06-18 DIAGNOSIS — R41.82 ALTERED MENTAL STATUS, UNSPECIFIED ALTERED MENTAL STATUS TYPE: ICD-10-CM

## 2023-06-18 PROCEDURE — 99422 OL DIG E/M SVC 11-20 MIN: CPT | Performed by: NURSE PRACTITIONER

## 2023-06-18 RX ORDER — SULFAMETHOXAZOLE AND TRIMETHOPRIM 800; 160 MG/1; MG/1
1 TABLET ORAL 2 TIMES DAILY
Qty: 14 TABLET | Refills: 0 | Status: SHIPPED | OUTPATIENT
Start: 2023-06-18 | End: 2023-06-25

## 2023-06-21 ENCOUNTER — NURSE ONLY (OUTPATIENT)
Dept: FAMILY MEDICINE CLINIC | Age: 88
End: 2023-06-21
Payer: MEDICARE

## 2023-06-21 DIAGNOSIS — B99.9 CONFUSION ASSOCIATED WITH INFECTION: Primary | ICD-10-CM

## 2023-06-21 DIAGNOSIS — R41.0 CONFUSION ASSOCIATED WITH INFECTION: Primary | ICD-10-CM

## 2023-06-21 LAB
BILIRUBIN, POC: NORMAL
BLOOD URINE, POC: NORMAL
CLARITY, POC: NORMAL
COLOR, POC: NORMAL
GLUCOSE URINE, POC: NORMAL
KETONES, POC: NORMAL
LEUKOCYTE EST, POC: NORMAL
NITRITE, POC: NORMAL
PH, POC: 7
PROTEIN, POC: NORMAL
SPECIFIC GRAVITY, POC: 1.02
UROBILINOGEN, POC: 0.2

## 2023-06-21 PROCEDURE — 81002 URINALYSIS NONAUTO W/O SCOPE: CPT | Performed by: FAMILY MEDICINE

## 2023-06-21 NOTE — PROGRESS NOTES
POCT UA PERFORMED, NORMAL RESULT. URINE CULTURE SENT TO THE LAB DUE TO SYMPTOMS OF HALLUCINATIONS.   [address]

## 2023-06-23 LAB — BACTERIA UR CULT: NORMAL

## 2023-06-27 ENCOUNTER — OFFICE VISIT (OUTPATIENT)
Dept: FAMILY MEDICINE CLINIC | Age: 88
End: 2023-06-27
Payer: MEDICARE

## 2023-06-27 VITALS
OXYGEN SATURATION: 98 % | BODY MASS INDEX: 17.43 KG/M2 | HEIGHT: 60 IN | WEIGHT: 88.8 LBS | DIASTOLIC BLOOD PRESSURE: 80 MMHG | HEART RATE: 67 BPM | SYSTOLIC BLOOD PRESSURE: 136 MMHG

## 2023-06-27 DIAGNOSIS — R13.14 PHARYNGOESOPHAGEAL DYSPHAGIA: ICD-10-CM

## 2023-06-27 DIAGNOSIS — I25.10 CORONARY ARTERY DISEASE INVOLVING NATIVE CORONARY ARTERY OF NATIVE HEART WITHOUT ANGINA PECTORIS: ICD-10-CM

## 2023-06-27 DIAGNOSIS — E44.1 MILD MALNUTRITION (HCC): ICD-10-CM

## 2023-06-27 DIAGNOSIS — R44.1 VISUAL HALLUCINATION: Primary | ICD-10-CM

## 2023-06-27 DIAGNOSIS — R53.83 OTHER FATIGUE: ICD-10-CM

## 2023-06-27 DIAGNOSIS — I48.0 PAF (PAROXYSMAL ATRIAL FIBRILLATION) (HCC): ICD-10-CM

## 2023-06-27 LAB
ALBUMIN SERPL-MCNC: 4.3 G/DL (ref 3.4–5)
ALBUMIN/GLOB SERPL: 1.7 {RATIO} (ref 1.1–2.2)
ALP SERPL-CCNC: 73 U/L (ref 40–129)
ALT SERPL-CCNC: 13 U/L (ref 10–40)
ANION GAP SERPL CALCULATED.3IONS-SCNC: 14 MMOL/L (ref 3–16)
AST SERPL-CCNC: 18 U/L (ref 15–37)
BASOPHILS # BLD: 0.1 K/UL (ref 0–0.2)
BASOPHILS NFR BLD: 1.1 %
BILIRUB SERPL-MCNC: 0.3 MG/DL (ref 0–1)
BUN SERPL-MCNC: 17 MG/DL (ref 7–20)
CALCIUM SERPL-MCNC: 9.7 MG/DL (ref 8.3–10.6)
CHLORIDE SERPL-SCNC: 100 MMOL/L (ref 99–110)
CO2 SERPL-SCNC: 27 MMOL/L (ref 21–32)
CREAT SERPL-MCNC: 1.1 MG/DL (ref 0.6–1.2)
DEPRECATED RDW RBC AUTO: 16.1 % (ref 12.4–15.4)
EOSINOPHIL # BLD: 0.2 K/UL (ref 0–0.6)
EOSINOPHIL NFR BLD: 3.1 %
GFR SERPLBLD CREATININE-BSD FMLA CKD-EPI: 47 ML/MIN/{1.73_M2}
GLUCOSE SERPL-MCNC: 90 MG/DL (ref 70–99)
HCT VFR BLD AUTO: 40.7 % (ref 36–48)
HGB BLD-MCNC: 13.4 G/DL (ref 12–16)
LYMPHOCYTES # BLD: 2.2 K/UL (ref 1–5.1)
LYMPHOCYTES NFR BLD: 31.6 %
MCH RBC QN AUTO: 26.5 PG (ref 26–34)
MCHC RBC AUTO-ENTMCNC: 32.9 G/DL (ref 31–36)
MCV RBC AUTO: 80.6 FL (ref 80–100)
MONOCYTES # BLD: 0.7 K/UL (ref 0–1.3)
MONOCYTES NFR BLD: 10.4 %
NEUTROPHILS # BLD: 3.7 K/UL (ref 1.7–7.7)
NEUTROPHILS NFR BLD: 53.8 %
PLATELET # BLD AUTO: 185 K/UL (ref 135–450)
PMV BLD AUTO: 9.7 FL (ref 5–10.5)
POTASSIUM SERPL-SCNC: 4 MMOL/L (ref 3.5–5.1)
PREALB SERPL-MCNC: 21.8 MG/DL (ref 20–40)
PROT SERPL-MCNC: 6.8 G/DL (ref 6.4–8.2)
RBC # BLD AUTO: 5.05 M/UL (ref 4–5.2)
SODIUM SERPL-SCNC: 141 MMOL/L (ref 136–145)
TSH SERPL DL<=0.005 MIU/L-ACNC: 2.16 UIU/ML (ref 0.27–4.2)
WBC # BLD AUTO: 6.8 K/UL (ref 4–11)

## 2023-06-27 PROCEDURE — 99214 OFFICE O/P EST MOD 30 MIN: CPT | Performed by: FAMILY MEDICINE

## 2023-06-27 PROCEDURE — 1123F ACP DISCUSS/DSCN MKR DOCD: CPT | Performed by: FAMILY MEDICINE

## 2023-06-27 PROCEDURE — 36415 COLL VENOUS BLD VENIPUNCTURE: CPT | Performed by: FAMILY MEDICINE

## 2023-06-27 RX ORDER — PANTOPRAZOLE SODIUM 20 MG/1
20 TABLET, DELAYED RELEASE ORAL
Qty: 30 TABLET | Refills: 2 | Status: SHIPPED | OUTPATIENT
Start: 2023-06-27

## 2023-06-27 RX ORDER — MIRTAZAPINE 30 MG/1
15-30 TABLET, FILM COATED ORAL NIGHTLY
Qty: 30 TABLET | Refills: 2 | Status: SHIPPED | OUTPATIENT
Start: 2023-06-27 | End: 2023-07-27

## 2023-07-01 ENCOUNTER — HOSPITAL ENCOUNTER (EMERGENCY)
Age: 88
Discharge: HOME | End: 2023-07-01
Attending: EMERGENCY MEDICINE
Payer: MEDICARE

## 2023-07-01 ENCOUNTER — APPOINTMENT (OUTPATIENT)
Dept: CT IMAGING | Age: 88
End: 2023-07-01
Payer: MEDICARE

## 2023-07-01 VITALS
OXYGEN SATURATION: 99 % | RESPIRATION RATE: 16 BRPM | DIASTOLIC BLOOD PRESSURE: 56 MMHG | TEMPERATURE: 97.7 F | SYSTOLIC BLOOD PRESSURE: 146 MMHG | HEART RATE: 56 BPM | BODY MASS INDEX: 17.59 KG/M2 | HEIGHT: 60 IN | WEIGHT: 89.6 LBS

## 2023-07-01 DIAGNOSIS — R44.3 HALLUCINATIONS: Primary | ICD-10-CM

## 2023-07-01 LAB
ALBUMIN SERPL-MCNC: 4 G/DL (ref 3.4–5)
ALBUMIN/GLOB SERPL: 1.4 {RATIO} (ref 1.1–2.2)
ALP SERPL-CCNC: 65 U/L (ref 40–129)
ALT SERPL-CCNC: <5 U/L (ref 10–40)
ANION GAP SERPL CALCULATED.3IONS-SCNC: 12 MMOL/L (ref 3–16)
AST SERPL-CCNC: 18 U/L (ref 15–37)
BASOPHILS # BLD: 0 K/UL (ref 0–0.2)
BASOPHILS NFR BLD: 0.6 %
BILIRUB SERPL-MCNC: 0.4 MG/DL (ref 0–1)
BILIRUB UR QL STRIP.AUTO: NEGATIVE
BUN SERPL-MCNC: 19 MG/DL (ref 7–20)
CALCIUM SERPL-MCNC: 9.8 MG/DL (ref 8.3–10.6)
CHLORIDE SERPL-SCNC: 100 MMOL/L (ref 99–110)
CLARITY UR: CLEAR
CO2 SERPL-SCNC: 25 MMOL/L (ref 21–32)
COLOR UR: YELLOW
CREAT SERPL-MCNC: 1 MG/DL (ref 0.6–1.2)
DEPRECATED RDW RBC AUTO: 15.4 % (ref 12.4–15.4)
EOSINOPHIL # BLD: 0.2 K/UL (ref 0–0.6)
EOSINOPHIL NFR BLD: 3 %
EPI CELLS #/AREA URNS HPF: NORMAL /HPF (ref 0–5)
GFR SERPLBLD CREATININE-BSD FMLA CKD-EPI: 53 ML/MIN/{1.73_M2}
GLUCOSE SERPL-MCNC: 95 MG/DL (ref 70–99)
GLUCOSE UR STRIP.AUTO-MCNC: NEGATIVE MG/DL
HCT VFR BLD AUTO: 39 % (ref 36–48)
HGB BLD-MCNC: 12.4 G/DL (ref 12–16)
HGB UR QL STRIP.AUTO: ABNORMAL
IMM GRANULOCYTES # BLD: 0 K/UL (ref 0–0.2)
IMM GRANULOCYTES NFR BLD: 0.2 %
INR PPP: 3.73 (ref 0.84–1.16)
KETONES UR STRIP.AUTO-MCNC: NEGATIVE MG/DL
LEUKOCYTE ESTERASE UR QL STRIP.AUTO: NEGATIVE
LYMPHOCYTES # BLD: 2.2 K/UL (ref 1–5.1)
LYMPHOCYTES NFR BLD: 32.9 %
MCH RBC QN AUTO: 25.6 PG (ref 26–34)
MCHC RBC AUTO-ENTMCNC: 31.8 G/DL (ref 32–36.4)
MCV RBC AUTO: 80.6 FL (ref 80–100)
MONOCYTES # BLD: 0.7 K/UL (ref 0–1.3)
MONOCYTES NFR BLD: 11 %
NEUTROPHILS # BLD: 3.4 K/UL (ref 1.7–7.7)
NEUTROPHILS NFR BLD: 52.3 %
NITRITE UR QL STRIP.AUTO: NEGATIVE
PH UR STRIP.AUTO: 5.5 [PH] (ref 5–8)
PLATELET # BLD AUTO: 206 K/UL (ref 135–450)
PMV BLD AUTO: 10.5 FL (ref 5–10.5)
POTASSIUM SERPL-SCNC: 3.6 MMOL/L (ref 3.5–5.1)
PROT SERPL-MCNC: 6.8 G/DL (ref 6.4–8.2)
PROT UR STRIP.AUTO-MCNC: NEGATIVE MG/DL
PROTHROMBIN TIME: 36.6 SEC (ref 11.5–14.8)
RBC # BLD AUTO: 4.84 M/UL (ref 4–5.2)
RBC #/AREA URNS HPF: NORMAL /HPF (ref 0–4)
SODIUM SERPL-SCNC: 137 MMOL/L (ref 136–145)
SP GR UR STRIP.AUTO: <=1.005 (ref 1–1.03)
UA COMPLETE W REFLEX CULTURE PNL UR: ABNORMAL
UA DIPSTICK W REFLEX MICRO PNL UR: YES
URN SPEC COLLECT METH UR: ABNORMAL
UROBILINOGEN UR STRIP-ACNC: 0.2 E.U./DL
WBC # BLD AUTO: 6.6 K/UL (ref 4–11)
WBC #/AREA URNS HPF: NORMAL /HPF (ref 0–5)

## 2023-07-01 PROCEDURE — 80053 COMPREHEN METABOLIC PANEL: CPT

## 2023-07-01 PROCEDURE — 99284 EMERGENCY DEPT VISIT MOD MDM: CPT

## 2023-07-01 PROCEDURE — 85025 COMPLETE CBC W/AUTO DIFF WBC: CPT

## 2023-07-01 PROCEDURE — 36415 COLL VENOUS BLD VENIPUNCTURE: CPT

## 2023-07-01 PROCEDURE — 85610 PROTHROMBIN TIME: CPT

## 2023-07-01 PROCEDURE — 81001 URINALYSIS AUTO W/SCOPE: CPT

## 2023-07-01 PROCEDURE — 70450 CT HEAD/BRAIN W/O DYE: CPT

## 2023-07-01 ASSESSMENT — PAIN - FUNCTIONAL ASSESSMENT
PAIN_FUNCTIONAL_ASSESSMENT: NONE - DENIES PAIN
PAIN_FUNCTIONAL_ASSESSMENT: NONE - DENIES PAIN

## 2023-07-01 ASSESSMENT — LIFESTYLE VARIABLES
HOW MANY STANDARD DRINKS CONTAINING ALCOHOL DO YOU HAVE ON A TYPICAL DAY: PATIENT DOES NOT DRINK
HOW OFTEN DO YOU HAVE A DRINK CONTAINING ALCOHOL: NEVER

## 2023-07-06 ENCOUNTER — TELEPHONE (OUTPATIENT)
Dept: FAMILY MEDICINE CLINIC | Age: 88
End: 2023-07-06

## 2023-07-06 RX ORDER — OLANZAPINE 2.5 MG/1
2.5 TABLET ORAL NIGHTLY
Qty: 30 TABLET | Refills: 3 | Status: SHIPPED | OUTPATIENT
Start: 2023-07-06

## 2023-07-10 DIAGNOSIS — Z95.2 S/P TAVR (TRANSCATHETER AORTIC VALVE REPLACEMENT): Primary | ICD-10-CM

## 2023-07-10 DIAGNOSIS — I35.1 NONRHEUMATIC AORTIC VALVE INSUFFICIENCY: ICD-10-CM

## 2023-07-10 DIAGNOSIS — I35.0 NONRHEUMATIC AORTIC VALVE STENOSIS: ICD-10-CM

## 2023-07-11 ENCOUNTER — HOSPITAL ENCOUNTER (OUTPATIENT)
Dept: NON INVASIVE DIAGNOSTICS | Age: 88
Discharge: HOME | End: 2023-07-11
Payer: MEDICARE

## 2023-07-11 ENCOUNTER — NURSE ONLY (OUTPATIENT)
Dept: FAMILY MEDICINE CLINIC | Age: 88
End: 2023-07-11
Payer: MEDICARE

## 2023-07-11 VITALS — DIASTOLIC BLOOD PRESSURE: 50 MMHG | SYSTOLIC BLOOD PRESSURE: 104 MMHG

## 2023-07-11 DIAGNOSIS — E86.0 DEHYDRATION: Primary | ICD-10-CM

## 2023-07-11 DIAGNOSIS — I35.0 NONRHEUMATIC AORTIC VALVE STENOSIS: ICD-10-CM

## 2023-07-11 DIAGNOSIS — Z79.01 ENCOUNTER FOR CURRENT LONG-TERM USE OF ANTICOAGULANTS: ICD-10-CM

## 2023-07-11 DIAGNOSIS — I35.1 NONRHEUMATIC AORTIC VALVE INSUFFICIENCY: ICD-10-CM

## 2023-07-11 DIAGNOSIS — Z95.2 S/P TAVR (TRANSCATHETER AORTIC VALVE REPLACEMENT): ICD-10-CM

## 2023-07-11 DIAGNOSIS — I48.0 PAF (PAROXYSMAL ATRIAL FIBRILLATION) (HCC): ICD-10-CM

## 2023-07-11 LAB
INTERNATIONAL NORMALIZATION RATIO, POC: 4.2
LV EF: 65 %
LVEF MODALITY: NORMAL
PROTHROMBIN TIME, POC: 50.4

## 2023-07-11 PROCEDURE — 85610 PROTHROMBIN TIME: CPT | Performed by: FAMILY MEDICINE

## 2023-07-11 PROCEDURE — 93306 TTE W/DOPPLER COMPLETE: CPT

## 2023-07-11 PROCEDURE — 36415 COLL VENOUS BLD VENIPUNCTURE: CPT | Performed by: FAMILY MEDICINE

## 2023-07-11 NOTE — PROGRESS NOTES
POCT PT/INR PERFORMED, SLIGHTLY ELEVATED. PT HAS BEEN OFF DILTIAZEM AND METOPROLOL X2 DAYS AND STARTED ZYPREX X4 DAYS. I SPOKE TO DR BLUNT'. PER PING, HOLD TONIGHT THEN CHANGE TO 1 TAB DAILY. RECHECK IN 1 WEEK. PT AND GRANDDAUGHTER INFORMED.   [facility]    POCT PT/INR PERFORMED AND BMP DRAWN RA. [facility]    Spoke w/ Minus Player and agree  Caro Finn MD

## 2023-07-12 DIAGNOSIS — E87.6 HYPOKALEMIA: Primary | ICD-10-CM

## 2023-07-12 LAB
ANION GAP SERPL CALCULATED.3IONS-SCNC: 13 MMOL/L (ref 3–16)
BUN SERPL-MCNC: 15 MG/DL (ref 7–20)
CALCIUM SERPL-MCNC: 9.8 MG/DL (ref 8.3–10.6)
CHLORIDE SERPL-SCNC: 102 MMOL/L (ref 99–110)
CO2 SERPL-SCNC: 29 MMOL/L (ref 21–32)
CREAT SERPL-MCNC: 1.1 MG/DL (ref 0.6–1.2)
GFR SERPLBLD CREATININE-BSD FMLA CKD-EPI: 47 ML/MIN/{1.73_M2}
GLUCOSE SERPL-MCNC: 136 MG/DL (ref 70–99)
POTASSIUM SERPL-SCNC: 3.2 MMOL/L (ref 3.5–5.1)
SODIUM SERPL-SCNC: 144 MMOL/L (ref 136–145)

## 2023-07-12 RX ORDER — POTASSIUM CHLORIDE 750 MG/1
10 TABLET, EXTENDED RELEASE ORAL DAILY
Qty: 30 TABLET | Refills: 5 | Status: SHIPPED | OUTPATIENT
Start: 2023-07-12

## 2023-07-14 ENCOUNTER — OFFICE VISIT (OUTPATIENT)
Dept: CARDIOLOGY CLINIC | Age: 88
End: 2023-07-14
Payer: MEDICARE

## 2023-07-14 VITALS
DIASTOLIC BLOOD PRESSURE: 80 MMHG | SYSTOLIC BLOOD PRESSURE: 142 MMHG | OXYGEN SATURATION: 96 % | HEART RATE: 132 BPM | WEIGHT: 87 LBS | BODY MASS INDEX: 17.08 KG/M2 | HEIGHT: 60 IN

## 2023-07-14 DIAGNOSIS — I48.0 PAF (PAROXYSMAL ATRIAL FIBRILLATION) (HCC): ICD-10-CM

## 2023-07-14 DIAGNOSIS — I35.1 NONRHEUMATIC AORTIC VALVE INSUFFICIENCY: ICD-10-CM

## 2023-07-14 DIAGNOSIS — I10 ESSENTIAL HYPERTENSION: ICD-10-CM

## 2023-07-14 DIAGNOSIS — I35.0 AORTIC VALVE STENOSIS, NONRHEUMATIC: Primary | ICD-10-CM

## 2023-07-14 DIAGNOSIS — Z95.2 S/P TAVR (TRANSCATHETER AORTIC VALVE REPLACEMENT): ICD-10-CM

## 2023-07-14 PROCEDURE — 1123F ACP DISCUSS/DSCN MKR DOCD: CPT | Performed by: INTERNAL MEDICINE

## 2023-07-14 PROCEDURE — 93000 ELECTROCARDIOGRAM COMPLETE: CPT | Performed by: INTERNAL MEDICINE

## 2023-07-14 PROCEDURE — 99214 OFFICE O/P EST MOD 30 MIN: CPT | Performed by: INTERNAL MEDICINE

## 2023-07-14 RX ORDER — AMIODARONE HYDROCHLORIDE 200 MG/1
TABLET ORAL
Qty: 60 TABLET | Refills: 5 | Status: SHIPPED | OUTPATIENT
Start: 2023-07-14

## 2023-07-18 ENCOUNTER — ANTI-COAG VISIT (OUTPATIENT)
Dept: FAMILY MEDICINE CLINIC | Age: 88
End: 2023-07-18
Payer: MEDICARE

## 2023-07-18 DIAGNOSIS — I48.0 PAF (PAROXYSMAL ATRIAL FIBRILLATION) (HCC): ICD-10-CM

## 2023-07-18 DIAGNOSIS — E87.6 HYPOKALEMIA: ICD-10-CM

## 2023-07-18 DIAGNOSIS — Z79.01 ENCOUNTER FOR CURRENT LONG-TERM USE OF ANTICOAGULANTS: ICD-10-CM

## 2023-07-18 DIAGNOSIS — I35.0 NONRHEUMATIC AORTIC VALVE STENOSIS: ICD-10-CM

## 2023-07-18 LAB
ANION GAP SERPL CALCULATED.3IONS-SCNC: 10 MMOL/L (ref 3–16)
BUN SERPL-MCNC: 15 MG/DL (ref 7–20)
CALCIUM SERPL-MCNC: 9.6 MG/DL (ref 8.3–10.6)
CHLORIDE SERPL-SCNC: 104 MMOL/L (ref 99–110)
CO2 SERPL-SCNC: 25 MMOL/L (ref 21–32)
CREAT SERPL-MCNC: 1.1 MG/DL (ref 0.6–1.2)
GFR SERPLBLD CREATININE-BSD FMLA CKD-EPI: 47 ML/MIN/{1.73_M2}
GLUCOSE SERPL-MCNC: 101 MG/DL (ref 70–99)
INTERNATIONAL NORMALIZATION RATIO, POC: 2.5
MAGNESIUM SERPL-MCNC: 2.4 MG/DL (ref 1.8–2.4)
POTASSIUM SERPL-SCNC: 4 MMOL/L (ref 3.5–5.1)
PROTHROMBIN TIME, POC: 30.5
SODIUM SERPL-SCNC: 139 MMOL/L (ref 136–145)

## 2023-07-18 PROCEDURE — 85610 PROTHROMBIN TIME: CPT | Performed by: FAMILY MEDICINE

## 2023-07-18 PROCEDURE — 36415 COLL VENOUS BLD VENIPUNCTURE: CPT | Performed by: FAMILY MEDICINE

## 2023-07-18 NOTE — PROGRESS NOTES
POCT PT/INR Performed today, WITHIN NORMAL RANGE. PT WAS STARTED ON AMIODARONE 200 MG SI PO BID FROM DR LIM ON 2023. DC'D DILTIAZEM AND METOPROLOL. Per LUKE GLISCHINSKI, CNP, CHANGE TO 1 TABLET DAILY, repeat in 2 WEEKS. Pt informed and granddaughter.    Labs drawn RA./mv  1 sst

## 2023-07-28 ENCOUNTER — OFFICE VISIT (OUTPATIENT)
Dept: CARDIOLOGY CLINIC | Age: 88
End: 2023-07-28
Payer: MEDICARE

## 2023-07-28 VITALS
WEIGHT: 85.5 LBS | HEIGHT: 60 IN | HEART RATE: 79 BPM | SYSTOLIC BLOOD PRESSURE: 110 MMHG | OXYGEN SATURATION: 99 % | BODY MASS INDEX: 16.78 KG/M2 | DIASTOLIC BLOOD PRESSURE: 70 MMHG

## 2023-07-28 DIAGNOSIS — I10 ESSENTIAL HYPERTENSION: ICD-10-CM

## 2023-07-28 DIAGNOSIS — I35.0 AORTIC VALVE STENOSIS, NONRHEUMATIC: ICD-10-CM

## 2023-07-28 DIAGNOSIS — I48.0 PAF (PAROXYSMAL ATRIAL FIBRILLATION) (HCC): Primary | ICD-10-CM

## 2023-07-28 PROCEDURE — 93000 ELECTROCARDIOGRAM COMPLETE: CPT | Performed by: NURSE PRACTITIONER

## 2023-07-28 PROCEDURE — 1123F ACP DISCUSS/DSCN MKR DOCD: CPT | Performed by: NURSE PRACTITIONER

## 2023-07-28 PROCEDURE — 99214 OFFICE O/P EST MOD 30 MIN: CPT | Performed by: NURSE PRACTITIONER

## 2023-07-28 NOTE — PROGRESS NOTES
[facility]     Outpatient Follow Up Note    CHIEF COMPLAINT / HPI:  Follow Up secondary to starting amiodarone     Subjective:   Davidson Hernandez is 80 y.o. female who presents today with a history of AS s/p TAVR, PAF. Today, Ms Giovanni Tsai says she feels much better since starting amiodarone. She now has more energy and wants to clean everything. Her granddaughter shares that Graham Burton has fallen twice this week. This morning she was going down the stairs and didn't want to wait for the light to be turned on and missed the bottom step. The second fall occurred after trying to clean up spilled water. Graham Burton admits that she doesn't know her limits. With regard to medication therapy the patient has been compliant with prescribed regimen. They have tolerated therapy to date.      Past Medical History:   Diagnosis Date    Aortic stenosis     Arthritis     COVID-19     PAF (paroxysmal atrial fibrillation) ([facility])      Social History:    Social History     Tobacco Use   Smoking Status Never   Smokeless Tobacco Never     Current Medications:  Current Outpatient Medications   Medication Sig Dispense Refill    amiodarone (CORDARONE) 200 MG tablet 200mg BID x 2 weeks then 200mg daily 60 tablet 5    potassium chloride (KLOR-CON M) 10 MEQ extended release tablet Take 1 tablet by mouth daily 30 tablet 5    OLANZapine (ZYPREXA) 2.5 MG tablet Take 1 tablet by mouth nightly 30 tablet 3    pantoprazole (PROTONIX) 20 MG tablet Take 1 tablet by mouth every morning (before breakfast) 30 tablet 2    mirtazapine (REMERON) 30 MG tablet Take 0.5-1 tablets by mouth nightly 30 tablet 2    metoprolol tartrate (LOPRESSOR) 50 MG tablet TAKE ONE TABLET BY MOUTH EVERY MORNING AND TAKE ONE TABLET BY MOUTH EVERY NIGHT AT BEDTIME (Patient not taking: Reported on 7/14/2023) 180 tablet 0    dilTIAZem (CARDIZEM CD) 120 MG extended release capsule TAKE ONE CAPSULE BY MOUTH DAILY (Patient not taking: Reported on 7/14/2023) 90 capsule 0

## 2023-08-07 ENCOUNTER — APPOINTMENT (OUTPATIENT)
Dept: GENERAL RADIOLOGY | Age: 88
DRG: 813 | End: 2023-08-07
Payer: MEDICARE

## 2023-08-07 ENCOUNTER — APPOINTMENT (OUTPATIENT)
Dept: CT IMAGING | Age: 88
DRG: 813 | End: 2023-08-07
Payer: MEDICARE

## 2023-08-07 ENCOUNTER — HOSPITAL ENCOUNTER (INPATIENT)
Age: 88
LOS: 2 days | Discharge: HOME HEALTH | DRG: 813 | End: 2023-08-09
Attending: STUDENT IN AN ORGANIZED HEALTH CARE EDUCATION/TRAINING PROGRAM | Admitting: INTERNAL MEDICINE
Payer: MEDICARE

## 2023-08-07 ENCOUNTER — ANTI-COAG VISIT (OUTPATIENT)
Dept: FAMILY MEDICINE CLINIC | Age: 88
End: 2023-08-07
Payer: MEDICARE

## 2023-08-07 VITALS — DIASTOLIC BLOOD PRESSURE: 40 MMHG | OXYGEN SATURATION: 98 % | HEART RATE: 88 BPM | SYSTOLIC BLOOD PRESSURE: 110 MMHG

## 2023-08-07 DIAGNOSIS — Z79.01 ENCOUNTER FOR CURRENT LONG-TERM USE OF ANTICOAGULANTS: ICD-10-CM

## 2023-08-07 DIAGNOSIS — T14.8XXA HEMATOMA: ICD-10-CM

## 2023-08-07 DIAGNOSIS — N17.9 AKI (ACUTE KIDNEY INJURY) (HCC): ICD-10-CM

## 2023-08-07 DIAGNOSIS — B99.9 CONFUSION ASSOCIATED WITH INFECTION: ICD-10-CM

## 2023-08-07 DIAGNOSIS — S22.080A CLOSED WEDGE COMPRESSION FRACTURE OF T12 VERTEBRA, INITIAL ENCOUNTER (HCC): ICD-10-CM

## 2023-08-07 DIAGNOSIS — N39.0 URINARY TRACT INFECTION WITH HEMATURIA, SITE UNSPECIFIED: ICD-10-CM

## 2023-08-07 DIAGNOSIS — R17 JAUNDICE: ICD-10-CM

## 2023-08-07 DIAGNOSIS — R41.0 CONFUSION ASSOCIATED WITH INFECTION: ICD-10-CM

## 2023-08-07 DIAGNOSIS — R31.9 URINARY TRACT INFECTION WITH HEMATURIA, SITE UNSPECIFIED: ICD-10-CM

## 2023-08-07 DIAGNOSIS — I35.0 NONRHEUMATIC AORTIC VALVE STENOSIS: ICD-10-CM

## 2023-08-07 DIAGNOSIS — M67.911 ROTATOR CUFF DYSFUNCTION, RIGHT: Primary | ICD-10-CM

## 2023-08-07 DIAGNOSIS — R53.1 WEAKNESS: ICD-10-CM

## 2023-08-07 DIAGNOSIS — I48.0 PAF (PAROXYSMAL ATRIAL FIBRILLATION) (HCC): Primary | ICD-10-CM

## 2023-08-07 DIAGNOSIS — R79.1 SUPRATHERAPEUTIC INR: ICD-10-CM

## 2023-08-07 DIAGNOSIS — E86.0 DEHYDRATION: ICD-10-CM

## 2023-08-07 DIAGNOSIS — D64.9 ANEMIA, UNSPECIFIED TYPE: ICD-10-CM

## 2023-08-07 PROBLEM — D62 ACUTE BLOOD LOSS ANEMIA: Status: ACTIVE | Noted: 2023-08-07

## 2023-08-07 LAB
ABO + RH BLD: NORMAL
ALBUMIN SERPL-MCNC: 3.5 G/DL (ref 3.4–5)
ALBUMIN SERPL-MCNC: 3.5 G/DL (ref 3.4–5)
ALBUMIN/GLOB SERPL: 1.3 {RATIO} (ref 1.1–2.2)
ALBUMIN/GLOB SERPL: 1.8 {RATIO} (ref 1.1–2.2)
ALP SERPL-CCNC: 73 U/L (ref 40–129)
ALP SERPL-CCNC: 74 U/L (ref 40–129)
ALT SERPL-CCNC: 11 U/L (ref 10–40)
ALT SERPL-CCNC: 11 U/L (ref 10–40)
ANION GAP SERPL CALCULATED.3IONS-SCNC: 11 MMOL/L (ref 3–16)
ANION GAP SERPL CALCULATED.3IONS-SCNC: 12 MMOL/L (ref 3–16)
APTT BLD: 173.3 SEC (ref 22.7–35.9)
AST SERPL-CCNC: 24 U/L (ref 15–37)
AST SERPL-CCNC: 26 U/L (ref 15–37)
BASOPHILS # BLD: 0.1 K/UL (ref 0–0.2)
BASOPHILS # BLD: 0.1 K/UL (ref 0–0.2)
BASOPHILS NFR BLD: 0.5 %
BASOPHILS NFR BLD: 0.9 %
BILIRUB SERPL-MCNC: 1 MG/DL (ref 0–1)
BILIRUB SERPL-MCNC: 1 MG/DL (ref 0–1)
BILIRUBIN, POC: NORMAL
BLD GP AB SCN SERPL QL: NORMAL
BLOOD BANK DISPENSE STATUS: NORMAL
BLOOD BANK DISPENSE STATUS: NORMAL
BLOOD BANK PRODUCT CODE: NORMAL
BLOOD BANK PRODUCT CODE: NORMAL
BLOOD URINE, POC: NORMAL
BPU ID: NORMAL
BPU ID: NORMAL
BUN SERPL-MCNC: 20 MG/DL (ref 7–20)
BUN SERPL-MCNC: 23 MG/DL (ref 7–20)
CALCIUM SERPL-MCNC: 8.6 MG/DL (ref 8.3–10.6)
CALCIUM SERPL-MCNC: 8.7 MG/DL (ref 8.3–10.6)
CHLORIDE SERPL-SCNC: 100 MMOL/L (ref 99–110)
CHLORIDE SERPL-SCNC: 101 MMOL/L (ref 99–110)
CLARITY, POC: NORMAL
CO2 SERPL-SCNC: 23 MMOL/L (ref 21–32)
CO2 SERPL-SCNC: 24 MMOL/L (ref 21–32)
COLOR, POC: NORMAL
CREAT SERPL-MCNC: 1.3 MG/DL (ref 0.6–1.2)
CREAT SERPL-MCNC: 1.5 MG/DL (ref 0.6–1.2)
DEPRECATED RDW RBC AUTO: 16.8 % (ref 12.4–15.4)
DEPRECATED RDW RBC AUTO: 17.3 % (ref 12.4–15.4)
DESCRIPTION BLOOD BANK: NORMAL
DESCRIPTION BLOOD BANK: NORMAL
EOSINOPHIL # BLD: 0 K/UL (ref 0–0.6)
EOSINOPHIL # BLD: 0 K/UL (ref 0–0.6)
EOSINOPHIL NFR BLD: 0.1 %
EOSINOPHIL NFR BLD: 0.3 %
GFR SERPLBLD CREATININE-BSD FMLA CKD-EPI: 32 ML/MIN/{1.73_M2}
GFR SERPLBLD CREATININE-BSD FMLA CKD-EPI: 39 ML/MIN/{1.73_M2}
GLUCOSE SERPL-MCNC: 145 MG/DL (ref 70–99)
GLUCOSE SERPL-MCNC: 171 MG/DL (ref 70–99)
GLUCOSE URINE, POC: NORMAL
HCT VFR BLD AUTO: 20.5 % (ref 36–48)
HCT VFR BLD AUTO: 21.7 % (ref 36–48)
HGB BLD-MCNC: 6.7 G/DL (ref 12–16)
HGB BLD-MCNC: 7.3 G/DL (ref 12–16)
INR PPP: >17.24 (ref 0.84–1.16)
INR PPP: >17.24 (ref 0.84–1.16)
KETONES, POC: NORMAL
LEUKOCYTE EST, POC: NORMAL
LYMPHOCYTES # BLD: 0.6 K/UL (ref 1–5.1)
LYMPHOCYTES # BLD: 1.5 K/UL (ref 1–5.1)
LYMPHOCYTES NFR BLD: 12.9 %
LYMPHOCYTES NFR BLD: 6.4 %
MCH RBC QN AUTO: 27.3 PG (ref 26–34)
MCH RBC QN AUTO: 27.5 PG (ref 26–34)
MCHC RBC AUTO-ENTMCNC: 32.9 G/DL (ref 31–36)
MCHC RBC AUTO-ENTMCNC: 33.5 G/DL (ref 31–36)
MCV RBC AUTO: 82.2 FL (ref 80–100)
MCV RBC AUTO: 82.9 FL (ref 80–100)
MONOCYTES # BLD: 0.8 K/UL (ref 0–1.3)
MONOCYTES # BLD: 0.9 K/UL (ref 0–1.3)
MONOCYTES NFR BLD: 8.1 %
MONOCYTES NFR BLD: 8.2 %
NEUTROPHILS # BLD: 8.4 K/UL (ref 1.7–7.7)
NEUTROPHILS # BLD: 8.8 K/UL (ref 1.7–7.7)
NEUTROPHILS NFR BLD: 77.8 %
NEUTROPHILS NFR BLD: 84.8 %
NITRITE, POC: NORMAL
PH, POC: 5.5
PLATELET # BLD AUTO: 279 K/UL (ref 135–450)
PLATELET # BLD AUTO: 323 K/UL (ref 135–450)
PMV BLD AUTO: 8.6 FL (ref 5–10.5)
PMV BLD AUTO: 8.8 FL (ref 5–10.5)
POTASSIUM SERPL-SCNC: 4.5 MMOL/L (ref 3.5–5.1)
POTASSIUM SERPL-SCNC: 4.6 MMOL/L (ref 3.5–5.1)
PROT SERPL-MCNC: 5.5 G/DL (ref 6.4–8.2)
PROT SERPL-MCNC: 6.1 G/DL (ref 6.4–8.2)
PROTEIN, POC: NORMAL
PROTHROMBIN TIME: >120 SEC (ref 11.5–14.8)
PROTHROMBIN TIME: >120 SEC (ref 11.5–14.8)
RBC # BLD AUTO: 2.47 M/UL (ref 4–5.2)
RBC # BLD AUTO: 2.64 M/UL (ref 4–5.2)
SODIUM SERPL-SCNC: 135 MMOL/L (ref 136–145)
SODIUM SERPL-SCNC: 136 MMOL/L (ref 136–145)
SPECIFIC GRAVITY, POC: 1.01
TROPONIN, HIGH SENSITIVITY: 17 NG/L (ref 0–14)
TROPONIN, HIGH SENSITIVITY: 18 NG/L (ref 0–14)
UROBILINOGEN, POC: 0.2
WBC # BLD AUTO: 11.3 K/UL (ref 4–11)
WBC # BLD AUTO: 9.9 K/UL (ref 4–11)

## 2023-08-07 PROCEDURE — 84484 ASSAY OF TROPONIN QUANT: CPT

## 2023-08-07 PROCEDURE — 85730 THROMBOPLASTIN TIME PARTIAL: CPT

## 2023-08-07 PROCEDURE — 1200000000 HC SEMI PRIVATE

## 2023-08-07 PROCEDURE — 73030 X-RAY EXAM OF SHOULDER: CPT

## 2023-08-07 PROCEDURE — 85610 PROTHROMBIN TIME: CPT

## 2023-08-07 PROCEDURE — 99285 EMERGENCY DEPT VISIT HI MDM: CPT

## 2023-08-07 PROCEDURE — 80053 COMPREHEN METABOLIC PANEL: CPT

## 2023-08-07 PROCEDURE — 36415 COLL VENOUS BLD VENIPUNCTURE: CPT | Performed by: NURSE PRACTITIONER

## 2023-08-07 PROCEDURE — 72125 CT NECK SPINE W/O DYE: CPT

## 2023-08-07 PROCEDURE — 30233N1 TRANSFUSION OF NONAUTOLOGOUS RED BLOOD CELLS INTO PERIPHERAL VEIN, PERCUTANEOUS APPROACH: ICD-10-PCS | Performed by: INTERNAL MEDICINE

## 2023-08-07 PROCEDURE — 96365 THER/PROPH/DIAG IV INF INIT: CPT

## 2023-08-07 PROCEDURE — 36415 COLL VENOUS BLD VENIPUNCTURE: CPT

## 2023-08-07 PROCEDURE — 70450 CT HEAD/BRAIN W/O DYE: CPT

## 2023-08-07 PROCEDURE — 71045 X-RAY EXAM CHEST 1 VIEW: CPT

## 2023-08-07 PROCEDURE — 86900 BLOOD TYPING SEROLOGIC ABO: CPT

## 2023-08-07 PROCEDURE — 72131 CT LUMBAR SPINE W/O DYE: CPT

## 2023-08-07 PROCEDURE — 86850 RBC ANTIBODY SCREEN: CPT

## 2023-08-07 PROCEDURE — 73110 X-RAY EXAM OF WRIST: CPT

## 2023-08-07 PROCEDURE — 72170 X-RAY EXAM OF PELVIS: CPT

## 2023-08-07 PROCEDURE — 85025 COMPLETE CBC W/AUTO DIFF WBC: CPT

## 2023-08-07 PROCEDURE — 73502 X-RAY EXAM HIP UNI 2-3 VIEWS: CPT

## 2023-08-07 PROCEDURE — 81002 URINALYSIS NONAUTO W/O SCOPE: CPT | Performed by: NURSE PRACTITIONER

## 2023-08-07 PROCEDURE — 6360000002 HC RX W HCPCS: Performed by: STUDENT IN AN ORGANIZED HEALTH CARE EDUCATION/TRAINING PROGRAM

## 2023-08-07 PROCEDURE — 6360000004 HC RX CONTRAST MEDICATION: Performed by: STUDENT IN AN ORGANIZED HEALTH CARE EDUCATION/TRAINING PROGRAM

## 2023-08-07 PROCEDURE — 86901 BLOOD TYPING SEROLOGIC RH(D): CPT

## 2023-08-07 PROCEDURE — P9016 RBC LEUKOCYTES REDUCED: HCPCS

## 2023-08-07 PROCEDURE — 71260 CT THORAX DX C+: CPT

## 2023-08-07 PROCEDURE — 36430 TRANSFUSION BLD/BLD COMPNT: CPT

## 2023-08-07 PROCEDURE — 86923 COMPATIBILITY TEST ELECTRIC: CPT

## 2023-08-07 PROCEDURE — 2580000003 HC RX 258: Performed by: STUDENT IN AN ORGANIZED HEALTH CARE EDUCATION/TRAINING PROGRAM

## 2023-08-07 PROCEDURE — 84443 ASSAY THYROID STIM HORMONE: CPT

## 2023-08-07 RX ORDER — ACETAMINOPHEN 650 MG/1
650 SUPPOSITORY RECTAL EVERY 6 HOURS PRN
Status: DISCONTINUED | OUTPATIENT
Start: 2023-08-07 | End: 2023-08-09 | Stop reason: HOSPADM

## 2023-08-07 RX ORDER — SODIUM CHLORIDE 9 MG/ML
INJECTION, SOLUTION INTRAVENOUS CONTINUOUS
Status: ACTIVE | OUTPATIENT
Start: 2023-08-07 | End: 2023-08-08

## 2023-08-07 RX ORDER — ACETAMINOPHEN 325 MG/1
650 TABLET ORAL EVERY 6 HOURS PRN
Status: DISCONTINUED | OUTPATIENT
Start: 2023-08-07 | End: 2023-08-09 | Stop reason: HOSPADM

## 2023-08-07 RX ORDER — DILTIAZEM HYDROCHLORIDE 120 MG/1
120 CAPSULE, COATED, EXTENDED RELEASE ORAL DAILY
COMMUNITY

## 2023-08-07 RX ORDER — ONDANSETRON 2 MG/ML
4 INJECTION INTRAMUSCULAR; INTRAVENOUS EVERY 6 HOURS PRN
Status: DISCONTINUED | OUTPATIENT
Start: 2023-08-07 | End: 2023-08-09 | Stop reason: HOSPADM

## 2023-08-07 RX ORDER — SODIUM CHLORIDE 0.9 % (FLUSH) 0.9 %
10 SYRINGE (ML) INJECTION EVERY 12 HOURS SCHEDULED
Status: DISCONTINUED | OUTPATIENT
Start: 2023-08-07 | End: 2023-08-09 | Stop reason: HOSPADM

## 2023-08-07 RX ORDER — SODIUM CHLORIDE 9 MG/ML
INJECTION, SOLUTION INTRAVENOUS PRN
Status: DISCONTINUED | OUTPATIENT
Start: 2023-08-07 | End: 2023-08-09 | Stop reason: HOSPADM

## 2023-08-07 RX ORDER — SENNOSIDES A AND B 8.6 MG/1
1 TABLET, FILM COATED ORAL DAILY PRN
Status: DISCONTINUED | OUTPATIENT
Start: 2023-08-07 | End: 2023-08-09 | Stop reason: HOSPADM

## 2023-08-07 RX ORDER — SODIUM CHLORIDE 0.9 % (FLUSH) 0.9 %
10 SYRINGE (ML) INJECTION PRN
Status: DISCONTINUED | OUTPATIENT
Start: 2023-08-07 | End: 2023-08-09 | Stop reason: HOSPADM

## 2023-08-07 RX ORDER — AMIODARONE HYDROCHLORIDE 200 MG/1
200 TABLET ORAL DAILY
Status: DISCONTINUED | OUTPATIENT
Start: 2023-08-08 | End: 2023-08-09 | Stop reason: HOSPADM

## 2023-08-07 RX ORDER — ONDANSETRON 4 MG/1
4 TABLET, ORALLY DISINTEGRATING ORAL EVERY 8 HOURS PRN
Status: DISCONTINUED | OUTPATIENT
Start: 2023-08-07 | End: 2023-08-09 | Stop reason: HOSPADM

## 2023-08-07 RX ORDER — SODIUM CHLORIDE 9 MG/ML
INJECTION, SOLUTION INTRAVENOUS PRN
Status: DISCONTINUED | OUTPATIENT
Start: 2023-08-07 | End: 2023-08-08

## 2023-08-07 RX ORDER — METOPROLOL TARTRATE 50 MG/1
50 TABLET, FILM COATED ORAL 2 TIMES DAILY
COMMUNITY

## 2023-08-07 RX ORDER — 0.9 % SODIUM CHLORIDE 0.9 %
1000 INTRAVENOUS SOLUTION INTRAVENOUS ONCE
Status: COMPLETED | OUTPATIENT
Start: 2023-08-07 | End: 2023-08-07

## 2023-08-07 RX ADMIN — IOPAMIDOL 75 ML: 755 INJECTION, SOLUTION INTRAVENOUS at 20:16

## 2023-08-07 RX ADMIN — SODIUM CHLORIDE 1000 ML: 9 INJECTION, SOLUTION INTRAVENOUS at 20:48

## 2023-08-07 RX ADMIN — PHYTONADIONE 10 MG: 10 INJECTION, EMULSION INTRAMUSCULAR; INTRAVENOUS; SUBCUTANEOUS at 19:02

## 2023-08-07 ASSESSMENT — PAIN - FUNCTIONAL ASSESSMENT
PAIN_FUNCTIONAL_ASSESSMENT: NONE - DENIES PAIN
PAIN_FUNCTIONAL_ASSESSMENT: NONE - DENIES PAIN

## 2023-08-07 ASSESSMENT — LIFESTYLE VARIABLES
HOW OFTEN DO YOU HAVE A DRINK CONTAINING ALCOHOL: NEVER
HOW MANY STANDARD DRINKS CONTAINING ALCOHOL DO YOU HAVE ON A TYPICAL DAY: PATIENT DOES NOT DRINK

## 2023-08-07 ASSESSMENT — PAIN SCALES - GENERAL: PAINLEVEL_OUTOF10: 0

## 2023-08-07 ASSESSMENT — ENCOUNTER SYMPTOMS
GASTROINTESTINAL NEGATIVE: 1
RESPIRATORY NEGATIVE: 1

## 2023-08-07 NOTE — ED NOTES
Report received from  [facility] , RN at this time. Introduced self to pt, all needs met, call light within reach.        Chhaya Oropeza RN  08/07/23 6451

## 2023-08-07 NOTE — ED TRIAGE NOTES
Patient to the ED for frequent falls and weakness. States INR is 17.24, sent by PCP. Patient alert & oriented. Bruising noted over patient's skin. No signs of distress noted.

## 2023-08-07 NOTE — ED PROVIDER NOTES
PATIENT:  No follow-up provider specified.       Electronically Signed: Jason Ricci MD, 08/07/23, 9:40 PM       Jason Ricci MD  08/07/23 2487

## 2023-08-07 NOTE — ED NOTES

## 2023-08-07 NOTE — ED NOTES
IV placed. Unable to obtain labs. Straight stick for labs, unsuccessful.      Lianne Jovel RN  08/07/23 4581

## 2023-08-08 LAB
ANION GAP SERPL CALCULATED.3IONS-SCNC: 8 MMOL/L (ref 3–16)
APTT BLD: 30.7 SEC (ref 22.7–35.9)
BACTERIA UR CULT: NORMAL
BASOPHILS # BLD: 0.1 K/UL (ref 0–0.2)
BASOPHILS # BLD: 0.1 K/UL (ref 0–0.2)
BASOPHILS NFR BLD: 0.6 %
BASOPHILS NFR BLD: 0.8 %
BUN SERPL-MCNC: 19 MG/DL (ref 7–20)
CALCIUM SERPL-MCNC: 7.8 MG/DL (ref 8.3–10.6)
CHLORIDE SERPL-SCNC: 105 MMOL/L (ref 99–110)
CO2 SERPL-SCNC: 23 MMOL/L (ref 21–32)
CREAT SERPL-MCNC: 1.1 MG/DL (ref 0.6–1.2)
DEPRECATED RDW RBC AUTO: 16.3 % (ref 12.4–15.4)
DEPRECATED RDW RBC AUTO: 16.9 % (ref 12.4–15.4)
EOSINOPHIL # BLD: 0 K/UL (ref 0–0.6)
EOSINOPHIL # BLD: 0.1 K/UL (ref 0–0.6)
EOSINOPHIL NFR BLD: 0.1 %
EOSINOPHIL NFR BLD: 0.7 %
GFR SERPLBLD CREATININE-BSD FMLA CKD-EPI: 47 ML/MIN/{1.73_M2}
GLUCOSE SERPL-MCNC: 91 MG/DL (ref 70–99)
HCT VFR BLD AUTO: 25.8 % (ref 36–48)
HCT VFR BLD AUTO: 26.3 % (ref 36–48)
HGB BLD-MCNC: 8.5 G/DL (ref 12–16)
HGB BLD-MCNC: 8.8 G/DL (ref 12–16)
INR PPP: 1.68 (ref 0.84–1.16)
INR PPP: 1.69 (ref 0.84–1.16)
LYMPHOCYTES # BLD: 1.2 K/UL (ref 1–5.1)
LYMPHOCYTES # BLD: 1.3 K/UL (ref 1–5.1)
LYMPHOCYTES NFR BLD: 14 %
LYMPHOCYTES NFR BLD: 16.3 %
MCH RBC QN AUTO: 26.8 PG (ref 26–34)
MCH RBC QN AUTO: 26.9 PG (ref 26–34)
MCHC RBC AUTO-ENTMCNC: 32.8 G/DL (ref 31–36)
MCHC RBC AUTO-ENTMCNC: 33.6 G/DL (ref 31–36)
MCV RBC AUTO: 80 FL (ref 80–100)
MCV RBC AUTO: 81.5 FL (ref 80–100)
MONOCYTES # BLD: 0.9 K/UL (ref 0–1.3)
MONOCYTES # BLD: 0.9 K/UL (ref 0–1.3)
MONOCYTES NFR BLD: 10.3 %
MONOCYTES NFR BLD: 10.7 %
NEUTROPHILS # BLD: 5.8 K/UL (ref 1.7–7.7)
NEUTROPHILS # BLD: 6.6 K/UL (ref 1.7–7.7)
NEUTROPHILS NFR BLD: 71.7 %
NEUTROPHILS NFR BLD: 74.8 %
PLATELET # BLD AUTO: 200 K/UL (ref 135–450)
PLATELET # BLD AUTO: 207 K/UL (ref 135–450)
PMV BLD AUTO: 8.3 FL (ref 5–10.5)
PMV BLD AUTO: 8.6 FL (ref 5–10.5)
POTASSIUM SERPL-SCNC: 4.1 MMOL/L (ref 3.5–5.1)
PROTHROMBIN TIME: 19.8 SEC (ref 11.5–14.8)
PROTHROMBIN TIME: 19.8 SEC (ref 11.5–14.8)
RBC # BLD AUTO: 3.17 M/UL (ref 4–5.2)
RBC # BLD AUTO: 3.29 M/UL (ref 4–5.2)
SODIUM SERPL-SCNC: 136 MMOL/L (ref 136–145)
TSH SERPL DL<=0.005 MIU/L-ACNC: 3.7 UIU/ML (ref 0.27–4.2)
WBC # BLD AUTO: 8.1 K/UL (ref 4–11)
WBC # BLD AUTO: 8.8 K/UL (ref 4–11)

## 2023-08-08 PROCEDURE — 94760 N-INVAS EAR/PLS OXIMETRY 1: CPT

## 2023-08-08 PROCEDURE — 80048 BASIC METABOLIC PNL TOTAL CA: CPT

## 2023-08-08 PROCEDURE — 97535 SELF CARE MNGMENT TRAINING: CPT

## 2023-08-08 PROCEDURE — 97162 PT EVAL MOD COMPLEX 30 MIN: CPT

## 2023-08-08 PROCEDURE — 85025 COMPLETE CBC W/AUTO DIFF WBC: CPT

## 2023-08-08 PROCEDURE — 85730 THROMBOPLASTIN TIME PARTIAL: CPT

## 2023-08-08 PROCEDURE — 97116 GAIT TRAINING THERAPY: CPT

## 2023-08-08 PROCEDURE — 36415 COLL VENOUS BLD VENIPUNCTURE: CPT

## 2023-08-08 PROCEDURE — 85610 PROTHROMBIN TIME: CPT

## 2023-08-08 PROCEDURE — 6370000000 HC RX 637 (ALT 250 FOR IP): Performed by: INTERNAL MEDICINE

## 2023-08-08 PROCEDURE — 97166 OT EVAL MOD COMPLEX 45 MIN: CPT

## 2023-08-08 PROCEDURE — 97530 THERAPEUTIC ACTIVITIES: CPT

## 2023-08-08 PROCEDURE — 1200000000 HC SEMI PRIVATE

## 2023-08-08 PROCEDURE — 2580000003 HC RX 258: Performed by: INTERNAL MEDICINE

## 2023-08-08 RX ORDER — PANTOPRAZOLE SODIUM 20 MG/1
20 TABLET, DELAYED RELEASE ORAL
Status: DISCONTINUED | OUTPATIENT
Start: 2023-08-09 | End: 2023-08-09 | Stop reason: HOSPADM

## 2023-08-08 RX ADMIN — SODIUM CHLORIDE, PRESERVATIVE FREE 10 ML: 5 INJECTION INTRAVENOUS at 09:20

## 2023-08-08 RX ADMIN — ACETAMINOPHEN 650 MG: 325 TABLET ORAL at 09:30

## 2023-08-08 RX ADMIN — SODIUM CHLORIDE: 9 INJECTION, SOLUTION INTRAVENOUS at 02:00

## 2023-08-08 RX ADMIN — SODIUM CHLORIDE: 9 INJECTION, SOLUTION INTRAVENOUS at 22:30

## 2023-08-08 RX ADMIN — SODIUM CHLORIDE, PRESERVATIVE FREE 10 ML: 5 INJECTION INTRAVENOUS at 00:24

## 2023-08-08 RX ADMIN — AMIODARONE HYDROCHLORIDE 200 MG: 200 TABLET ORAL at 09:20

## 2023-08-08 SDOH — ECONOMIC STABILITY: HOUSING INSECURITY
IN THE LAST 12 MONTHS, WAS THERE A TIME WHEN YOU DID NOT HAVE A STEADY PLACE TO SLEEP OR SLEPT IN A SHELTER (INCLUDING NOW)?: NO

## 2023-08-08 SDOH — ECONOMIC STABILITY: FOOD INSECURITY: WITHIN THE PAST 12 MONTHS, THE FOOD YOU BOUGHT JUST DIDN'T LAST AND YOU DIDN'T HAVE MONEY TO GET MORE.: NEVER TRUE

## 2023-08-08 SDOH — ECONOMIC STABILITY: INCOME INSECURITY: IN THE LAST 12 MONTHS, WAS THERE A TIME WHEN YOU WERE NOT ABLE TO PAY THE MORTGAGE OR RENT ON TIME?: NO

## 2023-08-08 SDOH — ECONOMIC STABILITY: FOOD INSECURITY: WITHIN THE PAST 12 MONTHS, YOU WORRIED THAT YOUR FOOD WOULD RUN OUT BEFORE YOU GOT MONEY TO BUY MORE.: NEVER TRUE

## 2023-08-08 SDOH — HEALTH STABILITY: PHYSICAL HEALTH: ON AVERAGE, HOW MANY DAYS PER WEEK DO YOU ENGAGE IN MODERATE TO STRENUOUS EXERCISE (LIKE A BRISK WALK)?: 0 DAYS

## 2023-08-08 SDOH — HEALTH STABILITY: PHYSICAL HEALTH: ON AVERAGE, HOW MANY MINUTES DO YOU ENGAGE IN EXERCISE AT THIS LEVEL?: 0 MIN

## 2023-08-08 ASSESSMENT — PAIN DESCRIPTION - ORIENTATION: ORIENTATION: RIGHT;LEFT

## 2023-08-08 ASSESSMENT — SOCIAL DETERMINANTS OF HEALTH (SDOH)
HOW OFTEN DO YOU ATTENT MEETINGS OF THE CLUB OR ORGANIZATION YOU BELONG TO?: NEVER
IN A TYPICAL WEEK, HOW MANY TIMES DO YOU TALK ON THE PHONE WITH FAMILY, FRIENDS, OR NEIGHBORS?: MORE THAN THREE TIMES A WEEK
HOW HARD IS IT FOR YOU TO PAY FOR THE VERY BASICS LIKE FOOD, HOUSING, MEDICAL CARE, AND HEATING?: NOT HARD AT ALL
WITHIN THE LAST YEAR, HAVE TO BEEN RAPED OR FORCED TO HAVE ANY KIND OF SEXUAL ACTIVITY BY YOUR PARTNER OR EX-PARTNER?: NO
WITHIN THE LAST YEAR, HAVE YOU BEEN KICKED, HIT, SLAPPED, OR OTHERWISE PHYSICALLY HURT BY YOUR PARTNER OR EX-PARTNER?: NO
HOW OFTEN DO YOU GET TOGETHER WITH FRIENDS OR RELATIVES?: MORE THAN THREE TIMES A WEEK
DO YOU BELONG TO ANY CLUBS OR ORGANIZATIONS SUCH AS CHURCH GROUPS UNIONS, FRATERNAL OR ATHLETIC GROUPS, OR SCHOOL GROUPS?: NO
HOW OFTEN DO YOU ATTEND CHURCH OR RELIGIOUS SERVICES?: NEVER
WITHIN THE LAST YEAR, HAVE YOU BEEN HUMILIATED OR EMOTIONALLY ABUSED IN OTHER WAYS BY YOUR PARTNER OR EX-PARTNER?: NO
WITHIN THE LAST YEAR, HAVE YOU BEEN AFRAID OF YOUR PARTNER OR EX-PARTNER?: NO

## 2023-08-08 ASSESSMENT — PAIN DESCRIPTION - DESCRIPTORS: DESCRIPTORS: ACHING;BURNING

## 2023-08-08 ASSESSMENT — PAIN SCALES - GENERAL
PAINLEVEL_OUTOF10: 0
PAINLEVEL_OUTOF10: 7

## 2023-08-08 ASSESSMENT — PAIN DESCRIPTION - LOCATION: LOCATION: HIP

## 2023-08-08 NOTE — ED NOTES
Pt resting in bed at this time, laying in a supine position with head of bed elevated . Call light remains in reach instructed pt how to use, and encouraged pt to call if needed assistance, no distress noted. RR even and unlabored, skin warm and dry. No needs at this time.        Evelio Rodriguez RN  08/07/23 9566

## 2023-08-08 NOTE — FLOWSHEET NOTE
Lab was notified of need for APTT and PT/INR 15-30 minutes after first bag of blood was completed and stated they could not see the orders. Charge nurse was informed and lab stated they would send someone up and she only milagro TSH. Orders updated to STAT and lab notified again of need and continue stating was not able to see orders. Charge nurse informed and lab stated they would manually put the orders in. APTT and PT/INR was drawn at 0104, awaiting results.

## 2023-08-08 NOTE — ACP (ADVANCE CARE PLANNING)
Advance Care Planning     Advance Care Planning Activator (Inpatient)  Conversation Note      Date of ACP Conversation: 8/8/2023     Conversation Conducted with: Patient with Decision Making Capacity    ACP Activator: Kyle Farrell RN    Health Care Decision Maker:     Current Designated Health Care Decision Maker:     Primary Decision Maker: Darryl Holm - [phone]    Secondary Decision Maker: Annette Vera - [phone]    Care Preferences    Ventilation: \"If you were in your present state of health and suddenly became very ill and were unable to breathe on your own, what would your preference be about the use of a ventilator (breathing machine) if it were available to you? \"      Would the patient desire the use of ventilator (breathing machine)?: yes    \"If your health worsens and it becomes clear that your chance of recovery is unlikely, what would your preference be about the use of a ventilator (breathing machine) if it were available to you? \"     Would the patient desire the use of ventilator (breathing machine)?: No      Resuscitation  \"CPR works best to restart the heart when there is a sudden event, like a heart attack, in someone who is otherwise healthy. Unfortunately, CPR does not typically restart the heart for people who have serious health conditions or who are very sick. \"    \"In the event your heart stopped as a result of an underlying serious health condition, would you want attempts to be made to restart your heart (answer \"yes\" for attempt to resuscitate) or would you prefer a natural death (answer \"no\" for do not attempt to resuscitate)? \" yes       [] Yes   [x] No   Educated Patient / Dolly Berrios regarding differences between Advance Directives and portable DNR orders.     Length of ACP Conversation in minutes: 3     Conversation Outcomes:  ACP discussion completed    Follow-up plan:    [] Schedule follow-up conversation to continue planning  [] Referred

## 2023-08-08 NOTE — FLOWSHEET NOTE
Order for H&H to to drawn at 0300 was placed and lab notified and stated unable to see order. Order modified to a STAT H&H and lab notified. No labs drawn. Reviewed orders and order had been removed. Called lab and replaced the order for a STAT and they returned call stating they had discontinued the order due to they had just drawn a CBC at 0415. Charge nurse notified.

## 2023-08-08 NOTE — ED NOTES
ED SBAR report provider to Yessi Aquino, [address]. Patient to be transported to Room 4115 via stretcher by RN. Patient transported with bedside cardiac monitor and with IV medications infusing. IV site clean, dry, and intact. Updated patient and family on plan of care.        Rosalio Macario RN  08/07/23 7371

## 2023-08-08 NOTE — FLOWSHEET NOTE
Patient was incontinent of urine external cath was placed to collect urine for sample. No urine output at this time. Encouraged fluids.

## 2023-08-08 NOTE — H&P
HOSPITAL MEDICINE  HISTORY & PHYSICAL        Date of Admission: 8/7/2023  MRN: [id number]  Date of Service: 08/07/23       Chief Complaint   Patient presents with    Fall     multiple    Abnormal Lab     Elevated INR          HISTORY OF PRESENT ILLNESS:     Franklin Obrien is a very pleasant 80 y.o. female with a PMHx of HTN, pAfib (on warfarin, beginning after a Covid infection 2020), Aortic stenosis (s/p TAVR 5/2019), Arthritis, who presented to the [facility] ED for evaluation. Pt with multiple falls over the past week. Pt accompanied by her granddaughter who is a nurse at a local physician's office. She notes pt first fell last week in her kitchen after a rug slipped out from under her. She was able to get back up without issue. The second fall occurred after slipping down a bottom step when she refused to wait for her daughter's assistance. The third fall occurred yesterday after she went to sit on a roller chair and it slid out from under her. She hit the left buttock causing a large hematoma. She has since been more fatigued and with increased pallor. She is otherwise in good spirits and denies any pain. Denies LOC, head trauma, neck pain, or leg pain. Granddaughter notes she had some episodes of low BP at home, subsequently finding at cardiology follow up that she was in rapid Afib. Her HTN meds were discontinued and she was initiated on amiodarone which has been helping. In the ER, VS stable, afebrile. Labs significant for Cr 1.5, Hgb 6.7, troponin 18. INR was >17.24. EKG was not ordered. Extensive imaging showed large 7.7cm hematoma of left gluteal muscle, 4.2cm hematoma over the right gluteal muscle, and an age-indeterminate left sacral fracture. Transfusion of 1 unit pRBC was initiated along with IV Vit K 10mg.        Case discussed with ED

## 2023-08-08 NOTE — FLOWSHEET NOTE
Lab results Aptt, PT/INR, TSH was reported to MD with new order to cancel order for the plasma. Blood bank has been notified.

## 2023-08-08 NOTE — FLOWSHEET NOTE
Patient admitted from ED via stretcher with RBC infusing at 150 Ml/HR. A&O X4. Denied pain. Oriented to room and surroundings. Order was placed for a second bag of RBC, Verified with Dr. Nia Vance that he wanted the second bag given prior to rechecking patient H&H. First bag of RBC was completed at 2307. No adverse side effects noted. Patient has orders for 4 Units of Plasma and spoke with blood bank and they were requesting to recheck PT/INR and APTT before administering, Dr. Nia Vance notified and lab. Orders updated to STAT. Consent reviewed with patient with verbal understanding and signed and placed in chart. Second bag of blood was initiated at 2354. Tele-monitor applied and verified with [facility], Sinus rhythm.

## 2023-08-08 NOTE — CONSULTS
Recent Labs     08/07/23  1326 08/07/23  1909 08/08/23  0415   WBC 11.3* 9.9 8.8   HGB 7.3* 6.7* 8.8*   HCT 21.7* 20.5* 26.3*   MCV 82.2 82.9 80.0    279 207     Lab Results   Component Value Date/Time    TROPONINI <0.01 09/13/2021 01:03 PM     No results found for: BNP  Lab Results   Component Value Date/Time    PROTIME 19.8 08/08/2023 04:15 AM    PROTIME 19.8 08/08/2023 01:04 AM    PROTIME >120.0 08/07/2023 07:09 PM    PROTIME 30.5 07/18/2023 11:13 AM    PROTIME 50.4 07/11/2023 02:37 PM    PROTIME 34.3 06/15/2023 12:04 PM    INR 1.68 08/08/2023 04:15 AM    INR 1.69 08/08/2023 01:04 AM    INR >17.24 08/07/2023 07:09 PM     Lab Results   Component Value Date/Time    CHOL 197 03/28/2019 09:11 AM    HDL 59 03/28/2019 09:11 AM    TRIG 90 03/28/2019 09:11 AM       Diagnostic and imaging results reviewed. Echo: 7/11/23  Indications: Aortic stenosis and Aortic regurgitation. Additional Indications:Post TAVR. Height: 60 inches Weight: 88 pounds BSA: 1.32 m2 BMI: 17.19 kg/m2      Conclusions      Summary   *Left ventricle - normal size and function with EF of 65%   *Aortic valve - TAVR valve well seated, mild to moderate central   regurgitation although difficult to determine severity   *LV size - LVEDD 3.79cm, LVESD 2.32cm   *Tricuspid valve - mild regurgitation   *Mitral valve - mild regurgitation      Signature      ------------------------------------------------------------------   Electronically signed by Selvin Herr MD (Interpreting   physician) on 07/11/2023 at 04:51 PM   ------------------------------------------------------------------    I independently reviewed the ECG, telemetry, serology, echocardiographic results.     Scheduled Meds:   amiodarone  200 mg Oral Daily    sodium chloride flush  10 mL IntraVENous 2 times per day     Continuous Infusions:   sodium chloride      sodium chloride      sodium chloride       PRN Meds:.sodium chloride, sodium chloride flush, sodium chloride,

## 2023-08-09 VITALS
RESPIRATION RATE: 18 BRPM | OXYGEN SATURATION: 95 % | TEMPERATURE: 98.5 F | HEIGHT: 61 IN | BODY MASS INDEX: 18.31 KG/M2 | HEART RATE: 80 BPM | WEIGHT: 97 LBS | SYSTOLIC BLOOD PRESSURE: 106 MMHG | DIASTOLIC BLOOD PRESSURE: 57 MMHG

## 2023-08-09 LAB
ANION GAP SERPL CALCULATED.3IONS-SCNC: 8 MMOL/L (ref 3–16)
BASOPHILS # BLD: 0.1 K/UL (ref 0–0.2)
BASOPHILS NFR BLD: 0.7 %
BUN SERPL-MCNC: 12 MG/DL (ref 7–20)
CALCIUM SERPL-MCNC: 8.3 MG/DL (ref 8.3–10.6)
CHLORIDE SERPL-SCNC: 108 MMOL/L (ref 99–110)
CO2 SERPL-SCNC: 23 MMOL/L (ref 21–32)
CREAT SERPL-MCNC: 0.9 MG/DL (ref 0.6–1.2)
DEPRECATED RDW RBC AUTO: 16.7 % (ref 12.4–15.4)
EOSINOPHIL # BLD: 0.1 K/UL (ref 0–0.6)
EOSINOPHIL NFR BLD: 1.2 %
GFR SERPLBLD CREATININE-BSD FMLA CKD-EPI: 60 ML/MIN/{1.73_M2}
GLUCOSE SERPL-MCNC: 85 MG/DL (ref 70–99)
HCT VFR BLD AUTO: 27.5 % (ref 36–48)
HGB BLD-MCNC: 9.4 G/DL (ref 12–16)
INR PPP: 1.05 (ref 0.84–1.16)
LYMPHOCYTES # BLD: 1 K/UL (ref 1–5.1)
LYMPHOCYTES NFR BLD: 12.9 %
MCH RBC QN AUTO: 27.7 PG (ref 26–34)
MCHC RBC AUTO-ENTMCNC: 34 G/DL (ref 31–36)
MCV RBC AUTO: 81.6 FL (ref 80–100)
MONOCYTES # BLD: 0.8 K/UL (ref 0–1.3)
MONOCYTES NFR BLD: 10.5 %
NEUTROPHILS # BLD: 5.9 K/UL (ref 1.7–7.7)
NEUTROPHILS NFR BLD: 74.7 %
PLATELET # BLD AUTO: 201 K/UL (ref 135–450)
PMV BLD AUTO: 8.4 FL (ref 5–10.5)
POTASSIUM SERPL-SCNC: 3.7 MMOL/L (ref 3.5–5.1)
PROTHROMBIN TIME: 13.8 SEC (ref 11.5–14.8)
RBC # BLD AUTO: 3.38 M/UL (ref 4–5.2)
SODIUM SERPL-SCNC: 139 MMOL/L (ref 136–145)
WBC # BLD AUTO: 7.9 K/UL (ref 4–11)

## 2023-08-09 PROCEDURE — 97530 THERAPEUTIC ACTIVITIES: CPT

## 2023-08-09 PROCEDURE — 2580000003 HC RX 258: Performed by: INTERNAL MEDICINE

## 2023-08-09 PROCEDURE — 80048 BASIC METABOLIC PNL TOTAL CA: CPT

## 2023-08-09 PROCEDURE — 6370000000 HC RX 637 (ALT 250 FOR IP): Performed by: INTERNAL MEDICINE

## 2023-08-09 PROCEDURE — 85025 COMPLETE CBC W/AUTO DIFF WBC: CPT

## 2023-08-09 PROCEDURE — 36415 COLL VENOUS BLD VENIPUNCTURE: CPT

## 2023-08-09 PROCEDURE — 6370000000 HC RX 637 (ALT 250 FOR IP): Performed by: HOSPITALIST

## 2023-08-09 PROCEDURE — 85610 PROTHROMBIN TIME: CPT

## 2023-08-09 RX ORDER — FUROSEMIDE 20 MG/1
TABLET ORAL
Qty: 90 TABLET | Refills: 0 | OUTPATIENT
Start: 2023-08-09

## 2023-08-09 RX ADMIN — PANTOPRAZOLE SODIUM 20 MG: 20 TABLET, DELAYED RELEASE ORAL at 08:39

## 2023-08-09 RX ADMIN — SODIUM CHLORIDE, PRESERVATIVE FREE 10 ML: 5 INJECTION INTRAVENOUS at 08:39

## 2023-08-09 RX ADMIN — AMIODARONE HYDROCHLORIDE 200 MG: 200 TABLET ORAL at 08:39

## 2023-08-09 NOTE — DISCHARGE INSTR - COC
Continuity of Care Form    Patient Name: Domenic Enriquez   :  1931  MRN:  [id number]    Admit date:  2023  Discharge date:  23    Code Status Order: Full Code   Advance Directives:     Admitting Physician:  No admitting provider for patient encounter. PCP: Criselda Garza MD    Discharging Nurse: Lucila Chavarria RN  [facility] Unit/Room#: B1V-1008/1403-48  Discharging Unit Phone Number: [phone]    Emergency Contact:   Extended Emergency Contact Information  Primary Emergency Contact: Irish Louis  Address: [address] [facility], [address] [facility] [address] [address] [place] [employer] [place] Phone: [phone]  Mobile Phone: [phone]  Relation: [address] [place] [address]  Secondary Emergency Contact: [facility]  Address: [address], [address] [facility] of [address] [place] Phone: [phone]  Work Phone: [phone]  Mobile Phone: [phone]  Relation: Grandchild    Past Surgical History:  Past Surgical History:   Procedure Laterality Date    AORTIC VALVE REPLACEMENT N/A 2019    TRANSCATHETER AORTIC VALVE REPLACEMENT FEMORAL APPROACH performed by Uche Keller MD at [facility]  2018    HYSTERECTOMY, TOTAL ABDOMINAL (CERVIX REMOVED)      MOHS SURGERY  2022    Left arm    UPPER GASTROINTESTINAL ENDOSCOPY N/A 2022    EGD DILATION BALLOON performed by Alfred Diamond MD at [address] [place] [address] Bilateral        Immunization History: There is no immunization history on file for this patient.     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
regular

## 2023-08-09 NOTE — PLAN OF CARE
Problem: Discharge Planning  Goal: Discharge to home or other facility with appropriate resources  Outcome: Progressing     Problem: Cardiovascular - Adult  Goal: Maintains optimal cardiac output and hemodynamic stability  Outcome: Progressing  Goal: Absence of cardiac dysrhythmias or at baseline  Outcome: Progressing     Problem: Skin/Tissue Integrity - Adult  Goal: Skin integrity remains intact  Outcome: Progressing  Goal: Incisions, wounds, or drain sites healing without S/S of infection  Outcome: Progressing  Goal: Oral mucous membranes remain intact  Outcome: Progressing     Problem: Musculoskeletal - Adult  Goal: Return mobility to safest level of function  Outcome: Progressing  Goal: Maintain proper alignment of affected body part  Outcome: Progressing  Goal: Return ADL status to a safe level of function  Outcome: Progressing     Problem: Genitourinary - Adult  Goal: Absence of urinary retention  Outcome: Progressing     Problem: Infection - Adult  Goal: Absence of infection at discharge  Outcome: Progressing  Goal: Absence of infection during hospitalization  Outcome: Progressing  Goal: Absence of fever/infection during anticipated neutropenic period  Outcome: Progressing     Problem: Metabolic/Fluid and Electrolytes - Adult  Goal: Electrolytes maintained within normal limits  Outcome: Progressing  Goal: Hemodynamic stability and optimal renal function maintained  Outcome: Progressing     Problem: Safety - Adult  Goal: Free from fall injury  Outcome: Progressing     Problem: ABCDS Injury Assessment  Goal: Absence of physical injury  Outcome: Progressing     Problem: Skin/Tissue Integrity  Goal: Absence of new skin breakdown  Description: 1. Monitor for areas of redness and/or skin breakdown  2. Assess vascular access sites hourly  3. Every 4-6 hours minimum:  Change oxygen saturation probe site  4.   Every 4-6 hours:  If on nasal continuous positive airway pressure, respiratory therapy assess nares and
Problem: Discharge Planning  Goal: Discharge to home or other facility with appropriate resources  Outcome: Progressing  Flowsheets  Taken 8/8/2023 0206  Discharge to home or other facility with appropriate resources:   Identify barriers to discharge with patient and caregiver   Arrange for needed discharge resources and transportation as appropriate   Identify discharge learning needs (meds, wound care, etc)   Arrange for interpreters to assist at discharge as needed   Refer to discharge planning if patient needs post-hospital services based on physician order or complex needs related to functional status, cognitive ability or social support system  Taken 8/8/2023 0009  Discharge to home or other facility with appropriate resources:   Identify barriers to discharge with patient and caregiver   Arrange for needed discharge resources and transportation as appropriate   Identify discharge learning needs (meds, wound care, etc)     Problem: Cardiovascular - Adult  Goal: Maintains optimal cardiac output and hemodynamic stability  Outcome: Progressing  Goal: Absence of cardiac dysrhythmias or at baseline  Outcome: Progressing     Problem: Skin/Tissue Integrity - Adult  Goal: Skin integrity remains intact  Outcome: Progressing  Goal: Incisions, wounds, or drain sites healing without S/S of infection  Outcome: Progressing  Goal: Oral mucous membranes remain intact  Outcome: Progressing     Problem: Musculoskeletal - Adult  Goal: Return mobility to safest level of function  Outcome: Progressing  Goal: Maintain proper alignment of affected body part  Outcome: Progressing  Goal: Return ADL status to a safe level of function  Outcome: Progressing     Problem: Genitourinary - Adult  Goal: Absence of urinary retention  Outcome: Progressing     Problem: Infection - Adult  Goal: Absence of infection at discharge  Outcome: Progressing  Goal: Absence of infection during hospitalization  Outcome: Progressing  Goal: Absence of
Adult  Goal: Absence of fever/infection during anticipated neutropenic period  8/8/2023 1214 by Ale Sheikh RN  Outcome: Progressing     Problem: Metabolic/Fluid and Electrolytes - Adult  Goal: Electrolytes maintained within normal limits  8/8/2023 1214 by Ale Sheikh RN  Outcome: Progressing     Problem: Metabolic/Fluid and Electrolytes - Adult  Goal: Hemodynamic stability and optimal renal function maintained  8/8/2023 1214 by Ale Sheikh RN  Outcome: Progressing     Problem: Safety - Adult  Goal: Free from fall injury  8/8/2023 1214 by Ale Sheikh RN  Outcome: Progressing     Problem: ABCDS Injury Assessment  Goal: Absence of physical injury  8/8/2023 1214 by Ale Sheikh RN  Outcome: Progressing     Problem: Skin/Tissue Integrity  Goal: Absence of new skin breakdown  Description: 1. Monitor for areas of redness and/or skin breakdown  2. Assess vascular access sites hourly  3. Every 4-6 hours minimum:  Change oxygen saturation probe site  4. Every 4-6 hours:  If on nasal continuous positive airway pressure, respiratory therapy assess nares and determine need for appliance change or resting period.   8/8/2023 1214 by Ale Sheikh RN  Outcome: Progressing

## 2023-08-09 NOTE — DISCHARGE SUMMARY
Hospital Medicine Discharge Summary    Patient ID: Ly Recinos      Patient's PCP: Andrew Petit MD    Admit Date: 8/7/2023     Discharge Date:   8/9/23    Admitting Physician: No admitting provider for patient encounter. Discharge Physician: Maryana Cowan MD     Discharge Diagnoses: Active Hospital Problems    Diagnosis     Acute blood loss anemia [D62]        The patient was seen and examined on day of discharge and this discharge summary is in conjunction with any daily progress note from day of discharge. Hospital Course:     Ly Recinos is a very pleasant 80 y.o. female with a PMHx of HTN, pAfib (on warfarin, beginning after a Covid infection 2020), Aortic stenosis (s/p TAVR 5/2019), Arthritis, who presented to the [facility] ED for evaluation. Pt with multiple falls over the past week. Pt accompanied by her granddaughter who is a nurse at a local physician's office. She notes pt first fell last week in her kitchen after a rug slipped out from under her. She was able to get back up without issue. The second fall occurred after slipping down a bottom step when she refused to wait for her daughter's assistance. The third fall occurred yesterday after she went to sit on a roller chair and it slid out from under her. She hit the left buttock causing a large hematoma. She has since been more fatigued and with increased pallor. She is otherwise in good spirits and denies any pain. Denies LOC, head trauma, neck pain, or leg pain. Granddaughter notes she had some episodes of low BP at home, subsequently finding at cardiology follow up that she was in rapid Afib. Her HTN meds were discontinued and she was initiated on amiodarone which has been helping. In the ER, VS stable, afebrile. Labs significant for Cr 1.5, Hgb 6.7, troponin 18. INR was >17.24. EKG was not ordered.   Extensive imaging showed large 7.7cm hematoma of left gluteal muscle, 4.2cm hematoma over

## 2023-08-10 ENCOUNTER — TELEPHONE (OUTPATIENT)
Dept: FAMILY MEDICINE CLINIC | Age: 88
End: 2023-08-10

## 2023-08-10 NOTE — CARE COORDINATION
Case Management Assessment  Initial Evaluation    Date/Time of Evaluation: 8/8/2023 4:16 PM  Assessment Completed by: Catrina Coronel RN    If patient is discharged prior to next notation, then this note serves as note for discharge by case management. Patient Name: Duran Belcher                     YOB: 1931  Diagnosis: Acute blood loss anemia [D62]  Hematoma [T14. 8XXA]  FRANKLIN (acute kidney injury) ([address]) [N17.9]  Supratherapeutic INR [R79.1]  Closed wedge compression fracture of T12 vertebra, initial encounter ([address]) [S22.080A]  Rotator cuff dysfunction, right [M67.911]  Anemia, unspecified type [D64.9]                     Date / Time: 8/7/2023  5:57 PM    Patient Admission Status: Inpatient   Readmission Risk (Low < 19, Mod (19-27), High > 27): Readmission Risk Score: 17.9    Current PCP: Rashida Hernández MD  PCP verified by CM? Yes    Chart Reviewed: Yes      History Provided by: Patient  Patient Orientation: Alert and Oriented    Patient Cognition: Alert    Hospitalization in the last 30 days (Readmission):  No    If yes, Readmission Assessment in CM Navigator will be completed. Advance Directives:      Code Status: Full Code   Patient's Primary Decision Maker is: Legal Next of Kin    Primary Decision MakerFlylejoey Chani - [phone]    Secondary Decision Maker: Eric [facility] - [phone]    Discharge Planning:    Patient lives with: (P) Family Members Type of Home: (P) House  Primary Care Giver:  Other (Comment) (Granddaughter)  Patient Support Systems include: Children, Family Members, Friends/Neighbors   Current Financial resources: (P) Medicare  Current community resources: (P) None  Current services prior to admission: (P) None            Current DME:              Type of Home Care services:  (P) None    ADLS  Prior functional level: (P) Assistance with the following:, Shopping, Cooking  Current functional level: (P) Cooking, Shopping    PT AM-PAC:   /24  OT
[facility]    DC order noted, all docs needed have been faxed to [facility] for home care services.     Home care to see patient within 24-48 hrs    Amira Logan RN, BSN CTN  [facility] [phone]
[facility]    Referral received from  to follow for home care services. I will follow for needs, and speak with patient to verify demos.     Albert Shirley RN, BSN CTN  [facility] [phone]
choice of provider and agrees with the discharge plan Yes    Freedom of choice list was provided with basic dialogue that supports the patient's individualized plan of care/goals and shares the quality data associated with the providers.  Yes    [place] [address]   Case Management Department  Ph: [phone]  Fax: [phone]

## 2023-08-11 ENCOUNTER — TELEPHONE (OUTPATIENT)
Dept: PRIMARY CARE CLINIC | Age: 88
End: 2023-08-11

## 2023-08-16 ENCOUNTER — TELEPHONE (OUTPATIENT)
Dept: FAMILY MEDICINE CLINIC | Age: 88
End: 2023-08-16

## 2023-08-16 RX ORDER — AMIODARONE HYDROCHLORIDE 200 MG/1
TABLET ORAL
Qty: 60 TABLET | Refills: 5 | Status: SHIPPED | OUTPATIENT
Start: 2023-08-16

## 2023-08-16 NOTE — TELEPHONE ENCOUNTER
Care Transitions Initial Follow Up Call    Outreach made within 2 business days of discharge: Yes    Patient: Freddy Gomes Patient : 1931   MRN: [id number]  Reason for Admission: There are no discharge diagnoses documented for the most recent discharge. Discharge Date: 23       Spoke with: SPOKE WITH PT GRANDDAUGHTER ON  DOCUMENTATION WAS MISSED     Discharge department/facility: [facility]     TCM Interactive Patient Contact:  Was patient able to fill all prescriptions: Yes  Was patient instructed to bring all medications to the follow-up visit: Yes  Is patient taking all medications as directed in the discharge summary?  Yes  Does patient understand their discharge instructions: Yes  Does patient have questions or concerns that need addressed prior to 7-14 day follow up office visit: yes -     Scheduled appointment with PCP within 7-14 days    Follow Up  Future Appointments   Date Time Provider [address] [place] [address]   2023  3:40 PM CAROLYN Arenas - STARR [address]     SPOKE TO GRANDDAUGHTER ON  SCHEDULED HER FOR  WITH Lien Nurse STARR Row[place], [place]

## 2023-08-21 NOTE — PATIENT INSTRUCTIONS
You may receive a survey regarding the care you received during your visit. Your input is valuable to us. We encourage you to complete and return your survey. We hope you will choose us in the future for your healthcare needs. GENERAL OFFICE POLICIES      Telephone Calls: Messages will be answered within 1-2 business days, unless the provider is out of the office. If it is urgent a covering provider will answer. (this does not include Medication refills). MyChart: We recommend all patients sign up for [employer]. Through this portal you can see your lab results, request refills, schedule appointments, pay your bill and send messages to the office. [employer] messages will be answered within 1-2 business days unless the provider is out of the office. For urgent matters, please call the office. Appointments:  All appointments must be scheduled. We ask all patients to schedule their next follow up appointment before they leave the office to make sure you will be able to be seen before you run out of medications. 24 hours notice is required to cancel or reschedule an appointment to avoid being marked as a no show. You may be dismissed from the practice after 3 no shows. LATE for Appointment: If you are 15 or more minutes late for your appointment, you may be asked to reschedule. MA/LAB APPTS: Must be scheduled, cannot accept walk in lab visits. We only draw labs for patients established in our office. We only do injections for medications ordered by our office. Acute Sick Visits:  Nothing other than acute complaint will be addressed at this visit. TRADITIONAL MEDICARE  DOES NOT COVER PHYSICALS  MEDICARE WELLNESS VISITS: These are NOT physicals but the free annual visit offered by Medicare to discuss wellness issues. Medication refills, checkups, etc. will not be addressed during this visit.   Medication Refills: Refills are handled electronically so please contact your pharmacy for medication refills

## 2023-08-22 ENCOUNTER — OFFICE VISIT (OUTPATIENT)
Dept: FAMILY MEDICINE CLINIC | Age: 88
End: 2023-08-22

## 2023-08-22 VITALS
HEART RATE: 80 BPM | SYSTOLIC BLOOD PRESSURE: 156 MMHG | BODY MASS INDEX: 16.62 KG/M2 | OXYGEN SATURATION: 99 % | HEIGHT: 61 IN | DIASTOLIC BLOOD PRESSURE: 60 MMHG | WEIGHT: 88 LBS

## 2023-08-22 DIAGNOSIS — Z91.81 AT HIGH RISK FOR FALLS: ICD-10-CM

## 2023-08-22 DIAGNOSIS — R60.9 DEPENDENT EDEMA: ICD-10-CM

## 2023-08-22 DIAGNOSIS — T14.8XXA HEMATOMA: ICD-10-CM

## 2023-08-22 DIAGNOSIS — D64.9 ANEMIA, UNSPECIFIED TYPE: ICD-10-CM

## 2023-08-22 DIAGNOSIS — Z09 HOSPITAL DISCHARGE FOLLOW-UP: ICD-10-CM

## 2023-08-22 DIAGNOSIS — I48.0 PAF (PAROXYSMAL ATRIAL FIBRILLATION) (HCC): ICD-10-CM

## 2023-08-22 DIAGNOSIS — N17.9 AKI (ACUTE KIDNEY INJURY) (HCC): Primary | ICD-10-CM

## 2023-08-22 LAB
BASOPHILS # BLD: 0 K/UL (ref 0–0.2)
BASOPHILS NFR BLD: 1.1 %
DEPRECATED RDW RBC AUTO: 23.4 % (ref 12.4–15.4)
EOSINOPHIL # BLD: 0 K/UL (ref 0–0.6)
EOSINOPHIL NFR BLD: 1.1 %
HCT VFR BLD AUTO: 37.9 % (ref 36–48)
HGB BLD-MCNC: 12.1 G/DL (ref 12–16)
LYMPHOCYTES # BLD: 1 K/UL (ref 1–5.1)
LYMPHOCYTES NFR BLD: 22.7 %
MCH RBC QN AUTO: 28.7 PG (ref 26–34)
MCHC RBC AUTO-ENTMCNC: 31.8 G/DL (ref 31–36)
MCV RBC AUTO: 90.1 FL (ref 80–100)
MONOCYTES # BLD: 0.5 K/UL (ref 0–1.3)
MONOCYTES NFR BLD: 12.3 %
NEUTROPHILS # BLD: 2.7 K/UL (ref 1.7–7.7)
NEUTROPHILS NFR BLD: 62.8 %
PLATELET # BLD AUTO: 220 K/UL (ref 135–450)
PMV BLD AUTO: 8.9 FL (ref 5–10.5)
RBC # BLD AUTO: 4.21 M/UL (ref 4–5.2)
WBC # BLD AUTO: 4.3 K/UL (ref 4–11)

## 2023-08-22 RX ORDER — AMIODARONE HYDROCHLORIDE 200 MG/1
200 TABLET ORAL DAILY
COMMUNITY

## 2023-08-22 ASSESSMENT — PATIENT HEALTH QUESTIONNAIRE - PHQ9
2. FEELING DOWN, DEPRESSED OR HOPELESS: 0
SUM OF ALL RESPONSES TO PHQ QUESTIONS 1-9: 0
1. LITTLE INTEREST OR PLEASURE IN DOING THINGS: 0
SUM OF ALL RESPONSES TO PHQ QUESTIONS 1-9: 0
SUM OF ALL RESPONSES TO PHQ9 QUESTIONS 1 & 2: 0
SUM OF ALL RESPONSES TO PHQ QUESTIONS 1-9: 0
SUM OF ALL RESPONSES TO PHQ QUESTIONS 1-9: 0

## 2023-08-22 NOTE — PROGRESS NOTES
thoracic area - right foot  Pulmonary/Chest: clear to auscultation bilaterally- no wheezes, rales or rhonchi, normal air movement, no respiratory distress  Cardiovascular: normal rate right foot swollen + 2 edema no pain decreased pedal pulse  Musculoskeletal: normal range of motion, no joint swelling, deformity or tenderness      An electronic signature was used to authenticate this note.   --Kayden Silverio, APRREBEKAH - CNP

## 2023-08-23 LAB
ANION GAP SERPL CALCULATED.3IONS-SCNC: 10 MMOL/L (ref 3–16)
BUN SERPL-MCNC: 16 MG/DL (ref 7–20)
CALCIUM SERPL-MCNC: 9.7 MG/DL (ref 8.3–10.6)
CHLORIDE SERPL-SCNC: 101 MMOL/L (ref 99–110)
CO2 SERPL-SCNC: 28 MMOL/L (ref 21–32)
CREAT SERPL-MCNC: 1 MG/DL (ref 0.6–1.2)
GFR SERPLBLD CREATININE-BSD FMLA CKD-EPI: 53 ML/MIN/{1.73_M2}
GLUCOSE SERPL-MCNC: 89 MG/DL (ref 70–99)
POTASSIUM SERPL-SCNC: 4.7 MMOL/L (ref 3.5–5.1)
SODIUM SERPL-SCNC: 139 MMOL/L (ref 136–145)

## 2023-08-26 ENCOUNTER — APPOINTMENT (OUTPATIENT)
Dept: GENERAL RADIOLOGY | Age: 88
End: 2023-08-26
Payer: MEDICARE

## 2023-08-26 ENCOUNTER — HOSPITAL ENCOUNTER (EMERGENCY)
Age: 88
Discharge: HOME | End: 2023-08-26
Attending: EMERGENCY MEDICINE
Payer: MEDICARE

## 2023-08-26 VITALS
WEIGHT: 94.8 LBS | HEART RATE: 71 BPM | OXYGEN SATURATION: 98 % | DIASTOLIC BLOOD PRESSURE: 60 MMHG | BODY MASS INDEX: 17.91 KG/M2 | RESPIRATION RATE: 17 BRPM | TEMPERATURE: 97.6 F | SYSTOLIC BLOOD PRESSURE: 161 MMHG

## 2023-08-26 DIAGNOSIS — T50.901A MEDICATION ADMINISTERED IN ERROR, ACCIDENTAL OR UNINTENTIONAL, INITIAL ENCOUNTER: Primary | ICD-10-CM

## 2023-08-26 LAB
ANION GAP SERPL CALCULATED.3IONS-SCNC: 10 MMOL/L (ref 3–16)
BASOPHILS # BLD: 0 K/UL (ref 0–0.2)
BASOPHILS NFR BLD: 1 %
BUN SERPL-MCNC: 18 MG/DL (ref 7–20)
CALCIUM SERPL-MCNC: 9.2 MG/DL (ref 8.3–10.6)
CHLORIDE SERPL-SCNC: 100 MMOL/L (ref 99–110)
CK SERPL-CCNC: 18 U/L (ref 26–192)
CO2 SERPL-SCNC: 26 MMOL/L (ref 21–32)
CREAT SERPL-MCNC: 1.1 MG/DL (ref 0.6–1.2)
DEPRECATED RDW RBC AUTO: 21.3 % (ref 12.4–15.4)
EOSINOPHIL # BLD: 0 K/UL (ref 0–0.6)
EOSINOPHIL NFR BLD: 0.4 %
GFR SERPLBLD CREATININE-BSD FMLA CKD-EPI: 47 ML/MIN/{1.73_M2}
GLUCOSE SERPL-MCNC: 150 MG/DL (ref 70–99)
HCT VFR BLD AUTO: 36.5 % (ref 36–48)
HGB BLD-MCNC: 12 G/DL (ref 12–16)
LACTATE BLDV-SCNC: 1.9 MMOL/L (ref 0.4–2)
LYMPHOCYTES # BLD: 0.5 K/UL (ref 1–5.1)
LYMPHOCYTES NFR BLD: 13.4 %
MCH RBC QN AUTO: 28.8 PG (ref 26–34)
MCHC RBC AUTO-ENTMCNC: 32.8 G/DL (ref 31–36)
MCV RBC AUTO: 87.9 FL (ref 80–100)
MONOCYTES # BLD: 0.3 K/UL (ref 0–1.3)
MONOCYTES NFR BLD: 8.1 %
NEUTROPHILS # BLD: 3.1 K/UL (ref 1.7–7.7)
NEUTROPHILS NFR BLD: 77.1 %
PLATELET # BLD AUTO: 176 K/UL (ref 135–450)
PMV BLD AUTO: 8.9 FL (ref 5–10.5)
POTASSIUM SERPL-SCNC: 4 MMOL/L (ref 3.5–5.1)
RBC # BLD AUTO: 4.15 M/UL (ref 4–5.2)
SODIUM SERPL-SCNC: 136 MMOL/L (ref 136–145)
TROPONIN, HIGH SENSITIVITY: 18 NG/L (ref 0–14)
WBC # BLD AUTO: 4 K/UL (ref 4–11)

## 2023-08-26 PROCEDURE — 2580000003 HC RX 258: Performed by: PHYSICIAN ASSISTANT

## 2023-08-26 PROCEDURE — 83605 ASSAY OF LACTIC ACID: CPT

## 2023-08-26 PROCEDURE — 93005 ELECTROCARDIOGRAM TRACING: CPT | Performed by: PHYSICIAN ASSISTANT

## 2023-08-26 PROCEDURE — 80048 BASIC METABOLIC PNL TOTAL CA: CPT

## 2023-08-26 PROCEDURE — 71045 X-RAY EXAM CHEST 1 VIEW: CPT

## 2023-08-26 PROCEDURE — 99285 EMERGENCY DEPT VISIT HI MDM: CPT

## 2023-08-26 PROCEDURE — 82550 ASSAY OF CK (CPK): CPT

## 2023-08-26 PROCEDURE — 85025 COMPLETE CBC W/AUTO DIFF WBC: CPT

## 2023-08-26 PROCEDURE — 84484 ASSAY OF TROPONIN QUANT: CPT

## 2023-08-26 PROCEDURE — 36415 COLL VENOUS BLD VENIPUNCTURE: CPT

## 2023-08-26 RX ORDER — 0.9 % SODIUM CHLORIDE 0.9 %
1000 INTRAVENOUS SOLUTION INTRAVENOUS ONCE
Status: COMPLETED | OUTPATIENT
Start: 2023-08-26 | End: 2023-08-26

## 2023-08-26 RX ADMIN — SODIUM CHLORIDE 1000 ML: 9 INJECTION, SOLUTION INTRAVENOUS at 17:38

## 2023-08-26 ASSESSMENT — PAIN - FUNCTIONAL ASSESSMENT: PAIN_FUNCTIONAL_ASSESSMENT: NONE - DENIES PAIN

## 2023-08-26 NOTE — ED NOTES
Report given to Napoleon Linder RN to assume care. EDSBAR discussed.      Nely Mtz RN  08/26/23 1926

## 2023-08-26 NOTE — ED TRIAGE NOTES
Pt arrived via EMS from home d/t accidentally taking her daughter's home medications. Medication reported by granddaughter at bedside were Clozapine 25mg and Lexipro 5mg. Family called because she was having difficulty staying awake. Pt is A&Ox4 presently, hard of hearing bilaterally. Normal sinus on the monitor. Respirations even, easy, unlabored.

## 2023-08-27 LAB
EKG ATRIAL RATE: 79 BPM
EKG DIAGNOSIS: NORMAL
EKG P AXIS: 35 DEGREES
EKG P-R INTERVAL: 176 MS
EKG Q-T INTERVAL: 392 MS
EKG QRS DURATION: 90 MS
EKG QTC CALCULATION (BAZETT): 449 MS
EKG R AXIS: -8 DEGREES
EKG T AXIS: 31 DEGREES
EKG VENTRICULAR RATE: 79 BPM

## 2023-08-27 PROCEDURE — 93010 ELECTROCARDIOGRAM REPORT: CPT | Performed by: INTERNAL MEDICINE

## 2023-08-27 NOTE — ED NOTES
Patient and daughter would like to go. 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 PA aware.      Nandini Bush RN  08/26/23 2059

## 2023-08-28 ENCOUNTER — TELEPHONE (OUTPATIENT)
Dept: FAMILY MEDICINE CLINIC | Age: 88
End: 2023-08-28

## 2023-08-28 NOTE — TELEPHONE ENCOUNTER
[address] [facility] is calling because they need orders for:    Urine - sample - testing, rule out UTI  87 lbs - not eating - appetite stimulate   She took her daughters medication yesterday and landed in the ER. Please give her a call back.

## 2023-08-30 LAB
BILIRUBIN, URINE: NEGATIVE
CLARITY: CLEAR
COLOR: YELLOW
EPITHELIAL CELLS, UA: NORMAL /HPF
ERYTHROCYTES URINE: NORMAL /HPF
GLUCOSE URINE: NEGATIVE
HB: SOURCE: NORMAL
KETONES, URINE: NEGATIVE
LEUKOCYTE ESTERASE, URINE: NEGATIVE
LEUKOCYTES, UA: <6 /HPF
NITRITE, URINE: NEGATIVE
PH, URINE: 6 (ref 5–8)
PROTEIN, URINE: NEGATIVE
RBC URINE: NEGATIVE
SPECIFIC GRAVITY UA: 1.01 (ref 1–1.03)
URINE TYPE: NORMAL
UROBILINOGEN, URINE: NORMAL MG/DL

## 2023-09-02 LAB
CULTURE RESULT: NORMAL
Lab: NORMAL
REPORT STATUS: NORMAL
SPECIMEN DESCRIPTION: NORMAL

## 2023-09-07 ENCOUNTER — OFFICE VISIT (OUTPATIENT)
Dept: CARDIOLOGY CLINIC | Age: 88
End: 2023-09-07
Payer: MEDICARE

## 2023-09-07 VITALS
WEIGHT: 86 LBS | HEIGHT: 61 IN | DIASTOLIC BLOOD PRESSURE: 52 MMHG | OXYGEN SATURATION: 98 % | HEART RATE: 73 BPM | SYSTOLIC BLOOD PRESSURE: 120 MMHG | BODY MASS INDEX: 16.24 KG/M2

## 2023-09-07 DIAGNOSIS — Z09 HOSPITAL DISCHARGE FOLLOW-UP: ICD-10-CM

## 2023-09-07 DIAGNOSIS — I10 ESSENTIAL HYPERTENSION: ICD-10-CM

## 2023-09-07 DIAGNOSIS — I25.10 CORONARY ARTERY DISEASE INVOLVING NATIVE CORONARY ARTERY OF NATIVE HEART WITHOUT ANGINA PECTORIS: ICD-10-CM

## 2023-09-07 DIAGNOSIS — I48.0 PAF (PAROXYSMAL ATRIAL FIBRILLATION) (HCC): Primary | ICD-10-CM

## 2023-09-07 PROBLEM — D62 ACUTE BLOOD LOSS ANEMIA: Status: RESOLVED | Noted: 2023-08-07 | Resolved: 2023-09-07

## 2023-09-07 PROCEDURE — 93000 ELECTROCARDIOGRAM COMPLETE: CPT | Performed by: NURSE PRACTITIONER

## 2023-09-07 PROCEDURE — 1123F ACP DISCUSS/DSCN MKR DOCD: CPT | Performed by: NURSE PRACTITIONER

## 2023-09-07 PROCEDURE — 1111F DSCHRG MED/CURRENT MED MERGE: CPT | Performed by: NURSE PRACTITIONER

## 2023-09-07 PROCEDURE — 99214 OFFICE O/P EST MOD 30 MIN: CPT | Performed by: NURSE PRACTITIONER

## 2023-09-11 ENCOUNTER — TELEPHONE (OUTPATIENT)
Dept: FAMILY MEDICINE CLINIC | Age: 88
End: 2023-09-11

## 2023-09-11 DIAGNOSIS — E44.1 MILD MALNUTRITION (HCC): ICD-10-CM

## 2023-09-11 DIAGNOSIS — R63.6 UNDERWEIGHT: Primary | ICD-10-CM

## 2023-09-11 DIAGNOSIS — N17.9 AKI (ACUTE KIDNEY INJURY) (HCC): ICD-10-CM

## 2023-09-11 RX ORDER — MEGESTROL ACETATE 40 MG/1
40 TABLET ORAL DAILY
Qty: 30 TABLET | Refills: 3 | Status: SHIPPED | OUTPATIENT
Start: 2023-09-11

## 2023-09-11 NOTE — TELEPHONE ENCOUNTER
Jarett Maldonado @  [phone]    CALLING RE:  HARIKA IS EATING VERY WELL - NOT EVEN A WHOLE MEAL A DAY -    ANYTHING YOU CAN GIVE HER?  MAYBE [facility] [id number] Jcarlos Santiago, [address] [place] [address] [address] - F [phone]

## 2023-09-12 NOTE — TELEPHONE ENCOUNTER
Received PA Request (Jamison)  Submitted PA for Megestrol Acetate [facility] Key: [id number] STATUS: PENDING. Follow up done daily; if no response in three days we will refax for status check. If another three days goes by with no response we will call the insurance for status.

## 2023-09-13 ENCOUNTER — TELEPHONE (OUTPATIENT)
Dept: FAMILY MEDICINE CLINIC | Age: 88
End: 2023-09-13

## 2023-09-13 NOTE — TELEPHONE ENCOUNTER
The medication is APPROVED.  6/13/2023- 9/11/2024. If this requires a response please respond to the pool ( P [phone] Solorzano). Thank you please advise patient.

## 2023-09-27 RX ORDER — FUROSEMIDE 20 MG/1
20 TABLET ORAL DAILY
Qty: 90 TABLET | Refills: 0 | Status: SHIPPED | OUTPATIENT
Start: 2023-09-27

## 2023-09-28 ENCOUNTER — TELEPHONE (OUTPATIENT)
Dept: FAMILY MEDICINE CLINIC | Age: 88
End: 2023-09-28

## 2023-09-28 RX ORDER — PANTOPRAZOLE SODIUM 20 MG/1
20 TABLET, DELAYED RELEASE ORAL
Qty: 30 TABLET | Refills: 2 | Status: SHIPPED | OUTPATIENT
Start: 2023-09-28

## 2023-10-03 ENCOUNTER — TELEPHONE (OUTPATIENT)
Dept: FAMILY MEDICINE CLINIC | Age: 88
End: 2023-10-03

## 2023-10-03 RX ORDER — MELOXICAM 7.5 MG/1
7.5 TABLET ORAL DAILY
Qty: 90 TABLET | Refills: 0 | Status: SHIPPED | OUTPATIENT
Start: 2023-10-03

## 2023-10-04 NOTE — ED PROVIDER NOTES
[address] [facility] [address] [address]      Pt Name: Roger Singh  MRN: [id number]  [address] [facility] 4/14/1931  Date of evaluation: 8/26/2023  Provider: ZACHARY Rod  PCP: Morelia Beavers MD  Note Started: 5:12 PM EDT     This patient was also seen and evaluated by Attending Physician Jez Solorzano MD.    [address]       Chief Complaint   Patient presents with    Other     Took sister's medications by accident about 1400. 25mg Clozapine, 5mg Lexipro. HISTORY OF PRESENT ILLNESS   (Location, Timing/Onset, Context/Setting, Quality, Duration, Modifying Factors, Severity, Associated Signs and Symptoms)  Note limiting factors. Roger Singh is a 80 y.o. female who presents via EMS, accompanied by her niece, with report of taking the wrong prescription medication. The niece says that the patient had not taken her own prescription medications this morning, but went to take them sometime around 2:00 PM, but mistakenly took her sister's medication instead, as the niece says the patient's own medications were still there and the sisters medications were gone. The sister's medications are one 25 mg clozapine and one 5 mg Lexapro, and there is no suspicion that the patient took more than that. The niece says that soon afterward the patient was noted to be very tired, nearly falling asleep, and at 1 point had a blood pressure as low as approximately 75/45, but then it came back up. She says now the patient still looks tired and somewhat fatigued. Normally the patient ambulates well on her own with a walker, but they had to assist her to get her up before coming here to the ED. She says that lately the patient has been healthy, no recent hospitalizations or infections, has been generally her normal self. She normally has good mental status. Patient herself says she cannot remember taking the medication earlier today.   Says she feels a little bit never used

## 2023-10-05 ENCOUNTER — TELEPHONE (OUTPATIENT)
Dept: FAMILY MEDICINE CLINIC | Age: 88
End: 2023-10-05

## 2023-10-05 NOTE — TELEPHONE ENCOUNTER
They did a home assessment today. Re Chuck Patten and they want it to be extended for 4 weeks.   Will Fabiola sign the orders  They Need a verbal

## 2023-11-06 ENCOUNTER — NURSE ONLY (OUTPATIENT)
Dept: FAMILY MEDICINE CLINIC | Age: 88
End: 2023-11-06
Payer: MEDICARE

## 2023-11-06 DIAGNOSIS — N39.0 URINARY TRACT INFECTION WITH HEMATURIA, SITE UNSPECIFIED: Primary | ICD-10-CM

## 2023-11-06 DIAGNOSIS — R31.9 URINARY TRACT INFECTION WITH HEMATURIA, SITE UNSPECIFIED: Primary | ICD-10-CM

## 2023-11-06 LAB
BILIRUBIN, POC: NORMAL
BLOOD URINE, POC: NORMAL
CLARITY, POC: CLEAR
COLOR, POC: YELLOW
GLUCOSE URINE, POC: NORMAL
KETONES, POC: NORMAL
LEUKOCYTE EST, POC: NORMAL
NITRITE, POC: NORMAL
PH, POC: 6
PROTEIN, POC: NORMAL
SPECIFIC GRAVITY, POC: 1.01
UROBILINOGEN, POC: 0.2

## 2023-11-06 PROCEDURE — 81002 URINALYSIS NONAUTO W/O SCOPE: CPT | Performed by: NURSE PRACTITIONER

## 2023-11-07 LAB — BACTERIA UR CULT: NORMAL

## 2023-11-10 ENCOUNTER — TELEPHONE (OUTPATIENT)
Dept: FAMILY MEDICINE CLINIC | Age: 88
End: 2023-11-10

## 2023-11-10 DIAGNOSIS — R09.89 CHEST CONGESTION: ICD-10-CM

## 2023-11-10 DIAGNOSIS — R09.81 NASAL CONGESTION: ICD-10-CM

## 2023-11-10 RX ORDER — AMOXICILLIN 500 MG/1
500 CAPSULE ORAL 2 TIMES DAILY
Qty: 14 CAPSULE | Refills: 0 | Status: SHIPPED | OUTPATIENT
Start: 2023-11-10 | End: 2023-11-17

## 2023-11-10 NOTE — TELEPHONE ENCOUNTER
COUGH CONGESTION NO FEVER NEG FOR COVID 02 NORMAL COUGHING ALL NIGHT, TRYING ROBITUSSIN MUCINEX NASAL SPRAY, AND LEFT OVER TESSOLON PEARLS. CAN WE SEND MEDS TO Toshia Barth? AND MAYBE SOMETHING MILD FOR COUGH?

## 2023-11-17 ENCOUNTER — TELEPHONE (OUTPATIENT)
Dept: FAMILY MEDICINE CLINIC | Age: 88
End: 2023-11-17

## 2023-11-17 RX ORDER — POTASSIUM CHLORIDE 750 MG/1
10 TABLET, EXTENDED RELEASE ORAL DAILY
Qty: 30 TABLET | Refills: 5 | Status: SHIPPED | OUTPATIENT
Start: 2023-11-17

## 2023-11-27 ENCOUNTER — NURSE ONLY (OUTPATIENT)
Dept: FAMILY MEDICINE CLINIC | Age: 88
End: 2023-11-27
Payer: MEDICARE

## 2023-11-27 DIAGNOSIS — N39.0 URINARY TRACT INFECTION WITH HEMATURIA, SITE UNSPECIFIED: Primary | ICD-10-CM

## 2023-11-27 DIAGNOSIS — R31.9 URINARY TRACT INFECTION WITH HEMATURIA, SITE UNSPECIFIED: Primary | ICD-10-CM

## 2023-11-27 LAB
BILIRUBIN, POC: ABNORMAL
BLOOD URINE, POC: ABNORMAL
CLARITY, POC: CLEAR
COLOR, POC: YELLOW
GLUCOSE URINE, POC: ABNORMAL
KETONES, POC: ABNORMAL
LEUKOCYTE EST, POC: ABNORMAL
NITRITE, POC: ABNORMAL
PH, POC: 6.5
PROTEIN, POC: ABNORMAL
SPECIFIC GRAVITY, POC: 1.02
UROBILINOGEN, POC: 0.2

## 2023-11-27 PROCEDURE — 81002 URINALYSIS NONAUTO W/O SCOPE: CPT

## 2023-11-29 LAB — BACTERIA UR CULT: NORMAL

## 2023-12-26 RX ORDER — PANTOPRAZOLE SODIUM 20 MG/1
20 TABLET, DELAYED RELEASE ORAL
Qty: 30 TABLET | Refills: 2 | Status: CANCELLED | OUTPATIENT
Start: 2023-12-26

## 2023-12-26 RX ORDER — FUROSEMIDE 20 MG/1
20 TABLET ORAL DAILY
Qty: 90 TABLET | Refills: 0 | Status: CANCELLED | OUTPATIENT
Start: 2023-12-26

## 2023-12-28 RX ORDER — PANTOPRAZOLE SODIUM 20 MG/1
20 TABLET, DELAYED RELEASE ORAL DAILY
COMMUNITY
Start: 2023-12-28 | End: 2023-12-28 | Stop reason: SDUPTHER

## 2023-12-28 RX ORDER — PANTOPRAZOLE SODIUM 20 MG/1
20 TABLET, DELAYED RELEASE ORAL DAILY
Qty: 30 TABLET | Refills: 0 | Status: SHIPPED | OUTPATIENT
Start: 2023-12-28

## 2023-12-28 RX ORDER — FUROSEMIDE 20 MG/1
20 TABLET ORAL DAILY
COMMUNITY
Start: 2023-12-28 | End: 2023-12-28 | Stop reason: SDUPTHER

## 2023-12-28 RX ORDER — FUROSEMIDE 20 MG/1
20 TABLET ORAL DAILY
Qty: 30 TABLET | Refills: 0 | Status: SHIPPED | OUTPATIENT
Start: 2023-12-28

## 2024-01-09 RX ORDER — MELOXICAM 7.5 MG/1
7.5 TABLET ORAL DAILY
COMMUNITY
Start: 2024-01-09 | End: 2024-01-09 | Stop reason: SDUPTHER

## 2024-01-09 RX ORDER — MELOXICAM 7.5 MG/1
7.5 TABLET ORAL DAILY
Qty: 30 TABLET | Refills: 0 | Status: SHIPPED | OUTPATIENT
Start: 2024-01-09

## 2024-01-09 RX ORDER — MELOXICAM 7.5 MG/1
7.5 TABLET ORAL DAILY
Qty: 90 TABLET | Refills: 0 | Status: SHIPPED | OUTPATIENT
Start: 2024-01-09 | End: 2024-01-09 | Stop reason: CLARIF

## 2024-01-09 RX ORDER — AMIODARONE HYDROCHLORIDE 200 MG/1
200 TABLET ORAL DAILY
Qty: 30 TABLET | Refills: 0 | Status: SHIPPED | OUTPATIENT
Start: 2024-01-09

## 2024-01-24 RX ORDER — FUROSEMIDE 20 MG/1
20 TABLET ORAL DAILY
Qty: 30 TABLET | Refills: 0 | Status: SHIPPED | OUTPATIENT
Start: 2024-01-24

## 2024-02-06 NOTE — TELEPHONE ENCOUNTER
Called and spoke to pt granddaughter. She states that the patient is going to start warfarin tomorrow. Dr. Smith Abbott agreed to manage INR and pt will be having blood work for INR check starting next week.  Will close referral.
Received signed referral from Dr Shania Murray. Per notes pt was taking Eliquis but its  too expensive so she want to switch to warfarin. Having skin cancer removed on 3/16. Pt will start warfarin after procedure on the day of her last dose of Eliquis. Note from MD stating if Dr Andrew Enriquez does blood thinning monitoring at his office feel free to have him monitor. Will f/u w/ pt after procedure to see if she wants to schedule w/ [facility] or have Dr Andrew Enriquez manage. Confirmed today that Dr Andrew Enriquez does manage warfarin in office.
1 or 2

## 2024-02-07 RX ORDER — MELOXICAM 7.5 MG/1
7.5 TABLET ORAL DAILY
Qty: 30 TABLET | Refills: 0 | Status: SHIPPED | OUTPATIENT
Start: 2024-02-07

## 2024-03-05 RX ORDER — FUROSEMIDE 20 MG/1
20 TABLET ORAL DAILY
Qty: 30 TABLET | Refills: 0 | Status: SHIPPED | OUTPATIENT
Start: 2024-03-05

## 2024-03-11 RX ORDER — MELOXICAM 7.5 MG/1
7.5 TABLET ORAL DAILY
Qty: 30 TABLET | Refills: 0 | Status: SHIPPED | OUTPATIENT
Start: 2024-03-11 | End: 2024-03-13

## 2024-03-13 RX ORDER — MELOXICAM 7.5 MG/1
7.5 TABLET ORAL DAILY
Qty: 30 TABLET | Refills: 0 | Status: SHIPPED | OUTPATIENT
Start: 2024-03-13

## 2024-04-04 RX ORDER — PANTOPRAZOLE SODIUM 20 MG/1
20 TABLET, DELAYED RELEASE ORAL DAILY
Qty: 30 TABLET | Refills: 0 | Status: SHIPPED | OUTPATIENT
Start: 2024-04-04

## 2024-04-16 ENCOUNTER — TELEPHONE (OUTPATIENT)
Dept: CARDIOLOGY CLINIC | Age: 89
End: 2024-04-16

## 2024-04-16 NOTE — TELEPHONE ENCOUNTER
Called granddaughter Elva, left  asking for her to call to get TAVR echo/ov scheduled with DCE in July

## 2024-04-16 NOTE — TELEPHONE ENCOUNTER
Jerri, can you schedule pt for an echo and OV with DCE in a TAVR spot in the next few months? Thanks, Brandi ESPITIA

## 2024-04-26 ENCOUNTER — TELEPHONE (OUTPATIENT)
Dept: FAMILY MEDICINE CLINIC | Age: 89
End: 2024-04-26

## 2024-04-26 RX ORDER — MELOXICAM 7.5 MG/1
7.5 TABLET ORAL DAILY
Qty: 30 TABLET | Refills: 0 | Status: SHIPPED | OUTPATIENT
Start: 2024-04-26

## 2024-04-26 RX ORDER — FUROSEMIDE 20 MG/1
20 TABLET ORAL DAILY
Qty: 30 TABLET | Refills: 0 | Status: SHIPPED | OUTPATIENT
Start: 2024-04-26

## 2024-04-26 RX ORDER — PANTOPRAZOLE SODIUM 20 MG/1
20 TABLET, DELAYED RELEASE ORAL DAILY
Qty: 30 TABLET | Refills: 0 | Status: SHIPPED | OUTPATIENT
Start: 2024-04-26

## 2024-05-02 ENCOUNTER — NURSE ONLY (OUTPATIENT)
Dept: FAMILY MEDICINE CLINIC | Age: 89
End: 2024-05-02
Payer: MEDICARE

## 2024-05-02 DIAGNOSIS — D64.9 ANEMIA, UNSPECIFIED TYPE: ICD-10-CM

## 2024-05-02 DIAGNOSIS — I10 ESSENTIAL HYPERTENSION: Primary | ICD-10-CM

## 2024-05-02 DIAGNOSIS — E78.00 HYPERCHOLESTEREMIA: ICD-10-CM

## 2024-05-02 LAB
ALBUMIN SERPL-MCNC: 4.2 G/DL (ref 3.4–5)
ALBUMIN/GLOB SERPL: 1.8 {RATIO} (ref 1.1–2.2)
ALP SERPL-CCNC: 83 U/L (ref 40–129)
ALT SERPL-CCNC: 14 U/L (ref 10–40)
ANION GAP SERPL CALCULATED.3IONS-SCNC: 14 MMOL/L (ref 3–16)
AST SERPL-CCNC: 19 U/L (ref 15–37)
BASOPHILS # BLD: 0.1 K/UL (ref 0–0.2)
BASOPHILS NFR BLD: 2 %
BILIRUB SERPL-MCNC: 0.3 MG/DL (ref 0–1)
BUN SERPL-MCNC: 26 MG/DL (ref 7–20)
CALCIUM SERPL-MCNC: 9.2 MG/DL (ref 8.3–10.6)
CHLORIDE SERPL-SCNC: 103 MMOL/L (ref 99–110)
CO2 SERPL-SCNC: 21 MMOL/L (ref 21–32)
CREAT SERPL-MCNC: 1.6 MG/DL (ref 0.6–1.2)
DEPRECATED RDW RBC AUTO: 14.5 % (ref 12.4–15.4)
EOSINOPHIL # BLD: 0.2 K/UL (ref 0–0.6)
EOSINOPHIL NFR BLD: 4.7 %
GFR SERPLBLD CREATININE-BSD FMLA CKD-EPI: 30 ML/MIN/{1.73_M2}
GLUCOSE SERPL-MCNC: 98 MG/DL (ref 70–99)
HCT VFR BLD AUTO: 37.4 % (ref 36–48)
HGB BLD-MCNC: 12.3 G/DL (ref 12–16)
LYMPHOCYTES # BLD: 1 K/UL (ref 1–5.1)
LYMPHOCYTES NFR BLD: 20.9 %
MCH RBC QN AUTO: 27.8 PG (ref 26–34)
MCHC RBC AUTO-ENTMCNC: 33 G/DL (ref 31–36)
MCV RBC AUTO: 84.3 FL (ref 80–100)
MONOCYTES # BLD: 0.5 K/UL (ref 0–1.3)
MONOCYTES NFR BLD: 10.1 %
NEUTROPHILS # BLD: 3 K/UL (ref 1.7–7.7)
NEUTROPHILS NFR BLD: 62.3 %
PLATELET # BLD AUTO: 176 K/UL (ref 135–450)
PMV BLD AUTO: 9.6 FL (ref 5–10.5)
POTASSIUM SERPL-SCNC: 4.8 MMOL/L (ref 3.5–5.1)
PROT SERPL-MCNC: 6.5 G/DL (ref 6.4–8.2)
RBC # BLD AUTO: 4.44 M/UL (ref 4–5.2)
SODIUM SERPL-SCNC: 138 MMOL/L (ref 136–145)
WBC # BLD AUTO: 4.8 K/UL (ref 4–11)

## 2024-05-02 PROCEDURE — 36415 COLL VENOUS BLD VENIPUNCTURE: CPT | Performed by: FAMILY MEDICINE

## 2024-05-07 NOTE — PROGRESS NOTES
Subjective:      Patient ID: Jacques Harkins is a 80 y.o. female. HPI  Chief Complaint   Patient presents with    Dizziness     DIZZY, ROOM IS SPINNING, CANT DRIVE, FEELS DRUNK, HOLDING ON TO THE WALLS, VISION IS OFF LOOKED AT THE DOOR HANDLE AND WHEN REACHING FOR IT IT WAS NOT IN THE RIGHT PLACE      Had influenza -went back to work last week - felt better worked several days at [address] last week but since yesterday - very dizzy - couldn't drive to work. Spinning/ moving sensation. Seeing double. Right eye stye - no n/v  Felt off-balance when had flu  Ok sitting in chair now unless moves head to far to one side. No headache - chronic [place] - no head injury  Fell few weeks ago - everything checked -no major injuries. No cold/ resp sx - no cp/palpitations. BP Readings from Last 3 Encounters:   02/05/18 (!) 146/80   11/13/17 126/74   11/08/17 (!) 187/97     Pulse Readings from Last 3 Encounters:   02/05/18 94   11/13/17 82   11/08/17 99     Wt Readings from Last 3 Encounters:   02/05/18 101 lb (45.8 kg)   11/13/17 100 lb (45.4 kg)   11/08/17 103 lb 9.9 oz (47 kg)   felt weak yesterday morning  Current Outpatient Prescriptions   Medication Sig Dispense Refill    meloxicam (MOBIC) 7.5 MG tablet TAKE ONE TABLET BY MOUTH ONCE DAILY 90 tablet 1     No current facility-administered medications for this visit. Feels a little weak today - ate 1/2 sandwich this am  No tinnitus, no pain. No urinary incont/ confusion lately. Takes mobic for oa - needs refill  Review of Systems    Objective:   Physical Exam   Constitutional: She is oriented to person, place, and time. She appears well-developed and well-nourished. No distress. HENT:   Head: Normocephalic and atraumatic. Mouth/Throat: Oropharynx is clear and moist. No oropharyngeal exudate. tms clear bilat   Eyes: Conjunctivae are normal. No scleral icterus.    No nystagmus evident  noninflamed nodule mid upper lip right eye   Cardiovascular: Normal rate, regular rhythm, normal heart sounds and intact distal pulses. No murmur heard. Pulmonary/Chest: Effort normal and breath sounds normal. No respiratory distress. She has no wheezes. She has no rales. Musculoskeletal: She exhibits no edema. Neurological: She is alert and oriented to person, place, and time. No focal lesions  Cranial nerves grossly intact   Skin: Skin is warm and dry. Psychiatric: She has a normal mood and affect. Assessment:      1. Elevated BP without diagnosis of hypertension     2. Acute onset of severe vertigo     3. Arthritis             Plan:      Fairly sudden onset of vertigo w/o other neuro sx and no other sx of nph - hold on imaging for now unless sx worsen/ persist >10--14 days - rest, push fluids  Meclizine 12.5mg to 25mg every 6 hours prn    Refill mobic low dose to use prn w/ food for oa pain  Vertigo d/w pt  Monitor bp  Work excuse as needed - Sunday 2/4 through Sunday 2/11. Sw meet with at bedside. A & o x4. Patients resides with spouse dexter in a private ranch style home with 0 BRUNA. Patient is known to be followed by the [facility] patient program. Spouse Dexter reports Nursing/SW visits began 2 weeks ago and MD visit is suppose to be in the next 2-3 weeks. Patient is Indep with ADL's and uses a walker. Patient ex-son in law cooks for him and spouse and tenant who is a cousin assist as well. Patient is known to [facility] and [facility] house calls.

## 2024-05-19 ENCOUNTER — APPOINTMENT (OUTPATIENT)
Dept: GENERAL RADIOLOGY | Age: 89
DRG: 872 | End: 2024-05-19
Payer: MEDICARE

## 2024-05-19 ENCOUNTER — HOSPITAL ENCOUNTER (INPATIENT)
Age: 89
LOS: 8 days | Discharge: HOME HEALTH | DRG: 872 | End: 2024-05-27
Attending: STUDENT IN AN ORGANIZED HEALTH CARE EDUCATION/TRAINING PROGRAM | Admitting: STUDENT IN AN ORGANIZED HEALTH CARE EDUCATION/TRAINING PROGRAM
Payer: MEDICARE

## 2024-05-19 DIAGNOSIS — M79.604 ACUTE PAIN OF RIGHT LOWER EXTREMITY: ICD-10-CM

## 2024-05-19 DIAGNOSIS — R79.89 ELEVATED LFTS: ICD-10-CM

## 2024-05-19 DIAGNOSIS — D72.9 NEUTROPHILIA: ICD-10-CM

## 2024-05-19 DIAGNOSIS — L03.115 CELLULITIS OF RIGHT LOWER EXTREMITY: Primary | ICD-10-CM

## 2024-05-19 PROBLEM — L03.90 CELLULITIS: Status: ACTIVE | Noted: 2024-05-19

## 2024-05-19 LAB
ALBUMIN SERPL-MCNC: 3.6 G/DL (ref 3.4–5)
ALBUMIN/GLOB SERPL: 1.2 {RATIO} (ref 1.1–2.2)
ALP SERPL-CCNC: 81 U/L (ref 40–129)
ALT SERPL-CCNC: 62 U/L (ref 10–40)
ANION GAP SERPL CALCULATED.3IONS-SCNC: 11 MMOL/L (ref 3–16)
AST SERPL-CCNC: 68 U/L (ref 15–37)
BASOPHILS # BLD: 0.1 K/UL (ref 0–0.2)
BASOPHILS NFR BLD: 0.4 %
BILIRUB SERPL-MCNC: 1 MG/DL (ref 0–1)
BUN SERPL-MCNC: 21 MG/DL (ref 7–20)
CALCIUM SERPL-MCNC: 9.1 MG/DL (ref 8.3–10.6)
CHLORIDE SERPL-SCNC: 99 MMOL/L (ref 99–110)
CO2 SERPL-SCNC: 25 MMOL/L (ref 21–32)
CREAT SERPL-MCNC: 1.2 MG/DL (ref 0.6–1.2)
DEPRECATED RDW RBC AUTO: 15 % (ref 12.4–15.4)
EOSINOPHIL # BLD: 0 K/UL (ref 0–0.6)
EOSINOPHIL NFR BLD: 0 %
GFR SERPLBLD CREATININE-BSD FMLA CKD-EPI: 42 ML/MIN/{1.73_M2}
GLUCOSE SERPL-MCNC: 99 MG/DL (ref 70–99)
HCT VFR BLD AUTO: 35.3 % (ref 36–48)
HGB BLD-MCNC: 11.8 G/DL (ref 12–16)
LACTATE BLDV-SCNC: 1.1 MMOL/L (ref 0.4–1.9)
LACTATE BLDV-SCNC: 1.4 MMOL/L (ref 0.4–1.9)
LACTATE BLDV-SCNC: 1.4 MMOL/L (ref 0.4–1.9)
LYMPHOCYTES # BLD: 0.7 K/UL (ref 1–5.1)
LYMPHOCYTES NFR BLD: 4.6 %
MCH RBC QN AUTO: 27.7 PG (ref 26–34)
MCHC RBC AUTO-ENTMCNC: 33.4 G/DL (ref 31–36)
MCV RBC AUTO: 83 FL (ref 80–100)
MONOCYTES # BLD: 1.3 K/UL (ref 0–1.3)
MONOCYTES NFR BLD: 9 %
NEUTROPHILS # BLD: 12.5 K/UL (ref 1.7–7.7)
NEUTROPHILS NFR BLD: 86 %
PLATELET # BLD AUTO: 137 K/UL (ref 135–450)
PMV BLD AUTO: 9.6 FL (ref 5–10.5)
POTASSIUM SERPL-SCNC: 4.5 MMOL/L (ref 3.5–5.1)
PROT SERPL-MCNC: 6.7 G/DL (ref 6.4–8.2)
RBC # BLD AUTO: 4.26 M/UL (ref 4–5.2)
SODIUM SERPL-SCNC: 135 MMOL/L (ref 136–145)
WBC # BLD AUTO: 14.6 K/UL (ref 4–11)

## 2024-05-19 PROCEDURE — 6360000002 HC RX W HCPCS: Performed by: STUDENT IN AN ORGANIZED HEALTH CARE EDUCATION/TRAINING PROGRAM

## 2024-05-19 PROCEDURE — 6360000002 HC RX W HCPCS: Performed by: PHYSICIAN ASSISTANT

## 2024-05-19 PROCEDURE — 80053 COMPREHEN METABOLIC PANEL: CPT

## 2024-05-19 PROCEDURE — 83605 ASSAY OF LACTIC ACID: CPT

## 2024-05-19 PROCEDURE — 73630 X-RAY EXAM OF FOOT: CPT

## 2024-05-19 PROCEDURE — 94640 AIRWAY INHALATION TREATMENT: CPT

## 2024-05-19 PROCEDURE — 87040 BLOOD CULTURE FOR BACTERIA: CPT

## 2024-05-19 PROCEDURE — 6370000000 HC RX 637 (ALT 250 FOR IP): Performed by: STUDENT IN AN ORGANIZED HEALTH CARE EDUCATION/TRAINING PROGRAM

## 2024-05-19 PROCEDURE — 99285 EMERGENCY DEPT VISIT HI MDM: CPT

## 2024-05-19 PROCEDURE — 2580000003 HC RX 258: Performed by: STUDENT IN AN ORGANIZED HEALTH CARE EDUCATION/TRAINING PROGRAM

## 2024-05-19 PROCEDURE — 86060 ANTISTREPTOLYSIN O TITER: CPT

## 2024-05-19 PROCEDURE — 2580000003 HC RX 258: Performed by: PHYSICIAN ASSISTANT

## 2024-05-19 PROCEDURE — 96365 THER/PROPH/DIAG IV INF INIT: CPT

## 2024-05-19 PROCEDURE — 1200000000 HC SEMI PRIVATE

## 2024-05-19 PROCEDURE — 85025 COMPLETE CBC W/AUTO DIFF WBC: CPT

## 2024-05-19 PROCEDURE — 36415 COLL VENOUS BLD VENIPUNCTURE: CPT

## 2024-05-19 PROCEDURE — 73590 X-RAY EXAM OF LOWER LEG: CPT

## 2024-05-19 PROCEDURE — 94760 N-INVAS EAR/PLS OXIMETRY 1: CPT

## 2024-05-19 RX ORDER — HEPARIN SODIUM 5000 [USP'U]/ML
5000 INJECTION, SOLUTION INTRAVENOUS; SUBCUTANEOUS 2 TIMES DAILY
Status: DISCONTINUED | OUTPATIENT
Start: 2024-05-19 | End: 2024-05-27 | Stop reason: HOSPADM

## 2024-05-19 RX ORDER — GUAIFENESIN 200 MG/10ML
200 LIQUID ORAL EVERY 4 HOURS PRN
Status: DISCONTINUED | OUTPATIENT
Start: 2024-05-19 | End: 2024-05-27 | Stop reason: HOSPADM

## 2024-05-19 RX ORDER — ACETAMINOPHEN 325 MG/1
650 TABLET ORAL EVERY 6 HOURS PRN
Status: DISCONTINUED | OUTPATIENT
Start: 2024-05-19 | End: 2024-05-27 | Stop reason: HOSPADM

## 2024-05-19 RX ORDER — POLYETHYLENE GLYCOL 3350 17 G/17G
17 POWDER, FOR SOLUTION ORAL DAILY PRN
Status: DISCONTINUED | OUTPATIENT
Start: 2024-05-19 | End: 2024-05-27 | Stop reason: HOSPADM

## 2024-05-19 RX ORDER — PANTOPRAZOLE SODIUM 20 MG/1
20 TABLET, DELAYED RELEASE ORAL
Status: DISCONTINUED | OUTPATIENT
Start: 2024-05-20 | End: 2024-05-27 | Stop reason: HOSPADM

## 2024-05-19 RX ORDER — ONDANSETRON 2 MG/ML
4 INJECTION INTRAMUSCULAR; INTRAVENOUS EVERY 6 HOURS PRN
Status: DISCONTINUED | OUTPATIENT
Start: 2024-05-19 | End: 2024-05-27 | Stop reason: HOSPADM

## 2024-05-19 RX ORDER — AMIODARONE HYDROCHLORIDE 200 MG/1
200 TABLET ORAL DAILY
Status: DISCONTINUED | OUTPATIENT
Start: 2024-05-19 | End: 2024-05-27 | Stop reason: HOSPADM

## 2024-05-19 RX ORDER — ACETAMINOPHEN 650 MG/1
650 SUPPOSITORY RECTAL EVERY 6 HOURS PRN
Status: DISCONTINUED | OUTPATIENT
Start: 2024-05-19 | End: 2024-05-27 | Stop reason: HOSPADM

## 2024-05-19 RX ORDER — SODIUM CHLORIDE 0.9 % (FLUSH) 0.9 %
5-40 SYRINGE (ML) INJECTION EVERY 12 HOURS SCHEDULED
Status: DISCONTINUED | OUTPATIENT
Start: 2024-05-19 | End: 2024-05-27 | Stop reason: HOSPADM

## 2024-05-19 RX ORDER — MEGESTROL ACETATE 40 MG/1
40 TABLET ORAL DAILY
Status: DISCONTINUED | OUTPATIENT
Start: 2024-05-19 | End: 2024-05-19

## 2024-05-19 RX ORDER — ONDANSETRON 4 MG/1
4 TABLET, ORALLY DISINTEGRATING ORAL EVERY 8 HOURS PRN
Status: DISCONTINUED | OUTPATIENT
Start: 2024-05-19 | End: 2024-05-27 | Stop reason: HOSPADM

## 2024-05-19 RX ORDER — SODIUM CHLORIDE, SODIUM LACTATE, POTASSIUM CHLORIDE, CALCIUM CHLORIDE 600; 310; 30; 20 MG/100ML; MG/100ML; MG/100ML; MG/100ML
INJECTION, SOLUTION INTRAVENOUS CONTINUOUS
Status: DISCONTINUED | OUTPATIENT
Start: 2024-05-19 | End: 2024-05-23

## 2024-05-19 RX ORDER — SODIUM CHLORIDE 0.9 % (FLUSH) 0.9 %
5-40 SYRINGE (ML) INJECTION PRN
Status: DISCONTINUED | OUTPATIENT
Start: 2024-05-19 | End: 2024-05-27 | Stop reason: HOSPADM

## 2024-05-19 RX ORDER — SODIUM CHLORIDE 9 MG/ML
INJECTION, SOLUTION INTRAVENOUS PRN
Status: DISCONTINUED | OUTPATIENT
Start: 2024-05-19 | End: 2024-05-27 | Stop reason: HOSPADM

## 2024-05-19 RX ADMIN — VANCOMYCIN HYDROCHLORIDE 750 MG: 750 INJECTION, POWDER, LYOPHILIZED, FOR SOLUTION INTRAVENOUS at 18:59

## 2024-05-19 RX ADMIN — PIPERACILLIN AND TAZOBACTAM 4500 MG: 4; .5 INJECTION, POWDER, LYOPHILIZED, FOR SOLUTION INTRAVENOUS at 14:14

## 2024-05-19 RX ADMIN — SODIUM CHLORIDE, POTASSIUM CHLORIDE, SODIUM LACTATE AND CALCIUM CHLORIDE: 600; 310; 30; 20 INJECTION, SOLUTION INTRAVENOUS at 20:35

## 2024-05-19 RX ADMIN — HEPARIN SODIUM 5000 UNITS: 5000 INJECTION INTRAVENOUS; SUBCUTANEOUS at 20:29

## 2024-05-19 RX ADMIN — IPRATROPIUM BROMIDE AND ALBUTEROL 1 PUFF: 20; 100 SPRAY, METERED RESPIRATORY (INHALATION) at 19:44

## 2024-05-19 RX ADMIN — CEFEPIME 2000 MG: 2 INJECTION, POWDER, FOR SOLUTION INTRAVENOUS at 17:50

## 2024-05-19 RX ADMIN — AMIODARONE HYDROCHLORIDE 200 MG: 200 TABLET ORAL at 17:51

## 2024-05-19 ASSESSMENT — PAIN - FUNCTIONAL ASSESSMENT: PAIN_FUNCTIONAL_ASSESSMENT: NONE - DENIES PAIN

## 2024-05-19 ASSESSMENT — PAIN SCALES - GENERAL: PAINLEVEL_OUTOF10: 0

## 2024-05-19 NOTE — PROGRESS NOTES
Clinical Pharmacy Note  Dose Adjustment    Mayra Nieto is receiving cefepime for cellulitis. Based on the patient's Estimated Creatinine Clearance: Estimated Creatinine Clearance: 18 mL/min (based on SCr of 1.2 mg/dL). urine output and indication, the dose has been adjusted to 1gm Q24H extended infusion after a 2gm loading dose per protocol.    Pharmacy will continue to monitor and adjust dose as needed for changes in renal function.    Jordny Rg [facility], 5/19/2024 5:19 PM

## 2024-05-19 NOTE — PROGRESS NOTES
Medication Reconciliation     List of medications patient is currently taking is complete.     Source of information:   1. Conversation with patient's family at bedside  2. [facility] records      Allergies  Bactrim [sulfamethoxazole-trimethoprim] and Macrobid [nitrofurantoin]     Notes regarding home medications:  1. Patient has not received any doses of her home medications today prior to arrival in the ED.    Terrie Lincoln, Pharmacy Intern  5/19/2024 4:08 PM

## 2024-05-19 NOTE — ED PROVIDER NOTES
[facility] EMERGENCY DEPARTMENT  [address] [facility] [address]  Dept: [phone]  Loc: [phone]    EMERGENCY DEPARTMENT ENCOUNTER        This patient was not seen or evaluated by the attending physician.    I evaluated this patient, the attending physician was available for consultation.    CHIEF COMPLAINT    Chief Complaint   Patient presents with    Leg Pain     Right leg pain, front of leg, denies injury, no swelling, no deformity.  Pt denies leg looks any different than normal.  States has not been able to bear weight.       HPI    Mayra Nieto is a 89 y.o. female who presents with redness of the skin located over the right lower leg and foot.  Onset was 2 days ago.  The duration has been constant since the onset.  The patient has associated pain.  The pain is worse with ambulation. The context was a spontaneous onset..    REVIEW OF SYSTEMS    Constitutional: no fever, chills or sweats  Respiratory:  No cough or shortness of breath   Cardiovascular:  No chest pain  GI: No vomiting or abdominal pain  Skin: see HPI  All other systems reviewed and are negative.    PAST MEDICAL HISTORY/SURGICAL HISTORY     Past Medical History:   Diagnosis Date    Aortic stenosis     Arthritis     COVID-19     PAF (paroxysmal atrial fibrillation) ([facility])      Past Surgical History:   Procedure Laterality Date    AORTIC VALVE REPLACEMENT N/A 5/21/2019    TRANSCATHETER AORTIC VALVE REPLACEMENT FEMORAL APPROACH performed by Otoniel Williamson MD at [facility] CVOR    CARDIAC CATHETERIZATION  04/17/2018    HYSTERECTOMY, TOTAL ABDOMINAL (CERVIX REMOVED)      MOHS SURGERY  04/14/2022    Left arm    UPPER GASTROINTESTINAL ENDOSCOPY N/A 2/14/2022    EGD DILATION BALLOON performed by Bhavin Sim MD at [facility] ENDOSCOPY    VARICOSE VEIN SURGERY Bilateral 1980's       CURRENT MEDICATIONS  (may include discharge medications prescribed in the ED)  Current Outpatient Rx  osteomyelitis. MRI is more sensitive for   detection of osteomyelitis.         XR FOOT RIGHT (MIN 3 VIEWS)   Final Result   No acute osseous abnormality.             ED COURSE/MEDICAL DECISION MAKING   See EMR for medications prescribed in the ED.    Vitals:    05/19/24 1345 05/19/24 1348 05/19/24 1349 05/19/24 1400   BP: (!) 134/53   (!) 109/49   Pulse: 81   84   Resp: 16   18   Temp:  98.1 °F (36.7 °C)     TempSrc:  Oral     SpO2: 97%   93%   Weight:   39.9 kg (87 lb 15.4 oz)        Differential diagnosis: Necrotizing fasciitis, cellulitis, osteomyelitis, erysipelas, suppurative thrombophlebitis, aseptic superficial thrombophlebitis, DVT, gout, compartment syndrome, erythema migrans (lyme disease), contact dermatitis, lymphedema, other    CRITICAL CARE NOTE:  There was a high probability of clinically significant life-threatening deterioration of the patient's condition requiring my urgent intervention.    Total critical care time was at least 30 minutes.    This includes vital sign monitoring, pulse oximetry monitoring, telemetry monitoring, clinical response to the IV medications, reviewing the nursing notes, consultation time, dictation/documentation time, and interpretation of the labwork. This excludes any separately billable procedures performed.    Patient is afebrile and nontoxic in appearance. Labs reveal leukocytosis of 14,600.  Patient does not technically meet SIRS criteria.  She likely has right lower extremity cellulitis, Zosyn ordered for empiric treatment.  Hemoglobin 11.8, slightly below baseline.      Metabolic panel reveals mildly elevated ALT/AST, however there was specimen hemolysis.  Otherwise unremarkable.      Lactic 1.4, within normal limits.      Plain film findings as above.     Reevaluation at 3:20 PM: Patient is resting comfortably.     Consultation with hospitalist at 3:30 PM: I contacted Dr. Landa via [employer] for admission.g.    FINAL IMPRESSION  1. Cellulitis of right lower

## 2024-05-19 NOTE — PROGRESS NOTES
Pt in room 4260. Pt A&Ox4, VSS, denies pain. Grandson and granddaughter at bedside. Bed in lowest, locked position. Pt oriented to room and call light. No needs at this time.    Electronically signed by Zuleyma Segal RN on 5/19/2024 at 5:12 PM

## 2024-05-19 NOTE — PROGRESS NOTES
4 Eyes Skin Assessment     NAME:  Mayra Nieto  YOB: 1931  MEDICAL RECORD NUMBER:  [id number]    The patient is being assessed for  Admission    I agree that at least one RN has performed a thorough Head to Toe Skin Assessment on the patient. ALL assessment sites listed below have been assessed.      Areas assessed by both nurses:    Head, Face, Ears, Shoulders, Back, Chest, Arms, Elbows, Hands, Sacrum. Buttock, Coccyx, Ischium, Legs. Feet and Heels, and Under Medical Devices         Does the Patient have a Wound? No noted wound(s)       Darryl Prevention initiated by RN: No  Wound Care Orders initiated by RN: No    Pressure Injury (Stage 3,4, Unstageable, DTI, NWPT, and Complex wounds) if present, place Wound referral order by RN under : No    New Ostomies, if present place, Ostomy referral order under : No     Nurse 1 eSignature: Electronically signed by Zuleyma Segal RN on 5/19/24 at 6:12 PM EDT    **SHARE this note so that the co-signing nurse can place an eSignature**    Nurse 2 eSignature: Electronically signed by Ariadna Patterson RN on 5/19/24 at 6:43 PM EDT

## 2024-05-19 NOTE — H&P
V2.0  History and Physical      Name:  Mayra Nieto /Age/Sex: 1931  (89 y.o. female)   MRN & CSN:  [id number] & [id number] Encounter Date/Time: 2024 5:47 PM EDT   Location:  [id number]-8096/4261-01 PCP: Armando Bee MD       Hospital Day: 1    Assessment and Plan:   Mayra Nieto is a 89 y.o. female with a pmh of chronic diastolic heart failure with preserved ejection fraction atrial fibrillation chronic GERD poor appetite with failure to thrive who presents with Cellulitis    Hospital Problems             Last Modified POA    * (Principal) Cellulitis 2024 Yes       Bilateral lower extremity cellulitis started on antibiotics de-escalate antibiotic as per culture results.  IV fluid hydration.  Patient looks very dry  History of chronic diastolic heart failure with preserved ejection fraction we will hold off on any water pills for now  Paroxysmal atrial fibrillation continue amiodarone  Chronic GERD on PPI     Medical Decision Making:  The following items were considered in medical decision making:  Discussion of patient care with other providers  Reviewed clinical lab tests if any  Reviewed radiology tests if any  Reviewed other diagnostic tests/interventions  Independent review of radiologic images if any  Microbiology cultures and other micro tests if any    Estimated time spent for medical decision-making encompassing complexity of the case, history taking, medication review, physical examination, communication with family, RN, , discussion with specialists, and ancillary staff members to provide accurate care for the patient was around 20 minutes.    The billing in this case is level 2 due to the combination of above and specifically because of 2 problems addressed during hospital stay    Goals status was discussed in detail with patient and son.  Verbalized understanding of different options of CODE STATUS.  Patient opted for full code.    Comment: Please note this report has

## 2024-05-19 NOTE — ED NOTES
Redness noted to right lower leg with skin tear on right lateral lower leg. Pt states do scratched her 2 days ago.  Redness in noted down into foot, skin is warm to touch in reddened area.

## 2024-05-19 NOTE — CONSULTS
Clinical Pharmacy Note  Vancomycin Consult    Mayra Nieto is a 89 y.o. female ordered vancomycin for SSTI; consult received from Dr. Tinajero to manage therapy. Also receiving Cefepime.    Allergies:  Bactrim [sulfamethoxazole-trimethoprim] and Macrobid [nitrofurantoin]     Temp max:  Temp (24hrs), Av.6 °F (36.4 °C), Min:97 °F (36.1 °C), Max:98.1 °F (36.7 °C)      Recent Labs     24  1403   WBC 14.6*       Recent Labs     24  1403   BUN 21*   CREATININE 1.2       No intake or output data in the 24 hours ending 24 1723    Culture Results:      Ht Readings from Last 1 Encounters:   23 1.549 m (5' 1\")        Wt Readings from Last 1 Encounters:   24 39.9 kg (87 lb 15.4 oz)         Estimated Creatinine Clearance: 18 mL/min (based on SCr of 1.2 mg/dL).    Assessment/Plan:  Day # 1 of vancomycin.  Vancomycin 750 mg IV x 1 dose due to kidney function   Will dose intermittently based on levels.  Goal trough 10-15  Random level at 1200 tomorrow             Thank you for the consult.   Jaquan Borrego formerly [facility], 2024 5:25 PM

## 2024-05-20 PROBLEM — E43 SEVERE MALNUTRITION (HCC): Status: ACTIVE | Noted: 2024-05-20

## 2024-05-20 LAB
ANION GAP SERPL CALCULATED.3IONS-SCNC: 11 MMOL/L (ref 3–16)
ASO AB SERPL-ACNC: 23 IU/ML (ref 0–200)
BACTERIA URNS QL MICRO: NORMAL /HPF
BILIRUB UR QL STRIP.AUTO: NEGATIVE
BUN SERPL-MCNC: 19 MG/DL (ref 7–20)
CALCIUM SERPL-MCNC: 8.5 MG/DL (ref 8.3–10.6)
CHLORIDE SERPL-SCNC: 99 MMOL/L (ref 99–110)
CLARITY UR: CLEAR
CO2 SERPL-SCNC: 23 MMOL/L (ref 21–32)
COLOR UR: ABNORMAL
CREAT SERPL-MCNC: 1.2 MG/DL (ref 0.6–1.2)
DEPRECATED RDW RBC AUTO: 15 % (ref 12.4–15.4)
EPI CELLS #/AREA URNS AUTO: 1 /HPF (ref 0–5)
GFR SERPLBLD CREATININE-BSD FMLA CKD-EPI: 42 ML/MIN/{1.73_M2}
GLUCOSE SERPL-MCNC: 78 MG/DL (ref 70–99)
GLUCOSE UR STRIP.AUTO-MCNC: NEGATIVE MG/DL
HCT VFR BLD AUTO: 31.2 % (ref 36–48)
HGB BLD-MCNC: 10.5 G/DL (ref 12–16)
HGB UR QL STRIP.AUTO: NEGATIVE
HYALINE CASTS #/AREA URNS AUTO: 1 /LPF (ref 0–8)
KETONES UR STRIP.AUTO-MCNC: ABNORMAL MG/DL
LEUKOCYTE ESTERASE UR QL STRIP.AUTO: NEGATIVE
MAGNESIUM SERPL-MCNC: 2.2 MG/DL (ref 1.8–2.4)
MCH RBC QN AUTO: 27.8 PG (ref 26–34)
MCHC RBC AUTO-ENTMCNC: 33.7 G/DL (ref 31–36)
MCV RBC AUTO: 82.5 FL (ref 80–100)
NITRITE UR QL STRIP.AUTO: NEGATIVE
PH UR STRIP.AUTO: 6 [PH] (ref 5–8)
PHOSPHATE SERPL-MCNC: 3.5 MG/DL (ref 2.5–4.9)
PLATELET # BLD AUTO: 121 K/UL (ref 135–450)
PMV BLD AUTO: 9.7 FL (ref 5–10.5)
POTASSIUM SERPL-SCNC: 3.8 MMOL/L (ref 3.5–5.1)
PROT UR STRIP.AUTO-MCNC: 100 MG/DL
RBC # BLD AUTO: 3.79 M/UL (ref 4–5.2)
RBC CLUMPS #/AREA URNS AUTO: 3 /HPF (ref 0–4)
SODIUM SERPL-SCNC: 133 MMOL/L (ref 136–145)
SP GR UR STRIP.AUTO: 1.03 (ref 1–1.03)
UA DIPSTICK W REFLEX MICRO PNL UR: YES
URN SPEC COLLECT METH UR: ABNORMAL
UROBILINOGEN UR STRIP-ACNC: 1 E.U./DL
VANCOMYCIN SERPL-MCNC: 6.1 UG/ML
WBC # BLD AUTO: 10.9 K/UL (ref 4–11)
WBC #/AREA URNS AUTO: 0 /HPF (ref 0–5)

## 2024-05-20 PROCEDURE — 84100 ASSAY OF PHOSPHORUS: CPT

## 2024-05-20 PROCEDURE — 94760 N-INVAS EAR/PLS OXIMETRY 1: CPT

## 2024-05-20 PROCEDURE — 6370000000 HC RX 637 (ALT 250 FOR IP): Performed by: STUDENT IN AN ORGANIZED HEALTH CARE EDUCATION/TRAINING PROGRAM

## 2024-05-20 PROCEDURE — 80048 BASIC METABOLIC PNL TOTAL CA: CPT

## 2024-05-20 PROCEDURE — 94640 AIRWAY INHALATION TREATMENT: CPT

## 2024-05-20 PROCEDURE — 80202 ASSAY OF VANCOMYCIN: CPT

## 2024-05-20 PROCEDURE — 85027 COMPLETE CBC AUTOMATED: CPT

## 2024-05-20 PROCEDURE — 6360000002 HC RX W HCPCS: Performed by: STUDENT IN AN ORGANIZED HEALTH CARE EDUCATION/TRAINING PROGRAM

## 2024-05-20 PROCEDURE — 81001 URINALYSIS AUTO W/SCOPE: CPT

## 2024-05-20 PROCEDURE — 1200000000 HC SEMI PRIVATE

## 2024-05-20 PROCEDURE — 2580000003 HC RX 258: Performed by: STUDENT IN AN ORGANIZED HEALTH CARE EDUCATION/TRAINING PROGRAM

## 2024-05-20 PROCEDURE — 36415 COLL VENOUS BLD VENIPUNCTURE: CPT

## 2024-05-20 PROCEDURE — 87086 URINE CULTURE/COLONY COUNT: CPT

## 2024-05-20 PROCEDURE — 2580000003 HC RX 258: Performed by: INTERNAL MEDICINE

## 2024-05-20 PROCEDURE — 6360000002 HC RX W HCPCS: Performed by: INTERNAL MEDICINE

## 2024-05-20 PROCEDURE — 83735 ASSAY OF MAGNESIUM: CPT

## 2024-05-20 RX ADMIN — CEFAZOLIN 1000 MG: 1 INJECTION, POWDER, FOR SOLUTION INTRAMUSCULAR; INTRAVENOUS at 15:57

## 2024-05-20 RX ADMIN — HEPARIN SODIUM 5000 UNITS: 5000 INJECTION INTRAVENOUS; SUBCUTANEOUS at 21:22

## 2024-05-20 RX ADMIN — PANTOPRAZOLE SODIUM 20 MG: 20 TABLET, DELAYED RELEASE ORAL at 05:58

## 2024-05-20 RX ADMIN — IPRATROPIUM BROMIDE AND ALBUTEROL 1 PUFF: 20; 100 SPRAY, METERED RESPIRATORY (INHALATION) at 07:56

## 2024-05-20 RX ADMIN — ACETAMINOPHEN 650 MG: 325 TABLET ORAL at 21:22

## 2024-05-20 RX ADMIN — SODIUM CHLORIDE, POTASSIUM CHLORIDE, SODIUM LACTATE AND CALCIUM CHLORIDE: 600; 310; 30; 20 INJECTION, SOLUTION INTRAVENOUS at 06:01

## 2024-05-20 RX ADMIN — ACETAMINOPHEN 650 MG: 325 TABLET ORAL at 02:04

## 2024-05-20 RX ADMIN — AMIODARONE HYDROCHLORIDE 200 MG: 200 TABLET ORAL at 10:09

## 2024-05-20 ASSESSMENT — PAIN - FUNCTIONAL ASSESSMENT
PAIN_FUNCTIONAL_ASSESSMENT: PREVENTS OR INTERFERES SOME ACTIVE ACTIVITIES AND ADLS
PAIN_FUNCTIONAL_ASSESSMENT: ACTIVITIES ARE NOT PREVENTED
PAIN_FUNCTIONAL_ASSESSMENT: PREVENTS OR INTERFERES SOME ACTIVE ACTIVITIES AND ADLS

## 2024-05-20 ASSESSMENT — PAIN SCALES - GENERAL
PAINLEVEL_OUTOF10: 0
PAINLEVEL_OUTOF10: 4
PAINLEVEL_OUTOF10: 3
PAINLEVEL_OUTOF10: 3
PAINLEVEL_OUTOF10: 0

## 2024-05-20 ASSESSMENT — PAIN DESCRIPTION - PAIN TYPE: TYPE: ACUTE PAIN

## 2024-05-20 ASSESSMENT — PAIN DESCRIPTION - ORIENTATION
ORIENTATION: RIGHT

## 2024-05-20 ASSESSMENT — PAIN DESCRIPTION - DESCRIPTORS
DESCRIPTORS: ACHING;DISCOMFORT

## 2024-05-20 ASSESSMENT — PAIN DESCRIPTION - LOCATION
LOCATION: LEG

## 2024-05-20 ASSESSMENT — PAIN DESCRIPTION - ONSET: ONSET: ON-GOING

## 2024-05-20 NOTE — PROGRESS NOTES
V2.0    [facility] [place]      Name:  Mayra Nieto /Age/Sex: 1931  (89 y.o. female)   MRN & CSN:  [id number] & [id number] Encounter Date/Time: 2024 2:03 PM EDT   Location:  [id number] PCP: Armando Bee MD     Attending:Darrell Montiel MD       Hospital Day: 2    Assessment and Recommendations   Mayra Nieto is a 89 y.o. female who presents with Cellulitis      Plan:   Bilateral lower extremity cellulitis likely due to strep, IV fluid de-escalating antibiotics today.  White count improved from 14.6 yesterday down to 10.9 this morning  History of chronic diastolic heart failure no exacerbation at this time  A-fib continue to be on amiodarone  GERD PPI  Thrombocytopenia, mild repeat CBC in a.m.      Diet ADULT ORAL NUTRITION SUPPLEMENT; Breakfast, Lunch, Dinner; Standard High Calorie/High Protein Oral Supplement  ADULT DIET; Regular   DVT Prophylaxis [] Lovenox, []  Heparin, [] SCDs, [] Ambulation,  [] Eliquis, [] Xarelto  [] Coumadin   Code Status Full Code             Personally reviewed Lab Studies and Imaging     Discussed management of the case with case management over IDR nurse at bedside          Drugs that require monitoring for toxicity include vancomycin and the method of monitoring was creatinine    Medical Decision Making:  The following items were considered in medical decision making:  Discussion of patient care with other providers  Reviewed clinical lab tests  Reviewed radiology tests  Reviewed other diagnostic tests/interventions  Independent review of radiologic images  Microbiology cultures and other micro tests reviewed      Subjective:     Chief Complaint: Lower leg edema erythema    Mayra Nieto is a 89 y.o. female who presents with lower legs erythema erythema bilateral overall improving patient feeling weak but no fever or chills      Review of Systems:      Pertinent positives and negatives discussed in HPI    Objective:   No intake or output data in  RIGHT FOOT 5/19/2024 1:48 pm COMPARISON: None. HISTORY: ORDERING SYSTEM PROVIDED HISTORY: right foot cellulitis r/o osteo TECHNOLOGIST PROVIDED HISTORY: Reason for exam:->right foot cellulitis r/o osteo Reason for Exam: right foot cellulitis r/o osteo FINDINGS: There is no evidence of acute fracture.  There is normal alignment of the tarsometatarsal joints.  No acute joint abnormality.  No focal osseous lesion. No focal soft tissue abnormality.     No acute osseous abnormality.     XR TIBIA FIBULA RIGHT (2 VIEWS)    Result Date: 5/19/2024  EXAMINATION: TWO XRAY VIEWS OF THE RIGHT TIBIA/FIBULA 5/19/2024 1:48 pm COMPARISON: None. HISTORY: ORDERING SYSTEM PROVIDED HISTORY: right leg cellulitis r/o osteo TECHNOLOGIST PROVIDED HISTORY: Reason for exam:->right leg cellulitis r/o osteo Reason for Exam: right leg cellulitis r/o osteo FINDINGS: The tibia and fibula appear normal. The knee joint and ankle joint appear normal. Soft tissues are unremarkable.     No radiographic evidence of osteomyelitis. MRI is more sensitive for detection of osteomyelitis.       CBC:   Recent Labs     05/19/24  1403 05/20/24  0744   WBC 14.6* 10.9   HGB 11.8* 10.5*    121*     BMP:    Recent Labs     05/19/24  1403 05/20/24  0744   * 133*   K 4.5 3.8   CL 99 99   CO2 25 23   BUN 21* 19   CREATININE 1.2 1.2   GLUCOSE 99 78     Hepatic:   Recent Labs     05/19/24  1403   AST 68*   ALT 62*   BILITOT 1.0   ALKPHOS 81     Lipids:   Lab Results   Component Value Date/Time    CHOL 197 03/28/2019 09:11 AM    HDL 59 03/28/2019 09:11 AM    TRIG 90 03/28/2019 09:11 AM     Hemoglobin A1C: No results found for: \"LABA1C\"  TSH:   Lab Results   Component Value Date/Time    TSH 2.16 06/27/2023 12:18 PM     Troponin: No results found for: \"TROPONINT\"  Lactic Acid: No results for input(s): \"LACTA\" in the last 72 hours.  BNP: No results for input(s): \"PROBNP\" in the last 72 hours.  UA:  Lab Results   Component Value Date/Time    NITRU NEGATIVE

## 2024-05-20 NOTE — PLAN OF CARE
Problem: Discharge Planning  Goal: Discharge to home or other facility with appropriate resources  Outcome: Progressing  Flowsheets (Taken 5/19/2024 2053)  Discharge to home or other facility with appropriate resources:   Identify barriers to discharge with patient and caregiver   Arrange for needed discharge resources and transportation as appropriate   Identify discharge learning needs (meds, wound care, etc)     Problem: Skin/Tissue Integrity  Goal: Absence of new skin breakdown  Description: 1.  Monitor for areas of redness and/or skin breakdown  2.  Assess vascular access sites hourly  3.  Every 4-6 hours minimum:  Change oxygen saturation probe site  4.  Every 4-6 hours:  If on nasal continuous positive airway pressure, respiratory therapy assess nares and determine need for appliance change or resting period.  Outcome: Progressing     Problem: Safety - Adult  Goal: Free from fall injury  Outcome: Progressing     Problem: ABCDS Injury Assessment  Goal: Absence of physical injury  Outcome: Progressing     Problem: Neurosensory - Adult  Goal: Achieves stable or improved neurological status  Outcome: Progressing  Flowsheets (Taken 5/19/2024 2053)  Achieves stable or improved neurological status: Assess for and report changes in neurological status  Goal: Achieves maximal functionality and self care  Outcome: Progressing  Flowsheets (Taken 5/19/2024 2053)  Achieves maximal functionality and self care:   Monitor swallowing and airway patency with patient fatigue and changes in neurological status   Encourage and assist patient to increase activity and self care with guidance from physical therapy/occupational therapy     Problem: Skin/Tissue Integrity - Adult  Goal: Skin integrity remains intact  Outcome: Progressing  Flowsheets (Taken 5/20/2024 0341)  Skin Integrity Remains Intact: Monitor for areas of redness and/or skin breakdown  Goal: Incisions, wounds, or drain sites healing without S/S of  infection  Outcome: Progressing     Problem: Musculoskeletal - Adult  Goal: Return mobility to safest level of function  Outcome: Progressing  Flowsheets (Taken 5/19/2024 2053)  Return Mobility to Safest Level of Function:   Assess patient stability and activity tolerance for standing, transferring and ambulating with or without assistive devices   Assist with transfers and ambulation using safe patient handling equipment as needed   Ensure adequate protection for wounds/incisions during mobilization     Problem: Gastrointestinal - Adult  Goal: Maintains or returns to baseline bowel function  Outcome: Progressing  Flowsheets (Taken 5/19/2024 2053)  Maintains or returns to baseline bowel function:   Assess bowel function   Encourage oral fluids to ensure adequate hydration   Administer IV fluids as ordered to ensure adequate hydration   Administer ordered medications as needed   Encourage mobilization and activity  Goal: Maintains adequate nutritional intake  Outcome: Progressing  Flowsheets (Taken 5/19/2024 2053)  Maintains adequate nutritional intake:   Monitor percentage of each meal consumed   Identify factors contributing to decreased intake, treat as appropriate   Assist with meals as needed   Monitor intake and output, weight and lab values   Obtain nutritional consult as needed     Problem: Pain  Goal: Verbalizes/displays adequate comfort level or baseline comfort level  Outcome: Progressing

## 2024-05-20 NOTE — RT PROTOCOL NOTE
RT Inhaler-Nebulizer Bronchodilator Protocol Note    There is a bronchodilator order in the chart from a provider indicating to follow the RT Bronchodilator Protocol and there is an “Initiate RT Inhaler-Nebulizer Bronchodilator Protocol” order as well (see protocol at bottom of note).    CXR Findings:  No results found.    The findings from the last RT Protocol Assessment were as follows:   History Pulmonary Disease: None or smoker <15 pack years  Respiratory Pattern: Regular pattern and RR 12-20 bpm  Breath Sounds: Slightly diminished and/or crackles  Cough: Weak, productive  Indication for Bronchodilator Therapy: Decreased or absent breath sounds  Bronchodilator Assessment Score: 4    Aerosolized bronchodilator medication orders have been revised according to the RT Inhaler-Nebulizer Bronchodilator Protocol below.    Respiratory Therapist to perform RT Therapy Protocol Assessment initially then follow the protocol.  Repeat RT Therapy Protocol Assessment PRN for score 0-3 or on second treatment, BID, and PRN for scores above 3.    No Indications - adjust the frequency to every 6 hours PRN wheezing or bronchospasm, if no treatments needed after 48 hours then discontinue using Per Protocol order mode.     If indication present, adjust the RT bronchodilator orders based on the Bronchodilator Assessment Score as indicated below.  Use Inhaler orders unless patient has one or more of the following: on home nebulizer, not able to hold breath for 10 seconds, is not alert and oriented, cannot activate and use MDI correctly, or respiratory rate 25 breaths per minute or more, then use the equivalent nebulizer order(s) with same Frequency and PRN reasons based on the score.  If a patient is on this medication at home then do not decrease Frequency below that used at home.    0-3 - enter or revise RT bronchodilator order(s) to equivalent RT Bronchodilator order with Frequency of every 4 hours PRN for wheezing or increased work

## 2024-05-20 NOTE — PROGRESS NOTES
Right lower leg with wound on posterior side. Area is black/red in color. No drainage . Very tender to touch with warmth present. Area cleansed with saline and Petroleum dressing  and Kerlix applied . Tylenol 650 mg p.o given for discomfort. Legs elevated on a pillow .

## 2024-05-20 NOTE — PROGRESS NOTES
Clinical Pharmacy Note  Vancomycin Consult    Mayra Nieto is a 89 y.o. female ordered vancomycin for SSTI; consult received from Dr. Tinajero to manage therapy. Also receiving Cefepime.    Allergies:  Bactrim [sulfamethoxazole-trimethoprim] and Macrobid [nitrofurantoin]     Temp max:  Temp (24hrs), Av.9 °F (36.6 °C), Min:97 °F (36.1 °C), Max:98.4 °F (36.9 °C)      Recent Labs     24  1403 24  0744   WBC 14.6* 10.9         Recent Labs     24  1403 24  0744   BUN 21* 19   CREATININE 1.2 1.2         No intake or output data in the 24 hours ending 24 1252    Culture Results:      Ht Readings from Last 1 Encounters:   24 1.549 m (5' 1\")        Wt Readings from Last 1 Encounters:   24 40.1 kg (88 lb 6.5 oz)         Estimated Creatinine Clearance: 19 mL/min (based on SCr of 1.2 mg/dL).    Assessment/Plan:  Day # 2 of vancomycin.  Intermittent dosing based on levels due to age/weight and SCr 1.2    Random vanco level = 6.1 mg/L  Vanco 750 mg again today  Random vanco level tomorrow am    Thank you for the consult.     Arabella Mims, PharmD.  2024  12:53 PM

## 2024-05-20 NOTE — PROGRESS NOTES
Comprehensive Nutrition Assessment    Type and Reason for Visit:  Initial, Positive Nutrition Screen    Nutrition Recommendations/Plan:   Liberalize diet to regular to allow for greater variety  Decrease Ensure Enlive to bid as pt feels overwhelmend with 3 per day     Malnutrition Assessment:  Malnutrition Status:  Severe malnutrition (05/20/24 1525)    Context:  Acute Illness     Findings of the 6 clinical characteristics of malnutrition:  Energy Intake:  Unable to assess  Weight Loss:  No significant weight loss     Body Fat Loss:  Moderate body fat loss Triceps, Fat Overlying Ribs   Muscle Mass Loss:  Moderate muscle mass loss Clavicles (pectoralis & deltoids), Thigh (quadriceps), Calf (gastrocnemius)  Fluid Accumulation:  No significant fluid accumulation     Strength:  Not Performed    Nutrition Assessment:    MST 2 for wt loss and decreased po appetite. PMH includes; GERD, AVR (19'), A-Fib, EGD with dilation, CHF. Pt adm with cellulitis of RLE and Failure to thrive. Diet adv to CCC (5) with Ensure Enlive tid. Pt reported that appetite is starting to improve. Lunch intake observed this date tolerating po well. Feedback revealed that 3 servings of Ensure Enlive are \"too much.\"  Agreed to try and finish 2 per day. Will adjust supplement order to bid and monitor acceptance. Denied any chewing or swallowing difficulties. Pt reported leaving her dentures at home. Hopefully family will bring in per feedback. When asked about diet at home, pt reported that grand daughter prepares all  meals. Feedback revealed that both pt and grand daughter have been caring for pt's daughter who has Alzheimers. When asked about wt, pt reported that UBW is 88 lbs. The most pt ever weighed was 130 lbs while pregnant with children. Suggested use of ONS at home. Pt reported the use of Boost at least 2 times per day. Discussed importance of nutrient dense choices to help ensure adequacy. Pt agreed to try. Records reveal a losing trend

## 2024-05-20 NOTE — CARE COORDINATION
/24  OT AM-PAC:   /24    Family can provide assistance at DC: Yes  Would you like Case Management to discuss the discharge plan with any other family members/significant others, and if so, who? Yes (Granddaughter Irish)  Plans to Return to Present Housing: Unknown at present (Needs PT/OT evaluations)  Other Identified Issues/Barriers to RETURNING to current housing: None  Potential Assistance needed at discharge: Home Care, Skilled Nursing Facility            Potential DME: N/A   Patient expects to discharge to: House  Plan for transportation at discharge: Family    Financial    Payor: [employer] / Plan: [employer] MEDICARE / Product Type: *No Product type* /     Does insurance require precert for SNF: Yes    Potential assistance Purchasing Medications: No  Meds-to-Beds request: Yes      [facility] [id number] - [facility] [address] [facility] [address] - P [phone] - F [phone]  [address] [place] [address]  Phone: [phone] Fax: [phone]    [facility] [address] - P [phone] - F [phone]  [address] [facility] [address]  Phone: [phone] Fax: [phone]      Notes:    Factors facilitating achievement of predicted outcomes: Family support    Barriers to discharge: IV antibiotics    Additional Case Management Notes: Discharge plan is to return to home with the granddaughter. Granddaughter takes care of the patient and the patient's daughter who has alzheimer's. Patient has a history with [facility]. Granddaughter stated she promised her she would not send her back to a SNF for rehab ever again. Has a hx with [facility].     Transportation to home will be provided by the granddaughter. Requested the granddaughter bring in a copy of the [facility] paperwork.     The Plan for Transition of Care is related to the following treatment goals of Cellulitis [L03.90]  Neutrophilia [D72.9]  Elevated LFTs  [R79.89]  Cellulitis of right lower extremity [L03.115]  Acute pain of right lower extremity [M79.604]    IF APPLICABLE: The Patient and/or patient representative Mayra and her family were provided with a choice of provider and agrees with the discharge plan. Freedom of choice list with basic dialogue that supports the patient's individualized plan of care/goals and shares the quality data associated with the providers was provided to:     Patient Representative Name:       The Patient and/or Patient Representative Agree with the Discharge Plan? Yes    Darlin Duvall RN  Case Management Department  Ph: [phone] Fax: [phone]

## 2024-05-21 LAB
ANION GAP SERPL CALCULATED.3IONS-SCNC: 6 MMOL/L (ref 3–16)
BACTERIA UR CULT: NORMAL
BUN SERPL-MCNC: 18 MG/DL (ref 7–20)
CALCIUM SERPL-MCNC: 8.7 MG/DL (ref 8.3–10.6)
CHLORIDE SERPL-SCNC: 103 MMOL/L (ref 99–110)
CO2 SERPL-SCNC: 25 MMOL/L (ref 21–32)
CREAT SERPL-MCNC: 1 MG/DL (ref 0.6–1.2)
DEPRECATED RDW RBC AUTO: 14.7 % (ref 12.4–15.4)
GFR SERPLBLD CREATININE-BSD FMLA CKD-EPI: 52 ML/MIN/{1.73_M2}
GLUCOSE SERPL-MCNC: 93 MG/DL (ref 70–99)
HCT VFR BLD AUTO: 31.8 % (ref 36–48)
HGB BLD-MCNC: 10.8 G/DL (ref 12–16)
MAGNESIUM SERPL-MCNC: 2.2 MG/DL (ref 1.8–2.4)
MCH RBC QN AUTO: 27.7 PG (ref 26–34)
MCHC RBC AUTO-ENTMCNC: 33.8 G/DL (ref 31–36)
MCV RBC AUTO: 82.1 FL (ref 80–100)
PHOSPHATE SERPL-MCNC: 2.2 MG/DL (ref 2.5–4.9)
PLATELET # BLD AUTO: 140 K/UL (ref 135–450)
PMV BLD AUTO: 9.7 FL (ref 5–10.5)
POTASSIUM SERPL-SCNC: 3.6 MMOL/L (ref 3.5–5.1)
RBC # BLD AUTO: 3.88 M/UL (ref 4–5.2)
SODIUM SERPL-SCNC: 134 MMOL/L (ref 136–145)
WBC # BLD AUTO: 7.5 K/UL (ref 4–11)

## 2024-05-21 PROCEDURE — 85027 COMPLETE CBC AUTOMATED: CPT

## 2024-05-21 PROCEDURE — 97530 THERAPEUTIC ACTIVITIES: CPT

## 2024-05-21 PROCEDURE — 6360000002 HC RX W HCPCS: Performed by: INTERNAL MEDICINE

## 2024-05-21 PROCEDURE — 6370000000 HC RX 637 (ALT 250 FOR IP): Performed by: INTERNAL MEDICINE

## 2024-05-21 PROCEDURE — 80048 BASIC METABOLIC PNL TOTAL CA: CPT

## 2024-05-21 PROCEDURE — 6360000002 HC RX W HCPCS: Performed by: STUDENT IN AN ORGANIZED HEALTH CARE EDUCATION/TRAINING PROGRAM

## 2024-05-21 PROCEDURE — 6370000000 HC RX 637 (ALT 250 FOR IP): Performed by: STUDENT IN AN ORGANIZED HEALTH CARE EDUCATION/TRAINING PROGRAM

## 2024-05-21 PROCEDURE — 1200000000 HC SEMI PRIVATE

## 2024-05-21 PROCEDURE — 2580000003 HC RX 258: Performed by: INTERNAL MEDICINE

## 2024-05-21 PROCEDURE — 97166 OT EVAL MOD COMPLEX 45 MIN: CPT

## 2024-05-21 PROCEDURE — 97162 PT EVAL MOD COMPLEX 30 MIN: CPT

## 2024-05-21 PROCEDURE — 83735 ASSAY OF MAGNESIUM: CPT

## 2024-05-21 PROCEDURE — 84100 ASSAY OF PHOSPHORUS: CPT

## 2024-05-21 PROCEDURE — 2580000003 HC RX 258: Performed by: STUDENT IN AN ORGANIZED HEALTH CARE EDUCATION/TRAINING PROGRAM

## 2024-05-21 PROCEDURE — 36415 COLL VENOUS BLD VENIPUNCTURE: CPT

## 2024-05-21 RX ORDER — DOXYCYCLINE HYCLATE 100 MG
100 TABLET ORAL 2 TIMES DAILY
Status: DISCONTINUED | OUTPATIENT
Start: 2024-05-21 | End: 2024-05-27 | Stop reason: HOSPADM

## 2024-05-21 RX ADMIN — CEFAZOLIN 1000 MG: 1 INJECTION, POWDER, FOR SOLUTION INTRAMUSCULAR; INTRAVENOUS at 14:48

## 2024-05-21 RX ADMIN — SODIUM CHLORIDE, POTASSIUM CHLORIDE, SODIUM LACTATE AND CALCIUM CHLORIDE: 600; 310; 30; 20 INJECTION, SOLUTION INTRAVENOUS at 05:25

## 2024-05-21 RX ADMIN — DOXYCYCLINE HYCLATE 100 MG: 100 TABLET, COATED ORAL at 14:48

## 2024-05-21 RX ADMIN — CEFAZOLIN 1000 MG: 1 INJECTION, POWDER, FOR SOLUTION INTRAMUSCULAR; INTRAVENOUS at 02:53

## 2024-05-21 RX ADMIN — ACETAMINOPHEN 650 MG: 325 TABLET ORAL at 12:37

## 2024-05-21 RX ADMIN — AMIODARONE HYDROCHLORIDE 200 MG: 200 TABLET ORAL at 08:28

## 2024-05-21 RX ADMIN — PANTOPRAZOLE SODIUM 20 MG: 20 TABLET, DELAYED RELEASE ORAL at 05:24

## 2024-05-21 RX ADMIN — HEPARIN SODIUM 5000 UNITS: 5000 INJECTION INTRAVENOUS; SUBCUTANEOUS at 22:37

## 2024-05-21 RX ADMIN — DOXYCYCLINE HYCLATE 100 MG: 100 TABLET, COATED ORAL at 22:37

## 2024-05-21 RX ADMIN — SODIUM CHLORIDE, PRESERVATIVE FREE 10 ML: 5 INJECTION INTRAVENOUS at 08:30

## 2024-05-21 RX ADMIN — HEPARIN SODIUM 5000 UNITS: 5000 INJECTION INTRAVENOUS; SUBCUTANEOUS at 08:28

## 2024-05-21 ASSESSMENT — PAIN SCALES - GENERAL
PAINLEVEL_OUTOF10: 0
PAINLEVEL_OUTOF10: 0
PAINLEVEL_OUTOF10: 3
PAINLEVEL_OUTOF10: 0

## 2024-05-21 ASSESSMENT — PAIN DESCRIPTION - PAIN TYPE: TYPE: ACUTE PAIN

## 2024-05-21 ASSESSMENT — PAIN DESCRIPTION - FREQUENCY: FREQUENCY: INTERMITTENT

## 2024-05-21 ASSESSMENT — PAIN DESCRIPTION - DESCRIPTORS: DESCRIPTORS: ACHING

## 2024-05-21 ASSESSMENT — PAIN DESCRIPTION - LOCATION: LOCATION: LEG

## 2024-05-21 ASSESSMENT — PAIN DESCRIPTION - ORIENTATION: ORIENTATION: RIGHT

## 2024-05-21 ASSESSMENT — PAIN DESCRIPTION - ONSET: ONSET: ON-GOING

## 2024-05-21 ASSESSMENT — PAIN - FUNCTIONAL ASSESSMENT: PAIN_FUNCTIONAL_ASSESSMENT: PREVENTS OR INTERFERES SOME ACTIVE ACTIVITIES AND ADLS

## 2024-05-21 NOTE — PROGRESS NOTES
Pt found by RN trying to climb out of the chair. Pt is A&Ox4, but very forgetful. Tele cam placed in pts room for safety.     Electronically signed by Jewell Castano RN on 5/21/2024 at 1:01 PM

## 2024-05-21 NOTE — PROGRESS NOTES
Occupational Therapy  Facility/Department: [address] MED SURG  Occupational Therapy Initial Assessment    Name: Mayra Nieto  : 1931  MRN: [id number]  Date of Service: 2024    Discharge Recommendations:  24 hour supervision or assist, Home with Home health OT, Continue to assess pending progress     Mayra Nieto scored a 16/24 on the AM-PAC ADL Inpatient form. Current research shows that an AM-PAC score of 18 or greater is typically associated with a discharge to the patient's home setting. Based on the patient's AM-PAC score, and their current ADL deficits, it is recommended that the patient have 2-3 sessions per week of Occupational Therapy at d/c to increase the patient's independence.  At this time, this patient demonstrates the endurance and safety to discharge home with 24/7 A & HHOT and a follow up treatment frequency of 2-3x/wk.   Please see assessment section for further patient specific details.    If patient discharges prior to next session this note will serve as a discharge summary.  Please see below for the latest assessment towards goals.        Patient Diagnosis(es): The primary encounter diagnosis was Cellulitis of right lower extremity. Diagnoses of Acute pain of right lower extremity, Neutrophilia, and Elevated LFTs were also pertinent to this visit.  Past Medical History:  has a past medical history of Aortic stenosis, Arthritis, COVID-19, and PAF (paroxysmal atrial fibrillation) ([facility]).  Past Surgical History:  has a past surgical history that includes Varicose vein surgery (Bilateral, ); Cardiac catheterization (2018); Hysterectomy, total abdominal; Aortic valve replacement (N/A, 2019); Upper gastrointestinal endoscopy (N/A, 2022); and Mohs surgery (2022).    Treatment Diagnosis: Decreased: ADLs, functional transfers/mobility      Assessment   Performance deficits / Impairments: Decreased functional mobility ;Decreased ADL status;Decreased

## 2024-05-21 NOTE — PLAN OF CARE
Problem: Discharge Planning  Goal: Discharge to home or other facility with appropriate resources  5/21/2024 1049 by Jewell Castano RN  Outcome: Progressing  5/21/2024 0142 by Koby Fields RN  Outcome: Progressing  Flowsheets (Taken 5/20/2024 2058)  Discharge to home or other facility with appropriate resources:   Identify barriers to discharge with patient and caregiver   Arrange for needed discharge resources and transportation as appropriate   Identify discharge learning needs (meds, wound care, etc)     Problem: Skin/Tissue Integrity  Goal: Absence of new skin breakdown  Description: 1.  Monitor for areas of redness and/or skin breakdown  2.  Assess vascular access sites hourly  3.  Every 4-6 hours minimum:  Change oxygen saturation probe site  4.  Every 4-6 hours:  If on nasal continuous positive airway pressure, respiratory therapy assess nares and determine need for appliance change or resting period.  5/21/2024 1049 by Jewell Castano RN  Outcome: Progressing  5/21/2024 0142 by Koby Fields RN  Outcome: Progressing     Problem: Safety - Adult  Goal: Free from fall injury  5/21/2024 1049 by Jewell Castano RN  Outcome: Progressing  5/21/2024 0142 by Koby Fields RN  Outcome: Progressing     Problem: ABCDS Injury Assessment  Goal: Absence of physical injury  5/21/2024 1049 by Jewell Castano RN  Outcome: Progressing  5/21/2024 0142 by Koby Fields RN  Outcome: Progressing     Problem: Neurosensory - Adult  Goal: Achieves stable or improved neurological status  5/21/2024 1049 by Jewell Castano RN  Outcome: Progressing  5/21/2024 0142 by Koby Fields RN  Outcome: Progressing  Flowsheets (Taken 5/20/2024 2058)  Achieves stable or improved neurological status: Assess for and report changes in neurological status  Goal: Achieves maximal functionality and self care  5/21/2024 1049 by Jewell Castano RN  Outcome: Progressing  5/21/2024 0142 by Koby Fields

## 2024-05-21 NOTE — PLAN OF CARE
Problem: Discharge Planning  Goal: Discharge to home or other facility with appropriate resources  5/21/2024 0142 by Koby Fields RN  Outcome: Progressing  Flowsheets (Taken 5/20/2024 2058)  Discharge to home or other facility with appropriate resources:   Identify barriers to discharge with patient and caregiver   Arrange for needed discharge resources and transportation as appropriate   Identify discharge learning needs (meds, wound care, etc)  5/20/2024 1540 by Zuleyma Segal RN  Outcome: Progressing     Problem: Skin/Tissue Integrity  Goal: Absence of new skin breakdown  Description: 1.  Monitor for areas of redness and/or skin breakdown  2.  Assess vascular access sites hourly  3.  Every 4-6 hours minimum:  Change oxygen saturation probe site  4.  Every 4-6 hours:  If on nasal continuous positive airway pressure, respiratory therapy assess nares and determine need for appliance change or resting period.  5/21/2024 0142 by Koby Fields RN  Outcome: Progressing  5/20/2024 1540 by Zuleyma Segal RN  Outcome: Progressing     Problem: Safety - Adult  Goal: Free from fall injury  5/21/2024 0142 by Koby Fields RN  Outcome: Progressing  5/20/2024 1540 by Zuleyma Segal RN  Outcome: Progressing     Problem: ABCDS Injury Assessment  Goal: Absence of physical injury  5/21/2024 0142 by Koby Fields RN  Outcome: Progressing  5/20/2024 1540 by Zuleyma Segal RN  Outcome: Progressing     Problem: Neurosensory - Adult  Goal: Achieves stable or improved neurological status  5/21/2024 0142 by Koby Fields RN  Outcome: Progressing  Flowsheets (Taken 5/20/2024 2058)  Achieves stable or improved neurological status: Assess for and report changes in neurological status  5/20/2024 1540 by Zuleyma Segal RN  Outcome: Progressing  Goal: Achieves maximal functionality and self care  5/21/2024 0142 by Koby Fields RN  Outcome: Progressing  5/20/2024 1540 by Zuleyma Segal RN  Outcome:

## 2024-05-21 NOTE — PROGRESS NOTES
V2.0    [facility] [place]      Name:  Mayra Nieto /Age/Sex: 1931  (89 y.o. female)   MRN & CSN:  [id number] & [id number] Encounter Date/Time: 2024 1:36 PM EDT   Location:  [id number] PCP: Armando Bee MD     Attending:Darrell Montiel MD       Hospital Day: 3    Assessment and Recommendations   Mayra Nieto is a 89 y.o. female who presents with Cellulitis      Plan:     Bilateral lower extremity cellulitis likely due to strep, IV fluid IV Ancef, adding doxycycline today podiatry consulted nurse at bedside  History of chronic diastolic heart failure no exacerbation at this time  A-fib continue to be on amiodarone  GERD PPI  Thrombocytopenia, mild repeat CBC in a.m.        Diet ADULT DIET; Regular  ADULT ORAL NUTRITION SUPPLEMENT; Breakfast, Dinner; Standard High Calorie/High Protein Oral Supplement   DVT Prophylaxis [] Lovenox, []  Heparin, [] SCDs, [] Ambulation,  [] Eliquis, [] Xarelto  [] Coumadin   Code Status Full Code             Personally reviewed Lab Studies and Imaging     Discussed management of the case with nursing staff        Medical Decision Making:  The following items were considered in medical decision making:  Discussion of patient care with other providers  Reviewed clinical lab tests  Reviewed radiology tests  Reviewed other diagnostic tests/interventions  Independent review of radiologic images  Microbiology cultures and other micro tests reviewed      Subjective:     Chief Complaint: Foot pain    Mayra Nieto is a 89 y.o. female who presents with feet pain, no fever or chills today still having erythema on her right foot      Review of Systems:      Pertinent positives and negatives discussed in HPI    Objective:   No intake or output data in the 24 hours ending 24 1336   Vitals:   Vitals:    24 0334 24 0352 24 0813 24 1136   BP:  130/70 (!) 194/65 (!) 169/66   Pulse:  70 80 76   Resp:  18  16   Temp:  97.6 °F (36.4 °C)

## 2024-05-21 NOTE — PROGRESS NOTES
Physical Therapy  Facility/Department: [address] MED SURG  Physical Therapy Initial Assessment    Name: Mayra Nieto  : 1931  MRN: [id number]  Date of Service: 2024    Discharge Recommendations:  Therapy recommended at discharge, Continue to assess pending progress          Patient Diagnosis(es): The primary encounter diagnosis was Cellulitis of right lower extremity. Diagnoses of Acute pain of right lower extremity, Neutrophilia, and Elevated LFTs were also pertinent to this visit.  Past Medical History:  has a past medical history of Aortic stenosis, Arthritis, COVID-19, and PAF (paroxysmal atrial fibrillation) ([facility]).  Past Surgical History:  has a past surgical history that includes Varicose vein surgery (Bilateral, ); Cardiac catheterization (2018); Hysterectomy, total abdominal; Aortic valve replacement (N/A, 2019); Upper gastrointestinal endoscopy (N/A, 2022); and Mohs surgery (2022).    Assessment   Body Structures, Functions, Activity Limitations Requiring Skilled Therapeutic Intervention: Decreased functional mobility ;Decreased ADL status;Decreased strength;Decreased endurance;Decreased balance  Assessment: Pt is a 89 y.o. female who presented to the ED on 24 with RLE cellulitis. Prior to admission, pt living with dtr and granddtr in home setting, independent with ADLs and ambulation, questionable historian. Pt currently functioning below baseline. Anticipate return home with initial 24hr supv and level 1 HHPT. Would recommend SNF if 24hr unable to be provided.  Treatment Diagnosis: impaired mobility  Therapy Prognosis: Good  Decision Making: Medium Complexity  History: see above  Exam: see below  Clinical Presentation: evolving  Requires PT Follow-Up: Yes  Activity Tolerance  Activity Tolerance: Patient tolerated treatment well     Plan   Physical Therapy Plan  General Plan: 3-5 times per week  Current Treatment Recommendations: Strengthening, Balance  assistance;Decreased awareness of need for safety  Problem Solving: Decreased awareness of errors  Insights: Decreased awareness of deficits  Initiation: Requires cues for some  Sequencing: Does not require cues  Cognition Comment: VERY [place] impacting command following     Objective   Gross Assessment  Strength: Generally decreased, functional     Bed mobility  Supine to Sit: Stand by assistance  Sit to Supine: Unable to assess (in recliner at end of session)  Transfers  Sit to Stand: Contact guard assistance  Stand to Sit: Contact guard assistance  Ambulation  Surface: Level tile  Device: Rolling Walker  Assistance: Contact guard assistance;Minimal assistance  Quality of Gait: kyphotic  Gait Deviations: Slow Mirian;Decreased step length;Decreased step height  Distance: 10' + 25'  Comments: Pt ambulated to toilet to have BM and then to recliner; cues to square self to toilet and recliner.     Balance  Posture: Fair  Sitting - Static: Fair  Sitting - Dynamic: Fair  Standing - Static: Fair  Standing - Dynamic: Fair;-         AM-PAC - Mobility  AM-PAC Basic Mobility - Inpatient   How much help is needed turning from your back to your side while in a flat bed without using bedrails?: A Little  How much help is needed moving from lying on your back to sitting on the side of a flat bed without using bedrails?: A Little  How much help is needed moving to and from a bed to a chair?: A Little  How much help is needed standing up from a chair using your arms?: A Little  How much help is needed walking in hospital room?: A Little  How much help is needed climbing 3-5 steps with a railing?: A Lot  AM-PAC Inpatient Mobility Raw Score : 17  AM-PAC Inpatient T-Scale Score : 42.13  Mobility Inpatient CMS 0-100% Score: 50.57  Mobility Inpatient CMS G-Code Modifier : CK       Goals  Short Term Goals  Time Frame for Short Term Goals: by acute discharge  Short Term Goal 1: bed mobility mod I  Short Term Goal 2: sit<>stand mod I  Short

## 2024-05-21 NOTE — RT PROTOCOL NOTE
RT Inhaler-Nebulizer Bronchodilator Protocol Note    There is a bronchodilator order in the chart from a provider indicating to follow the RT Bronchodilator Protocol and there is an “Initiate RT Inhaler-Nebulizer Bronchodilator Protocol” order as well (see protocol at bottom of note).    CXR Findings:  No results found.    The findings from the last RT Protocol Assessment were as follows:   History Pulmonary Disease: None or smoker <15 pack years  Respiratory Pattern: Regular pattern and RR 12-20 bpm  Breath Sounds: Clear breath sounds  Cough: Strong, spontaneous, non-productive  Indication for Bronchodilator Therapy: Wheezing associated with pulm disorder  Bronchodilator Assessment Score: 0    Aerosolized bronchodilator medication orders have been revised according to the RT Inhaler-Nebulizer Bronchodilator Protocol below.    Respiratory Therapist to perform RT Therapy Protocol Assessment initially then follow the protocol.  Repeat RT Therapy Protocol Assessment PRN for score 0-3 or on second treatment, BID, and PRN for scores above 3.    No Indications - adjust the frequency to every 6 hours PRN wheezing or bronchospasm, if no treatments needed after 48 hours then discontinue using Per Protocol order mode.     If indication present, adjust the RT bronchodilator orders based on the Bronchodilator Assessment Score as indicated below.  Use Inhaler orders unless patient has one or more of the following: on home nebulizer, not able to hold breath for 10 seconds, is not alert and oriented, cannot activate and use MDI correctly, or respiratory rate 25 breaths per minute or more, then use the equivalent nebulizer order(s) with same Frequency and PRN reasons based on the score.  If a patient is on this medication at home then do not decrease Frequency below that used at home.    0-3 - enter or revise RT bronchodilator order(s) to equivalent RT Bronchodilator order with Frequency of every 4 hours PRN for wheezing or

## 2024-05-22 LAB
ANION GAP SERPL CALCULATED.3IONS-SCNC: 9 MMOL/L (ref 3–16)
BUN SERPL-MCNC: 11 MG/DL (ref 7–20)
CALCIUM SERPL-MCNC: 8.3 MG/DL (ref 8.3–10.6)
CHLORIDE SERPL-SCNC: 104 MMOL/L (ref 99–110)
CO2 SERPL-SCNC: 25 MMOL/L (ref 21–32)
CREAT SERPL-MCNC: 0.8 MG/DL (ref 0.6–1.2)
DEPRECATED RDW RBC AUTO: 14.8 % (ref 12.4–15.4)
GFR SERPLBLD CREATININE-BSD FMLA CKD-EPI: 69 ML/MIN/{1.73_M2}
GLUCOSE SERPL-MCNC: 107 MG/DL (ref 70–99)
HCT VFR BLD AUTO: 28.7 % (ref 36–48)
HGB BLD-MCNC: 9.7 G/DL (ref 12–16)
MAGNESIUM SERPL-MCNC: 1.9 MG/DL (ref 1.8–2.4)
MCH RBC QN AUTO: 27.7 PG (ref 26–34)
MCHC RBC AUTO-ENTMCNC: 33.6 G/DL (ref 31–36)
MCV RBC AUTO: 82.4 FL (ref 80–100)
PHOSPHATE SERPL-MCNC: 2.3 MG/DL (ref 2.5–4.9)
PLATELET # BLD AUTO: 167 K/UL (ref 135–450)
PMV BLD AUTO: 9.7 FL (ref 5–10.5)
POTASSIUM SERPL-SCNC: 3.5 MMOL/L (ref 3.5–5.1)
RBC # BLD AUTO: 3.49 M/UL (ref 4–5.2)
SODIUM SERPL-SCNC: 138 MMOL/L (ref 136–145)
WBC # BLD AUTO: 6.5 K/UL (ref 4–11)

## 2024-05-22 PROCEDURE — 2580000003 HC RX 258: Performed by: INTERNAL MEDICINE

## 2024-05-22 PROCEDURE — 6370000000 HC RX 637 (ALT 250 FOR IP): Performed by: INTERNAL MEDICINE

## 2024-05-22 PROCEDURE — 87077 CULTURE AEROBIC IDENTIFY: CPT

## 2024-05-22 PROCEDURE — 87186 SC STD MICRODIL/AGAR DIL: CPT

## 2024-05-22 PROCEDURE — 87070 CULTURE OTHR SPECIMN AEROBIC: CPT

## 2024-05-22 PROCEDURE — 0Y9M0ZZ DRAINAGE OF RIGHT FOOT, OPEN APPROACH: ICD-10-PCS | Performed by: STUDENT IN AN ORGANIZED HEALTH CARE EDUCATION/TRAINING PROGRAM

## 2024-05-22 PROCEDURE — 6370000000 HC RX 637 (ALT 250 FOR IP): Performed by: PODIATRIST

## 2024-05-22 PROCEDURE — 6360000002 HC RX W HCPCS: Performed by: INTERNAL MEDICINE

## 2024-05-22 PROCEDURE — 6360000002 HC RX W HCPCS: Performed by: STUDENT IN AN ORGANIZED HEALTH CARE EDUCATION/TRAINING PROGRAM

## 2024-05-22 PROCEDURE — 0J9Q0ZZ DRAINAGE OF RIGHT FOOT SUBCUTANEOUS TISSUE AND FASCIA, OPEN APPROACH: ICD-10-PCS | Performed by: STUDENT IN AN ORGANIZED HEALTH CARE EDUCATION/TRAINING PROGRAM

## 2024-05-22 PROCEDURE — 36415 COLL VENOUS BLD VENIPUNCTURE: CPT

## 2024-05-22 PROCEDURE — 84100 ASSAY OF PHOSPHORUS: CPT

## 2024-05-22 PROCEDURE — 1200000000 HC SEMI PRIVATE

## 2024-05-22 PROCEDURE — 94760 N-INVAS EAR/PLS OXIMETRY 1: CPT

## 2024-05-22 PROCEDURE — 6370000000 HC RX 637 (ALT 250 FOR IP): Performed by: STUDENT IN AN ORGANIZED HEALTH CARE EDUCATION/TRAINING PROGRAM

## 2024-05-22 PROCEDURE — 83735 ASSAY OF MAGNESIUM: CPT

## 2024-05-22 PROCEDURE — 85027 COMPLETE CBC AUTOMATED: CPT

## 2024-05-22 PROCEDURE — 92610 EVALUATE SWALLOWING FUNCTION: CPT

## 2024-05-22 PROCEDURE — 87205 SMEAR GRAM STAIN: CPT

## 2024-05-22 PROCEDURE — 80048 BASIC METABOLIC PNL TOTAL CA: CPT

## 2024-05-22 PROCEDURE — 2580000003 HC RX 258: Performed by: STUDENT IN AN ORGANIZED HEALTH CARE EDUCATION/TRAINING PROGRAM

## 2024-05-22 RX ADMIN — SODIUM CHLORIDE, POTASSIUM CHLORIDE, SODIUM LACTATE AND CALCIUM CHLORIDE: 600; 310; 30; 20 INJECTION, SOLUTION INTRAVENOUS at 11:24

## 2024-05-22 RX ADMIN — DOXYCYCLINE HYCLATE 100 MG: 100 TABLET, COATED ORAL at 09:24

## 2024-05-22 RX ADMIN — HEPARIN SODIUM 5000 UNITS: 5000 INJECTION INTRAVENOUS; SUBCUTANEOUS at 09:24

## 2024-05-22 RX ADMIN — ONDANSETRON 4 MG: 2 INJECTION INTRAMUSCULAR; INTRAVENOUS at 01:49

## 2024-05-22 RX ADMIN — AMIODARONE HYDROCHLORIDE 200 MG: 200 TABLET ORAL at 09:24

## 2024-05-22 RX ADMIN — DOXYCYCLINE HYCLATE 100 MG: 100 TABLET, COATED ORAL at 19:59

## 2024-05-22 RX ADMIN — CEFAZOLIN 1000 MG: 1 INJECTION, POWDER, FOR SOLUTION INTRAMUSCULAR; INTRAVENOUS at 02:41

## 2024-05-22 RX ADMIN — SODIUM CHLORIDE, PRESERVATIVE FREE 10 ML: 5 INJECTION INTRAVENOUS at 20:16

## 2024-05-22 RX ADMIN — MUPIROCIN: 20 OINTMENT TOPICAL at 22:10

## 2024-05-22 RX ADMIN — PANTOPRAZOLE SODIUM 20 MG: 20 TABLET, DELAYED RELEASE ORAL at 05:45

## 2024-05-22 RX ADMIN — DIBASIC SODIUM PHOSPHATE, MONOBASIC POTASSIUM PHOSPHATE AND MONOBASIC SODIUM PHOSPHATE 1 TABLET: 852; 155; 130 TABLET ORAL at 14:16

## 2024-05-22 RX ADMIN — ACETAMINOPHEN 650 MG: 325 TABLET ORAL at 19:59

## 2024-05-22 RX ADMIN — HEPARIN SODIUM 5000 UNITS: 5000 INJECTION INTRAVENOUS; SUBCUTANEOUS at 20:00

## 2024-05-22 RX ADMIN — SODIUM CHLORIDE, POTASSIUM CHLORIDE, SODIUM LACTATE AND CALCIUM CHLORIDE: 600; 310; 30; 20 INJECTION, SOLUTION INTRAVENOUS at 22:09

## 2024-05-22 RX ADMIN — ACETAMINOPHEN 650 MG: 325 TABLET ORAL at 01:49

## 2024-05-22 RX ADMIN — CEFAZOLIN 1000 MG: 1 INJECTION, POWDER, FOR SOLUTION INTRAMUSCULAR; INTRAVENOUS at 15:41

## 2024-05-22 ASSESSMENT — PAIN SCALES - GENERAL
PAINLEVEL_OUTOF10: 0

## 2024-05-22 NOTE — PROGRESS NOTES
V2.0    [facility] [place] Progress Note      Name:  Mayra Nieto /Age/Sex: 1931  (89 y.o. female)   MRN & CSN:  [id number] & [id number] Encounter Date/Time: 2024 12:33 PM EDT   Location:  [id number] PCP: Armando Bee MD     Attending:Darrell Montiel MD       Hospital Day: 4    Assessment and Recommendations   Mayra Nieto is a 89 y.o. female who presents with Cellulitis      Plan:   Bilateral lower extremity cellulitis likely due to strep, IV fluid IV Ancef, added doxycycline yesterday and podiatry consulted pending further recommendations  History of chronic diastolic heart failure no exacerbation at this time  A-fib continue to be on amiodarone  GERD PPI  Thrombocytopenia, mild r improved today up to 167  Hypophosphatemia replacing with p.o. supplement        Diet ADULT DIET; Regular  ADULT ORAL NUTRITION SUPPLEMENT; Breakfast, Dinner; Standard High Calorie/High Protein Oral Supplement   DVT Prophylaxis [] Lovenox, []  Heparin, [] SCDs, [] Ambulation,  [] Eliquis, [] Xarelto  [] Coumadin   Code Status Full Code             Personally reviewed Lab Studies and Imaging     Discussed management of the case with case management over IDR    Telemetry strip reviewed no ST elevation      Drugs that require monitoring for toxicity include Ancef and the method of monitoring was diarrhea    Medical Decision Making:  The following items were considered in medical decision making:  Discussion of patient care with other providers  Reviewed clinical lab tests  Reviewed radiology tests  Reviewed other diagnostic tests/interventions  Independent review of radiologic images  Microbiology cultures and other micro tests reviewed      Subjective:     Chief Complaint: Bilateral feet erythema    Mayra Nieto is a 89 y.o. female who presents with bilateral feet erythema      Review of Systems:      Pertinent positives and negatives discussed in HPI    Objective:   No intake or output data in the 24 hours  TECHNOLOGIST PROVIDED HISTORY: Reason for exam:->right foot cellulitis r/o osteo Reason for Exam: right foot cellulitis r/o osteo FINDINGS: There is no evidence of acute fracture.  There is normal alignment of the tarsometatarsal joints.  No acute joint abnormality.  No focal osseous lesion. No focal soft tissue abnormality.     No acute osseous abnormality.     XR TIBIA FIBULA RIGHT (2 VIEWS)    Result Date: 5/19/2024  EXAMINATION: TWO XRAY VIEWS OF THE RIGHT TIBIA/FIBULA 5/19/2024 1:48 pm COMPARISON: None. HISTORY: ORDERING SYSTEM PROVIDED HISTORY: right leg cellulitis r/o osteo TECHNOLOGIST PROVIDED HISTORY: Reason for exam:->right leg cellulitis r/o osteo Reason for Exam: right leg cellulitis r/o osteo FINDINGS: The tibia and fibula appear normal. The knee joint and ankle joint appear normal. Soft tissues are unremarkable.     No radiographic evidence of osteomyelitis. MRI is more sensitive for detection of osteomyelitis.       CBC:   Recent Labs     05/20/24  0744 05/21/24  0531 05/22/24  0500   WBC 10.9 7.5 6.5   HGB 10.5* 10.8* 9.7*   * 140 167     BMP:    Recent Labs     05/20/24  0744 05/21/24  0531 05/22/24  0500   * 134* 138   K 3.8 3.6 3.5   CL 99 103 104   CO2 23 25 25   BUN 19 18 11   CREATININE 1.2 1.0 0.8   GLUCOSE 78 93 107*     Hepatic:   Recent Labs     05/19/24  1403   AST 68*   ALT 62*   BILITOT 1.0   ALKPHOS 81     Lipids:   Lab Results   Component Value Date/Time    CHOL 197 03/28/2019 09:11 AM    HDL 59 03/28/2019 09:11 AM    TRIG 90 03/28/2019 09:11 AM     Hemoglobin A1C: No results found for: \"LABA1C\"  TSH:   Lab Results   Component Value Date/Time    TSH 2.16 06/27/2023 12:18 PM     Troponin: No results found for: \"TROPONINT\"  Lactic Acid: No results for input(s): \"LACTA\" in the last 72 hours.  BNP: No results for input(s): \"PROBNP\" in the last 72 hours.  UA:  Lab Results   Component Value Date/Time    NITRU Negative 05/20/2024 10:16 PM    COLORU DARK YELLOW 05/20/2024 10:16

## 2024-05-22 NOTE — PLAN OF CARE
Problem: Discharge Planning  Goal: Discharge to home or other facility with appropriate resources  Outcome: Progressing  Flowsheets (Taken 5/22/2024 0220)  Discharge to home or other facility with appropriate resources:   Identify barriers to discharge with patient and caregiver   Arrange for needed discharge resources and transportation as appropriate   Identify discharge learning needs (meds, wound care, etc)   Arrange for interpreters to assist at discharge as needed   Refer to discharge planning if patient needs post-hospital services based on physician order or complex needs related to functional status, cognitive ability or social support system     Problem: Skin/Tissue Integrity  Goal: Absence of new skin breakdown  Description: 1.  Monitor for areas of redness and/or skin breakdown  2.  Assess vascular access sites hourly  3.  Every 4-6 hours minimum:  Change oxygen saturation probe site  4.  Every 4-6 hours:  If on nasal continuous positive airway pressure, respiratory therapy assess nares and determine need for appliance change or resting period.  Outcome: Progressing     Problem: Safety - Adult  Goal: Free from fall injury  Outcome: Progressing     Problem: Skin/Tissue Integrity - Adult  Goal: Skin integrity remains intact  Outcome: Progressing     Problem: Musculoskeletal - Adult  Goal: Return mobility to safest level of function  Outcome: Progressing

## 2024-05-22 NOTE — PLAN OF CARE
Problem: Discharge Planning  Goal: Discharge to home or other facility with appropriate resources  5/22/2024 0952 by Koby Flores RN  Outcome: Progressing  5/22/2024 0220 by Katherine Dial RN  Outcome: Progressing  Flowsheets (Taken 5/22/2024 0220)  Discharge to home or other facility with appropriate resources:   Identify barriers to discharge with patient and caregiver   Arrange for needed discharge resources and transportation as appropriate   Identify discharge learning needs (meds, wound care, etc)   Arrange for interpreters to assist at discharge as needed   Refer to discharge planning if patient needs post-hospital services based on physician order or complex needs related to functional status, cognitive ability or social support system     Problem: Skin/Tissue Integrity  Goal: Absence of new skin breakdown  Description: 1.  Monitor for areas of redness and/or skin breakdown  2.  Assess vascular access sites hourly  3.  Every 4-6 hours minimum:  Change oxygen saturation probe site  4.  Every 4-6 hours:  If on nasal continuous positive airway pressure, respiratory therapy assess nares and determine need for appliance change or resting period.  5/22/2024 0952 by Koby Flores RN  Outcome: Progressing  5/22/2024 0220 by Katherine Dial RN  Outcome: Progressing     Problem: Safety - Adult  Goal: Free from fall injury  5/22/2024 0952 by Koby Flores RN  Outcome: Progressing  5/22/2024 0220 by Katherine Dial RN  Outcome: Progressing     Problem: ABCDS Injury Assessment  Goal: Absence of physical injury  Outcome: Progressing     Problem: Neurosensory - Adult  Goal: Achieves stable or improved neurological status  Outcome: Progressing  Goal: Achieves maximal functionality and self care  Outcome: Progressing     Problem: Skin/Tissue Integrity - Adult  Goal: Skin integrity remains intact  5/22/2024 0952 by Koby Flores RN  Outcome: Progressing  5/22/2024 0220 by Katherine Dial RN  Outcome:

## 2024-05-22 NOTE — PROGRESS NOTES
Pt in bed A+O x3. Confused at times, [place]. Observed pt having trouble swallowing during breakfast and taking medications. Breaths even and unlabored. MD notified. Turned to left side. [place] dressing right ankle clean dry and intact. Placed by podiatry. Wound cultured taken to core lab by RN. Call light and side table in reach, bed alarm on. Plan of care ongoing. Electronically signed by Koby Flores RN on 5/22/2024 at 9:56 AM

## 2024-05-22 NOTE — PROGRESS NOTES
Facility/Department: [address] MED SURG  Initial Assessment  DYSPHAGIA BEDSIDE SWALLOW EVALUATION     Patient: Mayra Nieto   : 1931   MRN: [id number]      Evaluation Date: 2024   Admitting Diagnosis:   Cellulitis [L03.90]  Neutrophilia [D72.9]  Elevated LFTs [R79.89]  Cellulitis of right lower extremity [L03.115]  Acute pain of right lower extremity [M79.604]   has a past medical history of Aortic stenosis, Arthritis, COVID-19, and PAF (paroxysmal atrial fibrillation) ([facility]).   has a past surgical history that includes Varicose vein surgery (Bilateral, ); Cardiac catheterization (2018); Hysterectomy, total abdominal; Aortic valve replacement (N/A, 2019); Upper gastrointestinal endoscopy (N/A, 2022); and Mohs surgery (2022).  Allergies: medication allergies noted                                             Onset date: 2024     CHART REVIEW:  2024 admitted with c/o cellulitis  MD ADMISSION H&P HPI:  patient has been having decreased appetite nausea along with a burning sensation in the bilateral lower extremities denies any leg pain otherwise denies any leg swelling as well patient was found to have signs of bilateral lower extremity cellulitis. Denies any orthopnea PND and chest pain to be admitted to the hospital     Consults noted: Podiatry  Swallow eval ordered 2024    IMAGING:  CXR: not ordered this admission  CT CHEST: not ordered this admission    Modified Barium Swallow Study: 2021  Modified Barium Swallow evaluation completed on 2021. Results indicate mild oropharyngeal dysphagia. Oral phase of swallow characterized by adequate labial seal but prolonged mastication of solids and mildly reduced lingual control for bolus prep and transport. Intermittent double swallows for transit of solid presentations with adequate clearance of oral cavity across consistencies. Swallow initiation was delayed, at valleculae for solids and into  liquids  Recommended form of Meds: Meds crushed as able in puree      Compensatory Swallowing Strategies:  Upright as possible with all PO intake , No straws , Small bites/sips , Swallow 2 times per bite , Eat/feed slowly, Remain upright 30-45 min     SHORT TERM DYSPHAGIA GOALS/PLAN OF CARE: Speech therapy for dysphagia tx 2-5 times per week during acute care stay.    Goals  Pt will functionally tolerate recommended diet with no overt clinical s/s of aspiration   Pt will participate in Modified Barium Swallow Study (MBS)  to further assess pharyngeal phase of swallow, assist with diet recommendations and to further direct plan of care        Dysphagia Therapeutic Intervention:  Diet Tolerance Monitoring , Patient/Family Education , Therapeutic Trials with SLP , Instrumental assessment of swallow function (Modified Barium Swallow Study)     Dysphagia Prognosis: [] good []fair  [x]guarded []poor  Barriers to progress: h/o prior deficits; age; ill-fitting dentures; comorbidities    Discharge Recommendations: Discharge recommendations to be determined pending ongoing follow-up during acute care stay      TEST DATA  Pain: Did not state              Vision: adequate for dysphagia needs  Hearing: [] WFL; [x] hearing impaired ; [] has hearing aids;[] no hearing aids    Cognitive/behavioral/communication:   Oriented to [x] self [x] place [] date [x] situation  [x]alert []lethargic  [x]cooperative []self-limiting   []confused   []distractible []agitated []impulsive  [x]verbally responsive []nonverbal []limited verbal responses  [x]follows one step commands []does not follow dx []follows complex commands  []aphasic []dysarthric  [] other:     Dentition: []Adequate [x]Dentures []Missing Many Teeth []Edentulous []Other:  Vocal Quality: [x]Normal []Dysphonic  []Aphonic  []Hoarse []Wet []Weak []Other:  Volitional Cough: [x]Strong []Weak []Wet []Absent []Congested []Other:  Volitional Swallow:   []Absent  [x]Delayed []Adequate

## 2024-05-22 NOTE — CONSULTS
Department of Podiatry Consult Note  Alexander Braun DPM Attending       Reason for Consult:  Abscess RIGHT foot  Requesting Physician:  Darrell Montiel MD    CHIEF COMPLAINT:  Painful lesion of RIGHT lateral foot with erythema / Cellulitis to RIGHT foot    HISTORY OF PRESENT ILLNESS:                The patient is a 89 y.o. female with significant past medical history of Atrial Fib with Aortic stenosis, Osteoarthritis  who is consulted for uncertain duration of erythema / cellulitis of RIGHT lateral foot with noted abscess to lateral calcaneal area. Patient relates pain, but only oriented to self, cannot give details as to etiology and duration of wound    Past Medical History:        Diagnosis Date    Aortic stenosis     Arthritis     COVID-19     PAF (paroxysmal atrial fibrillation) ([facility])      Past Surgical History:        Procedure Laterality Date    AORTIC VALVE REPLACEMENT N/A 5/21/2019    TRANSCATHETER AORTIC VALVE REPLACEMENT FEMORAL APPROACH performed by Otoniel Williamson MD at [facility] CVOR    CARDIAC CATHETERIZATION  04/17/2018    HYSTERECTOMY, TOTAL ABDOMINAL (CERVIX REMOVED)      MOHS SURGERY  04/14/2022    Left arm    UPPER GASTROINTESTINAL ENDOSCOPY N/A 2/14/2022    EGD DILATION BALLOON performed by Bhavin Sim MD at [facility] ENDOSCOPY    VARICOSE VEIN SURGERY Bilateral 1980's     Current Medications:    Current Facility-Administered Medications: doxycycline hyclate (VIBRA-TABS) tablet 100 mg, 100 mg, Oral, BID  ceFAZolin (ANCEF) 1,000 mg in sodium chloride 0.9 % 50 mL IVPB (mini-bag), 1,000 mg, IntraVENous, Q12H  albuterol-ipratropium (COMBIVENT RESPIMAT)  MCG/ACT inhaler 1 puff, 1 puff, Inhalation, Q6H PRN  amiodarone (CORDARONE) tablet 200 mg, 200 mg, Oral, Daily  pantoprazole (PROTONIX) tablet 20 mg, 20 mg, Oral, QAM AC  sodium chloride flush 0.9 % injection 5-40 mL, 5-40 mL, IntraVENous, 2 times per day  sodium chloride flush 0.9 % injection 5-40 mL, 5-40 mL, IntraVENous, PRN  0.9 % sodium

## 2024-05-22 NOTE — CARE COORDINATION
Per chart review, on RA, remains on IV antibiotics thru 5/27/24. I&D performed at the bedside by podiatry this morning with culture obtained of right foot abscess. 2cc's of purulent drainage evacuated per podiatry.     Discharge plan is to return to home with her granddaughter. Granddaughter is her HPOA and has declined a skilled nursing facility at this time, stating she promised the patient she would never send her to one again. Is open to home healthcare services if needed. Granddaughter will provide transportation to home at discharge.     Darlin BRIZUELA RN  Case Management  [phone]    Electronically signed by Darlin Duvall RN on 5/22/2024 at 11:40 AM

## 2024-05-23 ENCOUNTER — APPOINTMENT (OUTPATIENT)
Dept: GENERAL RADIOLOGY | Age: 89
DRG: 872 | End: 2024-05-23
Payer: MEDICARE

## 2024-05-23 LAB
BACTERIA BLD CULT: NORMAL
BACTERIA BLD CULT: NORMAL

## 2024-05-23 PROCEDURE — 71046 X-RAY EXAM CHEST 2 VIEWS: CPT

## 2024-05-23 PROCEDURE — 94640 AIRWAY INHALATION TREATMENT: CPT

## 2024-05-23 PROCEDURE — 6360000002 HC RX W HCPCS: Performed by: STUDENT IN AN ORGANIZED HEALTH CARE EDUCATION/TRAINING PROGRAM

## 2024-05-23 PROCEDURE — 94760 N-INVAS EAR/PLS OXIMETRY 1: CPT

## 2024-05-23 PROCEDURE — 6360000002 HC RX W HCPCS: Performed by: INTERNAL MEDICINE

## 2024-05-23 PROCEDURE — 2580000003 HC RX 258: Performed by: INTERNAL MEDICINE

## 2024-05-23 PROCEDURE — 6370000000 HC RX 637 (ALT 250 FOR IP): Performed by: STUDENT IN AN ORGANIZED HEALTH CARE EDUCATION/TRAINING PROGRAM

## 2024-05-23 PROCEDURE — 2700000000 HC OXYGEN THERAPY PER DAY

## 2024-05-23 PROCEDURE — 97530 THERAPEUTIC ACTIVITIES: CPT

## 2024-05-23 PROCEDURE — 6370000000 HC RX 637 (ALT 250 FOR IP): Performed by: INTERNAL MEDICINE

## 2024-05-23 PROCEDURE — 2580000003 HC RX 258: Performed by: STUDENT IN AN ORGANIZED HEALTH CARE EDUCATION/TRAINING PROGRAM

## 2024-05-23 PROCEDURE — 71045 X-RAY EXAM CHEST 1 VIEW: CPT

## 2024-05-23 PROCEDURE — 1200000000 HC SEMI PRIVATE

## 2024-05-23 RX ORDER — CETIRIZINE HYDROCHLORIDE 10 MG/1
5 TABLET ORAL DAILY
Status: DISCONTINUED | OUTPATIENT
Start: 2024-05-23 | End: 2024-05-27 | Stop reason: HOSPADM

## 2024-05-23 RX ORDER — CETIRIZINE HYDROCHLORIDE 5 MG/1
2.5 TABLET ORAL DAILY
Status: DISCONTINUED | OUTPATIENT
Start: 2024-05-23 | End: 2024-05-23 | Stop reason: ALTCHOICE

## 2024-05-23 RX ORDER — FUROSEMIDE 10 MG/ML
20 INJECTION INTRAMUSCULAR; INTRAVENOUS ONCE
Status: COMPLETED | OUTPATIENT
Start: 2024-05-23 | End: 2024-05-23

## 2024-05-23 RX ORDER — ALBUTEROL SULFATE 2.5 MG/3ML
2.5 SOLUTION RESPIRATORY (INHALATION) EVERY 4 HOURS PRN
Status: DISCONTINUED | OUTPATIENT
Start: 2024-05-23 | End: 2024-05-27 | Stop reason: HOSPADM

## 2024-05-23 RX ORDER — FUROSEMIDE 20 MG/1
20 TABLET ORAL DAILY
Status: DISCONTINUED | OUTPATIENT
Start: 2024-05-23 | End: 2024-05-27 | Stop reason: HOSPADM

## 2024-05-23 RX ADMIN — MUPIROCIN: 20 OINTMENT TOPICAL at 09:12

## 2024-05-23 RX ADMIN — CETIRIZINE HYDROCHLORIDE 5 MG: 10 TABLET, FILM COATED ORAL at 15:33

## 2024-05-23 RX ADMIN — MUPIROCIN: 20 OINTMENT TOPICAL at 20:50

## 2024-05-23 RX ADMIN — HEPARIN SODIUM 5000 UNITS: 5000 INJECTION INTRAVENOUS; SUBCUTANEOUS at 20:52

## 2024-05-23 RX ADMIN — CEFAZOLIN 1000 MG: 1 INJECTION, POWDER, FOR SOLUTION INTRAMUSCULAR; INTRAVENOUS at 15:34

## 2024-05-23 RX ADMIN — SODIUM CHLORIDE, PRESERVATIVE FREE 10 ML: 5 INJECTION INTRAVENOUS at 20:53

## 2024-05-23 RX ADMIN — DOXYCYCLINE HYCLATE 100 MG: 100 TABLET, COATED ORAL at 09:12

## 2024-05-23 RX ADMIN — IPRATROPIUM BROMIDE AND ALBUTEROL 1 PUFF: 20; 100 SPRAY, METERED RESPIRATORY (INHALATION) at 20:23

## 2024-05-23 RX ADMIN — FUROSEMIDE 20 MG: 20 TABLET ORAL at 12:14

## 2024-05-23 RX ADMIN — SODIUM CHLORIDE, PRESERVATIVE FREE 10 ML: 5 INJECTION INTRAVENOUS at 09:13

## 2024-05-23 RX ADMIN — FUROSEMIDE 20 MG: 10 INJECTION, SOLUTION INTRAMUSCULAR; INTRAVENOUS at 06:23

## 2024-05-23 RX ADMIN — AMIODARONE HYDROCHLORIDE 200 MG: 200 TABLET ORAL at 09:12

## 2024-05-23 RX ADMIN — DOXYCYCLINE HYCLATE 100 MG: 100 TABLET, COATED ORAL at 20:51

## 2024-05-23 RX ADMIN — IPRATROPIUM BROMIDE AND ALBUTEROL 1 PUFF: 20; 100 SPRAY, METERED RESPIRATORY (INHALATION) at 12:39

## 2024-05-23 RX ADMIN — HEPARIN SODIUM 5000 UNITS: 5000 INJECTION INTRAVENOUS; SUBCUTANEOUS at 09:12

## 2024-05-23 RX ADMIN — CEFAZOLIN 1000 MG: 1 INJECTION, POWDER, FOR SOLUTION INTRAMUSCULAR; INTRAVENOUS at 03:51

## 2024-05-23 ASSESSMENT — PAIN SCALES - GENERAL: PAINLEVEL_OUTOF10: 0

## 2024-05-23 NOTE — PLAN OF CARE
Problem: Discharge Planning  Goal: Discharge to home or other facility with appropriate resources  5/23/2024 1100 by Raymundo Moffett RN  Outcome: Progressing  Flowsheets (Taken 5/23/2024 0912)  Discharge to home or other facility with appropriate resources:   Identify barriers to discharge with patient and caregiver   Arrange for needed discharge resources and transportation as appropriate  5/23/2024 0341 by Estephanie Santana RN  Outcome: Progressing  Flowsheets (Taken 5/22/2024 2003)  Discharge to home or other facility with appropriate resources:   Identify barriers to discharge with patient and caregiver   Arrange for needed discharge resources and transportation as appropriate   Identify discharge learning needs (meds, wound care, etc)   Refer to discharge planning if patient needs post-hospital services based on physician order or complex needs related to functional status, cognitive ability or social support system     Problem: Skin/Tissue Integrity  Goal: Absence of new skin breakdown  Description: 1.  Monitor for areas of redness and/or skin breakdown  2.  Assess vascular access sites hourly  3.  Every 4-6 hours minimum:  Change oxygen saturation probe site  4.  Every 4-6 hours:  If on nasal continuous positive airway pressure, respiratory therapy assess nares and determine need for appliance change or resting period.  5/23/2024 1100 by Raymundo Moffett RN  Outcome: Progressing  5/23/2024 0341 by Estephanie Santana RN  Outcome: Progressing     Problem: Safety - Adult  Goal: Free from fall injury  5/23/2024 1100 by Raymundo Moffett RN  Outcome: Progressing  5/23/2024 0341 by Estephanie Santana RN  Outcome: Progressing     Problem: ABCDS Injury Assessment  Goal: Absence of physical injury  5/23/2024 1100 by Raymundo Moffett RN  Outcome: Progressing  5/23/2024 0341 by Estephanie Santana RN  Outcome: Progressing     Problem: Neurosensory - Adult  Goal: Achieves stable or improved neurological

## 2024-05-23 NOTE — PROGRESS NOTES
Speech Therapy note:     SLP attempt at dysphagia treatment f/u 5/23/2024 at 1513 pm  MD in with pt    Plan: re-attempt later today as schedule allows or 5/24/2024    Signed  Irish Oliva MS,CCC,SLP [id number]  Speech and Language Pathologist  5/23/204 at 1517 pm

## 2024-05-23 NOTE — PLAN OF CARE
Problem: Discharge Planning  Goal: Discharge to home or other facility with appropriate resources  Outcome: Progressing  Flowsheets (Taken 5/22/2024 2003)  Discharge to home or other facility with appropriate resources:   Identify barriers to discharge with patient and caregiver   Arrange for needed discharge resources and transportation as appropriate   Identify discharge learning needs (meds, wound care, etc)   Refer to discharge planning if patient needs post-hospital services based on physician order or complex needs related to functional status, cognitive ability or social support system     Problem: Skin/Tissue Integrity  Goal: Absence of new skin breakdown  Description: 1.  Monitor for areas of redness and/or skin breakdown  2.  Assess vascular access sites hourly  3.  Every 4-6 hours minimum:  Change oxygen saturation probe site  4.  Every 4-6 hours:  If on nasal continuous positive airway pressure, respiratory therapy assess nares and determine need for appliance change or resting period.  Outcome: Progressing     Problem: Safety - Adult  Goal: Free from fall injury  Outcome: Progressing     Problem: ABCDS Injury Assessment  Goal: Absence of physical injury  Outcome: Progressing     Problem: Neurosensory - Adult  Goal: Achieves stable or improved neurological status  Outcome: Progressing  Goal: Achieves maximal functionality and self care  Outcome: Progressing     Problem: Skin/Tissue Integrity - Adult  Goal: Skin integrity remains intact  Outcome: Progressing  Goal: Incisions, wounds, or drain sites healing without S/S of infection  Outcome: Progressing     Problem: Musculoskeletal - Adult  Goal: Return mobility to safest level of function  Outcome: Progressing     Problem: Gastrointestinal - Adult  Goal: Maintains or returns to baseline bowel function  Outcome: Progressing  Goal: Maintains adequate nutritional intake  Outcome: Progressing     Problem: Pain  Goal: Verbalizes/displays adequate comfort

## 2024-05-23 NOTE — PROGRESS NOTES
Podiatric Surgery Daily Progress Note  Mayra Nieto      Subjective :   Patient seen and examined this am at the bedside. Patient denies any acute overnight events. Patient denies N/V/F/C/SOB. Patient denies calf pain, thigh pain, chest pain.     Review of Systems: A 12 point review of symptoms is unremarkable with the exception of the chief complaint. Patient specifically denies nausea, fever, vomiting, chills, shortness of breath, chest pain, abdominal pain, constipation or difficulty urinating.       Objective     BP (!) 189/79   Pulse 68   Temp 97.5 °F (36.4 °C) (Oral)   Resp 18   Ht 1.549 m (5' 1\")   Wt 46.3 kg (102 lb 1.2 oz)   SpO2 96%   BMI 19.29 kg/m²      I/O:  Intake/Output Summary (Last 24 hours) at 5/23/2024 1704  Last data filed at 5/23/2024 1020  Gross per 24 hour   Intake 290 ml   Output 2100 ml   Net -1810 ml              Wt Readings from Last 3 Encounters:   05/23/24 46.3 kg (102 lb 1.2 oz)   09/07/23 39 kg (86 lb)   08/26/23 43 kg (94 lb 12.8 oz)       LABS:    Recent Labs     05/21/24  0531 05/22/24  0500   WBC 7.5 6.5   HGB 10.8* 9.7*   HCT 31.8* 28.7*    167        Recent Labs     05/22/24  0500      K 3.5      CO2 25   PHOS 2.3*   BUN 11   CREATININE 0.8      No results for input(s): \"PROT\", \"INR\", \"APTT\" in the last 72 hours.        LOWER EXTREMITY EXAMINATION    Dressing to right LE intact. No strikethrough noted to the external dressing.  No drainage noted to the internal layers of the dressing.     VASCULAR: DP and PT pulses are non-palpable. CFT is sluggish to the digits of the foot b/l. Skin temperature is warm to cool from proximal to distal with no focal calor noted. No edema noted. No pain with calf compression b/l.    NEUROLOGIC: Gross and epicritic sensation is appreciated b/l. Protective sensation is intact at all pedal sites b/l.    DERMATOLOGIC:   Patient provided verbal consent for photos to be taken today:  Right lower extremity:    Epithelialized  wound on the lateral aspect of the ankle.  Preulcerative lesion measures roughly 1.8 cm x 0.8 cm.  Wound bed is epithelialized with surrounding erythema consistent with recent history of bedside incision and drainage.  No evidence of purulence, fluctuance, or crepitations.      MUSCULOSKELETAL: Muscle strength is 5/5 for all pedal groups tested.  Pain with palpation and manual manipulation of the right foot more specifically along the lateral aspect of the ankle. Ankle joint ROM is decreased in dorsiflexion with the knee extended. No obvious biomechanical abnormalities.       IMAGING:  X-ray, right foot and tip-fib (05/19/2024)  IMPRESSION:  No radiographic evidence of osteomyelitis. MRI is more sensitive for  detection of osteomyelitis.    IMPRESSION:  No acute osseous abnormality.    Arterial duplex of right lower extremity pending    ASSESSMENT/PLAN  -Abscess, right ankle  -Nonpalpable pulses      -Patient examined and evaluated at bedside   -Hypertensive otherwise VSS, no leukocytosis noted (no AM CBC, yesterday WBC 6.5)  -Wound culture pending  -Blood culture NGTD  -Imaging reviewed, impression noted above, arterial duplex of the right lower extremity pending  -No dressing necessary to right lower extremity 2/2 wound epithelialized at this juncture  -Recommend patient continue on IV antibiotics per primary: Doxycycline and Ancef  -No weightbearing restriction from cardiac standpoint    DISPO: Abscess, right ankle.  All labs and imaging reviewed, impressions noted above.  Recommend patient continue on IV antibiotics per primary: Doxycycline and Ancef.  Further podiatric surgical intervention pending results of arterial duplex.    Patient seen and evaluated at bedside with Dr. Delmy Duke DPM.    Juan Francisco Franklin DPM   Podiatric Resident PGY1  Pager [phone] or PerfectServe  5/23/2024, 5:11 PM    Patient was seen and evaluated at bedside.  Agree with residents assessment and treatment plan.  Delmy Duke DPM

## 2024-05-23 NOTE — PROGRESS NOTES
V2.0    [facility] Progress Note      Name:  Mayra Nieto /Age/Sex: 1931  (89 y.o. female)   MRN & CSN:  [id number] & [id number] Encounter Date/Time: 2024 10:15 AM EDT   Location:  [id number]-[id number] PCP: Armando Bee MD     Attending:Darrlel Montiel MD       Hospital Day: 5    Assessment and Recommendations   Mayra Nieto is a 89 y.o. female who presents with Cellulitis      Plan:       Bilateral lower extremity cellulitis likely due to strep, IV fluid IV Ancef, added doxycycline yesterday and podiatry consulted.  Called and discussed with grand daughter Elva all questions answered  History of chronic diastolic heart failure no exacerbation at this time restarting p.o. Lasix will get another chest x-ray  A-fib continue to be on amiodarone  GERD PPI  Thrombocytopenia, mild   Dysphagia, family does not want MBS we will respect that      Diet ADULT ORAL NUTRITION SUPPLEMENT; Breakfast, Dinner; Standard High Calorie/High Protein Oral Supplement  ADULT DIET; Dysphagia - Pureed; Mildly Thick (Nectar)   DVT Prophylaxis [] Lovenox, []  Heparin, [] SCDs, [] Ambulation,  [] Eliquis, [] Xarelto  [] Coumadin   Code Status Full Code             Personally reviewed Lab Studies and Imaging     Discussed management of the case with nursing staff      Medical Decision Making:  The following items were considered in medical decision making:  Discussion of patient care with other providers  Reviewed clinical lab tests  Reviewed radiology tests  Reviewed other diagnostic tests/interventions  Independent review of radiologic images  Microbiology cultures and other micro tests reviewed      Subjective:     Chief Complaint: Foot pain    Mayra Nieto is a 89 y.o. female who presents with foot pain edema erythema overall slowly improving      Review of Systems:      Pertinent positives and negatives discussed in HPI    Objective:     Intake/Output Summary (Last 24 hours) at 2024 1015  Last data filed at  5/23/2024 0730  Gross per 24 hour   Intake 350 ml   Output 500 ml   Net -150 ml      Vitals:   Vitals:    05/23/24 0405 05/23/24 0611 05/23/24 0615 05/23/24 0806   BP: (!) 184/69 (!) 183/67  (!) 178/72   Pulse: 86 76  69   Resp:  22  16   Temp:  97.4 °F (36.3 °C)  97.5 °F (36.4 °C)   TempSrc:  Axillary  Oral   SpO2: 90% 93%  95%   Weight:   46.3 kg (102 lb 1.2 oz)    Height:             Physical Exam:      Physical Exam Performed:    BP (!) 178/72   Pulse 69   Temp 97.5 °F (36.4 °C) (Oral)   Resp 16   Ht 1.549 m (5' 1\")   Wt 46.3 kg (102 lb 1.2 oz)   SpO2 95%   BMI 19.29 kg/m²     General appearance: No apparent distress  Respiratory: Scattered rhonchi  Cardiovascular: Regular rate and rhythm with normal S1/S2 without murmurs, rubs or gallops.  Abdomen: Soft, non-tender  Musculoskeletal: No clubbing, cyanosis   Neurologic: No focal weakness  Psychiatric: Awake  Capillary Refill: Brisk, 3 seconds, normal   Peripheral Pulses: +2 palpable, equal bilaterally       Medications:   Medications:    mupirocin   Topical BID    doxycycline hyclate  100 mg Oral BID    ceFAZolin (ANCEF) IVPB  1,000 mg IntraVENous Q12H    amiodarone  200 mg Oral Daily    pantoprazole  20 mg Oral QAM AC    sodium chloride flush  5-40 mL IntraVENous 2 times per day    heparin (porcine)  5,000 Units SubCUTAneous BID      Infusions:    sodium chloride       PRN Meds: albuterol, 2.5 mg, Q4H PRN  albuterol-ipratropium, 1 puff, Q6H PRN  sodium chloride flush, 5-40 mL, PRN  sodium chloride, , PRN  ondansetron, 4 mg, Q8H PRN   Or  ondansetron, 4 mg, Q6H PRN  polyethylene glycol, 17 g, Daily PRN  acetaminophen, 650 mg, Q6H PRN   Or  acetaminophen, 650 mg, Q6H PRN  guaiFENesin, 200 mg, Q4H PRN        Labs and Imaging   XR CHEST PORTABLE    Result Date: 5/23/2024  EXAMINATION: ONE XRAY VIEW OF THE CHEST 5/23/2024 5:53 am COMPARISON: 08/26/2023 HISTORY: ORDERING SYSTEM PROVIDED HISTORY: SOB TECHNOLOGIST PROVIDED HISTORY: Reason for exam:->SOB Reason

## 2024-05-23 NOTE — PROGRESS NOTES
Received call from Elva, Grand-daughter, sounded upset about pt scheduled for Barium swallow without family permission, Elva would like to go ahead and get it cancelled, notified MD thru perfect serve.     Electronically signed by Raymundo Moffett RN on 5/23/24 at 7:54 AM EDT     Spoke w/ Dr. Montiel, exam cancelled, spoke with Christine at Radiology , exam cancelled.     Electronically signed by Raymundo Moffett RN on 5/23/24 at 9:04 AM EDT

## 2024-05-23 NOTE — PROGRESS NOTES
Patient/Caregiver education & training, Safety education & training, Equipment evaluation, education, & procurement, Therapeutic activities  Safety Devices  Type of Devices: All fall risk precautions in place, Call light within reach, Gait belt, Patient at risk for falls, Left in chair, Chair alarm in place, Nurse notified     Restrictions  Restrictions/Precautions  Restrictions/Precautions: Fall Risk     Subjective   General  Chart Reviewed: Yes  Patient assessed for rehabilitation services?: Yes  Additional Pertinent Hx: Pt is a 89 y.o. female who presented to the ED on 5/19/24 with RLE cellulitis.  Response To Previous Treatment: Not applicable  Family / Caregiver Present: No  Referring Practitioner: Darrell Montiel MD  Referral Date : 05/20/24  Diagnosis: Cellulitis  Follows Commands: Within Functional Limits  Subjective  Subjective: Pt is agreeable to PT.         Social/Functional History  Social/Functional History  Lives With: Family (dtr and granddtr (dtr requires assist with all ADLs))  Type of Home: House  Home Layout: Two level  Home Access: Stairs to enter with rails  Entrance Stairs - Number of Steps: 4  ADL Assistance: Independent  Homemaking Assistance: Independent  Ambulation Assistance: Independent  Transfer Assistance: Independent  Additional Comments: Questionable historian. [place]    Vision/Hearing  Hearing  Hearing: Exceptions to [facility]  Hearing Exceptions: Hard of hearing/hearing concerns (not wearing hearing aides)      Cognition   Orientation  Overall Orientation Status: Within Functional Limits  Cognition  Overall Cognitive Status: Exceptions  Arousal/Alertness: Appears intact  Following Commands: Follows one step commands with repetition (partly d/t [place])  Attention Span: Attends with cues to redirect  Memory: Decreased short term memory  Safety Judgement: Decreased awareness of need for assistance;Decreased awareness of need for safety  Problem Solving: Decreased awareness of errors  Insights:

## 2024-05-23 NOTE — PROGRESS NOTES
SLP NOTE    Chart reviewed and patient discussed with patient's grandchild, Elva. MBS order discontinued per prior RN/MD notes. Elva reports patient with typically good tolerance of soft solids and thin liquids taken using small bites/sips with tendency to consume frequent small meals/snacks rather than large meals d/t known esophageal dysphagia and prior GI MD recommendations. Elva reports concern for CHF exacerbation currently contributing to resp status with suspicion for resolution of symptoms of pharyngeal dysphagia as CHF stabilizes.     Elva with request to hold SLP clinical re-assessment until 5/24/2024 to permit time for CHF stabilization. ST to re-attempt 5/24/2024 unless otherwise notified.    Thank you.  Chanell Domingo MA, CCC-SLP, #[id number]  Speech-Language Pathologist  Portable phone: [phone]

## 2024-05-23 NOTE — PROGRESS NOTES
Pt with decreases O2 saturation in the mid to upper 80s.  Positioned upright in bed and encouraged to cough/ deep breath.  Lung sounds diminished with audible wheezing and crackles noted.  Moderate persistent congested/ productive cough. Placed on 2L O2.  Perfect serve message sent to notify the on call of change.  Also relayed the pts familys request for a chest xray.  New orders received per Dr. Kelly. Will continue to monitor.

## 2024-05-23 NOTE — PROGRESS NOTES
05/23/24 1453   Encounter Summary   Encounter Overview/Reason Spiritual/Emotional Needs   Service Provided For Patient   Referral/Consult From [facility]   Support System Children;Family members   Last Encounter  05/16/24  (visit and prayer CL)   Complexity of Encounter Low   Begin Time 1420   End Time  1455   Total Time Calculated 35 min   Spiritual/Emotional needs   Type Spiritual Support   Assessment/Intervention/Outcome   Assessment Calm;Coping;Hopeful   Intervention Active listening;Discussed illness injury and it’s impact;Discussed belief system/Quaker practices/olinda;Discussed relationship with God;Explored/Affirmed feelings, thoughts, concerns;Prayer (assurance of)/[place];Read/Provided Scripture   Outcome Coping;Engaged in conversation;Expressed Gratitude

## 2024-05-24 ENCOUNTER — TELEPHONE (OUTPATIENT)
Dept: FAMILY MEDICINE CLINIC | Age: 89
End: 2024-05-24

## 2024-05-24 ENCOUNTER — APPOINTMENT (OUTPATIENT)
Dept: VASCULAR LAB | Age: 89
DRG: 872 | End: 2024-05-24
Payer: MEDICARE

## 2024-05-24 LAB
BACTERIA SPEC AEROBE CULT: ABNORMAL
ECHO BSA: 1.31 M2
GRAM STN SPEC: ABNORMAL
ORGANISM: ABNORMAL
VAS RIGHT ATA MID PSV: 105.8 CM/S
VAS RIGHT ATA PROX PSV: 195 CM/S
VAS RIGHT CFA DIST PSV: 114.5 CM/S
VAS RIGHT CFA PROX PSV: 125.6 CM/S
VAS RIGHT PERONEAL MID PSV: 71.5 CM/S
VAS RIGHT PFA PROX PSV: 101 CM/S
VAS RIGHT POP A DIST PSV: 78.9 CM/S
VAS RIGHT POP A PROX PSV: 120.9 CM/S
VAS RIGHT POP A PROX VEL RATIO: 1.32
VAS RIGHT PTA MID PSV: 50.3 CM/S
VAS RIGHT PTA PROX PSV: 42 CM/S
VAS RIGHT SFA DIST PSV: 91.7 CM/S
VAS RIGHT SFA DIST VEL RATIO: 0.78
VAS RIGHT SFA MID PSV: 117.3 CM/S
VAS RIGHT SFA MID VEL RATIO: 1.1
VAS RIGHT SFA PROX PSV: 107.9 CM/S
VAS RIGHT SFA PROX VEL RATIO: 0.9

## 2024-05-24 PROCEDURE — 2580000003 HC RX 258: Performed by: INTERNAL MEDICINE

## 2024-05-24 PROCEDURE — 1200000000 HC SEMI PRIVATE

## 2024-05-24 PROCEDURE — 6370000000 HC RX 637 (ALT 250 FOR IP): Performed by: INTERNAL MEDICINE

## 2024-05-24 PROCEDURE — 93926 LOWER EXTREMITY STUDY: CPT | Performed by: SURGERY

## 2024-05-24 PROCEDURE — 2580000003 HC RX 258: Performed by: STUDENT IN AN ORGANIZED HEALTH CARE EDUCATION/TRAINING PROGRAM

## 2024-05-24 PROCEDURE — 92526 ORAL FUNCTION THERAPY: CPT

## 2024-05-24 PROCEDURE — 97530 THERAPEUTIC ACTIVITIES: CPT

## 2024-05-24 PROCEDURE — 6360000002 HC RX W HCPCS: Performed by: INTERNAL MEDICINE

## 2024-05-24 PROCEDURE — 97116 GAIT TRAINING THERAPY: CPT

## 2024-05-24 PROCEDURE — 6370000000 HC RX 637 (ALT 250 FOR IP): Performed by: STUDENT IN AN ORGANIZED HEALTH CARE EDUCATION/TRAINING PROGRAM

## 2024-05-24 PROCEDURE — 6360000002 HC RX W HCPCS: Performed by: STUDENT IN AN ORGANIZED HEALTH CARE EDUCATION/TRAINING PROGRAM

## 2024-05-24 PROCEDURE — 93926 LOWER EXTREMITY STUDY: CPT

## 2024-05-24 RX ORDER — FUROSEMIDE 20 MG/1
20 TABLET ORAL DAILY
Qty: 30 TABLET | Refills: 0 | Status: SHIPPED | OUTPATIENT
Start: 2024-05-24

## 2024-05-24 RX ORDER — PANTOPRAZOLE SODIUM 20 MG/1
20 TABLET, DELAYED RELEASE ORAL DAILY
Qty: 30 TABLET | Refills: 0 | Status: SHIPPED | OUTPATIENT
Start: 2024-05-24

## 2024-05-24 RX ORDER — CIPROFLOXACIN 2 MG/ML
400 INJECTION, SOLUTION INTRAVENOUS EVERY 12 HOURS
Status: DISCONTINUED | OUTPATIENT
Start: 2024-05-24 | End: 2024-05-26

## 2024-05-24 RX ORDER — AMIODARONE HYDROCHLORIDE 200 MG/1
200 TABLET ORAL DAILY
Qty: 30 TABLET | Refills: 0 | Status: SHIPPED | OUTPATIENT
Start: 2024-05-24

## 2024-05-24 RX ADMIN — DOXYCYCLINE HYCLATE 100 MG: 100 TABLET, COATED ORAL at 08:51

## 2024-05-24 RX ADMIN — DOXYCYCLINE HYCLATE 100 MG: 100 TABLET, COATED ORAL at 20:45

## 2024-05-24 RX ADMIN — MUPIROCIN: 20 OINTMENT TOPICAL at 20:46

## 2024-05-24 RX ADMIN — CETIRIZINE HYDROCHLORIDE 5 MG: 10 TABLET, FILM COATED ORAL at 08:51

## 2024-05-24 RX ADMIN — HEPARIN SODIUM 5000 UNITS: 5000 INJECTION INTRAVENOUS; SUBCUTANEOUS at 08:51

## 2024-05-24 RX ADMIN — CIPROFLOXACIN 400 MG: 400 INJECTION, SOLUTION INTRAVENOUS at 10:34

## 2024-05-24 RX ADMIN — AMIODARONE HYDROCHLORIDE 200 MG: 200 TABLET ORAL at 08:51

## 2024-05-24 RX ADMIN — CEFAZOLIN 1000 MG: 1 INJECTION, POWDER, FOR SOLUTION INTRAMUSCULAR; INTRAVENOUS at 04:43

## 2024-05-24 RX ADMIN — HEPARIN SODIUM 5000 UNITS: 5000 INJECTION INTRAVENOUS; SUBCUTANEOUS at 20:45

## 2024-05-24 RX ADMIN — PANTOPRAZOLE SODIUM 20 MG: 20 TABLET, DELAYED RELEASE ORAL at 05:01

## 2024-05-24 RX ADMIN — MUPIROCIN: 20 OINTMENT TOPICAL at 08:52

## 2024-05-24 RX ADMIN — SODIUM CHLORIDE, PRESERVATIVE FREE 10 ML: 5 INJECTION INTRAVENOUS at 20:46

## 2024-05-24 RX ADMIN — SODIUM CHLORIDE, PRESERVATIVE FREE 10 ML: 5 INJECTION INTRAVENOUS at 08:51

## 2024-05-24 RX ADMIN — FUROSEMIDE 20 MG: 20 TABLET ORAL at 08:51

## 2024-05-24 ASSESSMENT — PAIN SCALES - GENERAL: PAINLEVEL_OUTOF10: 0

## 2024-05-24 NOTE — PROGRESS NOTES
Facility/Department: [address] MED SURG  SLP DYSPHAGIA THERAPY    Patient: Mayra Nieto   : 1931   MRN: [id number]    Treatment Date: 2024   Admitting Diagnosis:  Cellulitis [L03.90]  Neutrophilia [D72.9]  Elevated LFTs [R79.89]  Cellulitis of right lower extremity [L03.115]  Acute pain of right lower extremity [M79.604]   has a past medical history of Aortic stenosis, Arthritis, COVID-19, and PAF (paroxysmal atrial fibrillation) ([facility]).   has a past surgical history that includes Varicose vein surgery (Bilateral, ); Cardiac catheterization (2018); Hysterectomy, total abdominal; Aortic valve replacement (N/A, 2019); Upper gastrointestinal endoscopy (N/A, 2022); and Mohs surgery (2022).  Allergies: medication allergies noted    Baseline History/Prior Level of Function:   Living Status: admitted from home with granddaughter  Baseline diet: patient reports preference for soft items; reports best to avoid straws  Prior Dysphagia History: most recent MBS  with rec for minced/thin-russ sips and no straws as well as EGD with hiatal hernia and note for stricture in     Onset: 2024     CHART REVIEW:  2024 admitted with c/o cellulitis  MD ADMISSION H&P HPI:  patient has been having decreased appetite nausea along with a burning sensation in the bilateral lower extremities denies any leg pain otherwise denies any leg swelling as well patient was found to have signs of bilateral lower extremity cellulitis. Denies any orthopnea PND and chest pain to be admitted to the hospital      Consults noted: Podiatry  Swallow eval ordered 2024     IMAGING:  CXR: 2024  IMPRESSION:  1. Unchanged congestive heart failure.    CT CHEST: not ordered this admission     Modified Barium Swallow Study: 2021  Modified Barium Swallow evaluation completed on 2021. Results indicate mild oropharyngeal dysphagia. Oral phase of swallow characterized by adequate labial seal  responsive, follows one step commands, reduced recall    Dentition: []Adequate [x]Dentures []Missing Many Teeth []Edentulous []Other:    Patient Positioning: Upright in chair    PO Trials:   IDDSI 0 (thin): via cup:, Concern for premature loss to pharynx, No overt signs/symptoms of penetration/aspiration  IDDSI 2 (mildly thick): via cup:, Concern for premature loss to pharynx, No overt signs/symptoms of penetration/aspiration  IDDSI 3 (moderately thick): DNT  IDDSI 4 (puree): Concern for premature loss to pharynx, No overt signs/symptoms of penetration/aspiration  IDDSI 5 (minced and moist): DNT  IDDSI 6 (soft and bite-sized): Prolonged bolus formation, Prolonged oral transit, Concern for premature loss to pharynx, No overt signs/symptoms of penetration/aspiration  IDDSI 7 (regular): Prolonged bolus formation, Prolonged oral transit, Concern for premature loss to pharynx, No overt signs/symptoms of penetration/aspiration    Dysphagia Tx:   Direct Dysphagia tx: [] Consistent with MILE continue [x] Improvement with consideration for upgrade [] Concern for not tolerating and want downgrade and instrumental if not already completed [] Comment   Dysphagia ex [x] Not completed or not applicable [] Unable to condition pt to ex [] Om ex [] Laryngeal/pharyngeal ex  [] Tactile/sensory stimulation [] Comment   Training in compensatory strategies [] Not completed or not applicable [] dependent [] Effortful swallow [] Postural   [] Supraglottic swallow [] [place] [] Super supraglottic swallow [x] Comment: reinforced for prior rec to avoid straws per MBS  Pt response to ex/training: [] dependent [x] Verbalized understanding [] demonstrated understanding [x] Family ed/update via telephone [x] Staff ed  [] Concern for reduced insight/compliance [] Comment:     Eating Assistance:  []Independent  [x]Setup or clean-up assistance   [] Supervision or touching assistance   [] Partial or moderate assistance   [] Substantial or maximal

## 2024-05-24 NOTE — PROGRESS NOTES
Podiatric Surgery Daily Progress Note  Mayra Nieto      Subjective :   Patient seen and examined this am at the bedside. Patient denies any acute overnight events. Patient denies N/V/F/C/SOB. Patient denies calf pain, thigh pain, chest pain.     Review of Systems: A 12 point review of symptoms is unremarkable with the exception of the chief complaint. Patient specifically denies nausea, fever, vomiting, chills, shortness of breath, chest pain, abdominal pain, constipation or difficulty urinating.       Objective     BP (!) 147/67   Pulse 80   Temp 98.1 °F (36.7 °C) (Oral)   Resp 18   Ht 1.549 m (5' 1\")   Wt 43.2 kg (95 lb 3.8 oz)   SpO2 98%   BMI 18.00 kg/m²      I/O:  Intake/Output Summary (Last 24 hours) at 5/24/2024 1634  Last data filed at 5/24/2024 0447  Gross per 24 hour   Intake 5 ml   Output 1300 ml   Net -1295 ml              Wt Readings from Last 3 Encounters:   05/24/24 43.2 kg (95 lb 3.8 oz)   09/07/23 39 kg (86 lb)   08/26/23 43 kg (94 lb 12.8 oz)       LABS:    Recent Labs     05/22/24  0500   WBC 6.5   HGB 9.7*   HCT 28.7*           Recent Labs     05/22/24  0500      K 3.5      CO2 25   PHOS 2.3*   BUN 11   CREATININE 0.8      No results for input(s): \"PROT\", \"INR\", \"APTT\" in the last 72 hours.        LOWER EXTREMITY EXAMINATION    Dressing to right LE intact. No strikethrough noted to the external dressing.  No drainage noted to the internal layers of the dressing.     VASCULAR: DP and PT pulses are non-palpable. CFT is sluggish to the digits of the foot b/l. Skin temperature is warm to cool from proximal to distal with no focal calor noted. No edema noted. No pain with calf compression b/l.    NEUROLOGIC: Gross and epicritic sensation is appreciated b/l. Protective sensation is intact at all pedal sites b/l.    DERMATOLOGIC:   Right lower extremity:    Epithelialized wound on the lateral aspect of the ankle.  Preulcerative lesion measures roughly 1.8 cm x 0.8 cm.   Wound bed is epithelialized with surrounding erythema consistent with recent history of bedside incision and drainage.  No evidence of purulence, fluctuance, or crepitations.      MUSCULOSKELETAL: Muscle strength is 5/5 for all pedal groups tested.  Pain with palpation and manual manipulation of the right foot more specifically along the lateral aspect of the ankle. Ankle joint ROM is decreased in dorsiflexion with the knee extended. No obvious biomechanical abnormalities.       IMAGING:  X-ray, right foot and tip-fib (05/19/2024)  IMPRESSION:  No radiographic evidence of osteomyelitis. MRI is more sensitive for  detection of osteomyelitis.    IMPRESSION:  No acute osseous abnormality.    Arterial duplex of right lower extremity Right Lower Arterial    Common Femoral Artery: Patent.   Proximal Common Femoral Artery: Multiphasic (normal) Doppler waveforms.   Distal Common Femoral Artery: Multiphasic (normal) Doppler waveforms.   Profunda Artery: Patent and multiphasic (normal) Doppler waveforms.   Superficial Femoral Artery: Patent.   Proximal Superficial Femoral Artery: Multiphasic (normal) Doppler waveforms.   Middle Superficial Femoral Artery: Multiphasic (normal) Doppler waveforms.   Distal Superficial Femoral Artery: Multiphasic (normal) Doppler waveforms.   Popliteal Artery: Patent.   Proximal Popliteal Artery: Multiphasic (normal) Doppler waveforms.   Distal Popliteal Artery: Multiphasic (normal) Doppler waveforms.   Anterior Tibial Artery: 50-70% stenosis. Calcific plaque. Monophasic Doppler waveforms.   Tibial/Peroneal Trunk: 50-70% stenosis. Calcific plaque. Monophasic Doppler waveforms.   Posterior Tibial Artery: Monophasic Doppler waveforms.   Peroneal Artery: monophasic Doppler waveforms.       ASSESSMENT/PLAN  -Abscess, right ankle  -PAD      -Patient examined and evaluated at bedside   -Hypertensive otherwise VSS, no leukocytosis noted (no AM CBC, yesterday WBC 6.5)  -Wound culture pending  -Blood

## 2024-05-24 NOTE — PLAN OF CARE
Problem: Discharge Planning  Goal: Discharge to home or other facility with appropriate resources  Outcome: Progressing     Problem: Skin/Tissue Integrity  Goal: Absence of new skin breakdown  Description: 1.  Monitor for areas of redness and/or skin breakdown  2.  Assess vascular access sites hourly  3.  Every 4-6 hours minimum:  Change oxygen saturation probe site  4.  Every 4-6 hours:  If on nasal continuous positive airway pressure, respiratory therapy assess nares and determine need for appliance change or resting period.  Outcome: Progressing     Problem: Safety - Adult  Goal: Free from fall injury  Outcome: Progressing     Problem: ABCDS Injury Assessment  Goal: Absence of physical injury  Outcome: Progressing     Problem: Neurosensory - Adult  Goal: Achieves stable or improved neurological status  Outcome: Progressing     Problem: Neurosensory - Adult  Goal: Achieves maximal functionality and self care  Outcome: Progressing     Problem: Skin/Tissue Integrity - Adult  Goal: Skin integrity remains intact  Outcome: Progressing     Problem: Skin/Tissue Integrity - Adult  Goal: Incisions, wounds, or drain sites healing without S/S of infection  Outcome: Progressing     Problem: Musculoskeletal - Adult  Goal: Return mobility to safest level of function  Outcome: Progressing     Problem: Gastrointestinal - Adult  Goal: Maintains or returns to baseline bowel function  Outcome: Progressing     Problem: Gastrointestinal - Adult  Goal: Maintains adequate nutritional intake  Outcome: Progressing     Problem: Pain  Goal: Verbalizes/displays adequate comfort level or baseline comfort level  Outcome: Progressing     Problem: Nutrition Deficit:  Goal: Optimize nutritional status  Outcome: Progressing

## 2024-05-24 NOTE — PROGRESS NOTES
Physical Therapy  Facility/Department: [address] MED SURG  Physical Therapy treatment session    Name: Mayra Nieto  : 1931  MRN: [id number]  Date of Service: 2024    Discharge Recommendations:  24 hour supervision or assist, Home with Home health PT, Patient would benefit from continued therapy after discharge   PT Equipment Recommendations  Equipment Needed: No    Mayra Nieto scored a 18/24 on the AM-PAC short mobility form. Current research shows that an AM-PAC score of 18 or greater is typically associated with a discharge to the patient's home setting. Based on the patient's AM-PAC score and their current functional mobility deficits, it is recommended that the patient have 2-3 sessions per week of Physical Therapy at d/c to increase the patient's independence.  At this time, this patient demonstrates the endurance and safety to discharge home with home PT and a follow up treatment frequency of 2-3x/wk.  Please see assessment section for further patient specific details.    If patient discharges prior to next session this note will serve as a discharge summary.  Please see below for the latest assessment towards goals.         Patient Diagnosis(es): The primary encounter diagnosis was Cellulitis of right lower extremity. Diagnoses of Acute pain of right lower extremity, Neutrophilia, and Elevated LFTs were also pertinent to this visit.  Past Medical History:  has a past medical history of Aortic stenosis, Arthritis, COVID-19, and PAF (paroxysmal atrial fibrillation) ([facility]).  Past Surgical History:  has a past surgical history that includes Varicose vein surgery (Bilateral, 's); Cardiac catheterization (2018); Hysterectomy, total abdominal; Aortic valve replacement (N/A, 2019); Upper gastrointestinal endoscopy (N/A, 2022); and Mohs surgery (2022).    Assessment   Body Structures, Functions, Activity Limitations Requiring Skilled Therapeutic Intervention: Decreased  toilet, total assist with pericare, assisted with changing wet socks seated on toilet; CGA for standing balance washing hands at the sink           AM-PAC - Mobility    AM-PAC Basic Mobility - Inpatient   How much help is needed turning from your back to your side while in a flat bed without using bedrails?: A Little  How much help is needed moving from lying on your back to sitting on the side of a flat bed without using bedrails?: A Little  How much help is needed moving to and from a bed to a chair?: A Little  How much help is needed standing up from a chair using your arms?: A Little  How much help is needed walking in hospital room?: A Little  How much help is needed climbing 3-5 steps with a railing?: A Little  AM-PAC Inpatient Mobility Raw Score : 18  AM-PAC Inpatient T-Scale Score : 43.63  Mobility Inpatient CMS 0-100% Score: 46.58  Mobility Inpatient CMS G-Code Modifier : CK            Goals  Short Term Goals  Time Frame for Short Term Goals: by acute discharge - all goals ongoing as of 5/24/24  Short Term Goal 1: bed mobility mod I  Short Term Goal 2: sit<>stand mod I  Short Term Goal 3: ambulate > 40' mod I with RW  Patient Goals   Patient Goals : none stated       Education  Patient Education  Education Given To: Patient  Education Provided: Role of Therapy;Plan of Care;Transfer Training  Education Provided Comments: use of call light for OOB  Education Method: Verbal  Barriers to Learning: Cognition  Education Outcome: Verbalized understanding;Continued education needed      Therapy Time   Individual Concurrent Group Co-treatment   Time In 0718         Time Out 0756         Minutes 38         Timed Code Treatment Minutes: 38 Minutes       Electronically signed by Faye Palomo, PT [id number] on 5/24/2024 at 8:06 AM

## 2024-05-24 NOTE — PLAN OF CARE
Problem: Discharge Planning  Goal: Discharge to home or other facility with appropriate resources  5/24/2024 1131 by Raymundo Moffett RN  Outcome: Progressing  Flowsheets (Taken 5/24/2024 0851)  Discharge to home or other facility with appropriate resources:   Identify barriers to discharge with patient and caregiver   Arrange for needed discharge resources and transportation as appropriate  5/24/2024 0128 by Daysi Lima RN  Outcome: Progressing     Problem: Skin/Tissue Integrity  Goal: Absence of new skin breakdown  Description: 1.  Monitor for areas of redness and/or skin breakdown  2.  Assess vascular access sites hourly  3.  Every 4-6 hours minimum:  Change oxygen saturation probe site  4.  Every 4-6 hours:  If on nasal continuous positive airway pressure, respiratory therapy assess nares and determine need for appliance change or resting period.  5/24/2024 1131 by Raymundo Moffett RN  Outcome: Progressing  5/24/2024 0128 by Daysi Lima RN  Outcome: Progressing     Problem: Safety - Adult  Goal: Free from fall injury  5/24/2024 1131 by Raymundo Moffett RN  Outcome: Progressing  5/24/2024 0128 by Daysi Lima RN  Outcome: Progressing     Problem: ABCDS Injury Assessment  Goal: Absence of physical injury  5/24/2024 1131 by Raymundo Moffett RN  Outcome: Progressing  5/24/2024 0128 by Daysi Lima RN  Outcome: Progressing     Problem: Neurosensory - Adult  Goal: Achieves stable or improved neurological status  5/24/2024 1131 by Raymundo Moffett RN  Outcome: Progressing  5/24/2024 0128 by Daysi Lima RN  Outcome: Progressing  Goal: Achieves maximal functionality and self care  5/24/2024 1131 by Raymundo Moffett RN  Outcome: Progressing  5/24/2024 0128 by Daysi Lima RN  Outcome: Progressing     Problem: Skin/Tissue Integrity - Adult  Goal: Skin integrity remains intact  5/24/2024 1131 by Raymundo Moffett RN  Outcome: Progressing  5/24/2024 0128 by Janie  NUPUR Tavarez  Outcome: Progressing  Goal: Incisions, wounds, or drain sites healing without S/S of infection  5/24/2024 1131 by Raymundo Moffett RN  Outcome: Progressing  5/24/2024 0128 by Daysi Lima RN  Outcome: Progressing     Problem: Musculoskeletal - Adult  Goal: Return mobility to safest level of function  5/24/2024 1131 by Raymundo Moffett RN  Outcome: Progressing  5/24/2024 0128 by Daysi Lima RN  Outcome: Progressing     Problem: Gastrointestinal - Adult  Goal: Maintains or returns to baseline bowel function  5/24/2024 1131 by Raymundo Moffett RN  Outcome: Progressing  5/24/2024 0128 by Daysi Lima RN  Outcome: Progressing  Goal: Maintains adequate nutritional intake  5/24/2024 1131 by Raymundo Moffett RN  Outcome: Progressing  5/24/2024 0128 by Daysi Lima RN  Outcome: Progressing     Problem: Pain  Goal: Verbalizes/displays adequate comfort level or baseline comfort level  5/24/2024 1131 by Raymundo Moffett RN  Outcome: Progressing  5/24/2024 0128 by Daysi Lima RN  Outcome: Progressing     Problem: Nutrition Deficit:  Goal: Optimize nutritional status  5/24/2024 1131 by Raymundo Moffett RN  Outcome: Progressing  5/24/2024 0128 by Daysi Lima RN  Outcome: Progressing

## 2024-05-24 NOTE — RT PROTOCOL NOTE
RT Nebulizer Bronchodilator Protocol Note    There is a bronchodilator order in the chart from a provider indicating to follow the RT Bronchodilator Protocol and there is an “Initiate RT Bronchodilator Protocol” order as well (see protocol at bottom of note).    CXR Findings:  XR CHEST PORTABLE    Result Date: 5/23/2024  Multifocal right-sided atelectasis versus pneumonia.       The findings from the last RT Protocol Assessment were as follows:  Smoking: None or smoker <15 pack years  Respiratory Pattern: Regular pattern and RR 12-20 bpm  Breath Sounds: Slightly diminished and/or crackles  Cough: Strong, spontaneous, non-productive  Indication for Bronchodilator Therapy: Decreased or absent breath sounds  Bronchodilator Assessment Score: 2    Aerosolized bronchodilator medication orders have been revised according to the RT Nebulizer Bronchodilator Protocol below.    Respiratory Therapist to perform RT Therapy Protocol Assessment initially then follow the protocol.  Repeat RT Therapy Protocol Assessment PRN for score 0-3 or on second treatment, BID, and PRN for scores above 3.    No Indications - adjust the frequency to every 6 hours PRN wheezing or bronchospasm, if no treatments needed after 48 hours then discontinue using Per Protocol order mode.     If indication present, adjust the RT bronchodilator orders based on the Bronchodilator Assessment Score as indicated below.  If a patient is on this medication at home then do not decrease Frequency below that used at home.    0-3 - enter or revise RT bronchodilator order(s) to equivalent RT Bronchodilator order with Frequency of every 4 hours PRN for wheezing or increased work of breathing using Per Protocol order mode.       4-6 - enter or revise RT Bronchodilator order(s) to two equivalent RT bronchodilator orders with one order with BID Frequency and one order with Frequency of every 4 hours PRN wheezing or increased work of breathing using Per Protocol order

## 2024-05-24 NOTE — PROGRESS NOTES
V2.0    [facility] Progress Note      Name:  Mayra Nieto /Age/Sex: 1931  (89 y.o. female)   MRN & CSN:  [id number] & [id number] Encounter Date/Time: 2024 11:06 AM EDT   Location:  [id number] PCP: Armando Bee MD     Attending:Darrell Montiel MD       Hospital Day: 6    Assessment and Recommendations   Mayra Nieto is a 89 y.o. female who presents with Cellulitis      Plan:   Bilateral lower extremity cellulitis likely due to strep, IV fluid IV Ancef, added doxycycline culture noted, changing antibiotics to ciprofloxacin along with Doxy  History of chronic diastolic heart failure no exacerbation at this time restarting p.o. Lasix will get another chest x-ray  A-fib continue to be on amiodarone  GERD PPI  Thrombocytopenia, mild   Dysphagia, significantly improved  Hypoxia, likely atelectatic changes        Diet ADULT ORAL NUTRITION SUPPLEMENT; Breakfast, Dinner; Standard High Calorie/High Protein Oral Supplement  ADULT DIET; Regular   DVT Prophylaxis [] Lovenox, []  Heparin, [] SCDs, [] Ambulation,  [] Eliquis, [] Xarelto  [] Coumadin   Code Status Full Code             Personally reviewed Lab Studies and Imaging     Discussed management of the case with case management        Medical Decision Making:  The following items were considered in medical decision making:  Discussion of patient care with other providers  Reviewed clinical lab tests  Reviewed radiology tests  Reviewed other diagnostic tests/interventions  Independent review of radiologic images  Microbiology cultures and other micro tests reviewed      Subjective:     Chief Complaint: Foot edema erythema    Mayra Nieto is a 89 y.o. female who presents with foot edema erythema improving      Review of Systems:      Pertinent positives and negatives discussed in HPI    Objective:     Intake/Output Summary (Last 24 hours) at 2024 1106  Last data filed at 2024 0447  Gross per 24 hour   Intake 115 ml   Output 1300 ml

## 2024-05-24 NOTE — PROGRESS NOTES
Occupational Therapy Attempt  Mayra Nieto    Pt out of room at vascular lab at the time of attempt. Will re-attempt as able.    Electronically signed by Irihs Acosta OT on 5/24/24 at 11:49 AM EDT

## 2024-05-24 NOTE — CARE COORDINATION
Per chart review, patient remains on IV antibiotics. May require a 2nd I&D to the right foot per podiatry. Recommendations for PT/OT at home with an [facility] score of 18/24.     Discharge plan is to return to home with her granddaughter. Open to [facility] services if required.     Darlin BRIZUELA RN  Case Management  [phone]    Electronically signed by Darlin Duvall RN on 5/24/2024 at 4:12 PM

## 2024-05-25 PROCEDURE — 6370000000 HC RX 637 (ALT 250 FOR IP): Performed by: STUDENT IN AN ORGANIZED HEALTH CARE EDUCATION/TRAINING PROGRAM

## 2024-05-25 PROCEDURE — 6360000002 HC RX W HCPCS: Performed by: STUDENT IN AN ORGANIZED HEALTH CARE EDUCATION/TRAINING PROGRAM

## 2024-05-25 PROCEDURE — 6370000000 HC RX 637 (ALT 250 FOR IP): Performed by: INTERNAL MEDICINE

## 2024-05-25 PROCEDURE — 2580000003 HC RX 258: Performed by: STUDENT IN AN ORGANIZED HEALTH CARE EDUCATION/TRAINING PROGRAM

## 2024-05-25 PROCEDURE — 1200000000 HC SEMI PRIVATE

## 2024-05-25 PROCEDURE — 94760 N-INVAS EAR/PLS OXIMETRY 1: CPT

## 2024-05-25 PROCEDURE — 6360000002 HC RX W HCPCS: Performed by: INTERNAL MEDICINE

## 2024-05-25 RX ADMIN — CIPROFLOXACIN 400 MG: 400 INJECTION, SOLUTION INTRAVENOUS at 11:55

## 2024-05-25 RX ADMIN — PANTOPRAZOLE SODIUM 20 MG: 20 TABLET, DELAYED RELEASE ORAL at 05:30

## 2024-05-25 RX ADMIN — MUPIROCIN: 20 OINTMENT TOPICAL at 20:06

## 2024-05-25 RX ADMIN — HEPARIN SODIUM 5000 UNITS: 5000 INJECTION INTRAVENOUS; SUBCUTANEOUS at 08:25

## 2024-05-25 RX ADMIN — MUPIROCIN: 20 OINTMENT TOPICAL at 11:11

## 2024-05-25 RX ADMIN — DOXYCYCLINE HYCLATE 100 MG: 100 TABLET, COATED ORAL at 20:01

## 2024-05-25 RX ADMIN — FUROSEMIDE 20 MG: 20 TABLET ORAL at 08:25

## 2024-05-25 RX ADMIN — DOXYCYCLINE HYCLATE 100 MG: 100 TABLET, COATED ORAL at 08:25

## 2024-05-25 RX ADMIN — AMIODARONE HYDROCHLORIDE 200 MG: 200 TABLET ORAL at 08:25

## 2024-05-25 RX ADMIN — CIPROFLOXACIN 400 MG: 400 INJECTION, SOLUTION INTRAVENOUS at 23:14

## 2024-05-25 RX ADMIN — SODIUM CHLORIDE, PRESERVATIVE FREE 10 ML: 5 INJECTION INTRAVENOUS at 20:01

## 2024-05-25 RX ADMIN — CETIRIZINE HYDROCHLORIDE 5 MG: 10 TABLET, FILM COATED ORAL at 08:25

## 2024-05-25 RX ADMIN — HEPARIN SODIUM 5000 UNITS: 5000 INJECTION INTRAVENOUS; SUBCUTANEOUS at 20:01

## 2024-05-25 RX ADMIN — CIPROFLOXACIN 400 MG: 400 INJECTION, SOLUTION INTRAVENOUS at 01:22

## 2024-05-25 RX ADMIN — SODIUM CHLORIDE, PRESERVATIVE FREE 10 ML: 5 INJECTION INTRAVENOUS at 11:54

## 2024-05-25 NOTE — PLAN OF CARE
Problem: Discharge Planning  Goal: Discharge to home or other facility with appropriate resources  5/25/2024 0125 by Daysi Lima RN  Outcome: Progressing     Problem: Skin/Tissue Integrity  Goal: Absence of new skin breakdown  Description: 1.  Monitor for areas of redness and/or skin breakdown  2.  Assess vascular access sites hourly  3.  Every 4-6 hours minimum:  Change oxygen saturation probe site  4.  Every 4-6 hours:  If on nasal continuous positive airway pressure, respiratory therapy assess nares and determine need for appliance change or resting period.  5/25/2024 0125 by Daysi Lima RN  Outcome: Progressing     Problem: Safety - Adult  Goal: Free from fall injury  5/25/2024 0125 by Daysi Lima RN  Outcome: Progressing     Problem: ABCDS Injury Assessment  Goal: Absence of physical injury  5/25/2024 0125 by Daysi Lima RN  Outcome: Progressing     Problem: Neurosensory - Adult  Goal: Achieves stable or improved neurological status  5/25/2024 0125 by Daysi Lima RN  Outcome: Progressing     Problem: Neurosensory - Adult  Goal: Achieves maximal functionality and self care  5/25/2024 0125 by Daysi Lima RN  Outcome: Progressing     Problem: Skin/Tissue Integrity - Adult  Goal: Skin integrity remains intact  5/25/2024 0125 by Daysi Lima RN  Outcome: Progressing     Problem: Skin/Tissue Integrity - Adult  Goal: Incisions, wounds, or drain sites healing without S/S of infection  5/25/2024 0125 by Daysi Lima RN  Outcome: Progressing     Problem: Musculoskeletal - Adult  Goal: Return mobility to safest level of function  5/25/2024 0125 by Daysi Lima RN  Outcome: Progressing     Problem: Gastrointestinal - Adult  Goal: Maintains or returns to baseline bowel function  5/25/2024 0125 by Daysi Lima RN  Outcome: Progressing     Problem: Gastrointestinal - Adult  Goal: Maintains adequate nutritional intake  5/25/2024  0125 by Daysi Lima, RN  Outcome: Progressing     Problem: Pain  Goal: Verbalizes/displays adequate comfort level or baseline comfort level  5/25/2024 0125 by Daysi Lima, RN  Outcome: Progressing     Problem: Nutrition Deficit:  Goal: Optimize nutritional status  5/25/2024 0125 by Daysi Lima, RN  Outcome: Progressing

## 2024-05-25 NOTE — PROGRESS NOTES
V2.0    [facility] [place] Progress Note      Name:  Mayra Nieto /Age/Sex: 1931  (89 y.o. female)   MRN & CSN:  [id number] & [id number] Encounter Date/Time: 2024 9:23 AM EDT   Location:  [id number] PCP: Armanod Bee MD     Attending:Darrell Montiel MD       Hospital Day: 7    Assessment and Recommendations   Mayra Nieto is a 89 y.o. female who presents with Cellulitis      Plan:         Plan:   Bilateral lower extremity cellulitis likely due to strep, IV fluid IV Ancef, added doxycycline culture noted positive for Sorathia, changed antibiotics to ciprofloxacin along with Doxy.  Called and updated POA all questions answered  History of chronic diastolic heart failure no exacerbation at this time restarting p.o. Lasix overall feeling better  A-fib continue to be on amiodarone  GERD PPI  Thrombocytopenia, mild   Dysphagia, significantly improved  Hypoxia, likely atelectatic changes on room air currently  Peripheral vascular disease vascular surgery consulted per podiatry      Diet ADULT ORAL NUTRITION SUPPLEMENT; Breakfast, Dinner; Standard High Calorie/High Protein Oral Supplement  ADULT DIET; Regular   DVT Prophylaxis [] Lovenox, []  Heparin, [] SCDs, [] Ambulation,  [] Eliquis, [] Xarelto  [] Coumadin   Code Status Full Code             Personally reviewed Lab Studies and Imaging     Discussed management of the case with nursing staff    Telemetry strip reviewed by myself sinus rhythm        Drugs that require monitoring for toxicity include ciprofloxacin and the method of monitoring was QT    Medical Decision Making:  The following items were considered in medical decision making:  Discussion of patient care with other providers  Reviewed clinical lab tests  Reviewed radiology tests  Reviewed other diagnostic tests/interventions  Independent review of radiologic images  Microbiology cultures and other micro tests reviewed      Subjective:     Chief Complaint: Leg edema erythema    Mayra TENA  PRN  albuterol-ipratropium, 1 puff, Q6H PRN  sodium chloride flush, 5-40 mL, PRN  sodium chloride, , PRN  ondansetron, 4 mg, Q8H PRN   Or  ondansetron, 4 mg, Q6H PRN  polyethylene glycol, 17 g, Daily PRN  acetaminophen, 650 mg, Q6H PRN   Or  acetaminophen, 650 mg, Q6H PRN  guaiFENesin, 200 mg, Q4H PRN        Labs and Imaging   Vascular duplex lower extremity arteries right    Result Date: 5/24/2024    SHAKIR could not be obtained due to bandages and body habitus.   Elevated velocities within the tibial/ peroneal trunk and proximal anterior tibial artery are suggestive of 50-70% stenosis.  No other areas of significant stenotic disease or occlusive disease are identified in the right leg.     XR CHEST (2 VW)    Result Date: 5/23/2024  EXAMINATION: TWO XRAY VIEWS OF THE CHEST 5/23/2024 1:21 pm COMPARISON: 05/23/2024 HISTORY: ORDERING SYSTEM PROVIDED HISTORY: SOB TECHNOLOGIST PROVIDED HISTORY: Reason for exam:->SOB Reason for Exam: sob FINDINGS: No change in the small to moderate bilateral pleural effusions with atelectasis.  No change in the mild-to-moderate pulmonary vascular congestion.  The cardiac silhouette is stable status post aortic valve replacement.  There is no pneumothorax.     1. Unchanged congestive heart failure.     XR CHEST PORTABLE    Result Date: 5/23/2024  EXAMINATION: ONE XRAY VIEW OF THE CHEST 5/23/2024 5:53 am COMPARISON: 08/26/2023 HISTORY: ORDERING SYSTEM PROVIDED HISTORY: SOB TECHNOLOGIST PROVIDED HISTORY: Reason for exam:->SOB Reason for Exam: sob FINDINGS: There is patchy multifocal right-sided airspace opacification.  The left lung is clear.  Heart size and vascularity are stable with note made of prior TAVR.  There is a small right effusion.     Multifocal right-sided atelectasis versus pneumonia.     XR FOOT RIGHT (MIN 3 VIEWS)    Result Date: 5/19/2024  EXAMINATION: XRAY VIEWS OF THE RIGHT FOOT 5/19/2024 1:48 pm COMPARISON: None. HISTORY: ORDERING SYSTEM PROVIDED HISTORY: right foot

## 2024-05-25 NOTE — PROGRESS NOTES
Physician Progress Note      PATIENT:               HARIKA SMALL  CSN #:                  [id number]  :                       [id number]  ADMIT DATE:       2024 1:24 PM  DISCH DATE:  RESPONDING  PROVIDER #:        Darrell Montiel MD          QUERY TEXT:    Patient admitted with Bilateral lower extremity cellulitis. Noted to have   Severe malnutrition Dietitian PN . If possible, please document in   progress notes and discharge summary if you are evaluating and /or treating   any of the following:    The medical record reflects the following:  Risk Factors: Age, Failure to thrive, PMH-GERD, A-Fib, EGD with dilation, CHF  Clinical Indicators:  Dietitian PN   Malnutrition Status: Severe   malnutrition  Energy Intake:  Unable to assess  Weight Loss:  No significant weight loss  Body Fat Loss:  Moderate body fat loss Triceps, Fat Overlying Ribs  Muscle Mass Loss:  Moderate muscle mass loss Clavicles (pectoralis &   deltoids), Thigh (quadriceps), Calf (gastrocnemius)  Fluid Accumulation:  No significant fluid accumulation   Strength:  Not Performed  Current BMI (kg/m2): 16.6  Treatment: Decrease Ensure Enlive to bid    ASPEN Criteria:    https://aspenjournals.onlinelibrary.haley.com/doi/full/10.1177/542861759627917  5      Thank you,  DANNIE Saavedra CDS  Options provided:  -- Protein calorie malnutrition moderate  -- Protein calorie malnutrition severe  -- Other - I will add my own diagnosis  -- Disagree - Not applicable / Not valid  -- Disagree - Clinically unable to determine / Unknown  -- Refer to Clinical Documentation Reviewer    PROVIDER RESPONSE TEXT:    This patient has moderate protein calorie malnutrition.    Query created by: Duyen Mcrae on 2024 2:43 AM      Electronically signed by:  Darrell Montiel MD 2024 3:26 PM

## 2024-05-25 NOTE — PLAN OF CARE
Problem: Discharge Planning  Goal: Discharge to home or other facility with appropriate resources  5/25/2024 1107 by Gela Medina RN  Outcome: Progressing  5/25/2024 0125 by Daysi Lima RN  Outcome: Progressing     Problem: Skin/Tissue Integrity  Goal: Absence of new skin breakdown  Description: 1.  Monitor for areas of redness and/or skin breakdown  2.  Assess vascular access sites hourly  3.  Every 4-6 hours minimum:  Change oxygen saturation probe site  4.  Every 4-6 hours:  If on nasal continuous positive airway pressure, respiratory therapy assess nares and determine need for appliance change or resting period.  5/25/2024 1107 by Gela Medina RN  Outcome: Progressing  5/25/2024 0125 by Daysi Lima RN  Outcome: Progressing     Problem: Safety - Adult  Goal: Free from fall injury  5/25/2024 1107 by Gela eMdina RN  Outcome: Progressing  5/25/2024 0125 by Daysi Lima RN  Outcome: Progressing     Problem: ABCDS Injury Assessment  Goal: Absence of physical injury  5/25/2024 1107 by Gela Medina RN  Outcome: Progressing  5/25/2024 0125 by Daysi Lima RN  Outcome: Progressing     Problem: Neurosensory - Adult  Goal: Achieves stable or improved neurological status  5/25/2024 1107 by Gela Medina, RN  Outcome: Progressing  5/25/2024 0125 by Daysi Lima RN  Outcome: Progressing  Goal: Achieves maximal functionality and self care  5/25/2024 1107 by Gela Medina, RN  Outcome: Progressing  5/25/2024 0125 by Daysi Lima RN  Outcome: Progressing     Problem: Skin/Tissue Integrity - Adult  Goal: Skin integrity remains intact  5/25/2024 1107 by Gela Medina RN  Outcome: Progressing  5/25/2024 0125 by Daysi Lima RN  Outcome: Progressing  Goal: Incisions, wounds, or drain sites healing without S/S of infection  5/25/2024 0125 by Daysi Lima RN  Outcome: Progressing     Problem: Musculoskeletal -  Adult  Goal: Return mobility to safest level of function  5/25/2024 1107 by Gela Medina, RN  Outcome: Progressing  5/25/2024 0125 by Daysi Lima, RN  Outcome: Progressing

## 2024-05-25 NOTE — PROGRESS NOTES
Podiatric Surgery Daily Progress Note  Mayra Nieto      Subjective :   Patient seen and examined this AM at the bedside. Patient denies any acute overnight events. Patient denies N/V/F/C/SOB. Patient denies calf pain, thigh pain, chest pain.     Review of Systems: A 12 point review of symptoms is unremarkable with the exception of the chief complaint. Patient specifically denies nausea, fever, vomiting, chills, shortness of breath, chest pain, abdominal pain, constipation or difficulty urinating.    Patient sleeping soundly, visited with nursing in room       Objective     BP (!) 170/64   Pulse 84   Temp 97.7 °F (36.5 °C) (Oral)   Resp 18   Ht 1.549 m (5' 1\")   Wt 42.1 kg (92 lb 13 oz)   SpO2 91%   BMI 17.54 kg/m²      I/O:  Intake/Output Summary (Last 24 hours) at 5/25/2024 1252  Last data filed at 5/24/2024 2043  Gross per 24 hour   Intake --   Output 300 ml   Net -300 ml              Wt Readings from Last 3 Encounters:   05/25/24 42.1 kg (92 lb 13 oz)   09/07/23 39 kg (86 lb)   08/26/23 43 kg (94 lb 12.8 oz)       LABS:    No results for input(s): \"WBC\", \"HGB\", \"HCT\", \"PLT\" in the last 72 hours.       No results for input(s): \"NA\", \"K\", \"CL\", \"CO2\", \"PHOS\", \"BUN\", \"CREATININE\" in the last 72 hours.    Invalid input(s): \"CA\"     No results for input(s): \"PROT\", \"INR\", \"APTT\" in the last 72 hours.        LOWER EXTREMITY EXAMINATION    Dressing to RIGHT LE intact. No strikethrough noted to the external dressing.  No drainage noted to the internal layers of the dressing.     VASCULAR: DP and PT pulses are non-palpable. CFT is sluggish to the digits of the foot b/l. Skin temperature is warm to cool from proximal to distal with no focal calor noted. No edema noted. No pain with calf compression b/l.    NEUROLOGIC: Gross and epicritic sensation is appreciated b/l. Protective sensation is intact at all pedal sites b/l.    DERMATOLOGIC:   Right lower extremity:    Epithelialized wound on the lateral aspect of

## 2024-05-26 PROCEDURE — 1200000000 HC SEMI PRIVATE

## 2024-05-26 PROCEDURE — 6370000000 HC RX 637 (ALT 250 FOR IP): Performed by: INTERNAL MEDICINE

## 2024-05-26 PROCEDURE — 6360000002 HC RX W HCPCS: Performed by: STUDENT IN AN ORGANIZED HEALTH CARE EDUCATION/TRAINING PROGRAM

## 2024-05-26 PROCEDURE — 6370000000 HC RX 637 (ALT 250 FOR IP): Performed by: STUDENT IN AN ORGANIZED HEALTH CARE EDUCATION/TRAINING PROGRAM

## 2024-05-26 PROCEDURE — 6360000002 HC RX W HCPCS: Performed by: INTERNAL MEDICINE

## 2024-05-26 PROCEDURE — 2580000003 HC RX 258: Performed by: STUDENT IN AN ORGANIZED HEALTH CARE EDUCATION/TRAINING PROGRAM

## 2024-05-26 RX ORDER — CIPROFLOXACIN 2 MG/ML
400 INJECTION, SOLUTION INTRAVENOUS EVERY 24 HOURS
Status: DISCONTINUED | OUTPATIENT
Start: 2024-05-27 | End: 2024-05-27 | Stop reason: HOSPADM

## 2024-05-26 RX ADMIN — SODIUM CHLORIDE, PRESERVATIVE FREE 10 ML: 5 INJECTION INTRAVENOUS at 22:44

## 2024-05-26 RX ADMIN — DOXYCYCLINE HYCLATE 100 MG: 100 TABLET, COATED ORAL at 08:57

## 2024-05-26 RX ADMIN — PANTOPRAZOLE SODIUM 20 MG: 20 TABLET, DELAYED RELEASE ORAL at 06:11

## 2024-05-26 RX ADMIN — CETIRIZINE HYDROCHLORIDE 5 MG: 10 TABLET, FILM COATED ORAL at 08:56

## 2024-05-26 RX ADMIN — MUPIROCIN: 20 OINTMENT TOPICAL at 21:10

## 2024-05-26 RX ADMIN — HEPARIN SODIUM 5000 UNITS: 5000 INJECTION INTRAVENOUS; SUBCUTANEOUS at 21:06

## 2024-05-26 RX ADMIN — SODIUM CHLORIDE, PRESERVATIVE FREE 10 ML: 5 INJECTION INTRAVENOUS at 08:58

## 2024-05-26 RX ADMIN — CIPROFLOXACIN 400 MG: 400 INJECTION, SOLUTION INTRAVENOUS at 12:26

## 2024-05-26 RX ADMIN — MUPIROCIN: 20 OINTMENT TOPICAL at 08:57

## 2024-05-26 RX ADMIN — DOXYCYCLINE HYCLATE 100 MG: 100 TABLET, COATED ORAL at 21:03

## 2024-05-26 RX ADMIN — AMIODARONE HYDROCHLORIDE 200 MG: 200 TABLET ORAL at 08:57

## 2024-05-26 RX ADMIN — FUROSEMIDE 20 MG: 20 TABLET ORAL at 08:57

## 2024-05-26 RX ADMIN — HEPARIN SODIUM 5000 UNITS: 5000 INJECTION INTRAVENOUS; SUBCUTANEOUS at 08:57

## 2024-05-26 ASSESSMENT — PAIN SCALES - GENERAL: PAINLEVEL_OUTOF10: 0

## 2024-05-26 NOTE — PROGRESS NOTES
V2.0    [facility] [place] Progress Note      Name:  Mayra Nieto /Age/Sex: 1931  (89 y.o. female)   MRN & CSN:  [id number] & [id number] Encounter Date/Time: 2024 8:50 AM EDT   Location:  [id number] PCP: Armando Bee MD     Attending:Darrell Montiel MD       Hospital Day: 8    Assessment and Recommendations   Mayra Nieto is a 89 y.o. female who presents with Cellulitis      Plan:     Bilateral lower extremity cellulitis likely due to strep, IV fluid IV Ancef, added doxycycline culture noted positive for Sorathia, changed antibiotics to ciprofloxacin along with Doxy.  Called and updated POA yesterday all questions answered  History of chronic diastolic heart failure no exacerbation at this time restarting p.o. Lasix overall feeling stable  A-fib continue to be on amiodarone  GERD PPI  Thrombocytopenia, mild   Dysphagia, significantly improved.  Tolerating p.o. intake very well  Hypoxia, likely atelectatic changes on room air currently  Peripheral vascular disease vascular surgery consulted per podiatry    Diet ADULT ORAL NUTRITION SUPPLEMENT; Breakfast, Dinner; Standard High Calorie/High Protein Oral Supplement  ADULT DIET; Regular   DVT Prophylaxis [] Lovenox, []  Heparin, [] SCDs, [] Ambulation,  [] Eliquis, [] Xarelto  [] Coumadin   Code Status Full Code             Personally reviewed Lab Studies and Imaging         Medical Decision Making:  The following items were considered in medical decision making:  Discussion of patient care with other providers  Reviewed clinical lab tests  Reviewed radiology tests  Reviewed other diagnostic tests/interventions  Independent review of radiologic images  Microbiology cultures and other micro tests reviewed      Subjective:     Chief Complaint: Bilateral cellulitis lower extremity    Mayra Nieto is a 89 y.o. female who presents with bilateral lower extremity cellulitis with abscess, status post I&D feeling better erythema better minimal drainage  no fever chills patient having good appetite      Review of Systems:      Pertinent positives and negatives discussed in HPI    Objective:     Intake/Output Summary (Last 24 hours) at 5/26/2024 0850  Last data filed at 5/26/2024 0621  Gross per 24 hour   Intake 180 ml   Output --   Net 180 ml      Vitals:   Vitals:    05/25/24 1459 05/25/24 1955 05/26/24 0615 05/26/24 0728   BP: (!) 148/64 (!) 164/62  (!) 170/57   Pulse: 83 84  70   Resp:  18  16   Temp: 98.4 °F (36.9 °C) 99 °F (37.2 °C)  97.9 °F (36.6 °C)   TempSrc: Oral Oral  Oral   SpO2: 92% 95%  94%   Weight:   40.6 kg (89 lb 8.1 oz)    Height:             Physical Exam:      Physical Exam Performed:    BP (!) 170/57   Pulse 70   Temp 97.9 °F (36.6 °C) (Oral)   Resp 16   Ht 1.549 m (5' 1\")   Wt 40.6 kg (89 lb 8.1 oz)   SpO2 94%   BMI 16.91 kg/m²     General appearance: No apparent distress  Respiratory:  Normal respiratory effort. Clear to auscultation, bilaterally without Rales/Wheezes/Rhonchi.  Cardiovascular: Regular rate and rhythm with normal S1/S2 without murmurs, rubs or gallops.  Abdomen: Soft, non-tender  Musculoskeletal: Improving erythema right foot  Neurologic: No focal weakness  Psychiatric: Alert and oriented  Capillary Refill: Brisk, 3 seconds, normal   Peripheral Pulses: +2 palpable, equal bilaterally       Medications:   Medications:    ciprofloxacin  400 mg IntraVENous Q12H    furosemide  20 mg Oral Daily    cetirizine  5 mg Oral Daily    mupirocin   Topical BID    doxycycline hyclate  100 mg Oral BID    amiodarone  200 mg Oral Daily    pantoprazole  20 mg Oral QAM AC    sodium chloride flush  5-40 mL IntraVENous 2 times per day    heparin (porcine)  5,000 Units SubCUTAneous BID      Infusions:    sodium chloride       PRN Meds: albuterol, 2.5 mg, Q4H PRN  albuterol-ipratropium, 1 puff, Q6H PRN  sodium chloride flush, 5-40 mL, PRN  sodium chloride, , PRN  ondansetron, 4 mg, Q8H PRN   Or  ondansetron, 4 mg, Q6H PRN  polyethylene glycol,

## 2024-05-26 NOTE — PROGRESS NOTES
Clinical Pharmacy Note  Renal Dose Adjustment    Mayra Nieto is receiving Ciprofloxacin 400 mg IVPB Q12H. This medication is renally eliminated.  Based on the patient's Estimated Creatinine Clearance: 28 mL/min (based on SCr of 0.8 mg/dL). and urine output, the dose has been adjusted to Ciprofloxacin 400 mg IVPB Q24H per protocol.    Pharmacy will continue to monitor and adjust dose as needed for changes in renal function.     Bruce Horne [facility], PharmD, BCPS  5/26/2024 1:46 PM

## 2024-05-26 NOTE — PROGRESS NOTES
Podiatric Surgery Daily Progress Note  Mayra Nieto      Subjective :   Patient seen and examined this AM at the bedside. Patient denies any acute overnight events. Patient denies N/V/F/C/SOB. Patient denies calf pain, thigh pain, chest pain.     Review of Systems: A 12 point review of symptoms is unremarkable with the exception of the chief complaint. Patient specifically denies nausea, fever, vomiting, chills, shortness of breath, chest pain, abdominal pain, constipation or difficulty urinating.    Patient resting comfortably       Objective     BP (!) 170/57   Pulse 70   Temp 97.9 °F (36.6 °C) (Oral)   Resp 16   Ht 1.549 m (5' 1\")   Wt 40.6 kg (89 lb 8.1 oz)   SpO2 94%   BMI 16.91 kg/m²      I/O:  Intake/Output Summary (Last 24 hours) at 5/26/2024 1103  Last data filed at 5/26/2024 0621  Gross per 24 hour   Intake 180 ml   Output --   Net 180 ml              Wt Readings from Last 3 Encounters:   05/26/24 40.6 kg (89 lb 8.1 oz)   09/07/23 39 kg (86 lb)   08/26/23 43 kg (94 lb 12.8 oz)       LABS:    No results for input(s): \"WBC\", \"HGB\", \"HCT\", \"PLT\" in the last 72 hours.       No results for input(s): \"NA\", \"K\", \"CL\", \"CO2\", \"PHOS\", \"BUN\", \"CREATININE\" in the last 72 hours.    Invalid input(s): \"CA\"     No results for input(s): \"PROT\", \"INR\", \"APTT\" in the last 72 hours.        LOWER EXTREMITY EXAMINATION    Dressing to RIGHT LE intact. No strikethrough noted to the external dressing.  No drainage noted to the internal layers of the dressing.     VASCULAR: DP and PT pulses are non-palpable. CFT is sluggish to the digits of the foot b/l. Skin temperature is warm to cool from proximal to distal with no focal calor noted. No edema noted. No pain with calf compression b/l.    NEUROLOGIC: Gross and epicritic sensation is appreciated b/l. Protective sensation is intact at all pedal sites b/l.    DERMATOLOGIC:   Right lower extremity:    Epithelialized wound on the lateral aspect of the ankle.  Preulcerative  pending vascular recommendations.    Alexander Braun DP  [facility]  [phone]

## 2024-05-26 NOTE — PROGRESS NOTES
Patient alert and oriented x4, VSS, sitting up in bed eating breakfast. Patient denies n/v, diarrhea, pain. Patient states mild SOB.  Patient denies needs at this time.  Bed in lowest and locked position, safety alarm in place. Non-slip socks on, call light in reach.

## 2024-05-26 NOTE — PLAN OF CARE
Problem: Discharge Planning  Goal: Discharge to home or other facility with appropriate resources  5/26/2024 0955 by Lesli Jonyer RN  Outcome: Progressing  Flowsheets (Taken 5/26/2024 0850)  Discharge to home or other facility with appropriate resources: Identify barriers to discharge with patient and caregiver  5/26/2024 0235 by Karla Goncalves RN  Outcome: Progressing     Problem: Skin/Tissue Integrity  Goal: Absence of new skin breakdown  Description: 1.  Monitor for areas of redness and/or skin breakdown  2.  Assess vascular access sites hourly  3.  Every 4-6 hours minimum:  Change oxygen saturation probe site  4.  Every 4-6 hours:  If on nasal continuous positive airway pressure, respiratory therapy assess nares and determine need for appliance change or resting period.  5/26/2024 0955 by Lesli Joyner RN  Outcome: Progressing  5/26/2024 0235 by Karla Goncalves RN  Outcome: Progressing     Problem: Safety - Adult  Goal: Free from fall injury  5/26/2024 0955 by Lesli Joyner RN  Outcome: Progressing  5/26/2024 0235 by Karla Goncalves RN  Outcome: Progressing     Problem: ABCDS Injury Assessment  Goal: Absence of physical injury  Outcome: Progressing     Problem: Neurosensory - Adult  Goal: Achieves stable or improved neurological status  Outcome: Progressing  Flowsheets (Taken 5/26/2024 0850)  Achieves stable or improved neurological status: Assess for and report changes in neurological status  Goal: Achieves maximal functionality and self care  Outcome: Progressing  Flowsheets (Taken 5/26/2024 0850)  Achieves maximal functionality and self care: Monitor swallowing and airway patency with patient fatigue and changes in neurological status     Problem: Skin/Tissue Integrity - Adult  Goal: Skin integrity remains intact  Outcome: Progressing  Flowsheets (Taken 5/26/2024 0850)  Skin Integrity Remains Intact: Monitor for areas of redness and/or skin breakdown  Goal: Incisions, wounds, or drain sites

## 2024-05-26 NOTE — PLAN OF CARE
Problem: Discharge Planning  Goal: Discharge to home or other facility with appropriate resources  Outcome: Progressing     Problem: Skin/Tissue Integrity  Goal: Absence of new skin breakdown  Description: 1.  Monitor for areas of redness and/or skin breakdown  2.  Assess vascular access sites hourly  3.  Every 4-6 hours minimum:  Change oxygen saturation probe site  4.  Every 4-6 hours:  If on nasal continuous positive airway pressure, respiratory therapy assess nares and determine need for appliance change or resting period.  Outcome: Progressing     Problem: Safety - Adult  Goal: Free from fall injury  Outcome: Progressing     Problem: Musculoskeletal - Adult  Goal: Return mobility to safest level of function  Outcome: Progressing

## 2024-05-27 VITALS
TEMPERATURE: 97.9 F | OXYGEN SATURATION: 93 % | WEIGHT: 91.05 LBS | RESPIRATION RATE: 16 BRPM | BODY MASS INDEX: 17.19 KG/M2 | HEIGHT: 61 IN | SYSTOLIC BLOOD PRESSURE: 152 MMHG | HEART RATE: 69 BPM | DIASTOLIC BLOOD PRESSURE: 64 MMHG

## 2024-05-27 LAB
ALBUMIN SERPL-MCNC: 3.1 G/DL (ref 3.4–5)
ALBUMIN/GLOB SERPL: 1.1 {RATIO} (ref 1.1–2.2)
ALP SERPL-CCNC: 80 U/L (ref 40–129)
ALT SERPL-CCNC: 9 U/L (ref 10–40)
ANION GAP SERPL CALCULATED.3IONS-SCNC: 10 MMOL/L (ref 3–16)
AST SERPL-CCNC: 19 U/L (ref 15–37)
BASOPHILS # BLD: 0.1 K/UL (ref 0–0.2)
BASOPHILS NFR BLD: 1.4 %
BILIRUB SERPL-MCNC: 0.4 MG/DL (ref 0–1)
BUN SERPL-MCNC: 10 MG/DL (ref 7–20)
CALCIUM SERPL-MCNC: 8.7 MG/DL (ref 8.3–10.6)
CHLORIDE SERPL-SCNC: 95 MMOL/L (ref 99–110)
CO2 SERPL-SCNC: 31 MMOL/L (ref 21–32)
CREAT SERPL-MCNC: 0.8 MG/DL (ref 0.6–1.2)
DEPRECATED RDW RBC AUTO: 14.7 % (ref 12.4–15.4)
EOSINOPHIL # BLD: 0.1 K/UL (ref 0–0.6)
EOSINOPHIL NFR BLD: 2.5 %
GFR SERPLBLD CREATININE-BSD FMLA CKD-EPI: 69 ML/MIN/{1.73_M2}
GLUCOSE SERPL-MCNC: 92 MG/DL (ref 70–99)
HCT VFR BLD AUTO: 34.9 % (ref 36–48)
HGB BLD-MCNC: 11.9 G/DL (ref 12–16)
LYMPHOCYTES # BLD: 0.8 K/UL (ref 1–5.1)
LYMPHOCYTES NFR BLD: 14.7 %
MCH RBC QN AUTO: 27.8 PG (ref 26–34)
MCHC RBC AUTO-ENTMCNC: 34 G/DL (ref 31–36)
MCV RBC AUTO: 81.7 FL (ref 80–100)
MONOCYTES # BLD: 0.7 K/UL (ref 0–1.3)
MONOCYTES NFR BLD: 12.5 %
NEUTROPHILS # BLD: 3.7 K/UL (ref 1.7–7.7)
NEUTROPHILS NFR BLD: 68.9 %
PLATELET # BLD AUTO: 339 K/UL (ref 135–450)
PMV BLD AUTO: 8.1 FL (ref 5–10.5)
POTASSIUM SERPL-SCNC: 3 MMOL/L (ref 3.5–5.1)
PROT SERPL-MCNC: 6 G/DL (ref 6.4–8.2)
RBC # BLD AUTO: 4.27 M/UL (ref 4–5.2)
SODIUM SERPL-SCNC: 136 MMOL/L (ref 136–145)
WBC # BLD AUTO: 5.4 K/UL (ref 4–11)

## 2024-05-27 PROCEDURE — 6360000002 HC RX W HCPCS: Performed by: PODIATRIST

## 2024-05-27 PROCEDURE — 2580000003 HC RX 258: Performed by: STUDENT IN AN ORGANIZED HEALTH CARE EDUCATION/TRAINING PROGRAM

## 2024-05-27 PROCEDURE — 6370000000 HC RX 637 (ALT 250 FOR IP): Performed by: STUDENT IN AN ORGANIZED HEALTH CARE EDUCATION/TRAINING PROGRAM

## 2024-05-27 PROCEDURE — 6370000000 HC RX 637 (ALT 250 FOR IP): Performed by: INTERNAL MEDICINE

## 2024-05-27 PROCEDURE — 80053 COMPREHEN METABOLIC PANEL: CPT

## 2024-05-27 PROCEDURE — 99222 1ST HOSP IP/OBS MODERATE 55: CPT | Performed by: SURGERY

## 2024-05-27 PROCEDURE — 94760 N-INVAS EAR/PLS OXIMETRY 1: CPT

## 2024-05-27 PROCEDURE — 2500000003 HC RX 250 WO HCPCS: Performed by: NURSE PRACTITIONER

## 2024-05-27 PROCEDURE — 36415 COLL VENOUS BLD VENIPUNCTURE: CPT

## 2024-05-27 PROCEDURE — 85025 COMPLETE CBC W/AUTO DIFF WBC: CPT

## 2024-05-27 PROCEDURE — 6360000002 HC RX W HCPCS: Performed by: STUDENT IN AN ORGANIZED HEALTH CARE EDUCATION/TRAINING PROGRAM

## 2024-05-27 RX ORDER — DOXYCYCLINE HYCLATE 100 MG
100 TABLET ORAL 2 TIMES DAILY
Qty: 1 TABLET | Refills: 0 | Status: SHIPPED | OUTPATIENT
Start: 2024-05-27 | End: 2024-05-28

## 2024-05-27 RX ORDER — POTASSIUM CHLORIDE 750 MG/1
10 TABLET, FILM COATED, EXTENDED RELEASE ORAL ONCE
Status: COMPLETED | OUTPATIENT
Start: 2024-05-27 | End: 2024-05-27

## 2024-05-27 RX ORDER — POTASSIUM CHLORIDE 20 MEQ/1
40 TABLET, EXTENDED RELEASE ORAL ONCE
Status: COMPLETED | OUTPATIENT
Start: 2024-05-27 | End: 2024-05-27

## 2024-05-27 RX ORDER — CIPROFLOXACIN 500 MG/1
500 TABLET, FILM COATED ORAL DAILY
Qty: 5 TABLET | Refills: 0 | Status: SHIPPED | OUTPATIENT
Start: 2024-05-27 | End: 2024-06-01

## 2024-05-27 RX ADMIN — MUPIROCIN: 20 OINTMENT TOPICAL at 09:30

## 2024-05-27 RX ADMIN — CIPROFLOXACIN 400 MG: 400 INJECTION, SOLUTION INTRAVENOUS at 11:09

## 2024-05-27 RX ADMIN — SODIUM CHLORIDE, PRESERVATIVE FREE 10 ML: 5 INJECTION INTRAVENOUS at 08:48

## 2024-05-27 RX ADMIN — FUROSEMIDE 20 MG: 20 TABLET ORAL at 08:46

## 2024-05-27 RX ADMIN — GUAIFENESIN 200 MG: 200 SOLUTION ORAL at 04:06

## 2024-05-27 RX ADMIN — DOXYCYCLINE HYCLATE 100 MG: 100 TABLET, COATED ORAL at 08:46

## 2024-05-27 RX ADMIN — POTASSIUM CHLORIDE 40 MEQ: 1500 TABLET, EXTENDED RELEASE ORAL at 11:07

## 2024-05-27 RX ADMIN — POTASSIUM CHLORIDE 10 MEQ: 750 TABLET, FILM COATED, EXTENDED RELEASE ORAL at 13:37

## 2024-05-27 RX ADMIN — HEPARIN SODIUM 5000 UNITS: 5000 INJECTION INTRAVENOUS; SUBCUTANEOUS at 08:46

## 2024-05-27 RX ADMIN — AMIODARONE HYDROCHLORIDE 200 MG: 200 TABLET ORAL at 08:46

## 2024-05-27 RX ADMIN — PANTOPRAZOLE SODIUM 20 MG: 20 TABLET, DELAYED RELEASE ORAL at 05:35

## 2024-05-27 RX ADMIN — CETIRIZINE HYDROCHLORIDE 5 MG: 10 TABLET, FILM COATED ORAL at 08:46

## 2024-05-27 ASSESSMENT — ENCOUNTER SYMPTOMS
RESPIRATORY NEGATIVE: 1
EYES NEGATIVE: 1
GASTROINTESTINAL NEGATIVE: 1
ALLERGIC/IMMUNOLOGIC NEGATIVE: 1
COLOR CHANGE: 1

## 2024-05-27 ASSESSMENT — PAIN SCALES - GENERAL
PAINLEVEL_OUTOF10: 0

## 2024-05-27 NOTE — PROGRESS NOTES
Discharge instructions and medications reviewed with patient grand daughter. Grand daughter verbalized understanding. Denies questions. Pt discharged via w/c with all personal belongings. Grand daughter transporting pt home. No distress or discomfort noted upon discharge.

## 2024-05-27 NOTE — PLAN OF CARE
Problem: Discharge Planning  Goal: Discharge to home or other facility with appropriate resources  5/27/2024 0927 by David Kraft RN  Outcome: Progressing  5/27/2024 0053 by Effie Sosa RN  Outcome: Progressing     Problem: Skin/Tissue Integrity  Goal: Absence of new skin breakdown  Description: 1.  Monitor for areas of redness and/or skin breakdown  2.  Assess vascular access sites hourly  3.  Every 4-6 hours minimum:  Change oxygen saturation probe site  4.  Every 4-6 hours:  If on nasal continuous positive airway pressure, respiratory therapy assess nares and determine need for appliance change or resting period.  5/27/2024 0927 by David Kraft RN  Outcome: Progressing  5/27/2024 0053 by Effie Sosa RN  Outcome: Progressing     Problem: Safety - Adult  Goal: Free from fall injury  5/27/2024 0927 by David Kraft RN  Outcome: Progressing  5/27/2024 0053 by Effie Sosa RN  Outcome: Progressing     Problem: ABCDS Injury Assessment  Goal: Absence of physical injury  5/27/2024 0927 by David Kraft RN  Outcome: Progressing  5/27/2024 0053 by Effie Sosa RN  Outcome: Progressing     Problem: Neurosensory - Adult  Goal: Achieves stable or improved neurological status  5/27/2024 0927 by David Kraft RN  Outcome: Progressing  5/27/2024 0053 by Effie Sosa RN  Outcome: Progressing  Goal: Achieves maximal functionality and self care  5/27/2024 0927 by David Kraft RN  Outcome: Progressing  5/27/2024 0053 by Effie Sosa RN  Outcome: Progressing     Problem: Skin/Tissue Integrity - Adult  Goal: Skin integrity remains intact  5/27/2024 0927 by David Kraft RN  Outcome: Progressing  5/27/2024 0053 by Effie Sosa RN  Outcome: Progressing  Flowsheets (Taken 5/26/2024 2252)  Skin Integrity Remains Intact:   Monitor for areas of redness and/or skin breakdown   Assess vascular access sites hourly  Goal: Incisions, wounds, or drain sites  healing without S/S of infection  5/27/2024 0927 by David Kraft RN  Outcome: Progressing  5/27/2024 0053 by Effie Sosa RN  Outcome: Progressing     Problem: Musculoskeletal - Adult  Goal: Return mobility to safest level of function  5/27/2024 0927 by David Kraft RN  Outcome: Progressing  5/27/2024 0053 by Effie Sosa RN  Outcome: Progressing     Problem: Gastrointestinal - Adult  Goal: Maintains or returns to baseline bowel function  5/27/2024 0927 by David Kraft RN  Outcome: Progressing  5/27/2024 0053 by Effie Sosa RN  Outcome: Progressing  Goal: Maintains adequate nutritional intake  5/27/2024 0927 by David Kraft RN  Outcome: Progressing  5/27/2024 0053 by Effie Sosa RN  Outcome: Progressing     Problem: Pain  Goal: Verbalizes/displays adequate comfort level or baseline comfort level  5/27/2024 0927 by David Kraft RN  Outcome: Progressing  5/27/2024 0053 by Effie Sosa RN  Outcome: Progressing     Problem: Nutrition Deficit:  Goal: Optimize nutritional status  5/27/2024 0927 by David Kraft RN  Outcome: Progressing  5/27/2024 0053 by Effie Sosa RN  Outcome: Progressing

## 2024-05-27 NOTE — DISCHARGE SUMMARY
Hospital Medicine Discharge Summary    Patient ID: Mayra Nieto      Patient's PCP: Armando Bee MD    Admit Date: 5/19/2024     Discharge Date:   05/27/2024    Admitting Provider: No admitting provider for patient encounter.     Discharge Provider: Darrell Montiel MD     Discharge Diagnoses:       Active Hospital Problems    Diagnosis     Severe malnutrition (HCC) [E43]     Cellulitis [L03.90]        The patient was seen and examined on day of discharge and this discharge summary is in conjunction with any daily progress note from day of discharge.    Hospital Course:     From HPI:\"Patient has been having decreased appetite nausea along with a burning sensation in the bilateral lower extremities denies any leg pain otherwise denies any leg swelling as well patient was found to have signs of bilateral lower extremity cellulitis. Denies any orthopnea PND and chest pain to be admitted to the hospital \"     Bilateral lower extremity cellulitis likely due to strep, IV fluid IV Ancef, added doxycycline culture noted positive for Sorathia, changed antibiotics to ciprofloxacin along with Doxy.  Overall continue to improve discussed with podiatry, no further plan for intervention per vascular surgery okay to discharge her complete doxycycline 1 more day and ciprofloxacin for 5 more days follow-up with podiatry as outpatient called and updated JONG Stevenson.  History of chronic diastolic heart failure no exacerbation at this time restarted p.o. Lasix overall feeling stable  A-fib continue to be on amiodarone  GERD PPI  Thrombocytopenia, mild   Dysphagia, significantly improved.  Tolerating p.o. intake very well  Hypoxia, likely atelectatic changes on room air currently.  Hypokalemia replacing with p.o. supplement 50 MEQ total and restart p.o. potassium on discharge  Peripheral vascular disease vascular surgery consulted per podiatry and no further recommendation at this time      Physical Exam Performed:     BP  (!) 152/64   Pulse 69   Temp 97.9 °F (36.6 °C) (Oral)   Resp 16   Ht 1.549 m (5' 1\")   Wt 41.3 kg (91 lb 0.8 oz)   SpO2 93%   BMI 17.20 kg/m²     Per progress note      Labs: For convenience and continuity at follow-up the following most recent labs are provided:      CBC:    Lab Results   Component Value Date/Time    WBC 5.4 05/27/2024 08:30 AM    HGB 11.9 05/27/2024 08:30 AM    HCT 34.9 05/27/2024 08:30 AM     05/27/2024 08:30 AM       Renal:    Lab Results   Component Value Date/Time     05/27/2024 08:31 AM    K 3.0 05/27/2024 08:31 AM    K 4.0 08/26/2023 05:36 PM    CL 95 05/27/2024 08:31 AM    CO2 31 05/27/2024 08:31 AM    BUN 10 05/27/2024 08:31 AM    CREATININE 0.8 05/27/2024 08:31 AM    CALCIUM 8.7 05/27/2024 08:31 AM    PHOS 2.3 05/22/2024 05:00 AM         Significant Diagnostic Studies    Radiology:   Vascular duplex lower extremity arteries right   Final Result      XR CHEST (2 VW)   Final Result   1. Unchanged congestive heart failure.         XR CHEST PORTABLE   Final Result   Multifocal right-sided atelectasis versus pneumonia.         XR TIBIA FIBULA RIGHT (2 VIEWS)   Final Result   No radiographic evidence of osteomyelitis. MRI is more sensitive for   detection of osteomyelitis.         XR FOOT RIGHT (MIN 3 VIEWS)   Final Result   No acute osseous abnormality.                Consults:     IP CONSULT TO PODIATRY  IP CONSULT TO VASCULAR SURGERY    Disposition: Home    Condition at Discharge: Stable    Discharge Instructions/Follow-up: PCP podiatry    Code Status:  Full Code     Activity: activity as tolerated    Diet: Cardiac      Discharge Medications:     Current Discharge Medication List             Details   mupirocin (BACTROBAN) 2 % ointment Apply topically 3 times daily.  Qty: 1 each, Refills: 0      doxycycline hyclate (VIBRA-TABS) 100 MG tablet Take 1 tablet by mouth 2 times daily for 1 dose  Qty: 1 tablet, Refills: 0      ciprofloxacin (CIPRO) 500 MG tablet Take 1 tablet by

## 2024-05-27 NOTE — PROGRESS NOTES
Podiatric Surgery Daily Progress Note  Mayra Nieto      Subjective :   Patient seen and examined this AM at the bedside. Patient denies any acute overnight events. Patient denies N/V/F/C/SOB. Patient denies calf pain, thigh pain, chest pain.     Review of Systems: A 12 point review of symptoms is unremarkable with the exception of the chief complaint. Patient specifically denies nausea, fever, vomiting, chills, shortness of breath, chest pain, abdominal pain, constipation or difficulty urinating.    Patient up to chair with eyes closed, awakens easily, relates some pain to RIGHT foot, but improved       Objective     BP (!) 152/64   Pulse 69   Temp 97.9 °F (36.6 °C) (Oral)   Resp 16   Ht 1.549 m (5' 1\")   Wt 41.3 kg (91 lb 0.8 oz)   SpO2 93%   BMI 17.20 kg/m²      I/O:  Intake/Output Summary (Last 24 hours) at 5/27/2024 1039  Last data filed at 5/26/2024 1940  Gross per 24 hour   Intake 180 ml   Output 400 ml   Net -220 ml              Wt Readings from Last 3 Encounters:   05/27/24 41.3 kg (91 lb 0.8 oz)   09/07/23 39 kg (86 lb)   08/26/23 43 kg (94 lb 12.8 oz)       LABS:    Recent Labs     05/27/24  0830   WBC 5.4   HGB 11.9*   HCT 34.9*             Recent Labs     05/27/24  0831      K 3.0*   CL 95*   CO2 31   BUN 10   CREATININE 0.8        No results for input(s): \"PROT\", \"INR\", \"APTT\" in the last 72 hours.        LOWER EXTREMITY EXAMINATION    Dressing to RIGHT LE intact. No strikethrough noted to the external dressing.  No drainage noted to the internal layers of the dressing.     VASCULAR: DP and PT pulses are non-palpable. CFT is sluggish to the digits of the foot b/l. Skin temperature is warm to cool from proximal to distal with no focal calor noted. No edema noted. No pain with calf compression b/l.    NEUROLOGIC: Gross and epicritic sensation is appreciated b/l. Protective sensation is intact at all pedal sites b/l.    DERMATOLOGIC:   Right lower extremity:    Epithelialized wound

## 2024-05-27 NOTE — PLAN OF CARE
Problem: Discharge Planning  Goal: Discharge to home or other facility with appropriate resources  Outcome: Progressing     Problem: Skin/Tissue Integrity  Goal: Absence of new skin breakdown  Description: 1.  Monitor for areas of redness and/or skin breakdown  2.  Assess vascular access sites hourly  3.  Every 4-6 hours minimum:  Change oxygen saturation probe site  4.  Every 4-6 hours:  If on nasal continuous positive airway pressure, respiratory therapy assess nares and determine need for appliance change or resting period.  Outcome: Progressing     Problem: Safety - Adult  Goal: Free from fall injury  Outcome: Progressing     Problem: ABCDS Injury Assessment  Goal: Absence of physical injury  Outcome: Progressing     Problem: Neurosensory - Adult  Goal: Achieves stable or improved neurological status  Outcome: Progressing  Goal: Achieves maximal functionality and self care  Outcome: Progressing     Problem: Skin/Tissue Integrity - Adult  Goal: Skin integrity remains intact  Outcome: Progressing  Flowsheets (Taken 5/26/2024 2252)  Skin Integrity Remains Intact:   Monitor for areas of redness and/or skin breakdown   Assess vascular access sites hourly  Goal: Incisions, wounds, or drain sites healing without S/S of infection  Outcome: Progressing     Problem: Musculoskeletal - Adult  Goal: Return mobility to safest level of function  Outcome: Progressing     Problem: Gastrointestinal - Adult  Goal: Maintains or returns to baseline bowel function  Outcome: Progressing  Goal: Maintains adequate nutritional intake  Outcome: Progressing     Problem: Pain  Goal: Verbalizes/displays adequate comfort level or baseline comfort level  Outcome: Progressing     Problem: Nutrition Deficit:  Goal: Optimize nutritional status  Outcome: Progressing

## 2024-05-27 NOTE — CONSULTS
[facility]  Consultation Note    Chief Complaint / Reason for Consultation  PAD, right lateral foot wound    History of Present Illness  Patient is a 89 y.o. female presenting with Pain and cellulitis over her right lateral foot.  Patient presented to the ED due to worsening pain with ambulation in her right foot.  Patient unsure of the reason for onset though notes she has done a great deal of walking recently due to her grandsons graduation.  Patient noticed pain after the graduation ceremony.  Patient has significant past medical history of CHF and A-fib.  She is status post TAVR in 2019.  Patient otherwise resting in bed comfortably this morning    Review of Systems  Review of Systems   Constitutional: Negative.    HENT: Negative.     Eyes: Negative.    Respiratory: Negative.     Cardiovascular: Negative.    Gastrointestinal: Negative.    Endocrine: Negative.    Genitourinary: Negative.    Musculoskeletal:  Positive for gait problem.   Skin:  Positive for color change and wound. Negative for pallor and rash.   Allergic/Immunologic: Negative.    Hematological: Negative.    Psychiatric/Behavioral: Negative.          Past Medical History:   Diagnosis Date    Aortic stenosis     Arthritis     COVID-19     PAF (paroxysmal atrial fibrillation) (HCC)        Past Surgical History:   Procedure Laterality Date    AORTIC VALVE REPLACEMENT N/A 5/21/2019    TRANSCATHETER AORTIC VALVE REPLACEMENT FEMORAL APPROACH performed by Otoniel Williamson MD at [facility] CVOR    CARDIAC CATHETERIZATION  04/17/2018    HYSTERECTOMY, TOTAL ABDOMINAL (CERVIX REMOVED)      MOHS SURGERY  04/14/2022    Left arm    UPPER GASTROINTESTINAL ENDOSCOPY N/A 2/14/2022    EGD DILATION BALLOON performed by Bhavin Sim MD at [facility] ENDOSCOPY    VARICOSE VEIN SURGERY Bilateral 1980's       Allergies   Allergen Reactions    Bactrim [Sulfamethoxazole-Trimethoprim] Hallucinations    Macrobid [Nitrofurantoin] Hallucinations

## 2024-05-27 NOTE — PROGRESS NOTES
V2.0    [facility] Progress Note      Name:  Mayra Nieto /Age/Sex: 1931  (89 y.o. female)   MRN & CSN:  [id number] & [id number] Encounter Date/Time: 2024 8:14 AM EDT   Location:  [id number]-3101/4261-01 PCP: Armando Bee MD     Attending:Darrell Montiel MD       Hospital Day: 9    Assessment and Recommendations   Mayra Nieto is a 89 y.o. female who presents with Cellulitis      Plan:          Bilateral lower extremity cellulitis likely due to strep, IV fluid IV Ancef, added doxycycline culture noted positive for Sorathia, changed antibiotics to ciprofloxacin along with Doxy.  Overall continue to improve  History of chronic diastolic heart failure no exacerbation at this time restarting p.o. Lasix overall feeling stable  A-fib continue to be on amiodarone  GERD PPI  Thrombocytopenia, mild   Dysphagia, significantly improved.  Tolerating p.o. intake very well  Hypoxia, likely atelectatic changes on room air currently  Hypokalemia replacing with p.o. supplement  Peripheral vascular disease vascular surgery consulted per podiatry        Diet ADULT ORAL NUTRITION SUPPLEMENT; Breakfast, Dinner; Standard High Calorie/High Protein Oral Supplement  ADULT DIET; Regular   DVT Prophylaxis [] Lovenox, []  Heparin, [] SCDs, [] Ambulation,  [] Eliquis, [] Xarelto  [] Coumadin   Code Status Full Code             Personally reviewed Lab Studies and Imaging     Discussed management of the case with nursing staff        Medical Decision Making:  The following items were considered in medical decision making:  Discussion of patient care with other providers  Reviewed clinical lab tests  Reviewed radiology tests  Reviewed other diagnostic tests/interventions  Independent review of radiologic images  Microbiology cultures and other micro tests reviewed      Subjective:     Chief Complaint: Foot edema erythema    Mayra Nieto is a 89 y.o. female who presents with foot edema erythema slowly improving fever chills no  Or  ondansetron, 4 mg, Q6H PRN  polyethylene glycol, 17 g, Daily PRN  acetaminophen, 650 mg, Q6H PRN   Or  acetaminophen, 650 mg, Q6H PRN  guaiFENesin, 200 mg, Q4H PRN        Labs and Imaging   Vascular duplex lower extremity arteries right    Result Date: 5/24/2024    SHAKIR could not be obtained due to bandages and body habitus.   Elevated velocities within the tibial/ peroneal trunk and proximal anterior tibial artery are suggestive of 50-70% stenosis.  No other areas of significant stenotic disease or occlusive disease are identified in the right leg.     XR CHEST (2 VW)    Result Date: 5/23/2024  EXAMINATION: TWO XRAY VIEWS OF THE CHEST 5/23/2024 1:21 pm COMPARISON: 05/23/2024 HISTORY: ORDERING SYSTEM PROVIDED HISTORY: SOB TECHNOLOGIST PROVIDED HISTORY: Reason for exam:->SOB Reason for Exam: sob FINDINGS: No change in the small to moderate bilateral pleural effusions with atelectasis.  No change in the mild-to-moderate pulmonary vascular congestion.  The cardiac silhouette is stable status post aortic valve replacement.  There is no pneumothorax.     1. Unchanged congestive heart failure.     XR CHEST PORTABLE    Result Date: 5/23/2024  EXAMINATION: ONE XRAY VIEW OF THE CHEST 5/23/2024 5:53 am COMPARISON: 08/26/2023 HISTORY: ORDERING SYSTEM PROVIDED HISTORY: SOB TECHNOLOGIST PROVIDED HISTORY: Reason for exam:->SOB Reason for Exam: sob FINDINGS: There is patchy multifocal right-sided airspace opacification.  The left lung is clear.  Heart size and vascularity are stable with note made of prior TAVR.  There is a small right effusion.     Multifocal right-sided atelectasis versus pneumonia.       CBC: No results for input(s): \"WBC\", \"HGB\", \"PLT\" in the last 72 hours.  BMP:  No results for input(s): \"NA\", \"K\", \"CL\", \"CO2\", \"BUN\", \"CREATININE\", \"GLUCOSE\" in the last 72 hours.  Hepatic: No results for input(s): \"AST\", \"ALT\", \"BILITOT\", \"ALKPHOS\" in the last 72 hours.    Invalid input(s): \"ALB\"  Lipids:   Lab Results

## 2024-05-27 NOTE — DISCHARGE INSTR - COC
Continuity of Care Form    Patient Name: Mayra Nieto   :  1931  MRN:  [id number]    Admit date:  2024  Discharge date:  ***    Code Status Order: Full Code   Advance Directives:     Admitting Physician:  No admitting provider for patient encounter.  PCP: Armando Bee MD    Discharging Nurse: ***  Discharging Hospital Unit/Room#: R4D-9629/4261-01  Discharging Unit Phone Number: ***    Emergency Contact:   Extended Emergency Contact Information  Primary Emergency Contact: Irish Louis  Address: [address]  Home Phone: [phone]  Mobile Phone: [phone]  Relation: Grandchild  Secondary Emergency Contact: Elva Thornton  Address: [address] [id number] [facility]  Home Phone: [phone]  Work Phone: [phone]  Mobile Phone: [phone]  Relation: Grandchild    Past Surgical History:  Past Surgical History:   Procedure Laterality Date    AORTIC VALVE REPLACEMENT N/A 2019    TRANSCATHETER AORTIC VALVE REPLACEMENT FEMORAL APPROACH performed by Otoniel Williamson MD at [facility] CVOR    CARDIAC CATHETERIZATION  2018    HYSTERECTOMY, TOTAL ABDOMINAL (CERVIX REMOVED)      MOHS SURGERY  2022    Left arm    UPPER GASTROINTESTINAL ENDOSCOPY N/A 2022    EGD DILATION BALLOON performed by Bhavin Sim MD at [facility] ENDOSCOPY    VARICOSE VEIN SURGERY Bilateral        Immunization History:     There is no immunization history on file for this patient.    Active Problems:  Patient Active Problem List   Diagnosis Code    Aortic calcification ([facility]) I70.0    Hypercholesteremia E78.00    Nonrheumatic aortic valve stenosis I35.0    Right carotid artery occlusion I65.21    Arthritis M19.90    Essential hypertension I10    Carotid stenosis, asymptomatic, right I65.21    S/P TAVR (transcatheter aortic valve replacement) Z95.2    PAF (paroxysmal atrial fibrillation) ([facility]) I48.0     [address], [facility] [address]  Phone:  [phone]  Fax:  [phone]     / signature: Electronically signed by MELISSA Viveros LSW on 5/29/2024 at 11:27 AM      PHYSICIAN SECTION    Prognosis: Good    Condition at Discharge: Stable    Rehab Potential (if transferring to Rehab): Good    Recommended Labs or Other Treatments After Discharge: Skilled nursing, wound care, PT, OT, follow-up with podiatry Dr. Braun in 1 week, follow-up with PCP in 3 days    Physician Certification: I certify the above information and transfer of Mayra Nieto  is necessary for the continuing treatment of the diagnosis listed and that she requires Home Care for greater 30 days.     Update Admission H&P: No change in H&P    PHYSICIAN SIGNATURE:  Electronically signed by Darrell Montiel MD on 5/27/24 at 12:29 PM EDT

## 2024-05-27 NOTE — CARE COORDINATION
Discharge order noted. Chart reviewed. Plan is to return home with granddaughter and [facility]. Finished IV Abx. Notified [facility]. No additional discharge needs identified at this time.    Electronically signed by Diane Chavira RN MSN on 5/27/2024 at 12:45 PM

## 2024-05-27 NOTE — PROGRESS NOTES
Discharge instructions and medications reviewed with patient.  Pt. Verbalized understanding. Denies questions.  Discontinued IV without complications.  Pt. tolerated well. Pt requested if RN can go over discharge paperwork with grand daughter. Waiting on grand daughter to come in to review discharge instructions with her as well.

## 2024-05-27 NOTE — PLAN OF CARE
Problem: Discharge Planning  Goal: Discharge to home or other facility with appropriate resources  5/27/2024 1337 by David Kraft RN  Outcome: Completed  5/27/2024 0927 by David Kraft RN  Outcome: Progressing  5/27/2024 0053 by Effie Sosa RN  Outcome: Progressing     Problem: Skin/Tissue Integrity  Goal: Absence of new skin breakdown  Description: 1.  Monitor for areas of redness and/or skin breakdown  2.  Assess vascular access sites hourly  3.  Every 4-6 hours minimum:  Change oxygen saturation probe site  4.  Every 4-6 hours:  If on nasal continuous positive airway pressure, respiratory therapy assess nares and determine need for appliance change or resting period.  5/27/2024 1337 by David Kraft RN  Outcome: Completed  5/27/2024 0927 by David Kraft RN  Outcome: Progressing  5/27/2024 0053 by Effie Sosa RN  Outcome: Progressing     Problem: Safety - Adult  Goal: Free from fall injury  5/27/2024 1337 by David Kraft RN  Outcome: Completed  Flowsheets (Taken 5/27/2024 0934)  Free From Fall Injury:   Instruct family/caregiver on patient safety   Based on caregiver fall risk screen, instruct family/caregiver to ask for assistance with transferring infant if caregiver noted to have fall risk factors  5/27/2024 0927 by David Kraft RN  Outcome: Progressing  5/27/2024 0053 by Effie Sosa RN  Outcome: Progressing     Problem: ABCDS Injury Assessment  Goal: Absence of physical injury  5/27/2024 1337 by David Kraft RN  Outcome: Completed  Flowsheets (Taken 5/27/2024 0934)  Absence of Physical Injury: Implement safety measures based on patient assessment  5/27/2024 0927 by David Kraft RN  Outcome: Progressing  5/27/2024 0053 by Effie Sosa RN  Outcome: Progressing     Problem: Neurosensory - Adult  Goal: Achieves stable or improved neurological status  5/27/2024 1337 by David Kraft RN  Outcome: Completed  5/27/2024 0927 by David Kraft

## 2024-05-28 ENCOUNTER — TELEPHONE (OUTPATIENT)
Dept: FAMILY MEDICINE CLINIC | Age: 89
End: 2024-05-28

## 2024-05-28 RX ORDER — POTASSIUM CHLORIDE 750 MG/1
10 TABLET, EXTENDED RELEASE ORAL DAILY
Qty: 90 TABLET | OUTPATIENT
Start: 2024-05-28

## 2024-05-28 NOTE — CARE COORDINATION
SW received phone call back from [facility] stating that they are out of network with her insurance.     Respectfully submitted,    Fabiana TORRES, JESSIS  [facility]   [phone]    Electronically signed by MELISSA Viveros, LSW on 5/28/2024 at 2:31 PM

## 2024-05-28 NOTE — CARE COORDINATION
SW received phone call from [facility] stating that they are not in network with patient's insurance.     SW reached out to the following providers today:    [facility]-Out of network this year.   [facility]  [facility]    Respectfully submitted,    Fabiana TORRES, NELY  [facility]   [phone]    Electronically signed by MELISSA Viveros, LSW on 5/28/2024 at 12:23 PM

## 2024-05-28 NOTE — TELEPHONE ENCOUNTER
Care Transitions Initial Follow Up Call    Outreach made within 2 business days of discharge: Yes    Patient: Mayra Nieto Patient : 1931   MRN: [id number]  Reason for Admission: There are no discharge diagnoses documented for the most recent discharge.  Discharge Date: 24       Spoke with: PT YANCY LANDRY    Discharge department/facility: [facility]    TCM Interactive Patient Contact:  Was patient able to fill all prescriptions: Yes  Was patient instructed to bring all medications to the follow-up visit: Yes  Is patient taking all medications as directed in the discharge summary? Yes  Does patient understand their discharge instructions: Yes  Does patient have questions or concerns that need addressed prior to 7-14 day follow up office visit: no    Scheduled appointment with PCP within 7-14 days    Follow Up  Future Appointments   Date Time Provider Department Center   2024  3:00 PM Armando Bee MD [facility] [place]   2024  9:45 AM Elgin Wells MD [facility] Cardio [facility]   2024 10:30 AM [facility]ina [facility]     SPOKE TO PT'S VINCENTUGHTERYANCY. SCHEDULED HOSPITAL FOLLOW UP WITH DR BEE' ON 2024. Elva Mc MA

## 2024-05-29 NOTE — CARE COORDINATION
SW reached out to the following providers to check on referrals sent yesterday:    [facility]-[phone]. SW spoke to [facility] in intake and they can accept. They will pull home care orders from [facility] and will reach out to patient and family.    Respectfully submitted,    Fabiana TORRES, NELY  [facility]   [phone]    Electronically signed by MELISSA Viveros, DORCAS on 5/29/2024 at 11:26 AM

## 2024-05-29 NOTE — CARE COORDINATION
SW reached out to charge nurse for 4N assistance with getting a nurse signature on the SHANTA for home care. The discharge nurse is not working today.     Respectfully submitted,    Fabiana TORRES, NELY  [facility]   [phone]    Electronically signed by MELISSA Viveros, DORCAS on 5/29/2024 at 11:30 AM

## 2024-05-30 ENCOUNTER — TELEPHONE (OUTPATIENT)
Dept: FAMILY MEDICINE CLINIC | Age: 89
End: 2024-05-30

## 2024-05-30 NOTE — TELEPHONE ENCOUNTER
Dipika from [facility] was calling because they are taking over her care and the patient has a wound on her left foot that is pretty bad.     Dipika would like to put Medi honey on it instead of what the current nurse is doing with it.    Can we please give her a call

## 2024-05-30 NOTE — PROGRESS NOTES
Physician Progress Note      PATIENT:               HARIKA SMALL  CSN #:                  [id number]  :                       1931  ADMIT DATE:       2024 1:24 PM  DISCH DATE:        2024 3:59 PM  RESPONDING  PROVIDER #:        Darrell Montiel MD          QUERY TEXT:    Pt admitted with Cellulitis of right lower extremity. Pt noted to have   elevated WBC, HR, RR. If possible, please document in the progress notes and   discharge summary if you are evaluating and /or treating any of the following:    The medical record reflects the following:  Risk Factors: Cellulitis of right lower extremity, Abscess, right ankle.  Clinical Indicators: Discharge Summary-Bilateral lower extremity cellulitis   likely due to strep, IV fluid IV Ancef,  added doxycycline culture noted positive for Sorathia, changed antibiotics to   ciprofloxacin along with Doxy  WBC-14.6,  HR-110,  RR-30, 23  Treatment: IVF, IV Ancef, ciprofloxacin, Serial lab      Thank you,  Duyen Mcrae, [facility] CDS  Options provided:  -- Sepsis due to Cellulitis of right lower extremity, present on admission  -- Cellulitis of right lower extremity without Sepsis  -- Other - I will add my own diagnosis  -- Disagree - Not applicable / Not valid  -- Disagree - Clinically unable to determine / Unknown  -- Refer to Clinical Documentation Reviewer    PROVIDER RESPONSE TEXT:    This patient has sepsis due to Cellulitis of right lower extremity which was   present on admission.    Query created by: Duyen Mcrae on 2024 3:03 AM      Electronically signed by:  Darrell Montiel MD 2024 9:32 AM

## 2024-06-05 NOTE — PROGRESS NOTES
Physician Progress Note      PATIENT:               HARIKA SMALL  CSN #:                  [id number]  :                       1931  ADMIT DATE:       2024 1:24 PM  DISCH DATE:        2024 3:59 PM  RESPONDING  PROVIDER #:        Alexander Rao DPM          QUERY TEXT:    Patient admitted with sepsis. Per Podiatry consult  documentation of I&D.    To accurately reflect the procedure performed please further specify the depth   of tissue incised and drained:    The medical record reflects the following:  Risk Factors: Abscess right foot, lateral bullae with cellulitis  Clinical Indicators: Podiatry consult -Abscess RIGHT foot, lateral bullae   with cellulitis  Upon Verbal Consent, Simple Incision and Drainage performed with evacuation of   2 cc's of purulence, Swab Culture obtained. Area painted with Iodine and   dressing applied  Treatment: IV fluid IV Ancef, doxycycline, Incision and Drainage      Thank you,  Duyen Mcrae, [facility] CDS  Options provided:  -- Skin only  -- Subcutaneous tissue  -- Fascia  -- Muscle  -- Other - I will add my own diagnosis  -- Disagree - Not applicable / Not valid  -- Disagree - Clinically unable to determine / Unknown  -- Refer to Clinical Documentation Reviewer    PROVIDER RESPONSE TEXT:    Addendum to 2024 procedure note: The depth of the drainage to right foot   was down to and including subcutaneous tissue.    Query created by: Duyen Mcrae on 2024 6:14 AM      Electronically signed by:  Alexander Rao DPM 2024 8:36 AM

## 2024-06-07 ENCOUNTER — TELEMEDICINE (OUTPATIENT)
Dept: FAMILY MEDICINE CLINIC | Age: 89
End: 2024-06-07

## 2024-06-07 DIAGNOSIS — L03.115 CELLULITIS OF RIGHT LEG: ICD-10-CM

## 2024-06-07 DIAGNOSIS — Z09 HOSPITAL DISCHARGE FOLLOW-UP: Primary | ICD-10-CM

## 2024-06-07 DIAGNOSIS — E44.1 MILD MALNUTRITION (HCC): ICD-10-CM

## 2024-06-07 DIAGNOSIS — L97.509 ULCER OF FOOT, UNSPECIFIED LATERALITY, UNSPECIFIED ULCER STAGE (HCC): ICD-10-CM

## 2024-06-07 DIAGNOSIS — E87.6 HYPOKALEMIA: ICD-10-CM

## 2024-06-07 DIAGNOSIS — I48.0 PAF (PAROXYSMAL ATRIAL FIBRILLATION) (HCC): ICD-10-CM

## 2024-06-07 DIAGNOSIS — Z51.81 THERAPEUTIC DRUG MONITORING: ICD-10-CM

## 2024-06-07 DIAGNOSIS — D64.9 MILD ANEMIA: ICD-10-CM

## 2024-06-07 DIAGNOSIS — K21.9 GASTROESOPHAGEAL REFLUX DISEASE, UNSPECIFIED WHETHER ESOPHAGITIS PRESENT: ICD-10-CM

## 2024-06-07 NOTE — PROGRESS NOTES
From HPI:\"Patient has been having decreased appetite nausea along with a burning sensation in the bilateral lower extremities denies any leg pain otherwise denies any leg swelling as well patient was found to have signs of bilateral lower extremity cellulitis. Denies any orthopnea PND and chest pain to be admitted to the hospital \"     Bilateral lower extremity cellulitis likely due to strep, IV fluid IV Ancef, added doxycycline culture noted positive for Sorathia, changed antibiotics to ciprofloxacin along with Doxy.  Overall continue to improve discussed with podiatry, no further plan for intervention per vascular surgery okay to discharge her complete doxycycline 1 more day and ciprofloxacin for 5 more days follow-up with podiatry as outpatient called and updated JONG Stevenson.  History of chronic diastolic heart failure no exacerbation at this time restarted p.o. Lasix overall feeling stable  A-fib continue to be on amiodarone  GERD PPI  Thrombocytopenia, mild   Dysphagia, significantly improved.  Tolerating p.o. intake very well  Hypoxia, likely atelectatic changes on room air currently.  Hypokalemia replacing with p.o. supplement 50 MEQ total and restart p.o. potassium on discharge  Peripheral vascular disease vascular surgery consulted per podiatry and no further recommendation at this time    Post-Discharge Transitional Care  Follow Up      Mayra Nieto   YOB: 1931    Date of Office Visit:  6/7/2024  Date of Hospital Admission: 5/19/24  Date of Hospital Discharge: 5/27/24  Risk of hospital readmission (high >=14%. Medium >=10%) :Readmission Risk Score: 14.3      Care management risk score Rising risk (score 2-5) and Complex Care (Scores >=6): No Risk Score On File     Non face to face  following discharge, date last encounter closed (first attempt may have been earlier): 05/28/2024    Call initiated 2 business days of discharge: Yes    ASSESSMENT/PLAN:   Below is the assessment and plan

## 2024-06-07 NOTE — PATIENT INSTRUCTIONS

## 2024-06-11 ENCOUNTER — TELEPHONE (OUTPATIENT)
Dept: FAMILY MEDICINE CLINIC | Age: 89
End: 2024-06-11

## 2024-06-11 NOTE — TELEPHONE ENCOUNTER
Sylvia from [facility] was calling because the patients granddaughter told her we were going to fax over lab orders.    They had not received the lab orders yet. Can we please fax them over     Fax Number

## 2024-06-14 NOTE — TELEPHONE ENCOUNTER
LAKESHIA Adams from [facility] was calling to let us know they were unable to get these labs the last two days. So the Family will be contacting us to bring her in.

## 2024-06-24 RX ORDER — FUROSEMIDE 20 MG/1
20 TABLET ORAL DAILY
Qty: 30 TABLET | Refills: 0 | Status: SHIPPED | OUTPATIENT
Start: 2024-06-24

## 2024-06-24 RX ORDER — PANTOPRAZOLE SODIUM 20 MG/1
20 TABLET, DELAYED RELEASE ORAL DAILY
Qty: 30 TABLET | Refills: 0 | Status: SHIPPED | OUTPATIENT
Start: 2024-06-24

## 2024-07-05 RX ORDER — AMIODARONE HYDROCHLORIDE 200 MG/1
200 TABLET ORAL DAILY
Qty: 30 TABLET | Refills: 0 | Status: SHIPPED | OUTPATIENT
Start: 2024-07-05

## 2024-07-16 ENCOUNTER — TELEPHONE (OUTPATIENT)
Dept: FAMILY MEDICINE CLINIC | Age: 89
End: 2024-07-16

## 2024-07-16 NOTE — TELEPHONE ENCOUNTER
Marie with [facility] is calling     They can not bill Medicare if  is not Kaylene enrolled .    They are going to resend to [facility].

## 2024-07-17 NOTE — PROGRESS NOTES
[facility]  H+P  Consult  OP Visit  FU Visit   CC/HPI   CC Followup visit for cardiac conditions detailed in assessment and plan below.   Intervention TAVR   General Doing well.  No new concerns.     Cardiac Sx -CP, -SOB, -dizziness, -syncope, -edema, -orthopnea, -pnd, -fatigue   HISTORY/ALLERGY/ROS   M/S/S/F Hx Reviewed in Epic and updated as appropriate   ALLERG Bactrim [sulfamethoxazole-trimethoprim] and Macrobid [nitrofurantoin]   ROS Full ROS obtained and negative except as mentioned in HPI   MEDICATIONS   Cardiac medications reviewed in Epic during visit with patient.   PHYSICAL EXAM   Vitals /60 (Site: Right Upper Arm, Position: Sitting, Cuff Size: Medium Adult)   Pulse 77   Wt 37.9 kg (83 lb 9.6 oz)   SpO2 95%   BMI 15.80 kg/m²    Gen Alert, coop, no distress Heart  RRR, 1/6   Lung CTA-B, unlabored, no DTP Extrem Edema -Grade 0 (0mm)      COMPLIANCE   Discussed and counseled on diet, exercise, weight loss, smoking, alcohol, drugs.  All questions answered.   CODING   SCI (46152) - 30-39 mins spent reviewing hx/tests/consults, performing exam, counseling/educating, ordering meds/tests/procedures, referring/communicating w/PCP/consultants, documenting in EMR, interpreting results, communicating medical information and plan with family.   SCRIBE ATTESTATION   Nurse I, Milagro Kraft, RN, am scribing for and in the presence of Elgin Wells MD. 7/17/2024 11:29 AM EDT   Doctor Milagro Kraft is working as scribe for and in presence of me and may have prepopulated components of note with my historical intellectual property (IP) under my supervision.  Any additions to this IP performed in my presence and at my direction. Content and accuracy of this note reviewed by me (Elgin Wells MD).   NEW MEDICATIONS   Pt verbalizes understanding of the need for treatment and education provided at today's visit.  Additional education provided in AVS.   ASSESSMENT AND PLAN   *AS/AI    Date EF Detail   Sx

## 2024-07-25 ENCOUNTER — OFFICE VISIT (OUTPATIENT)
Dept: CARDIOLOGY CLINIC | Age: 89
End: 2024-07-25
Payer: MEDICARE

## 2024-07-25 ENCOUNTER — HOSPITAL ENCOUNTER (OUTPATIENT)
Age: 89
Discharge: HOME | End: 2024-07-27
Payer: MEDICARE

## 2024-07-25 VITALS
BODY MASS INDEX: 17.18 KG/M2 | DIASTOLIC BLOOD PRESSURE: 64 MMHG | HEIGHT: 61 IN | WEIGHT: 91 LBS | SYSTOLIC BLOOD PRESSURE: 152 MMHG

## 2024-07-25 VITALS
HEART RATE: 77 BPM | SYSTOLIC BLOOD PRESSURE: 122 MMHG | BODY MASS INDEX: 15.8 KG/M2 | DIASTOLIC BLOOD PRESSURE: 60 MMHG | OXYGEN SATURATION: 95 % | WEIGHT: 83.6 LBS

## 2024-07-25 DIAGNOSIS — I10 ESSENTIAL HYPERTENSION: ICD-10-CM

## 2024-07-25 DIAGNOSIS — I35.0 AORTIC VALVE STENOSIS, NONRHEUMATIC: ICD-10-CM

## 2024-07-25 DIAGNOSIS — I35.1 NONRHEUMATIC AORTIC VALVE INSUFFICIENCY: ICD-10-CM

## 2024-07-25 DIAGNOSIS — Z95.2 S/P TAVR (TRANSCATHETER AORTIC VALVE REPLACEMENT): ICD-10-CM

## 2024-07-25 DIAGNOSIS — I47.19 ATRIAL TACHYCARDIA, PAROXYSMAL (HCC): ICD-10-CM

## 2024-07-25 DIAGNOSIS — I48.0 PAF (PAROXYSMAL ATRIAL FIBRILLATION) (HCC): ICD-10-CM

## 2024-07-25 DIAGNOSIS — Z95.2 HISTORY OF TRANSCATHETER AORTIC VALVE REPLACEMENT (TAVR): ICD-10-CM

## 2024-07-25 DIAGNOSIS — I35.0 AORTIC VALVE STENOSIS, NONRHEUMATIC: Primary | ICD-10-CM

## 2024-07-25 LAB
ECHO AO ASC DIAM: 2.6 CM
ECHO AO ASCENDING AORTA INDEX: 1.93 CM/M2
ECHO AO ROOT DIAM: 2.5 CM
ECHO AO ROOT INDEX: 1.85 CM/M2
ECHO AR MAX VEL PISA: 3.3 M/S
ECHO AV ACCELERATION TIME: 83 MS
ECHO AV AREA PEAK VELOCITY: 2.5 CM2
ECHO AV AREA VTI: 2.8 CM2
ECHO AV AREA/BSA PEAK VELOCITY: 1.9 CM2/M2
ECHO AV AREA/BSA VTI: 2.1 CM2/M2
ECHO AV MEAN GRADIENT: 11 MMHG
ECHO AV MEAN VELOCITY: 1.5 M/S
ECHO AV PEAK GRADIENT: 20 MMHG
ECHO AV PEAK VELOCITY: 2.3 M/S
ECHO AV REGURGITANT PHT: 276 MS
ECHO AV VELOCITY RATIO: 0.57
ECHO AV VTI: 49.1 CM
ECHO BSA: 1.33 M2
ECHO EST RA PRESSURE: 3 MMHG
ECHO IVC PROX: 1.9 CM
ECHO LA AREA 2C: 14.5 CM2
ECHO LA AREA 4C: 17.9 CM2
ECHO LA MAJOR AXIS: 5.3 CM
ECHO LA MINOR AXIS: 5.8 CM
ECHO LA VOL BP: 39 ML (ref 22–52)
ECHO LA VOL MOD A2C: 30 ML (ref 22–52)
ECHO LA VOL MOD A4C: 48 ML (ref 22–52)
ECHO LA VOL/BSA BIPLANE: 29 ML/M2 (ref 16–34)
ECHO LA VOLUME INDEX MOD A2C: 22 ML/M2 (ref 16–34)
ECHO LA VOLUME INDEX MOD A4C: 36 ML/M2 (ref 16–34)
ECHO LV E' LATERAL VELOCITY: 5 CM/S
ECHO LV E' SEPTAL VELOCITY: 4 CM/S
ECHO LV EDV A2C: 44 ML
ECHO LV EDV A4C: 30 ML
ECHO LV EDV INDEX A4C: 22 ML/M2
ECHO LV EDV NDEX A2C: 33 ML/M2
ECHO LV EJECTION FRACTION A2C: 68 %
ECHO LV EJECTION FRACTION A4C: 70 %
ECHO LV ESV A2C: 14 ML
ECHO LV ESV A4C: 9 ML
ECHO LV ESV INDEX A2C: 10 ML/M2
ECHO LV ESV INDEX A4C: 7 ML/M2
ECHO LV FRACTIONAL SHORTENING: 32 % (ref 28–44)
ECHO LV INTERNAL DIMENSION DIASTOLE INDEX: 2.52 CM/M2
ECHO LV INTERNAL DIMENSION DIASTOLIC: 3.4 CM (ref 3.9–5.3)
ECHO LV INTERNAL DIMENSION SYSTOLIC INDEX: 1.7 CM/M2
ECHO LV INTERNAL DIMENSION SYSTOLIC: 2.3 CM
ECHO LV IVSD: 1.2 CM (ref 0.6–0.9)
ECHO LV MASS 2D: 130.2 G (ref 67–162)
ECHO LV MASS INDEX 2D: 96.5 G/M2 (ref 43–95)
ECHO LV POSTERIOR WALL DIASTOLIC: 1.2 CM (ref 0.6–0.9)
ECHO LV RELATIVE WALL THICKNESS RATIO: 0.71
ECHO LVOT AREA: 4.2 CM2
ECHO LVOT AV VTI INDEX: 0.68
ECHO LVOT DIAM: 2.3 CM
ECHO LVOT MEAN GRADIENT: 4 MMHG
ECHO LVOT PEAK GRADIENT: 7 MMHG
ECHO LVOT PEAK VELOCITY: 1.3 M/S
ECHO LVOT STROKE VOLUME INDEX: 103 ML/M2
ECHO LVOT SV: 139.1 ML
ECHO LVOT VTI: 33.5 CM
ECHO MV A VELOCITY: 1.47 M/S
ECHO MV AREA VTI: 3.8 CM2
ECHO MV E VELOCITY: 0.69 M/S
ECHO MV E/A RATIO: 0.47
ECHO MV E/E' LATERAL: 13.8
ECHO MV E/E' RATIO (AVERAGED): 15.53
ECHO MV E/E' SEPTAL: 17.25
ECHO MV LVOT VTI INDEX: 1.1
ECHO MV MAX VELOCITY: 1.3 M/S
ECHO MV MEAN GRADIENT: 2 MMHG
ECHO MV MEAN VELOCITY: 0.7 M/S
ECHO MV PEAK GRADIENT: 7 MMHG
ECHO MV VTI: 36.9 CM
ECHO PV MAX VELOCITY: 1 M/S
ECHO PV PEAK GRADIENT: 4 MMHG
ECHO RA AREA 4C: 13 CM2
ECHO RA END SYSTOLIC VOLUME APICAL 4 CHAMBER INDEX BSA: 19 ML/M2
ECHO RA VOLUME: 25 ML
ECHO RIGHT VENTRICULAR SYSTOLIC PRESSURE (RVSP): 26 MMHG
ECHO RV BASAL DIMENSION: 3.2 CM
ECHO RV FREE WALL PEAK S': 17 CM/S
ECHO RV LONGITUDINAL DIMENSION: 5.4 CM
ECHO RV MID DIMENSION: 2.4 CM
ECHO RV TAPSE: 2.3 CM (ref 1.7–?)
ECHO TV REGURGITANT MAX VELOCITY: 2.39 M/S
ECHO TV REGURGITANT PEAK GRADIENT: 23 MMHG

## 2024-07-25 PROCEDURE — 93306 TTE W/DOPPLER COMPLETE: CPT

## 2024-07-25 PROCEDURE — 99214 OFFICE O/P EST MOD 30 MIN: CPT | Performed by: INTERNAL MEDICINE

## 2024-07-25 PROCEDURE — 1123F ACP DISCUSS/DSCN MKR DOCD: CPT | Performed by: INTERNAL MEDICINE

## 2024-07-31 ENCOUNTER — TELEPHONE (OUTPATIENT)
Dept: FAMILY MEDICINE CLINIC | Age: 89
End: 2024-07-31

## 2024-07-31 RX ORDER — FUROSEMIDE 20 MG/1
20 TABLET ORAL DAILY
Qty: 30 TABLET | Refills: 0 | Status: SHIPPED | OUTPATIENT
Start: 2024-07-31

## 2024-07-31 RX ORDER — PANTOPRAZOLE SODIUM 20 MG/1
20 TABLET, DELAYED RELEASE ORAL DAILY
Qty: 30 TABLET | Refills: 0 | Status: SHIPPED | OUTPATIENT
Start: 2024-07-31

## 2024-07-31 RX ORDER — POTASSIUM CHLORIDE 750 MG/1
10 TABLET, EXTENDED RELEASE ORAL DAILY
Qty: 30 TABLET | Refills: 0 | Status: SHIPPED | OUTPATIENT
Start: 2024-07-31

## 2024-09-03 RX ORDER — POTASSIUM CHLORIDE 750 MG/1
10 TABLET, EXTENDED RELEASE ORAL DAILY
Qty: 30 TABLET | Refills: 0 | Status: SHIPPED | OUTPATIENT
Start: 2024-09-03

## 2024-09-04 ENCOUNTER — TELEPHONE (OUTPATIENT)
Dept: FAMILY MEDICINE CLINIC | Age: 89
End: 2024-09-04

## 2024-09-04 RX ORDER — FUROSEMIDE 20 MG
20 TABLET ORAL DAILY
Qty: 30 TABLET | Refills: 0 | Status: SHIPPED | OUTPATIENT
Start: 2024-09-04

## 2024-09-04 RX ORDER — PANTOPRAZOLE SODIUM 20 MG/1
20 TABLET, DELAYED RELEASE ORAL DAILY
Qty: 30 TABLET | Refills: 0 | Status: SHIPPED | OUTPATIENT
Start: 2024-09-04

## 2024-09-17 ENCOUNTER — OFFICE VISIT (OUTPATIENT)
Dept: FAMILY MEDICINE CLINIC | Age: 89
End: 2024-09-17
Payer: MEDICARE

## 2024-09-17 VITALS
RESPIRATION RATE: 18 BRPM | HEIGHT: 61 IN | SYSTOLIC BLOOD PRESSURE: 132 MMHG | BODY MASS INDEX: 15.67 KG/M2 | HEART RATE: 76 BPM | WEIGHT: 83 LBS | OXYGEN SATURATION: 98 % | DIASTOLIC BLOOD PRESSURE: 70 MMHG

## 2024-09-17 DIAGNOSIS — M79.605 PAIN IN BOTH LOWER EXTREMITIES: ICD-10-CM

## 2024-09-17 DIAGNOSIS — M79.604 PAIN IN BOTH LOWER EXTREMITIES: ICD-10-CM

## 2024-09-17 DIAGNOSIS — D69.9 BLEEDS EASILY (HCC): Primary | ICD-10-CM

## 2024-09-17 DIAGNOSIS — R30.0 DYSURIA: ICD-10-CM

## 2024-09-17 DIAGNOSIS — Z79.899 HIGH RISK MEDICATION USE: ICD-10-CM

## 2024-09-17 LAB
ANION GAP SERPL CALCULATED.3IONS-SCNC: 12 MMOL/L (ref 3–16)
BILIRUBIN, POC: ABNORMAL
BLOOD URINE, POC: ABNORMAL
BUN SERPL-MCNC: 19 MG/DL (ref 7–20)
CALCIUM SERPL-MCNC: 9.5 MG/DL (ref 8.3–10.6)
CHLORIDE SERPL-SCNC: 99 MMOL/L (ref 99–110)
CLARITY, POC: ABNORMAL
CO2 SERPL-SCNC: 24 MMOL/L (ref 21–32)
COLOR, POC: ABNORMAL
CREAT SERPL-MCNC: 1.4 MG/DL (ref 0.6–1.2)
GFR SERPLBLD CREATININE-BSD FMLA CKD-EPI: 35 ML/MIN/{1.73_M2}
GLUCOSE SERPL-MCNC: 83 MG/DL (ref 70–99)
GLUCOSE URINE, POC: ABNORMAL MG/DL
KETONES, POC: ABNORMAL MG/DL
LEUKOCYTE EST, POC: ABNORMAL
NITRITE, POC: ABNORMAL
PH, POC: 6.5
POTASSIUM SERPL-SCNC: 5 MMOL/L (ref 3.5–5.1)
PROTEIN, POC: 30 MG/DL
SODIUM SERPL-SCNC: 135 MMOL/L (ref 136–145)
SPECIFIC GRAVITY, POC: 1.02
UROBILINOGEN, POC: ABNORMAL MG/DL

## 2024-09-17 PROCEDURE — 81002 URINALYSIS NONAUTO W/O SCOPE: CPT | Performed by: NURSE PRACTITIONER

## 2024-09-17 PROCEDURE — 99214 OFFICE O/P EST MOD 30 MIN: CPT | Performed by: NURSE PRACTITIONER

## 2024-09-17 PROCEDURE — 36415 COLL VENOUS BLD VENIPUNCTURE: CPT | Performed by: NURSE PRACTITIONER

## 2024-09-17 PROCEDURE — 1123F ACP DISCUSS/DSCN MKR DOCD: CPT | Performed by: NURSE PRACTITIONER

## 2024-09-17 RX ORDER — LIDOCAINE 50 MG/G
1 PATCH TOPICAL DAILY
Qty: 10 PATCH | Refills: 0 | Status: SHIPPED | OUTPATIENT
Start: 2024-09-17 | End: 2024-09-27

## 2024-09-17 SDOH — ECONOMIC STABILITY: FOOD INSECURITY: WITHIN THE PAST 12 MONTHS, THE FOOD YOU BOUGHT JUST DIDN'T LAST AND YOU DIDN'T HAVE MONEY TO GET MORE.: NEVER TRUE

## 2024-09-17 SDOH — ECONOMIC STABILITY: INCOME INSECURITY: HOW HARD IS IT FOR YOU TO PAY FOR THE VERY BASICS LIKE FOOD, HOUSING, MEDICAL CARE, AND HEATING?: NOT HARD AT ALL

## 2024-09-17 SDOH — ECONOMIC STABILITY: INCOME INSECURITY: IN THE LAST 12 MONTHS, WAS THERE A TIME WHEN YOU WERE NOT ABLE TO PAY THE MORTGAGE OR RENT ON TIME?: NO

## 2024-09-17 SDOH — ECONOMIC STABILITY: FOOD INSECURITY: WITHIN THE PAST 12 MONTHS, YOU WORRIED THAT YOUR FOOD WOULD RUN OUT BEFORE YOU GOT MONEY TO BUY MORE.: NEVER TRUE

## 2024-09-17 ASSESSMENT — PATIENT HEALTH QUESTIONNAIRE - PHQ9
SUM OF ALL RESPONSES TO PHQ QUESTIONS 1-9: 2
1. LITTLE INTEREST OR PLEASURE IN DOING THINGS: NOT AT ALL
SUM OF ALL RESPONSES TO PHQ9 QUESTIONS 1 & 2: 2
2. FEELING DOWN, DEPRESSED OR HOPELESS: MORE THAN HALF THE DAYS

## 2024-09-18 ENCOUNTER — TELEPHONE (OUTPATIENT)
Dept: ADMINISTRATIVE | Age: 89
End: 2024-09-18

## 2024-09-18 LAB
BACTERIA UR CULT: NORMAL
BASOPHILS # BLD: 0.1 K/UL (ref 0–0.2)
BASOPHILS NFR BLD: 1.3 %
DEPRECATED RDW RBC AUTO: 15.8 % (ref 12.4–15.4)
EOSINOPHIL # BLD: 0.2 K/UL (ref 0–0.6)
EOSINOPHIL NFR BLD: 3.3 %
HCT VFR BLD AUTO: 40.4 % (ref 36–48)
HGB BLD-MCNC: 13.1 G/DL (ref 12–16)
LYMPHOCYTES # BLD: 1.2 K/UL (ref 1–5.1)
LYMPHOCYTES NFR BLD: 20.9 %
MCH RBC QN AUTO: 27 PG (ref 26–34)
MCHC RBC AUTO-ENTMCNC: 32.5 G/DL (ref 31–36)
MCV RBC AUTO: 83.2 FL (ref 80–100)
MONOCYTES # BLD: 0.7 K/UL (ref 0–1.3)
MONOCYTES NFR BLD: 12.3 %
NEUTROPHILS # BLD: 3.7 K/UL (ref 1.7–7.7)
NEUTROPHILS NFR BLD: 62.2 %
PLATELET # BLD AUTO: 186 K/UL (ref 135–450)
PMV BLD AUTO: 9.1 FL (ref 5–10.5)
RBC # BLD AUTO: 4.85 M/UL (ref 4–5.2)
WBC # BLD AUTO: 5.9 K/UL (ref 4–11)

## 2024-09-19 ENCOUNTER — LAB (OUTPATIENT)
Dept: FAMILY MEDICINE CLINIC | Age: 89
End: 2024-09-19

## 2024-09-19 DIAGNOSIS — E86.0 DEHYDRATION, SEVERE: Primary | ICD-10-CM

## 2024-09-19 DIAGNOSIS — E86.0 DEHYDRATION, MODERATE: ICD-10-CM

## 2024-09-20 RX ORDER — 0.9 % SODIUM CHLORIDE 0.9 %
500 INTRAVENOUS SOLUTION INTRAVENOUS ONCE
Status: SHIPPED | OUTPATIENT
Start: 2024-09-19

## 2024-09-30 RX ORDER — FUROSEMIDE 20 MG
20 TABLET ORAL DAILY
Qty: 30 TABLET | Refills: 0 | Status: SHIPPED | OUTPATIENT
Start: 2024-09-30

## 2024-09-30 RX ORDER — PANTOPRAZOLE SODIUM 20 MG/1
20 TABLET, DELAYED RELEASE ORAL DAILY
Qty: 30 TABLET | Refills: 0 | Status: SHIPPED | OUTPATIENT
Start: 2024-09-30

## 2024-09-30 RX ORDER — POTASSIUM CHLORIDE 750 MG/1
10 TABLET, EXTENDED RELEASE ORAL DAILY
Qty: 30 TABLET | Refills: 0 | Status: SHIPPED | OUTPATIENT
Start: 2024-09-30

## 2024-10-10 RX ORDER — AMIODARONE HYDROCHLORIDE 200 MG/1
200 TABLET ORAL DAILY
Qty: 30 TABLET | Refills: 0 | Status: SHIPPED | OUTPATIENT
Start: 2024-10-10

## 2024-10-10 NOTE — TELEPHONE ENCOUNTER
Called pt granddaughter regarding PA on Lidocaine patches. They purchase these OTC, can disregard.    Pt granddaughter asked if we could send a new refill on her amiodarone. Pended to correct pharmacy.    Last visit 09/17   Next visit none

## 2024-10-14 ENCOUNTER — LAB (OUTPATIENT)
Dept: FAMILY MEDICINE CLINIC | Age: 89
End: 2024-10-14
Payer: MEDICARE

## 2024-10-14 DIAGNOSIS — E43 SEVERE MALNUTRITION (HCC): Primary | ICD-10-CM

## 2024-10-14 DIAGNOSIS — D64.9 MILD ANEMIA: ICD-10-CM

## 2024-10-14 DIAGNOSIS — N28.9 DECREASED RENAL FUNCTION: ICD-10-CM

## 2024-10-14 DIAGNOSIS — R41.0 ACUTE CONFUSION: ICD-10-CM

## 2024-10-14 DIAGNOSIS — E87.6 HYPOKALEMIA: ICD-10-CM

## 2024-10-14 DIAGNOSIS — R82.998 LEUKOCYTES IN URINE: ICD-10-CM

## 2024-10-14 LAB
BILIRUBIN, POC: ABNORMAL
BLOOD URINE, POC: ABNORMAL
CLARITY, POC: ABNORMAL
COLOR, POC: ABNORMAL
GLUCOSE URINE, POC: ABNORMAL MG/DL
KETONES, POC: ABNORMAL MG/DL
LEUKOCYTE EST, POC: ABNORMAL
NITRITE, POC: ABNORMAL
PH, POC: 6
PROTEIN, POC: ABNORMAL MG/DL
SPECIFIC GRAVITY, POC: 1.02
UROBILINOGEN, POC: 0.2 MG/DL

## 2024-10-14 PROCEDURE — 81002 URINALYSIS NONAUTO W/O SCOPE: CPT | Performed by: NURSE PRACTITIONER

## 2024-10-14 PROCEDURE — 36415 COLL VENOUS BLD VENIPUNCTURE: CPT | Performed by: NURSE PRACTITIONER

## 2024-10-14 NOTE — PROGRESS NOTES
NONFASTING LABS DRAWN R HAND AND POCT UA PERFORMED, ABNORMAL. URINE CULTURE SENT TO THE LAB. PT'S GRANDDAUGHTER INFORMED. [facility]     10/14/2024  2:12 PM - Elva Thornton MA    Component Value Flag Ref Range Units Status   Color, UA        Clarity, UA        Glucose, UA POC NEG   mg/dL Final   Bilirubin, UA NEG    Final   Ketones, UA NEG   mg/dL Final   Spec Grav, UA 1.020    Final   Blood, UA POC TRACE, INTACT    Final   pH, UA 6.0    Final   Protein, UA POC TRACE   mg/dL Final   Urobilinogen, UA 0.2   mg/dL Final   Leukocytes, UA SMALL    Final   Nitrite, UA NEG    Final

## 2024-10-15 LAB
ALBUMIN SERPL-MCNC: 4.3 G/DL (ref 3.4–5)
ALBUMIN/GLOB SERPL: 1.8 {RATIO} (ref 1.1–2.2)
ALP SERPL-CCNC: 76 U/L (ref 40–129)
ALT SERPL-CCNC: 13 U/L (ref 10–40)
ANION GAP SERPL CALCULATED.3IONS-SCNC: 14 MMOL/L (ref 3–16)
AST SERPL-CCNC: 22 U/L (ref 15–37)
BASOPHILS # BLD: 0.1 K/UL (ref 0–0.2)
BASOPHILS NFR BLD: 1.1 %
BILIRUB SERPL-MCNC: <0.2 MG/DL (ref 0–1)
BUN SERPL-MCNC: 25 MG/DL (ref 7–20)
CALCIUM SERPL-MCNC: 9.6 MG/DL (ref 8.3–10.6)
CHLORIDE SERPL-SCNC: 101 MMOL/L (ref 99–110)
CO2 SERPL-SCNC: 23 MMOL/L (ref 21–32)
CREAT SERPL-MCNC: 1.5 MG/DL (ref 0.6–1.2)
DEPRECATED RDW RBC AUTO: 16 % (ref 12.4–15.4)
EOSINOPHIL # BLD: 0.2 K/UL (ref 0–0.6)
EOSINOPHIL NFR BLD: 3.3 %
GFR SERPLBLD CREATININE-BSD FMLA CKD-EPI: 32 ML/MIN/{1.73_M2}
GLUCOSE SERPL-MCNC: 82 MG/DL (ref 70–99)
HCT VFR BLD AUTO: 39.3 % (ref 36–48)
HGB BLD-MCNC: 13 G/DL (ref 12–16)
LYMPHOCYTES # BLD: 0.9 K/UL (ref 1–5.1)
LYMPHOCYTES NFR BLD: 18.9 %
MCH RBC QN AUTO: 27.4 PG (ref 26–34)
MCHC RBC AUTO-ENTMCNC: 33.2 G/DL (ref 31–36)
MCV RBC AUTO: 82.5 FL (ref 80–100)
MONOCYTES # BLD: 0.5 K/UL (ref 0–1.3)
MONOCYTES NFR BLD: 11.4 %
NEUTROPHILS # BLD: 3.1 K/UL (ref 1.7–7.7)
NEUTROPHILS NFR BLD: 65.3 %
PLATELET # BLD AUTO: 195 K/UL (ref 135–450)
PMV BLD AUTO: 9.4 FL (ref 5–10.5)
POTASSIUM SERPL-SCNC: 4.7 MMOL/L (ref 3.5–5.1)
PROT SERPL-MCNC: 6.7 G/DL (ref 6.4–8.2)
RBC # BLD AUTO: 4.76 M/UL (ref 4–5.2)
SODIUM SERPL-SCNC: 138 MMOL/L (ref 136–145)
WBC # BLD AUTO: 4.8 K/UL (ref 4–11)

## 2024-10-16 LAB — BACTERIA UR CULT: NORMAL

## 2024-10-31 RX ORDER — PANTOPRAZOLE SODIUM 20 MG/1
20 TABLET, DELAYED RELEASE ORAL DAILY
Qty: 30 TABLET | Refills: 0 | Status: SHIPPED | OUTPATIENT
Start: 2024-10-31

## 2024-10-31 RX ORDER — FUROSEMIDE 20 MG/1
20 TABLET ORAL DAILY
Qty: 30 TABLET | Refills: 0 | Status: SHIPPED | OUTPATIENT
Start: 2024-10-31

## 2024-10-31 RX ORDER — POTASSIUM CHLORIDE 750 MG/1
10 TABLET, EXTENDED RELEASE ORAL DAILY
Qty: 30 TABLET | Refills: 0 | Status: SHIPPED | OUTPATIENT
Start: 2024-10-31

## 2024-11-19 ENCOUNTER — TELEPHONE (OUTPATIENT)
Dept: FAMILY MEDICINE CLINIC | Age: 89
End: 2024-11-19

## 2024-11-19 RX ORDER — AMIODARONE HYDROCHLORIDE 200 MG/1
200 TABLET ORAL DAILY
Qty: 30 TABLET | Refills: 0 | Status: SHIPPED | OUTPATIENT
Start: 2024-11-19

## 2024-12-02 RX ORDER — FUROSEMIDE 20 MG/1
20 TABLET ORAL DAILY
Qty: 30 TABLET | Refills: 0 | Status: SHIPPED | OUTPATIENT
Start: 2024-12-02

## 2024-12-02 RX ORDER — POTASSIUM CHLORIDE 750 MG/1
10 TABLET, EXTENDED RELEASE ORAL DAILY
Qty: 30 TABLET | Refills: 0 | Status: SHIPPED | OUTPATIENT
Start: 2024-12-02

## 2024-12-02 RX ORDER — PANTOPRAZOLE SODIUM 20 MG/1
20 TABLET, DELAYED RELEASE ORAL DAILY
Qty: 30 TABLET | Refills: 0 | Status: SHIPPED | OUTPATIENT
Start: 2024-12-02

## 2024-12-23 ENCOUNTER — TELEPHONE (OUTPATIENT)
Dept: FAMILY MEDICINE CLINIC | Age: 88
End: 2024-12-23

## 2024-12-23 RX ORDER — AMIODARONE HYDROCHLORIDE 200 MG/1
200 TABLET ORAL DAILY
Qty: 30 TABLET | Refills: 0 | Status: SHIPPED | OUTPATIENT
Start: 2024-12-23

## 2024-12-30 ENCOUNTER — TELEPHONE (OUTPATIENT)
Dept: FAMILY MEDICINE CLINIC | Age: 88
End: 2024-12-30

## 2024-12-30 RX ORDER — AMIODARONE HYDROCHLORIDE 200 MG/1
200 TABLET ORAL DAILY
Qty: 30 TABLET | Refills: 0 | Status: SHIPPED | OUTPATIENT
Start: 2024-12-30

## 2024-12-30 RX ORDER — POTASSIUM CHLORIDE 750 MG/1
10 TABLET, EXTENDED RELEASE ORAL DAILY
Qty: 30 TABLET | Refills: 0 | Status: SHIPPED | OUTPATIENT
Start: 2024-12-30

## 2025-01-08 ENCOUNTER — OFFICE VISIT (OUTPATIENT)
Dept: FAMILY MEDICINE CLINIC | Age: 89
End: 2025-01-08
Payer: MEDICARE

## 2025-01-08 VITALS
HEIGHT: 61 IN | OXYGEN SATURATION: 92 % | BODY MASS INDEX: 15.9 KG/M2 | RESPIRATION RATE: 18 BRPM | DIASTOLIC BLOOD PRESSURE: 68 MMHG | WEIGHT: 84.2 LBS | HEART RATE: 78 BPM | SYSTOLIC BLOOD PRESSURE: 162 MMHG

## 2025-01-08 DIAGNOSIS — J06.9 PROTRACTED URI: Primary | ICD-10-CM

## 2025-01-08 DIAGNOSIS — I10 ESSENTIAL HYPERTENSION: ICD-10-CM

## 2025-01-08 DIAGNOSIS — N18.9 CHRONIC KIDNEY DISEASE, UNSPECIFIED CKD STAGE: ICD-10-CM

## 2025-01-08 LAB
ANION GAP SERPL CALCULATED.3IONS-SCNC: 14 MMOL/L (ref 3–16)
BUN SERPL-MCNC: 25 MG/DL (ref 7–20)
CALCIUM SERPL-MCNC: 9.7 MG/DL (ref 8.3–10.6)
CHLORIDE SERPL-SCNC: 98 MMOL/L (ref 99–110)
CO2 SERPL-SCNC: 25 MMOL/L (ref 21–32)
CREAT SERPL-MCNC: 1.5 MG/DL (ref 0.6–1.2)
GFR SERPLBLD CREATININE-BSD FMLA CKD-EPI: 32 ML/MIN/{1.73_M2}
GLUCOSE SERPL-MCNC: 90 MG/DL (ref 70–99)
POTASSIUM SERPL-SCNC: 4.9 MMOL/L (ref 3.5–5.1)
SODIUM SERPL-SCNC: 137 MMOL/L (ref 136–145)

## 2025-01-08 PROCEDURE — 36415 COLL VENOUS BLD VENIPUNCTURE: CPT | Performed by: NURSE PRACTITIONER

## 2025-01-08 PROCEDURE — 1159F MED LIST DOCD IN RCRD: CPT | Performed by: NURSE PRACTITIONER

## 2025-01-08 PROCEDURE — 1123F ACP DISCUSS/DSCN MKR DOCD: CPT | Performed by: NURSE PRACTITIONER

## 2025-01-08 PROCEDURE — 1160F RVW MEDS BY RX/DR IN RCRD: CPT | Performed by: NURSE PRACTITIONER

## 2025-01-08 PROCEDURE — 99213 OFFICE O/P EST LOW 20 MIN: CPT | Performed by: NURSE PRACTITIONER

## 2025-01-08 RX ORDER — AZITHROMYCIN 250 MG/1
TABLET, FILM COATED ORAL
Qty: 6 TABLET | Refills: 0 | Status: SHIPPED | OUTPATIENT
Start: 2025-01-08 | End: 2025-01-18

## 2025-01-08 RX ORDER — PANTOPRAZOLE SODIUM 20 MG/1
20 TABLET, DELAYED RELEASE ORAL DAILY
Qty: 30 TABLET | Refills: 1 | Status: SHIPPED | OUTPATIENT
Start: 2025-01-08

## 2025-01-08 SDOH — ECONOMIC STABILITY: FOOD INSECURITY: WITHIN THE PAST 12 MONTHS, YOU WORRIED THAT YOUR FOOD WOULD RUN OUT BEFORE YOU GOT MONEY TO BUY MORE.: NEVER TRUE

## 2025-01-08 SDOH — ECONOMIC STABILITY: FOOD INSECURITY: WITHIN THE PAST 12 MONTHS, THE FOOD YOU BOUGHT JUST DIDN'T LAST AND YOU DIDN'T HAVE MONEY TO GET MORE.: NEVER TRUE

## 2025-01-08 ASSESSMENT — PATIENT HEALTH QUESTIONNAIRE - PHQ9
SUM OF ALL RESPONSES TO PHQ QUESTIONS 1-9: 2
SUM OF ALL RESPONSES TO PHQ QUESTIONS 1-9: 2
SUM OF ALL RESPONSES TO PHQ9 QUESTIONS 1 & 2: 2
2. FEELING DOWN, DEPRESSED OR HOPELESS: MORE THAN HALF THE DAYS
1. LITTLE INTEREST OR PLEASURE IN DOING THINGS: NOT AT ALL
SUM OF ALL RESPONSES TO PHQ QUESTIONS 1-9: 2
SUM OF ALL RESPONSES TO PHQ QUESTIONS 1-9: 2

## 2025-01-08 NOTE — PATIENT INSTRUCTIONS
You may receive a survey regarding the care you received during your visit.  Your input is valuable to us.  We encourage you to complete and return your survey.  We hope you will choose us in the future for your healthcare needs.       Instructions for Respiratory Infections (SAVE THIS SHEET)   For the first 7-14 days of symptoms follow instructions below, even before being seen in the office or even during treatment with antibiotics, until symptom free.   1. Water: Drink 1 ounce of water for every 2 pounds of body weight for adults need 64 Ounces of water per day. This will loosen mucus in the head and chest & improve the weak feeling of dehydration, allow the body to get germ fighting resources to the infection. Half can be juice or sugar free Crystal Light. Don't count drinks with caffeine or carbonation. Infants can have Pedialyte liquid or freezer pops. Avoid salt if you have high Blood Pressure, swelling in the feet or ankles or have heart problems.   2. Humidity: Humidify the air to 35-50% ( or until the windows fog over slightly). Can use a humidifier, vaporizer, boil water on the stove or put a coffee can full of water on the heater vents. This will loosen mucus from infections and allergies.   3. Sleep: Get 8-10 hours a night and rest during the evening after work or school. If you have trouble sleeping, adults can take Melatonin 3mg up to 3 tabs at bedtime ( not for children or pregnant women). If Mono is suspected then sleep during 9PM to 9AM time span (if possible.)   4.Cough: Take cough medicines with Guaifenesin ( to loosen chest or head congestion) and Dextromethorphan ( to decrease excess cough). Robitussin D.M. Syrup every 4-6 hrs or Mucinex D.M. Pills twice a day. Use the pediatric formulations for children over 6 months making sure they are alcohol & sugar free for children, pregnant women, and diabetics.   5. Pain And Fevers: Take Acetaminophen ( Tylenol) for fevers, aches, and headaches. 2-500

## 2025-01-08 NOTE — PROGRESS NOTES
Mayra Nieto (:  1931) is a 89 y.o. female,Established patient, here for evaluation of the following chief complaint(s):    Congestion (Cough, congestion x 2 days)      SUBJECTIVE/OBJECTIVE:  HPI  MRS SHABAZZ - STATES SHE HAS BEEN SICK  FOR AT LEAST THE LAST FEW DAYS  SHE C/O CHEST CONGESTION -  RUNNY NOSE  USING NOSE SPRAY THAT HAS NOT HELPED  COUGH ? DRY BUT HER DAUGHTER THINKS SHE HEARD A WEIRD NOISE WHEN SHE COUGH    Review of Systems  PER HPI  Physical Exam  Constitutional:       General: She is awake. She is not in acute distress.     Appearance: Normal appearance. She is well-groomed. She is not ill-appearing, toxic-appearing or diaphoretic.   HENT:      Right Ear: Tympanic membrane normal.      Left Ear: Tympanic membrane normal.   Cardiovascular:      Rate and Rhythm: Normal rate and regular rhythm.      Heart sounds: Normal heart sounds, S1 normal and S2 normal.   Pulmonary:      Effort: Pulmonary effort is normal.      Breath sounds: Normal breath sounds and air entry.   Neurological:      Mental Status: She is alert and oriented to person, place, and time.   Psychiatric:         Attention and Perception: Attention and perception normal.         Mood and Affect: Mood and affect normal.         Speech: Speech normal.         Behavior: Behavior normal. Behavior is cooperative.         Thought Content: Thought content normal.         Cognition and Memory: Cognition and memory normal.         Judgment: Judgment normal.         Mayra was seen today for congestion.    Diagnoses and all orders for this visit:    Protracted URI  -     azithromycin (ZITHROMAX) 250 MG tablet; 500mg on day 1 followed by 250mg on days 2 - 5  Instructions for Respiratory Infections (SAVE THIS SHEET)   For the first 7-14 days of symptoms follow instructions below, even before being seen in the office or even during treatment with antibiotics, until symptom free.   1. Water: Drink 1 ounce of water for every 2 pounds of

## 2025-01-14 RX ORDER — FUROSEMIDE 20 MG/1
20 TABLET ORAL DAILY
Qty: 30 TABLET | Refills: 0 | Status: SHIPPED | OUTPATIENT
Start: 2025-01-14

## 2025-01-21 ENCOUNTER — TELEPHONE (OUTPATIENT)
Dept: FAMILY MEDICINE CLINIC | Age: 89
End: 2025-01-21

## 2025-01-21 RX ORDER — AMIODARONE HYDROCHLORIDE 200 MG/1
200 TABLET ORAL DAILY
Qty: 30 TABLET | Refills: 0 | Status: SHIPPED | OUTPATIENT
Start: 2025-01-21

## 2025-02-13 RX ORDER — FUROSEMIDE 20 MG/1
20 TABLET ORAL DAILY
Qty: 30 TABLET | Refills: 0 | Status: SHIPPED | OUTPATIENT
Start: 2025-02-13 | End: 2025-03-13

## 2025-02-13 RX ORDER — POTASSIUM CHLORIDE 750 MG/1
10 TABLET, EXTENDED RELEASE ORAL DAILY
Qty: 30 TABLET | Refills: 0 | Status: SHIPPED | OUTPATIENT
Start: 2025-02-13 | End: 2025-03-13

## 2025-03-13 RX ORDER — FUROSEMIDE 20 MG/1
20 TABLET ORAL DAILY
Qty: 30 TABLET | Refills: 0 | Status: SHIPPED | OUTPATIENT
Start: 2025-03-13

## 2025-03-13 RX ORDER — POTASSIUM CHLORIDE 750 MG/1
10 TABLET, EXTENDED RELEASE ORAL DAILY
Qty: 30 TABLET | Refills: 0 | Status: SHIPPED | OUTPATIENT
Start: 2025-03-13

## 2025-03-14 RX ORDER — PANTOPRAZOLE SODIUM 20 MG/1
20 TABLET, DELAYED RELEASE ORAL DAILY
Qty: 60 TABLET | Refills: 0 | Status: SHIPPED | OUTPATIENT
Start: 2025-03-14

## 2025-03-24 RX ORDER — AMIODARONE HYDROCHLORIDE 200 MG/1
200 TABLET ORAL DAILY
Qty: 30 TABLET | Refills: 0 | Status: SHIPPED | OUTPATIENT
Start: 2025-03-24

## 2025-04-15 ENCOUNTER — TELEPHONE (OUTPATIENT)
Dept: FAMILY MEDICINE CLINIC | Age: 89
End: 2025-04-15

## 2025-04-15 RX ORDER — FUROSEMIDE 20 MG/1
20 TABLET ORAL DAILY
Qty: 30 TABLET | Refills: 0 | Status: SHIPPED | OUTPATIENT
Start: 2025-04-15

## 2025-04-21 RX ORDER — AMIODARONE HYDROCHLORIDE 200 MG/1
200 TABLET ORAL DAILY
Qty: 30 TABLET | Refills: 0 | Status: SHIPPED | OUTPATIENT
Start: 2025-04-21

## 2025-04-21 RX ORDER — FUROSEMIDE 20 MG/1
20 TABLET ORAL DAILY
Qty: 30 TABLET | Refills: 0 | OUTPATIENT
Start: 2025-04-21

## 2025-04-21 RX ORDER — POTASSIUM CHLORIDE 750 MG/1
10 TABLET, EXTENDED RELEASE ORAL DAILY
Qty: 30 TABLET | Refills: 0 | Status: SHIPPED | OUTPATIENT
Start: 2025-04-21

## 2025-04-29 NOTE — TELEPHONE ENCOUNTER
Head, normocephalic, atraumatic, Face, Face within normal limits, Ears, External ears within normal limits, Nose/Nasopharynx, External nose normal appearance, nares patent, no nasal discharge, Mouth and Throat, Oral cavity appearance normal, Lips, Appearance normal NOTED.KW

## 2025-05-19 ENCOUNTER — TELEPHONE (OUTPATIENT)
Dept: FAMILY MEDICINE CLINIC | Age: 89
End: 2025-05-19

## 2025-05-19 RX ORDER — FUROSEMIDE 20 MG/1
20 TABLET ORAL DAILY
Qty: 30 TABLET | Refills: 0 | Status: SHIPPED | OUTPATIENT
Start: 2025-05-19

## 2025-05-19 RX ORDER — AMIODARONE HYDROCHLORIDE 200 MG/1
200 TABLET ORAL DAILY
Qty: 30 TABLET | Refills: 0 | Status: SHIPPED | OUTPATIENT
Start: 2025-05-19

## 2025-05-19 RX ORDER — PANTOPRAZOLE SODIUM 20 MG/1
20 TABLET, DELAYED RELEASE ORAL DAILY
Qty: 60 TABLET | Refills: 0 | Status: SHIPPED | OUTPATIENT
Start: 2025-05-19

## 2025-05-19 RX ORDER — POTASSIUM CHLORIDE 750 MG/1
10 TABLET, EXTENDED RELEASE ORAL DAILY
Qty: 30 TABLET | Refills: 0 | Status: SHIPPED | OUTPATIENT
Start: 2025-05-19

## 2025-05-19 NOTE — TELEPHONE ENCOUNTER
[facility] HAVING ISSUES E-SCRIBING SCRIPTS FOR SOME OF OUR PROVIDERS. THEY ARE WORKING ON IT. [facility]

## 2025-06-17 RX ORDER — FUROSEMIDE 20 MG/1
20 TABLET ORAL DAILY
Qty: 30 TABLET | Refills: 0 | Status: SHIPPED | OUTPATIENT
Start: 2025-06-17

## 2025-06-17 RX ORDER — AMIODARONE HYDROCHLORIDE 200 MG/1
200 TABLET ORAL DAILY
Qty: 30 TABLET | Refills: 0 | Status: SHIPPED | OUTPATIENT
Start: 2025-06-17

## 2025-06-17 RX ORDER — POTASSIUM CHLORIDE 750 MG/1
10 TABLET, EXTENDED RELEASE ORAL DAILY
Qty: 30 TABLET | Refills: 0 | Status: SHIPPED | OUTPATIENT
Start: 2025-06-17

## 2025-07-17 RX ORDER — PANTOPRAZOLE SODIUM 20 MG/1
20 TABLET, DELAYED RELEASE ORAL DAILY
Qty: 60 TABLET | Refills: 0 | Status: SHIPPED | OUTPATIENT
Start: 2025-07-17

## 2025-07-22 ENCOUNTER — TELEPHONE (OUTPATIENT)
Dept: FAMILY MEDICINE CLINIC | Age: 89
End: 2025-07-22

## 2025-07-22 RX ORDER — POTASSIUM CHLORIDE 750 MG/1
10 TABLET, EXTENDED RELEASE ORAL DAILY
Qty: 30 TABLET | Refills: 0 | Status: SHIPPED | OUTPATIENT
Start: 2025-07-22

## 2025-07-23 RX ORDER — FUROSEMIDE 20 MG/1
20 TABLET ORAL DAILY
Qty: 30 TABLET | Refills: 0 | Status: SHIPPED | OUTPATIENT
Start: 2025-07-23

## 2025-07-24 ENCOUNTER — TELEPHONE (OUTPATIENT)
Dept: FAMILY MEDICINE CLINIC | Age: 89
End: 2025-07-24

## 2025-07-24 RX ORDER — AMIODARONE HYDROCHLORIDE 200 MG/1
200 TABLET ORAL DAILY
Qty: 30 TABLET | Refills: 0 | Status: SHIPPED | OUTPATIENT
Start: 2025-07-24

## 2025-08-05 RX ORDER — AMIODARONE HYDROCHLORIDE 200 MG/1
200 TABLET ORAL DAILY
Qty: 30 TABLET | Refills: 0 | OUTPATIENT
Start: 2025-08-05

## 2025-08-18 RX ORDER — POTASSIUM CHLORIDE 750 MG/1
10 TABLET, EXTENDED RELEASE ORAL DAILY
Qty: 30 TABLET | Refills: 0 | Status: SHIPPED | OUTPATIENT
Start: 2025-08-18

## 2025-08-20 ENCOUNTER — OFFICE VISIT (OUTPATIENT)
Dept: FAMILY MEDICINE CLINIC | Age: 89
End: 2025-08-20

## 2025-08-20 VITALS
WEIGHT: 86 LBS | SYSTOLIC BLOOD PRESSURE: 124 MMHG | BODY MASS INDEX: 16.24 KG/M2 | OXYGEN SATURATION: 100 % | HEIGHT: 61 IN | DIASTOLIC BLOOD PRESSURE: 72 MMHG | HEART RATE: 72 BPM

## 2025-08-20 DIAGNOSIS — I48.0 PAF (PAROXYSMAL ATRIAL FIBRILLATION) (HCC): ICD-10-CM

## 2025-08-20 DIAGNOSIS — Z01.818 PRE-OP EXAM: Primary | ICD-10-CM

## 2025-08-20 LAB
ALBUMIN SERPL-MCNC: 4.2 G/DL (ref 3.4–5)
ALBUMIN/GLOB SERPL: 1.8 {RATIO} (ref 1.1–2.2)
ALP SERPL-CCNC: 59 U/L (ref 40–129)
ALT SERPL-CCNC: 9 U/L (ref 10–40)
ANION GAP SERPL CALCULATED.3IONS-SCNC: 12 MMOL/L (ref 3–16)
AST SERPL-CCNC: 22 U/L (ref 15–37)
BASOPHILS # BLD: 0.1 K/UL (ref 0–0.2)
BASOPHILS NFR BLD: 1.2 %
BILIRUB SERPL-MCNC: 0.3 MG/DL (ref 0–1)
BUN SERPL-MCNC: 21 MG/DL (ref 7–20)
CALCIUM SERPL-MCNC: 9 MG/DL (ref 8.3–10.6)
CHLORIDE SERPL-SCNC: 103 MMOL/L (ref 99–110)
CO2 SERPL-SCNC: 22 MMOL/L (ref 21–32)
CREAT SERPL-MCNC: 1.5 MG/DL (ref 0.6–1.2)
DEPRECATED RDW RBC AUTO: 15.5 % (ref 12.4–15.4)
EOSINOPHIL # BLD: 0.1 K/UL (ref 0–0.6)
EOSINOPHIL NFR BLD: 2 %
GFR SERPLBLD CREATININE-BSD FMLA CKD-EPI: 32 ML/MIN/{1.73_M2}
GLUCOSE SERPL-MCNC: 94 MG/DL (ref 70–99)
HCT VFR BLD AUTO: 37.6 % (ref 36–48)
HGB BLD-MCNC: 12.6 G/DL (ref 12–16)
LYMPHOCYTES # BLD: 0.9 K/UL (ref 1–5.1)
LYMPHOCYTES NFR BLD: 16 %
MCH RBC QN AUTO: 27.4 PG (ref 26–34)
MCHC RBC AUTO-ENTMCNC: 33.5 G/DL (ref 31–36)
MCV RBC AUTO: 81.7 FL (ref 80–100)
MONOCYTES # BLD: 0.6 K/UL (ref 0–1.3)
MONOCYTES NFR BLD: 10.7 %
NEUTROPHILS # BLD: 4.2 K/UL (ref 1.7–7.7)
NEUTROPHILS NFR BLD: 70.1 %
PLATELET # BLD AUTO: 172 K/UL (ref 135–450)
PMV BLD AUTO: 9.2 FL (ref 5–10.5)
POTASSIUM SERPL-SCNC: 4.6 MMOL/L (ref 3.5–5.1)
PROT SERPL-MCNC: 6.6 G/DL (ref 6.4–8.2)
RBC # BLD AUTO: 4.6 M/UL (ref 4–5.2)
SODIUM SERPL-SCNC: 137 MMOL/L (ref 136–145)
WBC # BLD AUTO: 5.9 K/UL (ref 4–11)

## 2025-08-21 ENCOUNTER — TELEPHONE (OUTPATIENT)
Dept: CARDIOLOGY CLINIC | Age: 89
End: 2025-08-21

## 2025-08-22 ENCOUNTER — TELEPHONE (OUTPATIENT)
Dept: FAMILY MEDICINE CLINIC | Age: 89
End: 2025-08-22

## 2025-08-22 RX ORDER — AMIODARONE HYDROCHLORIDE 200 MG/1
200 TABLET ORAL DAILY
Qty: 90 TABLET | Refills: 0 | Status: SHIPPED | OUTPATIENT
Start: 2025-08-22

## 2025-08-24 ENCOUNTER — APPOINTMENT (OUTPATIENT)
Dept: CT IMAGING | Age: 89
End: 2025-08-24
Payer: MEDICARE

## 2025-08-24 ENCOUNTER — APPOINTMENT (OUTPATIENT)
Dept: GENERAL RADIOLOGY | Age: 89
End: 2025-08-24
Payer: MEDICARE

## 2025-08-24 ENCOUNTER — HOSPITAL ENCOUNTER (EMERGENCY)
Age: 89
Discharge: HOME | End: 2025-08-24
Payer: MEDICARE

## 2025-08-24 VITALS
TEMPERATURE: 97.8 F | SYSTOLIC BLOOD PRESSURE: 135 MMHG | RESPIRATION RATE: 18 BRPM | DIASTOLIC BLOOD PRESSURE: 68 MMHG | HEIGHT: 61 IN | OXYGEN SATURATION: 98 % | WEIGHT: 90.61 LBS | HEART RATE: 69 BPM | BODY MASS INDEX: 17.11 KG/M2

## 2025-08-24 DIAGNOSIS — N30.00 ACUTE CYSTITIS WITHOUT HEMATURIA: Primary | ICD-10-CM

## 2025-08-24 LAB
ALBUMIN SERPL-MCNC: 3.7 G/DL (ref 3.4–5)
ALBUMIN/GLOB SERPL: 1.3 {RATIO} (ref 1.1–2.2)
ALP SERPL-CCNC: 67 U/L (ref 40–129)
ALT SERPL-CCNC: 25 U/L (ref 10–40)
ANION GAP SERPL CALCULATED.3IONS-SCNC: 10 MMOL/L (ref 3–16)
AST SERPL-CCNC: 38 U/L (ref 15–37)
BACTERIA URNS QL MICRO: ABNORMAL /HPF
BASOPHILS # BLD: 0 K/UL (ref 0–0.2)
BASOPHILS NFR BLD: 0.3 %
BILIRUB SERPL-MCNC: 0.5 MG/DL (ref 0–1)
BILIRUB UR QL STRIP.AUTO: NEGATIVE
BUN SERPL-MCNC: 26 MG/DL (ref 7–20)
CALCIUM SERPL-MCNC: 9.1 MG/DL (ref 8.3–10.6)
CHLORIDE SERPL-SCNC: 100 MMOL/L (ref 99–110)
CLARITY UR: ABNORMAL
CO2 SERPL-SCNC: 24 MMOL/L (ref 21–32)
COLOR UR: YELLOW
CREAT SERPL-MCNC: 1.5 MG/DL (ref 0.6–1.2)
DEPRECATED RDW RBC AUTO: 15.5 % (ref 12.4–15.4)
EOSINOPHIL # BLD: 0 K/UL (ref 0–0.6)
EOSINOPHIL NFR BLD: 0 %
EPI CELLS #/AREA URNS AUTO: 2 /HPF (ref 0–5)
FLUAV + FLUBV AG NOSE IA.RAPID: NOT DETECTED
FLUAV + FLUBV AG NOSE IA.RAPID: NOT DETECTED
GFR SERPLBLD CREATININE-BSD FMLA CKD-EPI: 32 ML/MIN/{1.73_M2}
GLUCOSE BLD-MCNC: 90 MG/DL (ref 70–99)
GLUCOSE BLD-MCNC: 99 MG/DL (ref 70–99)
GLUCOSE SERPL-MCNC: 113 MG/DL (ref 70–99)
GLUCOSE UR STRIP.AUTO-MCNC: NEGATIVE MG/DL
HCT VFR BLD AUTO: 35.4 % (ref 36–48)
HGB BLD-MCNC: 12.1 G/DL (ref 12–16)
HGB UR QL STRIP.AUTO: ABNORMAL
HYALINE CASTS #/AREA URNS AUTO: 4 /LPF (ref 0–8)
KETONES UR STRIP.AUTO-MCNC: NEGATIVE MG/DL
LEUKOCYTE ESTERASE UR QL STRIP.AUTO: ABNORMAL
LYMPHOCYTES # BLD: 0.6 K/UL (ref 1–5.1)
LYMPHOCYTES NFR BLD: 5.2 %
MCH RBC QN AUTO: 27.6 PG (ref 26–34)
MCHC RBC AUTO-ENTMCNC: 34 G/DL (ref 31–36)
MCV RBC AUTO: 81.2 FL (ref 80–100)
MONOCYTES # BLD: 1.1 K/UL (ref 0–1.3)
MONOCYTES NFR BLD: 9.6 %
NEUTROPHILS # BLD: 9.3 K/UL (ref 1.7–7.7)
NEUTROPHILS NFR BLD: 84.9 %
NITRITE UR QL STRIP.AUTO: POSITIVE
NT-PROBNP SERPL-MCNC: 3227 PG/ML (ref 0–449)
PERFORMED ON: NORMAL
PERFORMED ON: NORMAL
PH UR STRIP.AUTO: 5.5 [PH] (ref 5–8)
PLATELET # BLD AUTO: 145 K/UL (ref 135–450)
PMV BLD AUTO: 8.8 FL (ref 5–10.5)
POTASSIUM SERPL-SCNC: 4.2 MMOL/L (ref 3.5–5.1)
PROT SERPL-MCNC: 6.6 G/DL (ref 6.4–8.2)
PROT UR STRIP.AUTO-MCNC: 30 MG/DL
RBC # BLD AUTO: 4.36 M/UL (ref 4–5.2)
RBC CLUMPS #/AREA URNS AUTO: 0 /HPF (ref 0–4)
SARS-COV-2 RDRP RESP QL NAA+PROBE: NOT DETECTED
SODIUM SERPL-SCNC: 134 MMOL/L (ref 136–145)
SP GR UR STRIP.AUTO: 1.01 (ref 1–1.03)
TROPONIN, HIGH SENSITIVITY: 16 NG/L (ref 0–14)
TROPONIN, HIGH SENSITIVITY: 17 NG/L (ref 0–14)
UA COMPLETE W REFLEX CULTURE PNL UR: YES
UA DIPSTICK W REFLEX MICRO PNL UR: YES
URN SPEC COLLECT METH UR: ABNORMAL
UROBILINOGEN UR STRIP-ACNC: 0.2 E.U./DL
WBC # BLD AUTO: 10.9 K/UL (ref 4–11)
WBC #/AREA URNS AUTO: 410 /HPF (ref 0–5)

## 2025-08-24 PROCEDURE — 93005 ELECTROCARDIOGRAM TRACING: CPT

## 2025-08-24 PROCEDURE — 96361 HYDRATE IV INFUSION ADD-ON: CPT

## 2025-08-24 PROCEDURE — 87086 URINE CULTURE/COLONY COUNT: CPT

## 2025-08-24 PROCEDURE — 80053 COMPREHEN METABOLIC PANEL: CPT

## 2025-08-24 PROCEDURE — 2500000003 HC RX 250 WO HCPCS

## 2025-08-24 PROCEDURE — 87077 CULTURE AEROBIC IDENTIFY: CPT

## 2025-08-24 PROCEDURE — 87635 SARS-COV-2 COVID-19 AMP PRB: CPT

## 2025-08-24 PROCEDURE — 81001 URINALYSIS AUTO W/SCOPE: CPT

## 2025-08-24 PROCEDURE — 71045 X-RAY EXAM CHEST 1 VIEW: CPT

## 2025-08-24 PROCEDURE — 83880 ASSAY OF NATRIURETIC PEPTIDE: CPT

## 2025-08-24 PROCEDURE — 85025 COMPLETE CBC W/AUTO DIFF WBC: CPT

## 2025-08-24 PROCEDURE — 87502 INFLUENZA DNA AMP PROBE: CPT

## 2025-08-24 PROCEDURE — 36415 COLL VENOUS BLD VENIPUNCTURE: CPT

## 2025-08-24 PROCEDURE — 99285 EMERGENCY DEPT VISIT HI MDM: CPT

## 2025-08-24 PROCEDURE — 96374 THER/PROPH/DIAG INJ IV PUSH: CPT

## 2025-08-24 PROCEDURE — 84484 ASSAY OF TROPONIN QUANT: CPT

## 2025-08-24 PROCEDURE — 70450 CT HEAD/BRAIN W/O DYE: CPT

## 2025-08-24 PROCEDURE — 2580000003 HC RX 258

## 2025-08-24 PROCEDURE — 87186 SC STD MICRODIL/AGAR DIL: CPT

## 2025-08-24 PROCEDURE — 6360000002 HC RX W HCPCS

## 2025-08-24 RX ORDER — CEFUROXIME AXETIL 500 MG/1
500 TABLET ORAL 2 TIMES DAILY
Qty: 14 TABLET | Refills: 0 | Status: SHIPPED | OUTPATIENT
Start: 2025-08-24 | End: 2025-08-31

## 2025-08-24 RX ORDER — 0.9 % SODIUM CHLORIDE 0.9 %
500 INTRAVENOUS SOLUTION INTRAVENOUS ONCE
Status: COMPLETED | OUTPATIENT
Start: 2025-08-24 | End: 2025-08-24

## 2025-08-24 RX ADMIN — SODIUM CHLORIDE 500 ML: 0.9 INJECTION, SOLUTION INTRAVENOUS at 15:50

## 2025-08-24 RX ADMIN — WATER 1000 MG: 1 INJECTION INTRAMUSCULAR; INTRAVENOUS; SUBCUTANEOUS at 17:27

## 2025-08-24 ASSESSMENT — PAIN SCALES - GENERAL
PAINLEVEL_OUTOF10: 0

## 2025-08-24 ASSESSMENT — PAIN - FUNCTIONAL ASSESSMENT: PAIN_FUNCTIONAL_ASSESSMENT: 0-10

## 2025-08-25 LAB
EKG ATRIAL RATE: 72 BPM
EKG DIAGNOSIS: NORMAL
EKG P AXIS: 70 DEGREES
EKG P-R INTERVAL: 166 MS
EKG Q-T INTERVAL: 428 MS
EKG QRS DURATION: 94 MS
EKG QTC CALCULATION (BAZETT): 468 MS
EKG R AXIS: 5 DEGREES
EKG T AXIS: 30 DEGREES
EKG VENTRICULAR RATE: 72 BPM

## 2025-08-25 PROCEDURE — 93010 ELECTROCARDIOGRAM REPORT: CPT | Performed by: INTERNAL MEDICINE

## 2025-08-25 RX ORDER — AMIODARONE HYDROCHLORIDE 200 MG/1
200 TABLET ORAL DAILY
Qty: 30 TABLET | Refills: 0 | Status: SHIPPED | OUTPATIENT
Start: 2025-08-25

## 2025-08-25 RX ORDER — FUROSEMIDE 20 MG/1
20 TABLET ORAL DAILY
Qty: 30 TABLET | Refills: 0 | Status: SHIPPED | OUTPATIENT
Start: 2025-08-25

## 2025-08-26 LAB
BACTERIA UR CULT: ABNORMAL
ORGANISM: ABNORMAL

## (undated) DEVICE — APPLICATOR PREP 26ML 0.7% IOD POVACRYLEX 74% ISO ALC ST

## (undated) DEVICE — DRESSING WND SM W2.5XL4IN BRTH W/ CATH SECUREMENT AND

## (undated) DEVICE — GOWN SIRUS NONREIN LG W/TWL: Brand: MEDLINE INDUSTRIES, INC.

## (undated) DEVICE — PRESSURE MONITORING SET: Brand: TRUWAVE

## (undated) DEVICE — ESOPHAGEAL/PYLORIC/COLONIC/BILIARY WIREGUIDED BALLOON DILATATION CATHETER: Brand: CRE™ PRO

## (undated) DEVICE — ENDOSCOPY KIT: Brand: MEDLINE INDUSTRIES, INC.

## (undated) DEVICE — PAD THRM M SPL TORSO

## (undated) DEVICE — GLOVE SURG SZ 8 L11.77IN FNGR THK9.8MIL STRW LTX POLYMER

## (undated) DEVICE — BITE BLOCK ENDOSCP AD 60 FR W/ ADJ STRP PLAS GRN BLOX

## (undated) DEVICE — EXTENSION SET, 2 INJECTION SITES, MALE LUER LOCK ADAPTER WITH RETRACTABLE COLLAR: Brand: INTERLINK

## (undated) DEVICE — GLOVE SURG SZ 65 THK91MIL LTX FREE SYN POLYISOPRENE

## (undated) DEVICE — SUPPORT WRST AD W3.5XL9IN DIA14.5IN ART SFT ADJ HK AND LOOP

## (undated) DEVICE — OPEN HRT BASIC PK

## (undated) DEVICE — ELECTRODE ES AD DISPER HYDRGEL THN FOAM ADH SCALLOPED EDGE

## (undated) DEVICE — SET CATH 20GA L1.75IN RAD ART POLYUR RADPQ W/ INTEGR

## (undated) DEVICE — NEEDLE HYPO 18GA L1.5IN THN WALL PIVOTING SHLD BVL ORIENTED

## (undated) DEVICE — CATHETER IV 18GA L1.25N GREEN FEP SFTY STRGHT HUB RDPQUE DSP

## (undated) DEVICE — Z CONVERTED USE 2275871 SPONGE GZ W4XL4IN WHT 8 PLY CURITY

## (undated) DEVICE — GAUZE,SPONGE,8"X4",12PLY,XRAY,STRL,LF: Brand: MEDLINE

## (undated) DEVICE — 3M™ IOBAN™ 2 ANTIMICROBIAL INCISE DRAPE 6650EZ: Brand: IOBAN™ 2

## (undated) DEVICE — Z DISCONTINUED USE 2516375 APPLICATOR MEDICATED 3 CC CLR STRL CHLORAPREP

## (undated) DEVICE — COVER US PRB W13XL244CM SURGICAL INTRAOPERATIVE W RUBBERBAND

## (undated) DEVICE — INTEGRATED INFLATION/LITHO DEVICE: Brand: ALLIANCE II